# Patient Record
Sex: FEMALE | Race: WHITE | NOT HISPANIC OR LATINO | URBAN - METROPOLITAN AREA
[De-identification: names, ages, dates, MRNs, and addresses within clinical notes are randomized per-mention and may not be internally consistent; named-entity substitution may affect disease eponyms.]

---

## 2016-04-14 RX ORDER — ASPIRIN/CALCIUM CARB/MAGNESIUM 324 MG
1 TABLET ORAL
Qty: 0 | Refills: 0 | COMMUNITY
Start: 2016-04-14

## 2016-04-14 RX ORDER — ACETAMINOPHEN 500 MG
2 TABLET ORAL
Qty: 0 | Refills: 0 | COMMUNITY
Start: 2016-04-14

## 2018-07-07 ENCOUNTER — INPATIENT (INPATIENT)
Facility: HOSPITAL | Age: 83
LOS: 17 days | Discharge: HOME | DRG: 377 | End: 2018-07-25
Attending: FAMILY MEDICINE | Admitting: FAMILY MEDICINE
Payer: COMMERCIAL

## 2018-07-07 VITALS
OXYGEN SATURATION: 100 % | TEMPERATURE: 98 F | SYSTOLIC BLOOD PRESSURE: 80 MMHG | RESPIRATION RATE: 15 BRPM | DIASTOLIC BLOOD PRESSURE: 50 MMHG | HEART RATE: 91 BPM

## 2018-07-07 DIAGNOSIS — E83.51 HYPOCALCEMIA: ICD-10-CM

## 2018-07-07 DIAGNOSIS — J45.909 UNSPECIFIED ASTHMA, UNCOMPLICATED: ICD-10-CM

## 2018-07-07 DIAGNOSIS — E11.22 TYPE 2 DIABETES MELLITUS WITH DIABETIC CHRONIC KIDNEY DISEASE: ICD-10-CM

## 2018-07-07 DIAGNOSIS — I95.9 HYPOTENSION, UNSPECIFIED: ICD-10-CM

## 2018-07-07 DIAGNOSIS — E03.9 HYPOTHYROIDISM, UNSPECIFIED: ICD-10-CM

## 2018-07-07 DIAGNOSIS — F41.9 ANXIETY DISORDER, UNSPECIFIED: ICD-10-CM

## 2018-07-07 DIAGNOSIS — D64.9 ANEMIA, UNSPECIFIED: ICD-10-CM

## 2018-07-07 DIAGNOSIS — M19.90 UNSPECIFIED OSTEOARTHRITIS, UNSPECIFIED SITE: ICD-10-CM

## 2018-07-07 DIAGNOSIS — Z29.9 ENCOUNTER FOR PROPHYLACTIC MEASURES, UNSPECIFIED: ICD-10-CM

## 2018-07-07 DIAGNOSIS — N17.9 ACUTE KIDNEY FAILURE, UNSPECIFIED: ICD-10-CM

## 2018-07-07 DIAGNOSIS — R53.1 WEAKNESS: ICD-10-CM

## 2018-07-07 LAB
ABO RH CONFIRMATION: SIGNIFICANT CHANGE UP
ALBUMIN SERPL ELPH-MCNC: 1.7 G/DL — LOW (ref 3.3–5)
ALP SERPL-CCNC: 66 U/L — SIGNIFICANT CHANGE UP (ref 40–120)
ALT FLD-CCNC: 8 U/L — LOW (ref 12–78)
ANION GAP SERPL CALC-SCNC: 7 MMOL/L — SIGNIFICANT CHANGE UP (ref 5–17)
ANION GAP SERPL CALC-SCNC: 8 MMOL/L — SIGNIFICANT CHANGE UP (ref 5–17)
ANISOCYTOSIS BLD QL: SIGNIFICANT CHANGE UP
APPEARANCE UR: ABNORMAL
APTT BLD: 29.6 SEC — SIGNIFICANT CHANGE UP (ref 27.5–37.4)
AST SERPL-CCNC: 13 U/L — LOW (ref 15–37)
BACTERIA # UR AUTO: ABNORMAL
BASO STIPL BLD QL SMEAR: PRESENT — SIGNIFICANT CHANGE UP
BASOPHILS # BLD AUTO: 0 K/UL — SIGNIFICANT CHANGE UP (ref 0–0.2)
BASOPHILS NFR BLD AUTO: 0 % — SIGNIFICANT CHANGE UP (ref 0–2)
BILIRUB SERPL-MCNC: 0.1 MG/DL — LOW (ref 0.2–1.2)
BILIRUB UR-MCNC: NEGATIVE — SIGNIFICANT CHANGE UP
BLD GP AB SCN SERPL QL: SIGNIFICANT CHANGE UP
BUN SERPL-MCNC: 62 MG/DL — HIGH (ref 7–23)
BUN SERPL-MCNC: 71 MG/DL — HIGH (ref 7–23)
CALCIUM SERPL-MCNC: 6.4 MG/DL — CRITICAL LOW (ref 8.5–10.1)
CALCIUM SERPL-MCNC: 8 MG/DL — LOW (ref 8.5–10.1)
CHLORIDE SERPL-SCNC: 115 MMOL/L — HIGH (ref 96–108)
CHLORIDE SERPL-SCNC: 120 MMOL/L — HIGH (ref 96–108)
CO2 SERPL-SCNC: 20 MMOL/L — LOW (ref 22–31)
CO2 SERPL-SCNC: 23 MMOL/L — SIGNIFICANT CHANGE UP (ref 22–31)
COLOR SPEC: YELLOW — SIGNIFICANT CHANGE UP
CREAT SERPL-MCNC: 1.9 MG/DL — HIGH (ref 0.5–1.3)
CREAT SERPL-MCNC: 2.1 MG/DL — HIGH (ref 0.5–1.3)
DIFF PNL FLD: ABNORMAL
ELLIPTOCYTES BLD QL SMEAR: SLIGHT — SIGNIFICANT CHANGE UP
EOSINOPHIL # BLD AUTO: 0 K/UL — SIGNIFICANT CHANGE UP (ref 0–0.5)
EOSINOPHIL NFR BLD AUTO: 0 % — SIGNIFICANT CHANGE UP (ref 0–6)
EPI CELLS # UR: SIGNIFICANT CHANGE UP
GLUCOSE SERPL-MCNC: 125 MG/DL — HIGH (ref 70–99)
GLUCOSE SERPL-MCNC: 138 MG/DL — HIGH (ref 70–99)
GLUCOSE UR QL: NEGATIVE — SIGNIFICANT CHANGE UP
HCT VFR BLD CALC: 12.6 % — CRITICAL LOW (ref 34.5–45)
HCT VFR BLD CALC: 22.3 % — LOW (ref 34.5–45)
HGB BLD-MCNC: 3.8 G/DL — CRITICAL LOW (ref 11.5–15.5)
HGB BLD-MCNC: 7.2 G/DL — LOW (ref 11.5–15.5)
HYPOCHROMIA BLD QL: SIGNIFICANT CHANGE UP
INR BLD: 1.53 RATIO — HIGH (ref 0.88–1.16)
KETONES UR-MCNC: NEGATIVE — SIGNIFICANT CHANGE UP
LACTATE SERPL-SCNC: 1.4 MMOL/L — SIGNIFICANT CHANGE UP (ref 0.7–2)
LEUKOCYTE ESTERASE UR-ACNC: ABNORMAL
LIDOCAIN IGE QN: 17 U/L — LOW (ref 73–393)
LYMPHOCYTES # BLD AUTO: 0.56 K/UL — LOW (ref 1–3.3)
LYMPHOCYTES # BLD AUTO: 6 % — LOW (ref 13–44)
MACROCYTES BLD QL: SLIGHT — SIGNIFICANT CHANGE UP
MANUAL SMEAR VERIFICATION: SIGNIFICANT CHANGE UP
MCHC RBC-ENTMCNC: 25.2 PG — LOW (ref 27–34)
MCHC RBC-ENTMCNC: 30.2 GM/DL — LOW (ref 32–36)
MCV RBC AUTO: 83.4 FL — SIGNIFICANT CHANGE UP (ref 80–100)
MICROCYTES BLD QL: SLIGHT — SIGNIFICANT CHANGE UP
MONOCYTES # BLD AUTO: 0.28 K/UL — SIGNIFICANT CHANGE UP (ref 0–0.9)
MONOCYTES NFR BLD AUTO: 3 % — SIGNIFICANT CHANGE UP (ref 2–14)
NEUTROPHILS # BLD AUTO: 8.42 K/UL — HIGH (ref 1.8–7.4)
NEUTROPHILS NFR BLD AUTO: 88 % — HIGH (ref 43–77)
NEUTS BAND # BLD: 3 % — SIGNIFICANT CHANGE UP (ref 0–8)
NITRITE UR-MCNC: NEGATIVE — SIGNIFICANT CHANGE UP
NRBC # BLD: 1 — SIGNIFICANT CHANGE UP
NRBC # BLD: SIGNIFICANT CHANGE UP /100 WBCS (ref 0–0)
OB PNL STL: NEGATIVE — SIGNIFICANT CHANGE UP
OVALOCYTES BLD QL SMEAR: SLIGHT — SIGNIFICANT CHANGE UP
PH UR: 5 — SIGNIFICANT CHANGE UP (ref 5–8)
PLAT MORPH BLD: NORMAL — SIGNIFICANT CHANGE UP
PLATELET # BLD AUTO: 151 K/UL — SIGNIFICANT CHANGE UP (ref 150–400)
POIKILOCYTOSIS BLD QL AUTO: SLIGHT — SIGNIFICANT CHANGE UP
POLYCHROMASIA BLD QL SMEAR: SLIGHT — SIGNIFICANT CHANGE UP
POTASSIUM SERPL-MCNC: 4.1 MMOL/L — SIGNIFICANT CHANGE UP (ref 3.5–5.3)
POTASSIUM SERPL-MCNC: 4.8 MMOL/L — SIGNIFICANT CHANGE UP (ref 3.5–5.3)
POTASSIUM SERPL-SCNC: 4.1 MMOL/L — SIGNIFICANT CHANGE UP (ref 3.5–5.3)
POTASSIUM SERPL-SCNC: 4.8 MMOL/L — SIGNIFICANT CHANGE UP (ref 3.5–5.3)
PROT SERPL-MCNC: 4.2 G/DL — LOW (ref 6–8.3)
PROT UR-MCNC: 25 MG/DL
PROTHROM AB SERPL-ACNC: 16.8 SEC — HIGH (ref 9.8–12.7)
RBC # BLD: 1.51 M/UL — LOW (ref 3.8–5.2)
RBC # BLD: 2.65 M/UL — LOW (ref 3.8–5.2)
RBC # FLD: 17.9 % — HIGH (ref 10.3–14.5)
RBC BLD AUTO: ABNORMAL
RBC CASTS # UR COMP ASSIST: ABNORMAL /HPF (ref 0–4)
RETICS #: 79.5 K/UL — SIGNIFICANT CHANGE UP (ref 25–125)
RETICS/RBC NFR: 3 % — HIGH (ref 0.5–2.5)
SODIUM SERPL-SCNC: 145 MMOL/L — SIGNIFICANT CHANGE UP (ref 135–145)
SODIUM SERPL-SCNC: 148 MMOL/L — HIGH (ref 135–145)
SP GR SPEC: 1.02 — SIGNIFICANT CHANGE UP (ref 1.01–1.02)
UROBILINOGEN FLD QL: NEGATIVE — SIGNIFICANT CHANGE UP
WBC # BLD: 9.25 K/UL — SIGNIFICANT CHANGE UP (ref 3.8–10.5)
WBC # FLD AUTO: 9.25 K/UL — SIGNIFICANT CHANGE UP (ref 3.8–10.5)
WBC UR QL: SIGNIFICANT CHANGE UP

## 2018-07-07 PROCEDURE — 71045 X-RAY EXAM CHEST 1 VIEW: CPT | Mod: 26

## 2018-07-07 PROCEDURE — 99223 1ST HOSP IP/OBS HIGH 75: CPT | Mod: AI,GC

## 2018-07-07 PROCEDURE — 72125 CT NECK SPINE W/O DYE: CPT | Mod: 26

## 2018-07-07 PROCEDURE — 93010 ELECTROCARDIOGRAM REPORT: CPT

## 2018-07-07 PROCEDURE — 99291 CRITICAL CARE FIRST HOUR: CPT

## 2018-07-07 PROCEDURE — 99223 1ST HOSP IP/OBS HIGH 75: CPT

## 2018-07-07 PROCEDURE — 74176 CT ABD & PELVIS W/O CONTRAST: CPT | Mod: 26

## 2018-07-07 PROCEDURE — 70450 CT HEAD/BRAIN W/O DYE: CPT | Mod: 26

## 2018-07-07 RX ORDER — DEXTROSE 50 % IN WATER 50 %
25 SYRINGE (ML) INTRAVENOUS ONCE
Qty: 0 | Refills: 0 | Status: DISCONTINUED | OUTPATIENT
Start: 2018-07-07 | End: 2018-07-25

## 2018-07-07 RX ORDER — GLUCAGON INJECTION, SOLUTION 0.5 MG/.1ML
1 INJECTION, SOLUTION SUBCUTANEOUS ONCE
Qty: 0 | Refills: 0 | Status: DISCONTINUED | OUTPATIENT
Start: 2018-07-07 | End: 2018-07-25

## 2018-07-07 RX ORDER — HEPARIN SODIUM 5000 [USP'U]/ML
0 INJECTION INTRAVENOUS; SUBCUTANEOUS
Qty: 0 | Refills: 0 | COMMUNITY

## 2018-07-07 RX ORDER — PANTOPRAZOLE SODIUM 20 MG/1
40 TABLET, DELAYED RELEASE ORAL ONCE
Qty: 0 | Refills: 0 | Status: COMPLETED | OUTPATIENT
Start: 2018-07-07 | End: 2018-07-07

## 2018-07-07 RX ORDER — ACETAMINOPHEN 500 MG
650 TABLET ORAL EVERY 6 HOURS
Qty: 0 | Refills: 0 | Status: DISCONTINUED | OUTPATIENT
Start: 2018-07-07 | End: 2018-07-25

## 2018-07-07 RX ORDER — CALCIUM GLUCONATE 100 MG/ML
2 VIAL (ML) INTRAVENOUS ONCE
Qty: 0 | Refills: 0 | Status: COMPLETED | OUTPATIENT
Start: 2018-07-07 | End: 2018-07-07

## 2018-07-07 RX ORDER — SODIUM CHLORIDE 9 MG/ML
1000 INJECTION, SOLUTION INTRAVENOUS
Qty: 0 | Refills: 0 | Status: DISCONTINUED | OUTPATIENT
Start: 2018-07-07 | End: 2018-07-25

## 2018-07-07 RX ORDER — DEXTROSE 50 % IN WATER 50 %
12.5 SYRINGE (ML) INTRAVENOUS ONCE
Qty: 0 | Refills: 0 | Status: DISCONTINUED | OUTPATIENT
Start: 2018-07-07 | End: 2018-07-25

## 2018-07-07 RX ORDER — DEXTROSE 50 % IN WATER 50 %
15 SYRINGE (ML) INTRAVENOUS ONCE
Qty: 0 | Refills: 0 | Status: DISCONTINUED | OUTPATIENT
Start: 2018-07-07 | End: 2018-07-25

## 2018-07-07 RX ORDER — LEVOTHYROXINE SODIUM 125 MCG
75 TABLET ORAL DAILY
Qty: 0 | Refills: 0 | Status: DISCONTINUED | OUTPATIENT
Start: 2018-07-07 | End: 2018-07-07

## 2018-07-07 RX ORDER — PANTOPRAZOLE SODIUM 20 MG/1
40 TABLET, DELAYED RELEASE ORAL DAILY
Qty: 0 | Refills: 0 | Status: DISCONTINUED | OUTPATIENT
Start: 2018-07-07 | End: 2018-07-07

## 2018-07-07 RX ORDER — PANTOPRAZOLE SODIUM 20 MG/1
8 TABLET, DELAYED RELEASE ORAL
Qty: 80 | Refills: 0 | Status: DISCONTINUED | OUTPATIENT
Start: 2018-07-07 | End: 2018-07-09

## 2018-07-07 RX ORDER — CLONAZEPAM 1 MG
0.5 TABLET ORAL
Qty: 0 | Refills: 0 | Status: DISCONTINUED | OUTPATIENT
Start: 2018-07-07 | End: 2018-07-14

## 2018-07-07 RX ORDER — INSULIN LISPRO 100/ML
VIAL (ML) SUBCUTANEOUS EVERY 6 HOURS
Qty: 0 | Refills: 0 | Status: DISCONTINUED | OUTPATIENT
Start: 2018-07-07 | End: 2018-07-09

## 2018-07-07 RX ORDER — LEVOTHYROXINE SODIUM 125 MCG
37.5 TABLET ORAL AT BEDTIME
Qty: 0 | Refills: 0 | Status: DISCONTINUED | OUTPATIENT
Start: 2018-07-07 | End: 2018-07-09

## 2018-07-07 RX ORDER — SODIUM CHLORIDE 9 MG/ML
1000 INJECTION INTRAMUSCULAR; INTRAVENOUS; SUBCUTANEOUS
Qty: 0 | Refills: 0 | Status: COMPLETED | OUTPATIENT
Start: 2018-07-07 | End: 2018-07-07

## 2018-07-07 RX ADMIN — PANTOPRAZOLE SODIUM 10 MG/HR: 20 TABLET, DELAYED RELEASE ORAL at 18:14

## 2018-07-07 RX ADMIN — Medication 200 GRAM(S): at 17:27

## 2018-07-07 RX ADMIN — SODIUM CHLORIDE 2000 MILLILITER(S): 9 INJECTION INTRAMUSCULAR; INTRAVENOUS; SUBCUTANEOUS at 13:00

## 2018-07-07 RX ADMIN — SODIUM CHLORIDE 1000 MILLILITER(S): 9 INJECTION INTRAMUSCULAR; INTRAVENOUS; SUBCUTANEOUS at 13:45

## 2018-07-07 RX ADMIN — SODIUM CHLORIDE 2000 MILLILITER(S): 9 INJECTION INTRAMUSCULAR; INTRAVENOUS; SUBCUTANEOUS at 13:15

## 2018-07-07 RX ADMIN — PANTOPRAZOLE SODIUM 40 MILLIGRAM(S): 20 TABLET, DELAYED RELEASE ORAL at 14:29

## 2018-07-07 RX ADMIN — SODIUM CHLORIDE 1000 MILLILITER(S): 9 INJECTION INTRAMUSCULAR; INTRAVENOUS; SUBCUTANEOUS at 13:30

## 2018-07-07 RX ADMIN — Medication 37.5 MICROGRAM(S): at 23:11

## 2018-07-07 NOTE — H&P ADULT - HISTORY OF PRESENT ILLNESS
84 y/o F with PMHx DM2, hypothyroidism, asthma, arthritis, meningoma (chronic), and  BIBEMS after syncopal episode at [facility].  Patient is a poor historian and is unsure why she is in the hospital.  Family at bedside reports that patient was discharged from [facility] the previous day after being admitted with AMS 2/2 UTI.  Family reports that yesterday patient was SOB.  They report that she bleeds easily, but denies knowledge of blood in stools, changes in urine color, open wounds.    In ED patient vitals are _______, afebrile, O2 Sat 100% on supp O2.  Labs significant for Hbg 3.8, Hct 12.6, Na 148, Cl 120, Co2 20, BUN 62, Crt 1.9, GFR 24, Ca 6.4.  EKG shows NSR.  Patient received 2 u PRBCs. 84 y/o F with PMHx DM2, hypothyroidism, asthma, arthritis, meningoma (chronic), and  BIBEMS after syncopal episode at [facility].  Patient is a poor historian and is unsure why she is in the hospital.  Family at bedside reports that patient was discharged from [facility] the previous day after being admitted with AMS 2/2 UTI.  Family reports that yesterday patient was SOB.  They report that she bleeds easily, but denies knowledge of blood in stools, changes in urine color, open wounds. Patient confirms abdominal pain.    Per ED physician after stool guaiac performed patient had large maroon colored loose BM.    In ED patient vitals are _______, afebrile, O2 Sat 100% on supp O2.  Labs significant for Hbg 3.8, Hct 12.6, Na 148, Cl 120, Co2 20, BUN 62, Crt 1.9, GFR 24, Ca 6.4.  EKG shows NSR.  Patient received 2 u PRBCs.  Patient CT ab/pelv pending. 82 y/o F with PMHx DM2, hypothyroidism, asthma, arthritis, meningoma (chronic), and  BIBEMS after syncopal episode at [facility].  Patient is a poor historian and is unsure why she is in the hospital.  Family at bedside reports that patient was discharged from [facility] the previous day after being admitted with AMS 2/2 UTI.  Family reports that yesterday patient was SOB.  They report that she bleeds easily, but denies knowledge of blood in stools, changes in urine color, open wounds. Patient confirms abdominal pain.    Per ED nurse after stool guaiac performed patient had red streaked stool.    In ED patient vitals are _______, afebrile, O2 Sat 100% on supp O2.  Labs significant for Hbg 3.8, Hct 12.6, Na 148, Cl 120, Co2 20, BUN 62, Crt 1.9, GFR 24, Ca 6.4.  EKG shows NSR.  Patient received 2 u PRBCs.  Patient CT ab/pelv pending. 84 y/o F with PMHx DM2, hypothyroidism, asthma, arthritis, meningoma (chronic), and  BIBEMS after syncopal episode at [facility].  Patient is a poor historian and is unsure why she is in the hospital.  Family at bedside reports that patient was discharged from [facility] the previous day after being admitted with AMS 2/2 UTI.  Family reports that yesterday patient was SOB.  They report that she bleeds easily, but denies knowledge of blood in stools, changes in urine color, open wounds. Patient confirms abdominal pain.    Per ED nurse after stool guaiac performed patient had red streaked stool.    In ED patient vitals are 108/54, afebrile, O2 Sat 100% on supp O2.  Labs significant for Hbg 3.8, Hct 12.6, Na 148, Cl 120, Co2 20, BUN 62, Crt 1.9, GFR 24, Ca 6.4.  EKG shows NSR.  Patient received 2 u PRBCs.  Patient CT ab/pelv pending. 82 y/o F with PMHx DM2, hypothyroidism, asthma, arthritis, meningoma (chronic), and  BIBEMS after syncopal episode at [facility].  Patient is a poor historian and is unsure why she is in the hospital.  Family at bedside reports that patient was discharged from [facility] the previous day after being admitted with AMS 2/2 UTI.  Family reports that yesterday patient was SOB.  They report that she bleeds easily, but denies knowledge of blood in stools, changes in urine color, open wounds. Patient confirms abdominal pain.    Per ED nurse after stool guaiac performed patient had red streaked stool.    In ED patient vitals are 108/54, afebrile, O2 Sat 100% on supp O2.  Labs significant for Hbg 3.8, Hct 12.6, Na 148, Cl 120, Co2 20, BUN 62, Crt 1.9, GFR 24, Ca 6.4. UA+ for Large blood, leukocyte esterase. EKG shows NSR.  Patient received 2 u PRBCs.  CT head/neck shows no acute pathology. Patient CT ab/pelv pending. 82 y/o F with PMHx DM2, hypothyroidism, asthma, arthritis, meningoma (chronic), and  BIBEMS after syncopal episode at [facility]. Patient is a poor historian and is unsure why she is in the hospital.  Family at bedside reports that patient was discharged from  the previous day after being admitted with AMS 2/2 UTI.  Family reports that yesterday patient was SOB.  They report that she bleeds easily, but denies knowledge of blood in stools, changes in urine color, open wounds. Patient confirms abdominal pain. She denies chest pain, shortness of breath, palpitations, nausea or vomiting. She also denies dizziness blurry vision.    Per ED nurse after stool guaiac performed patient had red streaked stool.    In ED patient vitals are 108/54, afebrile, O2 Sat 100% on supp O2.  Labs significant for Hbg 3.8, Hct 12.6, Na 148, Cl 120, Co2 20, BUN 62, Crt 1.9, GFR 24, Ca 6.4. UA+ for Large blood, leukocyte esterase. EKG shows NSR.  Patient received 2 u PRBCs.  CT head/neck shows no acute pathology. Patient CT A/P ordered.

## 2018-07-07 NOTE — H&P ADULT - PROBLEM SELECTOR PLAN 9
- patient on Clonazepam 0.5 BID; may be contributing to disorientation: dose should be tapered if patient can tolerate

## 2018-07-07 NOTE — ED ADULT NURSE REASSESSMENT NOTE - NS ED NURSE REASSESS COMMENT FT1
Second unit of PRBCs given Unit # [id number]. Consent in chart. Risks and benefits explained to patient. Patient verbalized understanding of risks and benefits. Patient aware of possible side effects. Vital signs stable. Second RN Ligia at bedside for confirmation. Will continue to monitor throughout transfusion.

## 2018-07-07 NOTE — CONSULT NOTE ADULT - ASSESSMENT
Mrs. Collier is an 83 year old female with DM2, HTN, here with an episode of syncope, found to have significant anemia. History is difficult to obtain given her mental status.    - Transfuse to have Hb>8. Trend Hb closely  - Watch volume status. There is no echo on record so her LV function is unknown. Prn diuresis with PRBCs  - Hold all anti-HTN medications  - ICU evaluation  - Very mild troponin leak with normal CK not suggestive of ACS  - EKG is SR with no sign of ischemia or chamber enlargement  - Check ABG  - Baseline creatinine unknown, though normal in 2016.  - Watch creatinine and electrolytes. Keep K>4, Mg>2  - Will follow with you.

## 2018-07-07 NOTE — ED PROVIDER NOTE - ENMT, MLM
Airway patent, Nasal mucosa clear. Mouth with normal mucosa. Throat has no vesicles, no oropharyngeal exudates and uvula is midline. MM Moist. neck supple. no meningeal signs. no signs of head trauma

## 2018-07-07 NOTE — H&P ADULT - NSHPREVIEWOFSYSTEMS_GEN_ALL_CORE
Constitutional: Denies fever, chills, general malaise, weight loss, weight gain, diaphoresis   HEENT: Denies sore throat, runny nose, photophobia, blurry vision, double vision, eye pain, difficulty hearing, dizziness, dysphagia, epistaxis  Respiratory: Denies shortness of breath, dyspnea on exertion, cough, sputum production, wheezing, hemoptysis  Cardiovascular: Denies chest pain, palpitations, edema  Gastrointestinal: Denies nausea, vomiting, diarrhea, constipation, abdominal pain, melena, hematochezia   Genitourinary: Denies dysuria, hematuria, frequency, urgency, incontinence  Skin/Breast: Denies rash, hives, itching  Musculoskeletal: Denies muscle pains, muscle weakness, joint pain or swelling  Neurologic: Denies syncope, loss of consciousness, headache, weakness, dizziness, paresthesias, numbness, tingling, confusion, dementia   Psychiatric: Denies feeling anxious, depressed, suicidal, or homicidal thoughts  Endocrine: Denies cold or heat intolerance, polydipsia, polyphagia   Hematology/Oncology: Denies abnormal bruising, tender or enlarged lymph nodes   ROS negative except as noted above    Of note patient is alert, but not oriented and an inaccurate historian Constitutional: Denies fever, chills, general malaise, weight loss, weight gain, diaphoresis   HEENT: Denies sore throat, runny nose, photophobia, blurry vision, double vision, eye pain, difficulty hearing, dizziness, dysphagia, epistaxis  Respiratory: Denies shortness of breath, dyspnea on exertion, cough, sputum production, wheezing, hemoptysis  Cardiovascular: Denies chest pain, palpitations, edema  Gastrointestinal: + abdominal pain; Denies nausea, vomiting, diarrhea, constipation, melena, hematochezia   Genitourinary: Denies dysuria, hematuria, frequency, urgency, incontinence  Skin/Breast: Denies rash, hives, itching  Musculoskeletal: Denies muscle pains, muscle weakness, joint pain or swelling  Neurologic: Denies syncope, loss of consciousness, headache, weakness, dizziness, paresthesias, numbness, tingling, confusion, dementia   Psychiatric: Denies feeling anxious, depressed, suicidal, or homicidal thoughts  Endocrine: Denies cold or heat intolerance, polydipsia, polyphagia   Hematology/Oncology: Denies abnormal bruising, tender or enlarged lymph nodes   ROS negative except as noted above    Of note patient is alert, but not oriented and an inaccurate historian Constitutional: Denies fever, chills, general malaise, weight loss, weight gain, diaphoresis   HEENT: Denies sore throat, runny nose, photophobia, blurry vision, double vision, eye pain, difficulty hearing, dizziness, dysphagia, epistaxis  Respiratory: Denies shortness of breath, dyspnea on exertion, cough, sputum production, wheezing, hemoptysis  Cardiovascular: Denies chest pain, palpitations, edema  Gastrointestinal: + abdominal pain; Denies nausea, vomiting, diarrhea, constipation, melena, hematochezia   Genitourinary: Denies dysuria, hematuria, frequency, urgency, incontinence  Skin/Breast: Denies rash, hives, itching  Musculoskeletal: Denies muscle pains, muscle weakness, joint pain or swelling  Neurologic: Denies syncope, loss of consciousness, headache, weakness, dizziness, paresthesias, numbness, tingling, confusion, dementia   Psychiatric: Denies feeling anxious, depressed, suicidal, or homicidal thoughts  Endocrine: Denies cold or heat intolerance, polydipsia, polyphagia   Hematology/Oncology: + abnormal bruising: easily bruises (on heparin subcu)  ROS negative except as noted above    Of note patient is alert, but not oriented and an inaccurate historian As per HPI, limited due to confusion.

## 2018-07-07 NOTE — H&P ADULT - PROBLEM SELECTOR PLAN 5
currently on supplemental O2 given symptomatic anemia  duonebs q6 PRN wheezing/SOB Based on labs in HIE, patient appears to have CKD2, now with worsening in creatinine likely due to profound anemia/hypoperfusion vs prerenal due to recent illness  -Monitor CBC and possible renal consult if not improving post fluids and blood transfusion.

## 2018-07-07 NOTE — H&P ADULT - PROBLEM SELECTOR PLAN 3
- patient on SSI inpatient at [place]  - continue SSI (low) w/ hypoglycemia protocol - continue SSI (low) w/ accuchecks and hypoglycemia protocol

## 2018-07-07 NOTE — ED ADULT NURSE REASSESSMENT NOTE - NS ED NURSE REASSESS COMMENT FT1
Patient transported on monitor to CT with RN at bedside and portable monitor, patient remained stable during transport and placed back on hard cardiac monitor upon return to the ED.

## 2018-07-07 NOTE — ED PROVIDER NOTE - CARE PLAN
Principal Discharge DX:	Anemia, unspecified type  Secondary Diagnosis:	Syncope, unspecified syncope type  Secondary Diagnosis:	Hypotension

## 2018-07-07 NOTE — H&P ADULT - NSHPSOCIALHISTORY_GEN_ALL_CORE
Patient lives with son and daughter in-law.  Per family Patient lives with son and daughter in-law.  Per family patient ceased smoking cigarettes 15 years ago.  Patient drinks socially, and ambulates w/ assistance at home.

## 2018-07-07 NOTE — ED PROVIDER NOTE - PROGRESS NOTE DETAILS
Pt doing well, awake and alert. Still with persistent hypotension. Emergency blood xfuision ordered Dw Pt's daughter, Sheila Wyman, was not aware of pt being in hospital. She agrees with rx plan, will come see pt. Mal Gifford, accept admit. Pt doing well, some improvement in BP, pt seen by ICU, will monitor. Pt doing well, awake and alert. Still with persistent hypotension. Emergency blood xfuision ordered.  RN does states some blood in diaper, small amount after initial exam. Dw ICU re CT findings, admit to tele

## 2018-07-07 NOTE — H&P ADULT - ASSESSMENT
82 y/o F with PMHx DM2, hypothyroidism, asthma, arthritis, meningoma (chronic), and  BIBEMS after syncopal episode at [facility]. Admitted to telemetry w/ profound anemia, possibly 2/2 GIB. 84 y/o F with PMHx DM2, hypothyroidism, asthma, arthritis, meningoma (chronic), and  BIBEMS after syncopal episode at [facility]. Admitted to ICU w/ profound anemia, possibly 2/2 GIB.

## 2018-07-07 NOTE — CONSULT NOTE ADULT - ASSESSMENT
82 y/o female pmhx Hypothyroidism, DM, asthma who was discharged to rehab yesterday from [facility] w/ new diagnosis of CKD. Today in rehab patient had episode of syncope and was found to be hypotensive. Pt admitted w/ Acute blood loss anemia, hypotension,  Elevated INR, BARBARA on CKD, UTI and r/o GI bleed    Neuro: Stable  Resp: Stable, maintaining O2 sat >92% on room air.   CV: Hypotension, due to hypovolemia. Received 2L IVF and 2 PRBC w/ good volume response in BP. Will continue to monitor HD. If she becomes hypotensive will start vasopressors to keep MAP >65.   GI: r/o GI bleed, Started Protonix gtt. Watch for active bleeding. Low threshold for CTA of abdomen/pelvis if Hg continues to drop. Has never had EGD/colonoscopy. GI consulted. f/u recommendations  Renal: BARBARA on CKD, may be due to hypoperfusion. Trend BUN/Crt. Oliva Catheter Monitor I&Os. Avoid nephrotoxins   Endo: DM, c/w ISS w/ hypoglycemia protocol  -Hypothyroid, c/w home dose synthroid  Heme: Acute Blood loss anemia, initial Hg 3.8. s/p 2PRBC. Additional 2more ordered. CBC q6hr. Transfuse PRN. ?Hemolysis, f/u LDH, haptoglobin and retic count   - Elevated INR, pt does not take AC. only takes 81mg ASA. Will give 1unit Platelets

## 2018-07-07 NOTE — H&P ADULT - NSHPPHYSICALEXAM_GEN_ALL_CORE
Physical Exam:  General: ill appearing, pale female  HEENT: Normocephallic Atraumatic, PERRLA, EOMI bl, dry mucous membranes   Neck: Supple, nontender, no mass  Neurology: AA&Ox1 (person), CN II-XII grossly intact, no focal deficits  Respiratory: Clear To Auscultation B/L, No Wheezes, rhonchi or rales  CV: Regular Rate and Rhythm, +S1/S2, no murmurs, rubs or gallops  Abdominal: Soft, LUQ Tenderness, Non-Distended +Bowel Soundsx4  Extremities: No Clubbing, cyanosis or edema, + peripheral pulses: scaly chronic venous stasis bilaterally in LE  Musculoskeletal: Normal Range of motion, no joint erythema or warmth, no joint swelling   Skin: warm, dry, normal color, chronic venous stasis in LE bilaterally as noted above. Physical Exam:  General: ill appearing, pale female  HEENT: Normocephallic Atraumatic, PERRLA, EOMI bl, dry mucous membranes   Neck: Supple, nontender, no mass  Neurology: AA&Ox1 (person), CN II-XII grossly intact, no focal deficits  Respiratory: Clear To Auscultation B/L, No Wheezes, rhonchi or rales  CV: Regular Rate and Rhythm, +S1/S2, no murmurs, rubs or gallops  Abdominal: Soft, LUQ Tenderness, Non-Distended +Bowel Soundsx4: mild ecchymosis across LLQ  Extremities: No Clubbing, cyanosis or edema, + peripheral pulses: scaly chronic venous stasis bilaterally in LE  Musculoskeletal: Normal Range of motion, no joint erythema or warmth, no joint swelling   Skin: warm, dry, normal color, chronic venous stasis in LE bilaterally as noted above. Physical Exam:  General: ill appearing, pale female  HEENT: Normocephallic Atraumatic, PERRLA, EOMI bl, +conjunctival pallor, dry mucous membranes   Neck: Supple, nontender, no mass  Neurology: AA&Ox1 (person), CN II-XII grossly intact, no focal deficits  Respiratory: Clear To Auscultation B/L, No Wheezes, rhonchi or rales  CV: Regular Rate and Rhythm, +S1/S2, no murmurs, rubs or gallops  Abdominal: Soft, LUQ Tenderness, Non-Distended +Bowel Soundsx4: mild ecchymosis across LLQ  Extremities: No Clubbing, cyanosis or edema, + peripheral pulses: scaly chronic venous stasis bilaterally in LE  Musculoskeletal: Normal Range of motion, no joint erythema or warmth, no joint swelling   Skin: warm, dry, normal color, chronic venous stasis in LE bilaterally as noted above.

## 2018-07-07 NOTE — ED PROVIDER NOTE - GASTROINTESTINAL, MLM
Abdomen soft, non-tender, no guarding. non-distended. Old bruising lower abd,. No acute signs of trauma. Brown stool per rectum, otherwise nl rectum exam

## 2018-07-07 NOTE — H&P ADULT - ATTENDING COMMENTS
Case discussed with Dr. Klein, PGY2 and I agree with her assessment and plan above, edited where necessary. Patient has been accepted to ICU for further management. Attempted to reach PCP for patient, Patricia La NP at [phone] and was unable to do so.

## 2018-07-07 NOTE — H&P ADULT - PROBLEM SELECTOR PROBLEM 6
Arthritis Asthma, unspecified asthma severity, unspecified whether complicated, unspecified whether persistent

## 2018-07-07 NOTE — H&P ADULT - PROBLEM SELECTOR PLAN 7
IMPROVE VTE Individual Risk Assessment        RISK                                                          Points  [  ] Previous VTE                                                3  [  ] Thrombophilia                                             2  [ x ] Lower limb paralysis                                   2        (unable to hold up >15 seconds)    [  ] Current Cancer                                            2         (within 6 months)  [ x ] Immobilization > 24 hrs                              1  [  ] ICU/CCU stay > 24 hours                            1  [ x ] Age > 60                                                    1  IMPROVE VTE Score __4___    SCD's given GIB: hold all a/c  PPI for GIB pending further workup - patient on Clonazepam 0.5 BID; may be contributing to disorientation: consider tapering dose as tolerated Corrected for hypoalbuminemia is 8.24  ? due to BARBARA

## 2018-07-07 NOTE — H&P ADULT - PROBLEM SELECTOR PLAN 2
- patient receiving blood products now  - ICU consult pending; may need pressors  - monitor vitals closely - patient receiving blood products now: watch volume status closely: no echo  - ICU consult pending;  - Cardio, Dr. Ohara, consulted  - monitor vitals closely - patient receiving blood products now: watch volume status closely: no echo on record: EF unknown  - ICU consult pending;  - Cardio, Dr. Ohara, consulted  - monitor vitals closely - patient receiving blood products now: watch volume status closely: no echo on record: EF unknown  -HD stable for now.   -Accepted to ICU. Further management per ICU team   - Cardio, Dr. Ohara, consulted  - monitor vitals closely

## 2018-07-07 NOTE — ED ADULT NURSE NOTE - OBJECTIVE STATEMENT
Assumed patient care @ 1245. Pt received sitting on stretcher in no apparent distress NSR on cardiac monitor on 2L NC. Pt AOx3 at this time. Patient reports pain to her left arm but denies falling. Patient does state "they said I passed out, but I don't remember". Patient with foul smelling stool like a GI bleed at this time. RN to assess backside/stool.  Denies Nausea, Vomiting, Diarrhea. Skin warm, dry, color appropriate for age and race. Assumed patient care @ 1245. Pt received sitting on stretcher in no apparent distress NSR on cardiac monitor on 2L NC. Pt AOx3 at this time. Patient reports pain to her left arm but denies falling. Patient does state "they said I passed out, but I don't remember". Patient with blood streaked stool in rectum. Patient changed and repositioned. Patient complaining of Left arm pain from an old injury. Patient with DTI to whole bottom. Patient with stage 1 to left inner thigh. Abd soft nontender with + bowel sounds. Patient with Oliva on arrival that has been changed. Denies Nausea, Vomiting, Diarrhea, chest pain, shortness of breath, fever, chills. Patient pale in color.

## 2018-07-07 NOTE — CONSULT NOTE ADULT - ATTENDING COMMENTS
suspected GIB and acute blood loss anemia    hypotension improving with PRBCs and IVF  PPI gtt, transfuse 3rd PRBC and platelets, serial CBCs  GI eval  BARBARA on CKD, suspect will improve with volume repletion and PRBC  plan discussed with family at length

## 2018-07-07 NOTE — CONSULT NOTE ADULT - SUBJECTIVE AND OBJECTIVE BOX
Patient is a 83y old  Female who presents with a chief complaint of profound anemia (2018 14:43)      84 y/o female pmhx Hypothyroidism, DM, asthma     PAST MEDICAL & SURGICAL HISTORY:  Arthritis  Asthma  Diabetes mellitus  Hypothyroidism  No significant past surgical history    Allergies    Levaquin (Hives)  sulfa drugs (Unknown)    Intolerances      FAMILY HISTORY:  No pertinent family history in first degree relatives      Review of Systems:  CONSTITUTIONAL: No fever, chills, or fatigue  EYES: No eye pain, visual disturbances, or discharge  ENMT:  No difficulty hearing, tinnitus, vertigo; No sinus or throat pain  NECK: No pain or stiffness  RESPIRATORY: No cough, wheezing, chills or hemoptysis; No shortness of breath  CARDIOVASCULAR: No chest pain, palpitations, dizziness, or leg swelling  GASTROINTESTINAL: No abdominal or epigastric pain. No nausea, vomiting, or hematemesis; No diarrhea or constipation. No melena or hematochezia.  GENITOURINARY: No dysuria, frequency, hematuria, or incontinence  NEUROLOGICAL: No headaches, memory loss, loss of strength, numbness, or tremors  SKIN: No itching, burning, rashes, or lesions   MUSCULOSKELETAL: No joint pain or swelling; No muscle, back, or extremity pain  PSYCHIATRIC: No depression, anxiety, mood swings, or difficulty sleeping      Medications:        acetaminophen   Tablet 650 milliGRAM(s) Oral every 6 hours PRN  clonazePAM Tablet 0.5 milliGRAM(s) Oral two times a day        pantoprazole  Injectable 40 milliGRAM(s) IV Push daily      levothyroxine 75 MICROGram(s) Oral daily                  ICU Vital Signs Last 24 Hrs  T(C): 36.4 (2018 15:45), Max: 36.8 (2018 12:25)  T(F): 97.5 (2018 15:45), Max: 98.3 (2018 12:25)  HR: 75 (2018 16:09) (70 - 91)  BP: 115/52 (2018 16:09) (79/54 - 121/41)  BP(mean): --  ABP: --  ABP(mean): --  RR: 16 (2018 16:09) (15 - 16)  SpO2: 97% (2018 16:09) (97% - 100%)    Vital Signs Last 24 Hrs  T(C): 36.4 (2018 15:45), Max: 36.8 (2018 12:25)  T(F): 97.5 (2018 15:45), Max: 98.3 (2018 12:25)  HR: 75 (2018 16:09) (70 - 91)  BP: 115/52 (2018 16:09) (79/54 - 121/41)  BP(mean): --  RR: 16 (2018 16:09) (15 - 16)  SpO2: 97% (2018 16:09) (97% - 100%)        I&O's Detail        LABS:                        3.8    9.25  )-----------( 151      ( 2018 13:07 )             12.6         148<H>  |  120<H>  |  62<H>  ----------------------------<  125<H>  4.1   |  20<L>  |  1.90<H>    Ca    6.4<LL>      2018 13:07    TPro  4.2<L>  /  Alb  1.7<L>  /  TBili  0.1<L>  /  DBili  x   /  AST  13<L>  /  ALT  8<L>  /  AlkPhos  66  -07      CARDIAC MARKERS ( 2018 13:07 )  .034 ng/mL / x     / 27 U/L / x     / 3.1 ng/mL      CAPILLARY BLOOD GLUCOSE        PT/INR - ( 2018 13:07 )   PT: 16.8 sec;   INR: 1.53 ratio         PTT - ( 2018 13:07 )  PTT:29.6 sec  Urinalysis Basic - ( 2018 14:08 )    Color: Yellow / Appearance: Slightly Turbid / S.020 / pH: x  Gluc: x / Ketone: Negative  / Bili: Negative / Urobili: Negative   Blood: x / Protein: 25 mg/dL / Nitrite: Negative   Leuk Esterase: Moderate / RBC: 6-10 /HPF / WBC 3-5   Sq Epi: x / Non Sq Epi: Few / Bacteria: Few      CULTURES:      Physical Examination:    General: No acute distress.  Alert, oriented, interactive, nonfocal    HEENT: Pupils equal, reactive to light.  Symmetric.    PULM: Clear to auscultation bilaterally, no significant sputum production    CVS: Regular rate and rhythm, no murmurs, rubs, or gallops    ABD: Soft, nondistended, nontender, normoactive bowel sounds, no masses    EXT: No edema, nontender    SKIN: Warm and well perfused, no rashes noted.    NEURO: A&Ox3, strength 5/5 all extremities, cranial nerves grossly intact, no focal deficits    RADIOLOGY: ***    CRITICAL CARE TIME SPENT: ***  Evaluating/treating patient, reviewing data/labs/imaging, discussing case with multidisciplinary team, discussing plan/goals of care with patient/family. Non-inclusive of procedure time. Patient is a 83y old  Female who presents with a chief complaint of profound anemia (2018 14:43)      82 y/o female pmhx Hypothyroidism, DM, asthma who was discharged to rehab yesterday from [facility] w/ new diagnosis of CKD. Today in rehab patient had episode of syncope and was found to be hypotensive. Pt BIBEMS, Lethargic but arousable, hypotensive w/ SBP 70's. Pt found to have Hg 3.8, was emergently transfused 2 PRBC and given 2L IVF w/ slight BP response w/ SBP in low  100's.  No signs of active GI bleeding, hematuria, CT head/ abdomen/pelvis negative for any acute hemhorrgae /hematoma.  Pt denies chest pain, SOB, N/V, diarrhea, fever/chills, headace /dizziness.    Pt seen and examined in ED, lethargic, will awake to verbal stimuli but falls back asleep . As per ED nurse, she noticed streaks of BRBPR when after BM. Pt is guaiac negative.     PAST MEDICAL & SURGICAL HISTORY:  Arthritis  Asthma  Diabetes mellitus  Hypothyroidism    No significant past surgical history    Allergies    Levaquin (Hives)  sulfa drugs (Unknown)    Intolerances      FAMILY HISTORY:  No pertinent family history in first degree relatives      Review of Systems:  CONSTITUTIONAL: No fever, chills, or fatigue  EYES: No eye pain, visual disturbances, or discharge  ENMT:  No difficulty hearing, tinnitus, vertigo; No sinus or throat pain  NECK: No pain or stiffness  RESPIRATORY: No cough, wheezing, chills or hemoptysis; No shortness of breath  CARDIOVASCULAR: No chest pain, palpitations, dizziness, or leg swelling  GASTROINTESTINAL: ( +)abdominal pain. No nausea, vomiting, or hematemesis; No diarrhea or constipation. No melena or hematochezia.  GENITOURINARY: No dysuria, frequency, hematuria, or incontinence  NEUROLOGICAL: No headaches, memory loss, loss of strength, numbness, or tremors  SKIN: No itching, burning, rashes, or lesions   MUSCULOSKELETAL: No joint pain or swelling; No muscle, back, or extremity pain  PSYCHIATRIC: No depression, anxiety, mood swings, or difficulty sleeping      Medications:        acetaminophen   Tablet 650 milliGRAM(s) Oral every 6 hours PRN  clonazePAM Tablet 0.5 milliGRAM(s) Oral two times a day  pantoprazole  Injectable 40 milliGRAM(s) IV Push daily  levothyroxine 75 MICROGram(s) Oral daily                  ICU Vital Signs Last 24 Hrs  T(C): 36.4 (2018 15:45), Max: 36.8 (2018 12:25)  T(F): 97.5 (2018 15:45), Max: 98.3 (2018 12:25)  HR: 77  BP: 102/69  RR: 18  SpO2: 97%          I&O's Detail        LABS:                        3.8    9.25  )-----------( 151      ( 2018 13:07 )             12.6         148<H>  |  120<H>  |  62<H>  ----------------------------<  125<H>  4.1   |  20<L>  |  1.90<H>    Ca    6.4<LL>      2018 13:07    TPro  4.2<L>  /  Alb  1.7<L>  /  TBili  0.1<L>  /  DBili  x   /  AST  13<L>  /  ALT  8<L>  /  AlkPhos  66  07-07      CARDIAC MARKERS ( 2018 13:07 )  .034 ng/mL / x     / 27 U/L / x     / 3.1 ng/mL      CAPILLARY BLOOD GLUCOSE        PT/INR - ( 2018 13:07 )   PT: 16.8 sec;   INR: 1.53 ratio         PTT - ( 2018 13:07 )  PTT:29.6 sec  Urinalysis Basic - ( 2018 14:08 )    Color: Yellow / Appearance: Slightly Turbid / S.020 / pH: x  Gluc: x / Ketone: Negative  / Bili: Negative / Urobili: Negative   Blood: x / Protein: 25 mg/dL / Nitrite: Negative   Leuk Esterase: Moderate / RBC: 6-10 /HPF / WBC 3-5   Sq Epi: x / Non Sq Epi: Few / Bacteria: Few      CULTURES:      Physical Examination:    General: Extremely pale. Obese elderly female. Lethargic w/ out acute distress     HEENT: Pupils equal, reactive to light.  Symmetric.    PULM: Clear to auscultation bilaterally, no significant sputum production. No wheeze/rales/ rhonchi. Non labored breathing     CVS: + S1/S2. NSR. no murmurs. NO JVD     ABD: Soft, distended, nontender, normoactive bowel sounds, no masses. + Ecchymosis on lower abdomen     EXT: No edema, nontender. Chronic venous stasis     SKIN: Warm and well perfused, no rashes noted.    NEURO: Awake/ alert. Interactive Follows commands. Able to move all extremities. No focal deficits     RADIOLOGY: < from: CT Abdomen and Pelvis No Cont (18 @ 16:10) >    INTERPRETATION:    CT ABDOMEN and PELVIS without IV contrast dated 2018 4:10 PM    INDICATION: 83-year-old female severe anemia, ro RP bleed        TECHNIQUE: CT of the abdomen and pelvis was performed without intravenous   contrast administration. Axial, sagittal and coronal images were produced   and reviewed.  Enteric contrast was not administered, limiting complete   evaluation of the gastrointestinal tract. The patient is imaged with both   upper extremities in the gantry.    COMPARISON: None.    FINDINGS: Images of the lower chest demonstrate trace right pleural   effusion and subjacent right basilar atelectasis. There is no pericardial   effusion or hematoma. Atherosclerotic coronary artery calcifications are   noted. There is visualization of the hyperdense and the ventricular   septum, in keeping with history of anemia. A small hiatal hernia is   noted.     Postcholecystectomy surgical clips are seen in the gallbladder fossa. The   pancreas is completely fatty replaced. There is an apparent 2 cm   circumscribed cortical hypodensity in the left kidney, with an adjacent   cortical dystrophic calcification The liver, spleen, adrenal glands and   right kidney have a grossly unremarkable nonenhanced appearance.    Extensive atheromatous disease throughout the aortobiiliac system,   without aneurysm. There is also marked mural calcification of the splenic   artery. No retroperitoneal hematoma is present. No retroperitoneal mass   or lymphadenopathy is seen.     Enteric contrast was not administered, limiting complete evaluation of   the gastrointestinal tract. Evaluation of the nonopacified small bowel   demonstrates no dilatation or air-fluid levels. There is an apparent   intraluminal lipoma within the second portion of duodenum. There is a   moderate-sized rectal stool ball with circumferential mural thickening   and presacral edema stranding, most suggestive of stercoral colitis.   There is no proximal colonic obstruction. The appendix is not visualized.   No ascites no pneumoperitoneum is seen.    There is no pelvic hematoma, mass or lymphadenopathy. The urinary bladder   is decompressed around a Oliva catheter.    Evaluation of the osseous structures demonstrates a mild S-shaped   thoracolumbar scoliosis with multilevel multifactorial degenerative   spinal changes. There is a benign-appearing bone island in the right   iliac wing. Is heterogeneous appearance of the sacral ala which is felt   to represent to be related to stress-related change.      IMPRESSION:  No retroperitoneal or pelvic hematoma.    Constellation of findings are most suggestive of rectal stercoral colitis.    Trace right pleural effusion.    Oliva catheter in situ.    < end of copied text >    < from: CT Head No Cont (18 @ 13:44) >    CLINICAL INFORMATION: weakness, hypotension, altered mental status    TECHNIQUE: Contiguous axial 4 mm sections were obtained through the head.     COMPARISON: Head CT 2016    FINDINGS:     The ventricles and sulci are prominent, compatible with age-related   generalized cerebral volume loss.   There is no CT evidence for acute   cerebral cortical infarct. There is no evidence of hemorrhage. There is   periventricular and subcortical white matter hypoattenuation,  most   compatible with chronic microvascular ischemic changes.   Nomass effect   is found in the brain.  There is no midline shift or herniation pattern.    Stable calcified meningiomas along the right parietal and left temporal   convexity. Additional small noncalcified right parafalcine meningioma is   noted.    The visualized portions of the paranasal sinuses and mastoid air cells   are clear.      Heterogeneous calvarium again noted.    IMPRESSION:       No evidence of acute intracranial abnormality.  No evidence of   hemorrhage.      Age-related involutional changes as above.    < end of copied text >    CRITICAL CARE TIME SPENT: 45 min  Evaluating/treating patient, reviewing data/labs/imaging, discussing case with multidisciplinary team, discussing plan/goals of care with patient/family. Non-inclusive of procedure time.

## 2018-07-07 NOTE — H&P ADULT - PROBLEM SELECTOR PROBLEM 3
Type 2 diabetes mellitus with diabetic chronic kidney disease, unspecified CKD stage, unspecified whether long term insulin use

## 2018-07-07 NOTE — H&P ADULT - PROBLEM SELECTOR PROBLEM 5
Asthma, unspecified asthma severity, unspecified whether complicated, unspecified whether persistent Acute kidney injury superimposed on CKD

## 2018-07-07 NOTE — ED PROVIDER NOTE - CONSTITUTIONAL, MLM
normal... Well appearing, well nourished, awake, alert, oriented to person, place, time/situation and in no apparent distress. Well appearing, well nourished, awake, alert, oriented to person, place, time/situation and in no apparent distress. pos palllor

## 2018-07-07 NOTE — CONSULT NOTE ADULT - SUBJECTIVE AND OBJECTIVE BOX
[facility] [place] [facility] - Mabel Hernandez, Jessica, Cesar, Holly, Mayda Hartman  Office Number: [phone]    Initial Consult Note    CHIEF COMPLAINT: Patient is a 83y old  Female who presents with a chief complaint of syncope    HPI:  82 yo F p/w weakness x 1 day, reported low Hgb. Pt hypotensive by EMS. No known trauma, pt reportedly passed out this am at SNF. no abd pain. No vomiting. no diarrhea. Pt co feeling lousy, otherwise no other acute co. No cp/sob/palp.    Unable to obtain additional history, except on SNF sheets.  She is pale and weak, and awakes to speech and name, but falls asleep quickly.  Denies chest pain at current time.    PAST MEDICAL & SURGICAL HISTORY:  Arthritis  Asthma  Diabetes mellitus  Hypothyroidism  No significant past surgical history      SOCIAL HISTORY:  No tobacco, ethanol, or drug abuse.    FAMILY HISTORY:  No pertinent family history in first degree relatives    No family history of acute MI or sudden cardiac death.    MEDICATIONS  (STANDING):    MEDICATIONS  (PRN):      Allergies    Levaquin (Hives)  sulfa drugs (Unknown)    Intolerances        REVIEW OF SYSTEMS:  Unable to accurately assess    VITAL SIGNS:   Vital Signs Last 24 Hrs  T(C): 36.4 (07 Jul 2018 14:23), Max: 36.8 (07 Jul 2018 12:25)  T(F): 97.6 (07 Jul 2018 14:23), Max: 98.3 (07 Jul 2018 12:25)  HR: 76 (07 Jul 2018 14:23) (76 - 91)  BP: 102/53 (07 Jul 2018 14:23) (79/54 - 121/41)  BP(mean): --  RR: 16 (07 Jul 2018 14:23) (15 - 16)  SpO2: 100% (07 Jul 2018 14:23) (100% - 100%)    I&O's Summary      On Exam:    Constitutional: NAD, pale, sleeping  Lungs:  Non-labored, breath sounds are clear bilaterally, No wheezing, rales or rhonchi  Cardiovascular: RRR.  S1 and S2 positive.  No murmurs, rubs, gallops or clicks  Gastrointestinal: Bowel Sounds present, soft, nontender.   Lymph: chronic peripheral edema. No cervical lymphadenopathy.  Neurological: Unable to assess  Skin: No rashes or ulcers   Psych:  Unable to assess    LABS: All Labs Reviewed:                        3.8    9.25  )-----------( 151      ( 07 Jul 2018 13:07 )             12.6     07 Jul 2018 13:07    148    |  120    |  62     ----------------------------<  125    4.1     |  20     |  1.90     Ca    6.4        07 Jul 2018 13:07    TPro  4.2    /  Alb  1.7    /  TBili  0.1    /  DBili  x      /  AST  13     /  ALT  8      /  AlkPhos  66     07 Jul 2018 13:07    PT/INR - ( 07 Jul 2018 13:07 )   PT: 16.8 sec;   INR: 1.53 ratio         PTT - ( 07 Jul 2018 13:07 )  PTT:29.6 sec  CARDIAC MARKERS ( 07 Jul 2018 13:07 )  .034 ng/mL / x     / 27 U/L / x     / 3.1 ng/mL      Blood Culture:         RADIOLOGY:    EKG: SR

## 2018-07-07 NOTE — H&P ADULT - PROBLEM SELECTOR PLAN 1
- admit to telemetry  - possibly 2/2 GIB  - 2 unit PRBC in ED, f/u H&H  - ICU consult, given profound anemia, and hypotension, recs appreciated.  - CT ab/pelv pending, fu results - admit to telemetry  - possibly 2/2 GIB  - 2 unit PRBC in ED, f/u H&H  - ICU consult, given profound anemia, and hypotension, recs appreciated.  - CT ab/pelv pending, fu results  - bedrest  - fall precautions  - NPO for possible procedure - admit to telemetry  - possibly 2/2 GIB: GI, Dr. Daly consulted, f/u recs  - 2 unit PRBC in ED, f/u H&H  - ICU consult, given profound anemia, and hypotension, recs appreciated.  - CT ab/pelv pending, fu results  - bedrest  - fall precautions  - NPO for possible procedure - accepted to ICU  - possibly 2/2 GIB: GI, Dr. Daly consulted, f/u recs  - 2 unit PRBC in ED, f/u H&H  -1 unit platelets ordered as well given recent Aspirin use.  -CT A/P shows possible rectal stercoral colitis, no source of bleed evident.  - fall precautions  - NPO for possible procedure

## 2018-07-07 NOTE — H&P ADULT - PROBLEM SELECTOR PLAN 8
IMPROVE VTE Individual Risk Assessment        RISK                                                          Points  [  ] Previous VTE                                                3  [  ] Thrombophilia                                             2  [ x ] Lower limb paralysis                                   2        (unable to hold up >15 seconds)    [  ] Current Cancer                                            2         (within 6 months)  [ x ] Immobilization > 24 hrs                              1  [  ] ICU/CCU stay > 24 hours                            1  [ x ] Age > 60                                                    1  IMPROVE VTE Score __4___    SCD's given GIB: hold all a/c  PPI for GIB pending further workup - HOLD ALL NSAIDS  - tylenol 650mg PO q6 PRN mild pain

## 2018-07-07 NOTE — ED ADULT NURSE REASSESSMENT NOTE - NS ED NURSE REASSESS COMMENT FT1
1 unit emergency transfusion blood given Unit # [id number]. Consent in chart. Risks and benefits explained to patient. Patient verbalized understanding of risks and benefits. Patient aware of possible side effects. Vital signs stable. Second RN Severe, at bedside for confirmation. Will continue to monitor throughout transfusion.

## 2018-07-07 NOTE — H&P ADULT - PROBLEM SELECTOR PLAN 10
IMPROVE VTE Individual Risk Assessment        RISK                                                          Points  [  ] Previous VTE                                                3  [  ] Thrombophilia                                             2  [ x ] Lower limb paralysis                                   2        (unable to hold up >15 seconds)    [  ] Current Cancer                                            2         (within 6 months)  [ x ] Immobilization > 24 hrs                              1  [  ] ICU/CCU stay > 24 hours                            1  [ x ] Age > 60                                                    1  IMPROVE VTE Score __4___    SCD's given GIB: hold all a/c  PPI for GIB pending further workup

## 2018-07-07 NOTE — ED ADULT NURSE REASSESSMENT NOTE - NS ED NURSE REASSESS COMMENT FT1
Hospitalist @ bedside and patient's family states if patient is going to a regular floor they want her transferred to [facility]. ICUN Dr. Burch @ bedside and patient's family states if patient is going to a regular floor they want her transferred to [facility].

## 2018-07-07 NOTE — ED PROVIDER NOTE - OBJECTIVE STATEMENT
82 yo F p/w weakness x 1 day, reported low Hgb. Pt hypotensive by EMS. No known trauma, pt reportedly passed out this am at SNF. no abd pain. No vomiting. no diarrhea. Pt co feeling lousy, otherwise no other acute co. No cp/sob/palp.

## 2018-07-08 LAB
ALBUMIN SERPL ELPH-MCNC: 2 G/DL — LOW (ref 3.3–5)
ALP SERPL-CCNC: 74 U/L — SIGNIFICANT CHANGE UP (ref 40–120)
ALT FLD-CCNC: 7 U/L — LOW (ref 12–78)
ANION GAP SERPL CALC-SCNC: 8 MMOL/L — SIGNIFICANT CHANGE UP (ref 5–17)
APTT BLD: 33.3 SEC — SIGNIFICANT CHANGE UP (ref 27.5–37.4)
AST SERPL-CCNC: 12 U/L — LOW (ref 15–37)
BILIRUB SERPL-MCNC: 0.3 MG/DL — SIGNIFICANT CHANGE UP (ref 0.2–1.2)
BUN SERPL-MCNC: 70 MG/DL — HIGH (ref 7–23)
CALCIUM SERPL-MCNC: 7.9 MG/DL — LOW (ref 8.5–10.1)
CHLORIDE SERPL-SCNC: 117 MMOL/L — HIGH (ref 96–108)
CO2 SERPL-SCNC: 22 MMOL/L — SIGNIFICANT CHANGE UP (ref 22–31)
CREAT SERPL-MCNC: 2.2 MG/DL — HIGH (ref 0.5–1.3)
CULTURE RESULTS: NO GROWTH — SIGNIFICANT CHANGE UP
GLUCOSE SERPL-MCNC: 113 MG/DL — HIGH (ref 70–99)
HAPTOGLOB SERPL-MCNC: 69 MG/DL — SIGNIFICANT CHANGE UP (ref 34–200)
HBA1C BLD-MCNC: 5.8 % — HIGH (ref 4–5.6)
HCT VFR BLD CALC: 20.8 % — CRITICAL LOW (ref 34.5–45)
HCT VFR BLD CALC: 25.3 % — LOW (ref 34.5–45)
HCT VFR BLD CALC: 26.3 % — LOW (ref 34.5–45)
HGB BLD-MCNC: 6.8 G/DL — CRITICAL LOW (ref 11.5–15.5)
HGB BLD-MCNC: 8.2 G/DL — LOW (ref 11.5–15.5)
HGB BLD-MCNC: 8.4 G/DL — LOW (ref 11.5–15.5)
INR BLD: 1.45 RATIO — HIGH (ref 0.88–1.16)
LDH SERPL L TO P-CCNC: 181 U/L — SIGNIFICANT CHANGE UP (ref 50–242)
MAGNESIUM SERPL-MCNC: 2.1 MG/DL — SIGNIFICANT CHANGE UP (ref 1.6–2.6)
MCHC RBC-ENTMCNC: 27.2 PG — SIGNIFICANT CHANGE UP (ref 27–34)
MCHC RBC-ENTMCNC: 27.5 PG — SIGNIFICANT CHANGE UP (ref 27–34)
MCHC RBC-ENTMCNC: 28 PG — SIGNIFICANT CHANGE UP (ref 27–34)
MCHC RBC-ENTMCNC: 31.9 GM/DL — LOW (ref 32–36)
MCHC RBC-ENTMCNC: 32.4 GM/DL — SIGNIFICANT CHANGE UP (ref 32–36)
MCHC RBC-ENTMCNC: 32.7 GM/DL — SIGNIFICANT CHANGE UP (ref 32–36)
MCV RBC AUTO: 83.2 FL — SIGNIFICANT CHANGE UP (ref 80–100)
MCV RBC AUTO: 86.2 FL — SIGNIFICANT CHANGE UP (ref 80–100)
MCV RBC AUTO: 86.3 FL — SIGNIFICANT CHANGE UP (ref 80–100)
NRBC # BLD: 0 /100 WBCS — SIGNIFICANT CHANGE UP (ref 0–0)
NRBC # BLD: 0 /100 WBCS — SIGNIFICANT CHANGE UP (ref 0–0)
NRBC # BLD: 1 /100 WBCS — HIGH (ref 0–0)
PHOSPHATE SERPL-MCNC: 4.3 MG/DL — SIGNIFICANT CHANGE UP (ref 2.5–4.5)
PLATELET # BLD AUTO: 149 K/UL — LOW (ref 150–400)
PLATELET # BLD AUTO: 152 K/UL — SIGNIFICANT CHANGE UP (ref 150–400)
PLATELET # BLD AUTO: 166 K/UL — SIGNIFICANT CHANGE UP (ref 150–400)
POTASSIUM SERPL-MCNC: 4.9 MMOL/L — SIGNIFICANT CHANGE UP (ref 3.5–5.3)
POTASSIUM SERPL-SCNC: 4.9 MMOL/L — SIGNIFICANT CHANGE UP (ref 3.5–5.3)
PROT SERPL-MCNC: 4.6 G/DL — LOW (ref 6–8.3)
PROTHROM AB SERPL-ACNC: 15.9 SEC — HIGH (ref 9.8–12.7)
RBC # BLD: 2.5 M/UL — LOW (ref 3.8–5.2)
RBC # BLD: 2.93 M/UL — LOW (ref 3.8–5.2)
RBC # BLD: 3.05 M/UL — LOW (ref 3.8–5.2)
RBC # FLD: 16.2 % — HIGH (ref 10.3–14.5)
RBC # FLD: 17.1 % — HIGH (ref 10.3–14.5)
RBC # FLD: 17.2 % — HIGH (ref 10.3–14.5)
SODIUM SERPL-SCNC: 147 MMOL/L — HIGH (ref 135–145)
SPECIMEN SOURCE: SIGNIFICANT CHANGE UP
WBC # BLD: 11.92 K/UL — HIGH (ref 3.8–10.5)
WBC # BLD: 12.72 K/UL — HIGH (ref 3.8–10.5)
WBC # BLD: 13.07 K/UL — HIGH (ref 3.8–10.5)
WBC # FLD AUTO: 11.92 K/UL — HIGH (ref 3.8–10.5)
WBC # FLD AUTO: 12.72 K/UL — HIGH (ref 3.8–10.5)
WBC # FLD AUTO: 13.07 K/UL — HIGH (ref 3.8–10.5)

## 2018-07-08 PROCEDURE — 99233 SBSQ HOSP IP/OBS HIGH 50: CPT

## 2018-07-08 PROCEDURE — 99291 CRITICAL CARE FIRST HOUR: CPT

## 2018-07-08 RX ORDER — SODIUM CHLORIDE 9 MG/ML
1000 INJECTION, SOLUTION INTRAVENOUS ONCE
Qty: 0 | Refills: 0 | Status: COMPLETED | OUTPATIENT
Start: 2018-07-08 | End: 2018-07-08

## 2018-07-08 RX ORDER — SODIUM CHLORIDE 9 MG/ML
1000 INJECTION, SOLUTION INTRAVENOUS
Qty: 0 | Refills: 0 | Status: DISCONTINUED | OUTPATIENT
Start: 2018-07-08 | End: 2018-07-09

## 2018-07-08 RX ADMIN — PANTOPRAZOLE SODIUM 10 MG/HR: 20 TABLET, DELAYED RELEASE ORAL at 12:50

## 2018-07-08 RX ADMIN — PANTOPRAZOLE SODIUM 10 MG/HR: 20 TABLET, DELAYED RELEASE ORAL at 23:50

## 2018-07-08 RX ADMIN — Medication 37.5 MICROGRAM(S): at 21:13

## 2018-07-08 RX ADMIN — PANTOPRAZOLE SODIUM 10 MG/HR: 20 TABLET, DELAYED RELEASE ORAL at 01:34

## 2018-07-08 RX ADMIN — SODIUM CHLORIDE 75 MILLILITER(S): 9 INJECTION, SOLUTION INTRAVENOUS at 16:05

## 2018-07-08 RX ADMIN — SODIUM CHLORIDE 75 MILLILITER(S): 9 INJECTION, SOLUTION INTRAVENOUS at 23:50

## 2018-07-08 NOTE — PROGRESS NOTE ADULT - PROBLEM SELECTOR PLAN 1
- possibly 2/2 GIB: GI (Dr. Daly) planning for EGD tomorrow.  - now s/p transfusion of total 5 units PRBCs and 1 unit platelets.  - monitor H/H, transfuse as necessary.  - continue Protonix drip.

## 2018-07-08 NOTE — PROGRESS NOTE ADULT - SUBJECTIVE AND OBJECTIVE BOX
[facility] - Mabel Hernandez, Jessica, Cesar, Holly, Mayda Hartman  Office Number:  [phone]    Patient resting comfortably in bed in NAD.  Laying flat with no respiratory distress.  No chest pain or difficulty breathing.  Reports significant fatigue    ROS: negative unless otherwise mentioned.    Telemetry:  SR    MEDICATIONS  (STANDING):  clonazePAM Tablet 0.5 milliGRAM(s) Oral two times a day  dextrose 5%. 1000 milliLiter(s) (50 mL/Hr) IV Continuous <Continuous>  dextrose 50% Injectable 12.5 Gram(s) IV Push once  dextrose 50% Injectable 25 Gram(s) IV Push once  dextrose 50% Injectable 25 Gram(s) IV Push once  insulin lispro (HumaLOG) corrective regimen sliding scale   SubCutaneous every 6 hours  levothyroxine Injectable 37.5 MICROGram(s) IV Push at bedtime  pantoprazole Infusion 8 mG/Hr (10 mL/Hr) IV Continuous <Continuous>    MEDICATIONS  (PRN):  acetaminophen   Tablet 650 milliGRAM(s) Oral every 6 hours PRN Mild Pain  dextrose 40% Gel 15 Gram(s) Oral once PRN Blood Glucose LESS THAN 70 milliGRAM(s)/deciliter  glucagon  Injectable 1 milliGRAM(s) IntraMuscular once PRN Glucose LESS THAN 70 milligrams/deciliter      Allergies    Levaquin (Hives)  sulfa drugs (Unknown)    Intolerances        Vital Signs Last 24 Hrs  T(C): 36.5 (08 Jul 2018 04:45), Max: 36.8 (07 Jul 2018 12:25)  T(F): 97.7 (08 Jul 2018 04:45), Max: 98.3 (07 Jul 2018 12:25)  HR: 63 (08 Jul 2018 06:00) (57 - 91)  BP: 97/50 (08 Jul 2018 06:00) (79/54 - 126/55)  BP(mean): 72 (08 Jul 2018 06:00) (66 - 79)  RR: 14 (08 Jul 2018 06:00) (14 - 27)  SpO2: 100% (08 Jul 2018 06:00) (89% - 100%)    I&O's Summary    07 Jul 2018 07:01  -  08 Jul 2018 06:49  --------------------------------------------------------  IN: 1050 mL / OUT: 320 mL / NET: 730 mL        ON EXAM:    Constitutional: NAD, pale, sleeping but awake  Lungs:  Non-labored, breath sounds are clear bilaterally, No wheezing, rales or rhonchi  Cardiovascular: RRR.  S1 and S2 positive.  No murmurs, rubs, gallops or clicks  Gastrointestinal: Bowel Sounds present, soft, nontender.   Lymph: chronic peripheral edema. No cervical lymphadenopathy.  Neurological: No focal deficits  Skin: No rashes or ulcers   Psych:  Awake and alert, though lethargic    LABS: All Labs Reviewed:                        6.8    13.07 )-----------( 166      ( 08 Jul 2018 01:37 )             20.8                         7.2    x     )-----------( x        ( 07 Jul 2018 17:24 )             22.3                         3.8    9.25  )-----------( 151      ( 07 Jul 2018 13:07 )             12.6     08 Jul 2018 06:14    147    |  117    |  70     ----------------------------<  113    4.9     |  22     |  2.20   07 Jul 2018 18:22    145    |  115    |  71     ----------------------------<  138    4.8     |  23     |  2.10   07 Jul 2018 13:07    148    |  120    |  62     ----------------------------<  125    4.1     |  20     |  1.90     Ca    7.9        08 Jul 2018 06:14  Ca    8.0        07 Jul 2018 18:22  Ca    6.4        07 Jul 2018 13:07  Phos  4.3       08 Jul 2018 06:14  Mg     2.1       08 Jul 2018 06:14    TPro  4.6    /  Alb  2.0    /  TBili  0.3    /  DBili  x      /  AST  12     /  ALT  7      /  AlkPhos  74     08 Jul 2018 06:14  TPro  4.2    /  Alb  1.7    /  TBili  0.1    /  DBili  x      /  AST  13     /  ALT  8      /  AlkPhos  66     07 Jul 2018 13:07    PT/INR - ( 07 Jul 2018 13:07 )   PT: 16.8 sec;   INR: 1.53 ratio         PTT - ( 07 Jul 2018 13:07 )  PTT:29.6 sec  CARDIAC MARKERS ( 07 Jul 2018 13:07 )  .034 ng/mL / x     / 27 U/L / x     / 3.1 ng/mL      Blood Culture:

## 2018-07-08 NOTE — PROGRESS NOTE ADULT - SUBJECTIVE AND OBJECTIVE BOX
HPI:  84 y/o F with PMHx DM2, hypothyroidism, asthma, arthritis, meningoma (chronic), and  BIBEMS after syncopal episode at [facility], admitted with profound anemia of unclear source.    INTERVAL HPI:    REVIEW OF SYSTEMS:    CONSTITUTIONAL: No weakness, fevers or chills  EYES/ENT: No visual changes, no throat pain   RESPIRATORY: No cough, wheezing, hemoptysis; No shortness of breath  CARDIOVASCULAR: No chest pain or palpitations  GASTROINTESTINAL: No abdominal, nausea, vomiting, or hematemesis; No diarrhea or constipation. No melena or hematochezia.  GENITOURINARY: No dysuria, frequency or hematuria  NEUROLOGICAL: No dizziness, numbness, or weakness  SKIN: No itching, burning, rashes, or lesions   All other review of systems is negative unless indicated above.    VITAL SIGNS:  Vital Signs Last 24 Hrs  T(C): 36.3 (07-08-18 @ 07:23), Max: 36.8 (07-07-18 @ 12:25)  T(F): 97.4 (07-08-18 @ 07:23), Max: 98.3 (07-07-18 @ 12:25)  HR: 58 (07-08-18 @ 09:00) (56 - 91)  BP: 99/51 (07-08-18 @ 09:00) (79/54 - 126/55)  BP(mean): 73 (07-08-18 @ 09:00) (61 - 79)  RR: 14 (07-08-18 @ 09:00) (14 - 27)  SpO2: 100% (07-08-18 @ 09:00) (89% - 100%)      PHYSICAL EXAM:     GENERAL: no acute distress  HEENT: NC/AT, EOMI, neck supple, MMM  RESPIRATORY: LCTAB/L, no rhonchi, rales, or wheezing  CARDIOVASCULAR: RRR, no murmurs, gallops, rubs  ABDOMINAL: soft, non-tender, non-distended, positive bowel sounds   EXTREMITIES: no clubbing, cyanosis, or edema  NEUROLOGICAL: alert and oriented x 3, non-focal  SKIN: no rashes or lesions   MUSCULOSKELETAL: no gross joint deformity                          8.2    11.92 )-----------( 149      ( 08 Jul 2018 09:52 )             25.3     07-08    147<H>  |  117<H>  |  70<H>  ----------------------------<  113<H>  4.9   |  22  |  2.20<H>    Ca    7.9<L>      08 Jul 2018 06:14  Phos  4.3     07-08  Mg     2.1     07-08    TPro  4.6<L>  /  Alb  2.0<L>  /  TBili  0.3  /  DBili  x   /  AST  12<L>  /  ALT  7<L>  /  AlkPhos  74  07-08    PT/INR - ( 07 Jul 2018 13:07 )   PT: 16.8 sec;   INR: 1.53 ratio         PTT - ( 07 Jul 2018 13:07 )  PTT:29.6 sec  CAPILLARY BLOOD GLUCOSE      POCT Blood Glucose.: 116 mg/dL (08 Jul 2018 07:54)  POCT Blood Glucose.: 124 mg/dL (07 Jul 2018 23:55)  POCT Blood Glucose.: 125 mg/dL (07 Jul 2018 18:52)      MEDICATIONS  (STANDING):  clonazePAM Tablet 0.5 milliGRAM(s) Oral two times a day  dextrose 5%. 1000 milliLiter(s) (50 mL/Hr) IV Continuous <Continuous>  dextrose 50% Injectable 12.5 Gram(s) IV Push once  dextrose 50% Injectable 25 Gram(s) IV Push once  dextrose 50% Injectable 25 Gram(s) IV Push once  insulin lispro (HumaLOG) corrective regimen sliding scale   SubCutaneous every 6 hours  levothyroxine Injectable 37.5 MICROGram(s) IV Push at bedtime  pantoprazole Infusion 8 mG/Hr (10 mL/Hr) IV Continuous <Continuous>    MEDICATIONS  (PRN):  acetaminophen   Tablet 650 milliGRAM(s) Oral every 6 hours PRN Mild Pain  dextrose 40% Gel 15 Gram(s) Oral once PRN Blood Glucose LESS THAN 70 milliGRAM(s)/deciliter  glucagon  Injectable 1 milliGRAM(s) IntraMuscular once PRN Glucose LESS THAN 70 milligrams/deciliter HPI:  82 y/o F with PMHx DM2, hypothyroidism, asthma, arthritis, meningoma (chronic), and  BIBEMS after syncopal episode at [facility], admitted with profound anemia of unclear source.    INTERVAL HPI:  Patient see and examined. Sleepy this morning, but does wake up to respond to questions and then falls back asleep. Denies pain.     REVIEW OF SYSTEMS:  Unable to obtain due to somnolence.    VITAL SIGNS:  Vital Signs Last 24 Hrs  T(C): 36.3 (07-08-18 @ 07:23), Max: 36.8 (07-07-18 @ 12:25)  T(F): 97.4 (07-08-18 @ 07:23), Max: 98.3 (07-07-18 @ 12:25)  HR: 58 (07-08-18 @ 09:00) (56 - 91)  BP: 99/51 (07-08-18 @ 09:00) (79/54 - 126/55)  BP(mean): 73 (07-08-18 @ 09:00) (61 - 79)  RR: 14 (07-08-18 @ 09:00) (14 - 27)  SpO2: 100% (07-08-18 @ 09:00) (89% - 100%)      PHYSICAL EXAM:     GENERAL: pale, obese, elderly female, somnolent  HEENT: NC/AT, EOMI, +conjunctival pallor neck supple, MMM  RESPIRATORY: LCTAB/L, no rhonchi, rales, or wheezing  CARDIOVASCULAR: RRR, no murmurs, gallops, rubs  ABDOMINAL: soft, non-tender, non-distended, positive bowel sounds   EXTREMITIES: no clubbing, cyanosis, or edema  NEUROLOGICAL: somnolent but arousable, answers simple questions and falls back asleep  SKIN: no rashes or lesions   MUSCULOSKELETAL: no gross joint deformity                          8.2    11.92 )-----------( 149      ( 08 Jul 2018 09:52 )             25.3     07-08    147<H>  |  117<H>  |  70<H>  ----------------------------<  113<H>  4.9   |  22  |  2.20<H>    Ca    7.9<L>      08 Jul 2018 06:14  Phos  4.3     07-08  Mg     2.1     07-08    TPro  4.6<L>  /  Alb  2.0<L>  /  TBili  0.3  /  DBili  x   /  AST  12<L>  /  ALT  7<L>  /  AlkPhos  74  07-08    PT/INR - ( 07 Jul 2018 13:07 )   PT: 16.8 sec;   INR: 1.53 ratio         PTT - ( 07 Jul 2018 13:07 )  PTT:29.6 sec    CAPILLARY BLOOD GLUCOSE  POCT Blood Glucose.: 116 mg/dL (08 Jul 2018 07:54)  POCT Blood Glucose.: 124 mg/dL (07 Jul 2018 23:55)  POCT Blood Glucose.: 125 mg/dL (07 Jul 2018 18:52)      MEDICATIONS  (STANDING):  clonazePAM Tablet 0.5 milliGRAM(s) Oral two times a day  dextrose 5%. 1000 milliLiter(s) (50 mL/Hr) IV Continuous <Continuous>  dextrose 50% Injectable 12.5 Gram(s) IV Push once  dextrose 50% Injectable 25 Gram(s) IV Push once  dextrose 50% Injectable 25 Gram(s) IV Push once  insulin lispro (HumaLOG) corrective regimen sliding scale   SubCutaneous every 6 hours  levothyroxine Injectable 37.5 MICROGram(s) IV Push at bedtime  pantoprazole Infusion 8 mG/Hr (10 mL/Hr) IV Continuous <Continuous>    MEDICATIONS  (PRN):  acetaminophen   Tablet 650 milliGRAM(s) Oral every 6 hours PRN Mild Pain  dextrose 40% Gel 15 Gram(s) Oral once PRN Blood Glucose LESS THAN 70 milliGRAM(s)/deciliter  glucagon  Injectable 1 milliGRAM(s) IntraMuscular once PRN Glucose LESS THAN 70 milligrams/deciliter

## 2018-07-08 NOTE — DIETITIAN INITIAL EVALUATION ADULT. - PHYSICAL APPEARANCE
BMI 42.8 (class III obesity) using 5'4" as documented from past adm . Pt doesn't appear to be this obese./obese

## 2018-07-08 NOTE — CONSULT NOTE ADULT - SUBJECTIVE AND OBJECTIVE BOX
Chief Complaint:  Patient is a 83y old  Female who presents with a chief complaint of profound anemia (2018 14:43)      HPI:  82 y/o F with PMHx DM2, hypothyroidism, asthma, arthritis, meningoma (chronic), and  BIBEMS after syncopal episode at [facility]. Patient is a poor historian and is unsure why she is in the hospital.  Family at bedside reports that patient was discharged from [facility] the previous day after being admitted with AMS 2/2 UTI.  Family reports that yesterday patient was SOB.  They report that she bleeds easily, but denies knowledge of blood in stools, changes in urine color, open wounds. Patient confirms abdominal pain. She denies chest pain, shortness of breath, palpitations, nausea or vomiting. She also denies dizziness blurry vision. Pt admitted w/ Acute blood loss anemia, hypotension,  Elevated INR, BARBARA on CKD, UTI and r/o GI bleed. Patient poor historian, history obtained from medical chart no family at bedside . No history of ever having egd or colonoscopy.   Allergies:  Levaquin (Hives)  sulfa drugs (Unknown)      Medications:  acetaminophen   Tablet 650 milliGRAM(s) Oral every 6 hours PRN  clonazePAM Tablet 0.5 milliGRAM(s) Oral two times a day  dextrose 40% Gel 15 Gram(s) Oral once PRN  dextrose 5%. 1000 milliLiter(s) IV Continuous <Continuous>  dextrose 50% Injectable 12.5 Gram(s) IV Push once  dextrose 50% Injectable 25 Gram(s) IV Push once  dextrose 50% Injectable 25 Gram(s) IV Push once  glucagon  Injectable 1 milliGRAM(s) IntraMuscular once PRN  insulin lispro (HumaLOG) corrective regimen sliding scale   SubCutaneous every 6 hours  levothyroxine Injectable 37.5 MICROGram(s) IV Push at bedtime  pantoprazole Infusion 8 mG/Hr IV Continuous <Continuous>      PMHX/PSHX:  Arthritis  Asthma  Diabetes mellitus  Hypothyroidism  No significant past surgical history      Family history:        Social History:     ROS:     unable to obtain       PHYSICAL EXAM:   Vital Signs:  Vital Signs Last 24 Hrs  T(C): 36.3 (2018 07:23), Max: 36.8 (2018 12:25)  T(F): 97.4 (2018 07:23), Max: 98.3 (2018 12:25)  HR: 56 (2018 07:00) (56 - 91)  BP: 98/47 (2018 07:00) (79/54 - 126/55)  BP(mean): 68 (2018 07:00) (66 - 79)  RR: 14 (2018 07:00) (14 - 27)  SpO2: 100% (2018 07:00) (89% - 100%)  Daily     Daily Weight in k.2 (2018 17:50)    GENERAL:  Appears stated age, lethargic  HEENT:  NC/AT,  conjunctivae pale  CHEST:  Decreased breath sounds   HEART:  Regular rhythm, S1, S2, no murmur/rub/S3/S4, no abdominal bruit, no edema  ABDOMEN:  Soft, non-tender, non-distended, normoactive bowel sounds,  no masses ,no hepato-splenomegaly, no signs of chronic liver disease  EXTEREMITIES:  no cyanosis,clubbing or edema  SKIN:  No rash/erythema/ecchymoses/petechiae/wounds/abscess/warm/dry  NEURO:  arousable     LABS:                        6.8    13.07 )-----------( 166      ( 2018 01:37 )             20.8     07-08    147<H>  |  117<H>  |  70<H>  ----------------------------<  113<H>  4.9   |  22  |  2.20<H>    Ca    7.9<L>      2018 06:14  Phos  4.3     07-08  Mg     2.1     -08    TPro  4.6<L>  /  Alb  2.0<L>  /  TBili  0.3  /  DBili  x   /  AST  12<L>  /  ALT  7<L>  /  AlkPhos  74  07-08    LIVER FUNCTIONS - ( 2018 06:14 )  Alb: 2.0 g/dL / Pro: 4.6 g/dL / ALK PHOS: 74 U/L / ALT: 7 U/L / AST: 12 U/L / GGT: x           PT/INR - ( 2018 13:07 )   PT: 16.8 sec;   INR: 1.53 ratio         PTT - ( 2018 13:07 )  PTT:29.6 sec  Urinalysis Basic - ( 2018 14:08 )    Color: Yellow / Appearance: Slightly Turbid / S.020 / pH: x  Gluc: x / Ketone: Negative  / Bili: Negative / Urobili: Negative   Blood: x / Protein: 25 mg/dL / Nitrite: Negative   Leuk Esterase: Moderate / RBC: 6-10 /HPF / WBC 3-5   Sq Epi: x / Non Sq Epi: Few / Bacteria: Few      Amylase Serum--      Lipase serum17       Ammonia--      Imaging:    < from: CT Abdomen and Pelvis No Cont (18 @ 16:10) >    EXAM:  CT ABDOMEN AND PELVIS                            PROCEDURE DATE:  2018          INTERPRETATION:    CT ABDOMEN and PELVIS without IV contrast dated 2018 4:10 PM    INDICATION: 83-year-old female severe anemia, ro RP bleed        TECHNIQUE: CT of the abdomen and pelvis was performed without intravenous   contrast administration. Axial, sagittal and coronal images were produced   and reviewed.  Enteric contrast was not administered, limiting complete   evaluation of the gastrointestinal tract. The patient is imaged with both   upper extremities in the gantry.    COMPARISON: None.    FINDINGS: Images of the lower chest demonstrate trace right pleural   effusion and subjacent right basilar atelectasis. There is no pericardial   effusion or hematoma. Atherosclerotic coronary artery calcifications are   noted. There is visualization of the hyperdense and the ventricular   septum, in keeping with history of anemia. A small hiatal hernia is   noted.     Postcholecystectomy surgical clips are seen in the gallbladder fossa. The   pancreas is completely fatty replaced. There is an apparent 2 cm   circumscribed cortical hypodensity in the left kidney, with an adjacent   cortical dystrophic calcification The liver, spleen, adrenal glands and   right kidney have a grossly unremarkable nonenhanced appearance.    Extensive atheromatous disease throughout the aortobiiliac system,   without aneurysm. There is also marked mural calcification of the splenic   artery. No retroperitoneal hematoma is present. No retroperitoneal mass   or lymphadenopathy is seen.     Enteric contrast was not administered, limiting complete evaluation of   the gastrointestinal tract. Evaluation of the nonopacified small bowel   demonstrates no dilatation or air-fluid levels. There is an apparent   intraluminal lipoma within the second portion of duodenum. There is a   moderate-sized rectal stool ball with circumferential mural thickening   and presacral edema stranding, most suggestive of stercoral colitis.   There is no proximal colonic obstruction. The appendix is not visualized.   No ascites no pneumoperitoneum is seen.    There is no pelvic hematoma, mass or lymphadenopathy. The urinary bladder   is decompressed around a Oliva catheter.    Evaluation of the osseous structures demonstrates a mild S-shaped   thoracolumbar scoliosis with multilevel multifactorial degenerative   spinal changes. There is a benign-appearing bone island in the right   iliac wing. Is heterogeneous appearance of the sacral ala which is felt   to represent to be related to stress-related change.      IMPRESSION:  No retroperitoneal or pelvic hematoma.    Constellation of findings are most suggestive of rectal stercoral colitis.    Trace right pleural effusion.    Oliva catheter in situ.      < end of copied text >

## 2018-07-08 NOTE — DIETITIAN INITIAL EVALUATION ADULT. - OTHER INFO
82 y/o female adm from [facility] with a syncopal episode. Adm also with anemia and hypotension. H/H 6.8/20.8 (blood transfusions given). Pt is to have EGD done tomorrow.

## 2018-07-08 NOTE — DIETITIAN INITIAL EVALUATION ADULT. - NS AS NUTRI INTERV MEALS SNACK
Carbohydrate - modified diet/when medically feasible; adv diet after EGD tomorrow./Other (specify)/General/healthful diet

## 2018-07-08 NOTE — PROGRESS NOTE ADULT - ASSESSMENT
84 y/o F with PMHx DM2, hypothyroidism, asthma, arthritis, meningoma (chronic), and  BIBEMS after syncopal episode at [facility]. Admitted to ICU w/ profound anemia, possibly 2/2 GIB.

## 2018-07-08 NOTE — DIETITIAN INITIAL EVALUATION ADULT. - ETIOLOGY
related to alteration in GI tract structure and or function related to increased nutrient needs for wound healing

## 2018-07-08 NOTE — DIETITIAN INITIAL EVALUATION ADULT. - PROBLEM SELECTOR PLAN 5
Based on labs in HIE, patient appears to have CKD2, now with worsening in creatinine likely due to profound anemia/hypoperfusion vs prerenal due to recent illness  -Monitor CBC and possible renal consult if not improving post fluids and blood transfusion.

## 2018-07-08 NOTE — PROGRESS NOTE ADULT - SUBJECTIVE AND OBJECTIVE BOX
H/H 6.8/20.8  Pt HD stable. No signs of active bleed. Will transfuse additonal 2UPRBC. Follow up CBC

## 2018-07-08 NOTE — DIETITIAN INITIAL EVALUATION ADULT. - PROBLEM SELECTOR PLAN 1
- accepted to ICU  - possibly 2/2 GIB: GI, Dr. Daly consulted, f/u recs  - 2 unit PRBC in ED, f/u H&H  -1 unit platelets ordered as well given recent Aspirin use.  -CT A/P shows possible rectal stercoral colitis, no source of bleed evident.  - fall precautions  - NPO for possible procedure

## 2018-07-08 NOTE — PROGRESS NOTE ADULT - PROBLEM SELECTOR PLAN 9
- patient on Clonazepam 0.5 BID; may be contributing to disorientation/somnolence: dose should be tapered if patient can tolerate.

## 2018-07-08 NOTE — PROGRESS NOTE ADULT - SUBJECTIVE AND OBJECTIVE BOX
24 hour events:   admitted yesterday for GIB and severe anemia  has received 5 PRBCs, 1 platelets  Hb trend: 3.8 > 2 RPBC > 7.2 >1 platelet, 1 PRBC > 6.8 > 2 PRBC    T(F): 97.4 (18 @ 07:23), Max: 98.3 (18 @ 12:25)  HR: 56 (18 @ 07:00) (56 - 91)  BP: 98/47 (18 @ 07:00) (79/54 - 126/55)  RR: 14 (18 @ 07:00) (14 - 27)  SpO2: 100% (18 @ 07:00) (89% - 100%)  Wt(kg): --        CAPILLARY BLOOD GLUCOSE      POCT Blood Glucose.: 116 mg/dL (2018 07:54)      I&O's Summary    2018 07:01  -  2018 07:00  --------------------------------------------------------  IN: 1050 mL / OUT: 632 mL / NET: 418 mL        Physical Exam:   Gen:  Neuro:  HEENT:  Resp:  CVS:  Abd:  Ext:  Skin:    Meds:      dextrose 40% Gel Oral PRN  dextrose 50% Injectable IV Push  dextrose 50% Injectable IV Push  dextrose 50% Injectable IV Push  glucagon  Injectable IntraMuscular PRN  insulin lispro (HumaLOG) corrective regimen sliding scale SubCutaneous  levothyroxine Injectable IV Push      acetaminophen   Tablet Oral PRN  clonazePAM Tablet Oral        pantoprazole Infusion IV Continuous      dextrose 5%. IV Continuous                                  6.8    13.07 )-----------( 166      ( 2018 01:37 )             20.8     Bands 3.0    07-08    147<H>  |  117<H>  |  70<H>  ----------------------------<  113<H>  4.9   |  22  |  2.20<H>    Ca    7.9<L>      2018 06:14  Phos  4.3     07-08  Mg     2.1     07-    TPro  4.6<L>  /  Alb  2.0<L>  /  TBili  0.3  /  DBili  x   /  AST  12<L>  /  ALT  7<L>  /  AlkPhos  74      Lactate 1.4            @ 13:07      CARDIAC MARKERS ( 2018 13:07 )  .034 ng/mL / x     / 27 U/L / x     / 3.1 ng/mL      PT/INR - ( 2018 13:07 )   PT: 16.8 sec;   INR: 1.53 ratio         PTT - ( 2018 13:07 )  PTT:29.6 sec  Urinalysis Basic - ( 2018 14:08 )    Color: Yellow / Appearance: Slightly Turbid / S.020 / pH: x  Gluc: x / Ketone: Negative  / Bili: Negative / Urobili: Negative   Blood: x / Protein: 25 mg/dL / Nitrite: Negative   Leuk Esterase: Moderate / RBC: 6-10 /HPF / WBC 3-5   Sq Epi: x / Non Sq Epi: Few / Bacteria: Few                  Radiology: ***    Bedside ultrasound: ***    CENTRAL LINE: N/Y          DATE INSERTED:              REMOVE: Y/N  KELLEY: N/Y                       DATE INSERTED:              REMOVE: Y/N  A-LINE: N/Y                       DATE INSERTED:              REMOVE: Y/N    GLOBAL ISSUE/BEST PRACTICE:  Analgesia:  Sedation:  CAM-ICU:   HOB elevation: yes  Stress ulcer prophylaxis:  VTE prophylaxis:  Glycemic control:  Nutrition:    CODE STATUS: *** 24 hour events:   admitted yesterday for GIB and severe anemia  has received 5 PRBCs, 1 platelets  Hb trend: 3.8 > 2 RPBC > 7.2 >1 platelet, 1 PRBC > 6.8 > 2 PRBC    T(F): 97.4 (18 @ 07:23), Max: 98.3 (18 @ 12:25)  HR: 56 (18 @ 07:00) (56 - 91)  BP: 98/47 (18 @ 07:00) (79/54 - 126/55)  RR: 14 (18 @ 07:00) (14 - 27)  SpO2: 100% (18 @ 07:00) (89% - 100%)    POCT Blood Glucose.: 116 mg/dL (2018 07:54)      I&O's Summary    2018 07:01  -  2018 07:00  --------------------------------------------------------  IN: 1050 mL / OUT: 632 mL / NET: 418 mL        Physical Exam:   Gen: frail and elderly  Neuro: alert  Resp: clear anteriorly  CVS: RRR  Abd: soft, NTND  Ext: no edema  Skin: WWP    Meds:      dextrose 40% Gel Oral PRN  dextrose 50% Injectable IV Push  dextrose 50% Injectable IV Push  dextrose 50% Injectable IV Push  glucagon  Injectable IntraMuscular PRN  insulin lispro (HumaLOG) corrective regimen sliding scale SubCutaneous  levothyroxine Injectable IV Push      acetaminophen   Tablet Oral PRN  clonazePAM Tablet Oral        pantoprazole Infusion IV Continuous      dextrose 5%. IV Continuous                                  6.8    13.07 )-----------( 166      ( 2018 01:37 )             20.8     Bands 3.0    07-08    147<H>  |  117<H>  |  70<H>  ----------------------------<  113<H>  4.9   |  22  |  2.20<H>    Ca    7.9<L>      2018 06:14  Phos  4.3     07-08  Mg     2.1     07-08    TPro  4.6<L>  /  Alb  2.0<L>  /  TBili  0.3  /  DBili  x   /  AST  12<L>  /  ALT  7<L>  /  AlkPhos  74      Lactate 1.4            @ 13:07      CARDIAC MARKERS ( 2018 13:07 )  .034 ng/mL / x     / 27 U/L / x     / 3.1 ng/mL      PT/INR - ( 2018 13:07 )   PT: 16.8 sec;   INR: 1.53 ratio         PTT - ( 2018 13:07 )  PTT:29.6 sec  Urinalysis Basic - ( 2018 14:08 )    Color: Yellow / Appearance: Slightly Turbid / S.020 / pH: x  Gluc: x / Ketone: Negative  / Bili: Negative / Urobili: Negative   Blood: x / Protein: 25 mg/dL / Nitrite: Negative   Leuk Esterase: Moderate / RBC: 6-10 /HPF / WBC 3-5   Sq Epi: x / Non Sq Epi: Few / Bacteria: Few      Radiology:   < from: CT Abdomen and Pelvis No Cont (18 @ 16:10) >  IMPRESSION:  No retroperitoneal or pelvic hematoma.    Constellation of findings are most suggestive of rectal stercoral colitis.    Trace right pleural effusion.    Kelley catheter in situ.    < end of copied text >    < from: CT Cervical Spine No Cont (18 @ 13:48) >    IMPRESSION:   No vertebral fracture is recognized.   Multilevel   degenerative changes of the cervicalspine are present.    10 mm right apical lung nodule.  According to a 2017 consensus statement published by the American College   of Chest Physicians and the Fleischner Society, the following are   suggested as guidelines for single solid pulmonary nodules of this size:    Regardless of the history of smoking or malignancy, consider a follow up   CT, PET/CT, or tissue sampling at 3 months.     (Radiology 2017; 237:395-400).      < end of copied text >    < from: CT Head No Cont (18 @ 13:44) >  IMPRESSION:       No evidence of acute intracranial abnormality.  No evidence of   hemorrhage.      Age-related involutional changes as above.    < end of copied text >        CENTRAL LINE: N  KELLEY: Y  A-LINE: N    GLOBAL ISSUE/BEST PRACTICE:  Analgesia: N  Sedation: N  HOB elevation: yes  Stress ulcer prophylaxis: Y  VTE prophylaxis: Y, SCD  Glycemic control: Y  Nutrition: NPO    CODE STATUS: FULL

## 2018-07-08 NOTE — CONSULT NOTE ADULT - ASSESSMENT
84 y/o F with PMHx DM2, hypothyroidism, asthma, arthritis, meningoma admitted with syncope secondary to anemia. Patient received 5 units of PRBC and 1 platelets, fobt negative, unclear source of bleed

## 2018-07-08 NOTE — PROGRESS NOTE ADULT - PROBLEM SELECTOR PLAN 2
- Likely due to anemia, poor po intake.   - Continue to monitor closely.   - Transfuse/fluids as needed

## 2018-07-08 NOTE — PROGRESS NOTE ADULT - ASSESSMENT
Mrs. Collier is an 83 year old female with DM2, HTN, here with an episode of syncope, found to have significant anemia. Improvement in lethargy with PRBC transfusions    - Transfuse to have Hb>8. Trend Hb closely  - Watch volume status. There is no echo on record so her LV function is unknown. No need for diuresis at current time  - Can check a baseline echocardiogram, though there is no evidence of heart failure despite chronic edema  - Hold all anti-HTN medications  - Care per ICU  - Very mild troponin leak with normal CK not suggestive of ACS  - EKG is SR with no sign of ischemia or chamber enlargement  - Baseline creatinine unknown, though normal in 2016.  - Watch creatinine and electrolytes. Keep K>4, Mg>2  - Will follow with you.  - No active cardiac contraindication to proceeding with EGD. No evidence of ischemia heart failure, critical valvular disease or significant arrhthymias at this time.     High risk of decompensation. >35 min spent taking care of patient.

## 2018-07-08 NOTE — DIETITIAN INITIAL EVALUATION ADULT. - PROBLEM SELECTOR PLAN 2
- patient receiving blood products now: watch volume status closely: no echo on record: EF unknown  -HD stable for now.   -Accepted to ICU. Further management per ICU team   - Cardio, Dr. Ohara, consulted  - monitor vitals closely

## 2018-07-08 NOTE — PROGRESS NOTE ADULT - PROBLEM SELECTOR PLAN 5
Based on labs in HIE, patient appears to have CKD2, now with worsening in creatinine likely ATN due to profound anemia/hypoperfusion vs prerenal due to recent illness.   Expect creatinine will plateau soon and then improve. Continue to monitor daily.

## 2018-07-08 NOTE — DIETITIAN INITIAL EVALUATION ADULT. - SIGNS/SYMPTOMS
as evidenced by possible GI bleed, anemia, hypotension, syncope as evidenced by 2x Stage II and DTI pressure ulcers

## 2018-07-09 LAB
ANION GAP SERPL CALC-SCNC: 7 MMOL/L — SIGNIFICANT CHANGE UP (ref 5–17)
APTT BLD: 30.8 SEC — SIGNIFICANT CHANGE UP (ref 27.5–37.4)
BASOPHILS # BLD AUTO: 0.02 K/UL — SIGNIFICANT CHANGE UP (ref 0–0.2)
BASOPHILS NFR BLD AUTO: 0.2 % — SIGNIFICANT CHANGE UP (ref 0–2)
BUN SERPL-MCNC: 72 MG/DL — HIGH (ref 7–23)
CALCIUM SERPL-MCNC: 7.9 MG/DL — LOW (ref 8.5–10.1)
CHLORIDE SERPL-SCNC: 118 MMOL/L — HIGH (ref 96–108)
CO2 SERPL-SCNC: 23 MMOL/L — SIGNIFICANT CHANGE UP (ref 22–31)
CREAT SERPL-MCNC: 2.3 MG/DL — HIGH (ref 0.5–1.3)
EOSINOPHIL # BLD AUTO: 0.33 K/UL — SIGNIFICANT CHANGE UP (ref 0–0.5)
EOSINOPHIL NFR BLD AUTO: 3.3 % — SIGNIFICANT CHANGE UP (ref 0–6)
GLUCOSE SERPL-MCNC: 111 MG/DL — HIGH (ref 70–99)
HCT VFR BLD CALC: 25.2 % — LOW (ref 34.5–45)
HGB BLD-MCNC: 8.1 G/DL — LOW (ref 11.5–15.5)
IMM GRANULOCYTES NFR BLD AUTO: 1.3 % — SIGNIFICANT CHANGE UP (ref 0–1.5)
INR BLD: 2.04 RATIO — HIGH (ref 0.88–1.16)
LYMPHOCYTES # BLD AUTO: 1.25 K/UL — SIGNIFICANT CHANGE UP (ref 1–3.3)
LYMPHOCYTES # BLD AUTO: 12.5 % — LOW (ref 13–44)
MAGNESIUM SERPL-MCNC: 2.2 MG/DL — SIGNIFICANT CHANGE UP (ref 1.6–2.6)
MCHC RBC-ENTMCNC: 28.1 PG — SIGNIFICANT CHANGE UP (ref 27–34)
MCHC RBC-ENTMCNC: 32.1 GM/DL — SIGNIFICANT CHANGE UP (ref 32–36)
MCV RBC AUTO: 87.5 FL — SIGNIFICANT CHANGE UP (ref 80–100)
MONOCYTES # BLD AUTO: 0.63 K/UL — SIGNIFICANT CHANGE UP (ref 0–0.9)
MONOCYTES NFR BLD AUTO: 6.3 % — SIGNIFICANT CHANGE UP (ref 2–14)
NEUTROPHILS # BLD AUTO: 7.61 K/UL — HIGH (ref 1.8–7.4)
NEUTROPHILS NFR BLD AUTO: 76.4 % — SIGNIFICANT CHANGE UP (ref 43–77)
PHOSPHATE SERPL-MCNC: 4.4 MG/DL — SIGNIFICANT CHANGE UP (ref 2.5–4.5)
PLATELET # BLD AUTO: 149 K/UL — LOW (ref 150–400)
POTASSIUM SERPL-MCNC: 5.1 MMOL/L — SIGNIFICANT CHANGE UP (ref 3.5–5.3)
POTASSIUM SERPL-SCNC: 5.1 MMOL/L — SIGNIFICANT CHANGE UP (ref 3.5–5.3)
PROTHROM AB SERPL-ACNC: 22.6 SEC — HIGH (ref 9.8–12.7)
RBC # BLD: 2.88 M/UL — LOW (ref 3.8–5.2)
RBC # FLD: 18 % — HIGH (ref 10.3–14.5)
SODIUM SERPL-SCNC: 148 MMOL/L — HIGH (ref 135–145)
WBC # BLD: 9.97 K/UL — SIGNIFICANT CHANGE UP (ref 3.8–10.5)
WBC # FLD AUTO: 9.97 K/UL — SIGNIFICANT CHANGE UP (ref 3.8–10.5)

## 2018-07-09 PROCEDURE — 99291 CRITICAL CARE FIRST HOUR: CPT

## 2018-07-09 PROCEDURE — 99233 SBSQ HOSP IP/OBS HIGH 50: CPT

## 2018-07-09 RX ORDER — PHYTONADIONE (VIT K1) 5 MG
5 TABLET ORAL ONCE
Qty: 0 | Refills: 0 | Status: COMPLETED | OUTPATIENT
Start: 2018-07-09 | End: 2018-07-09

## 2018-07-09 RX ORDER — INSULIN LISPRO 100/ML
VIAL (ML) SUBCUTANEOUS
Qty: 0 | Refills: 0 | Status: DISCONTINUED | OUTPATIENT
Start: 2018-07-09 | End: 2018-07-22

## 2018-07-09 RX ORDER — LEVOTHYROXINE SODIUM 125 MCG
75 TABLET ORAL DAILY
Qty: 0 | Refills: 0 | Status: DISCONTINUED | OUTPATIENT
Start: 2018-07-09 | End: 2018-07-25

## 2018-07-09 RX ORDER — INSULIN LISPRO 100/ML
VIAL (ML) SUBCUTANEOUS AT BEDTIME
Qty: 0 | Refills: 0 | Status: DISCONTINUED | OUTPATIENT
Start: 2018-07-09 | End: 2018-07-22

## 2018-07-09 RX ORDER — SUCRALFATE 1 G
1 TABLET ORAL
Qty: 0 | Refills: 0 | Status: DISCONTINUED | OUTPATIENT
Start: 2018-07-09 | End: 2018-07-25

## 2018-07-09 RX ORDER — MORPHINE SULFATE 50 MG/1
1 CAPSULE, EXTENDED RELEASE ORAL ONCE
Qty: 0 | Refills: 0 | Status: DISCONTINUED | OUTPATIENT
Start: 2018-07-09 | End: 2018-07-09

## 2018-07-09 RX ORDER — PANTOPRAZOLE SODIUM 20 MG/1
40 TABLET, DELAYED RELEASE ORAL EVERY 12 HOURS
Qty: 0 | Refills: 0 | Status: DISCONTINUED | OUTPATIENT
Start: 2018-07-09 | End: 2018-07-11

## 2018-07-09 RX ADMIN — PANTOPRAZOLE SODIUM 40 MILLIGRAM(S): 20 TABLET, DELAYED RELEASE ORAL at 17:24

## 2018-07-09 RX ADMIN — MORPHINE SULFATE 1 MILLIGRAM(S): 50 CAPSULE, EXTENDED RELEASE ORAL at 03:35

## 2018-07-09 RX ADMIN — Medication 5 MILLIGRAM(S): at 14:42

## 2018-07-09 RX ADMIN — Medication 650 MILLIGRAM(S): at 02:47

## 2018-07-09 RX ADMIN — MORPHINE SULFATE 1 MILLIGRAM(S): 50 CAPSULE, EXTENDED RELEASE ORAL at 03:50

## 2018-07-09 RX ADMIN — Medication 0.5 MILLIGRAM(S): at 17:24

## 2018-07-09 RX ADMIN — Medication 1: at 16:50

## 2018-07-09 RX ADMIN — Medication 0.5 MILLIGRAM(S): at 06:16

## 2018-07-09 RX ADMIN — Medication 1 GRAM(S): at 17:24

## 2018-07-09 NOTE — PROGRESS NOTE ADULT - SUBJECTIVE AND OBJECTIVE BOX
Patient is a 83y old  Female who presents with a chief complaint of profound anemia (07 Jul 2018 14:43)      INTERVAL HPI: Pt seen and examined. Pt seen in ICU post EGD sleeping unable to obtain history.     OVERNIGHT EVENTS: none noted  T(F): 97.4 (07-09-18 @ 20:21), Max: 98.1 (07-09-18 @ 16:00)  HR: 81 (07-09-18 @ 20:21) (58 - 81)  BP: 152/79 (07-09-18 @ 20:21) (98/52 - 166/75)  RR: 18 (07-09-18 @ 20:21) (13 - 22)  SpO2: 97% (07-09-18 @ 20:21) (93% - 100%)  I&O's Summary    08 Jul 2018 07:01  -  09 Jul 2018 07:00  --------------------------------------------------------  IN: 1400 mL / OUT: 1000 mL / NET: 400 mL    09 Jul 2018 07:01  -  09 Jul 2018 22:00  --------------------------------------------------------  IN: 905 mL / OUT: 795 mL / NET: 110 mL        REVIEW OF SYSTEMS: unable as pt too sleepy from egd anesthesia    PHYSICAL EXAM:  GENERAL: NAD, well-groomed, well-developed  HEAD:  Atraumatic, Normocephalic  NECK: Supple, No JVD  NERVOUS SYSTEM:  Asleep  CHEST/LUNG: Clear to auscultation bilaterally; No rales, rhonchi, wheezing, or rubs  HEART: Regular rate and rhythm; No murmurs, rubs, or gallops  ABDOMEN: Soft, Nontender, Nondistended; Bowel sounds present  EXTREMITIES:  2+ Peripheral Pulses, No clubbing, cyanosis, or edema  SKIN: No rashes or lesions    LABS:                        8.1    9.97  )-----------( 149      ( 09 Jul 2018 07:56 )             25.2     07-09    148<H>  |  118<H>  |  72<H>  ----------------------------<  111<H>  5.1   |  23  |  2.30<H>    Ca    7.9<L>      09 Jul 2018 07:56  Phos  4.4     07-09  Mg     2.2     07-09    TPro  4.6<L>  /  Alb  2.0<L>  /  TBili  0.3  /  DBili  x   /  AST  12<L>  /  ALT  7<L>  /  AlkPhos  74  07-08    PT/INR - ( 09 Jul 2018 07:56 )   PT: 22.6 sec;   INR: 2.04 ratio         PTT - ( 09 Jul 2018 07:56 )  PTT:30.8 sec    CAPILLARY BLOOD GLUCOSE      POCT Blood Glucose.: 147 mg/dL (09 Jul 2018 21:02)  POCT Blood Glucose.: 152 mg/dL (09 Jul 2018 16:45)  POCT Blood Glucose.: 120 mg/dL (09 Jul 2018 11:10)  POCT Blood Glucose.: 129 mg/dL (09 Jul 2018 06:32)  POCT Blood Glucose.: 115 mg/dL (08 Jul 2018 23:14)      07-07 @ 16:49   No growth  --  --  07-07 @ 15:42   No growth to date.  --  --          MEDICATIONS  (STANDING):  clonazePAM Tablet 0.5 milliGRAM(s) Oral two times a day  dextrose 5%. 1000 milliLiter(s) (50 mL/Hr) IV Continuous <Continuous>  dextrose 50% Injectable 12.5 Gram(s) IV Push once  dextrose 50% Injectable 25 Gram(s) IV Push once  dextrose 50% Injectable 25 Gram(s) IV Push once  insulin lispro (HumaLOG) corrective regimen sliding scale   SubCutaneous three times a day before meals  insulin lispro (HumaLOG) corrective regimen sliding scale   SubCutaneous at bedtime  levothyroxine 75 MICROGram(s) Oral daily  pantoprazole    Tablet 40 milliGRAM(s) Oral every 12 hours  sucralfate 1 Gram(s) Oral four times a day    MEDICATIONS  (PRN):  acetaminophen   Tablet 650 milliGRAM(s) Oral every 6 hours PRN Mild Pain  dextrose 40% Gel 15 Gram(s) Oral once PRN Blood Glucose LESS THAN 70 milliGRAM(s)/deciliter  glucagon  Injectable 1 milliGRAM(s) IntraMuscular once PRN Glucose LESS THAN 70 milligrams/deciliter

## 2018-07-09 NOTE — PROGRESS NOTE ADULT - SUBJECTIVE AND OBJECTIVE BOX
SP EGD  -Clean based Duodenal Ulcer  -Gastritis  -Hiatal Hernia    Recs:  PPI bid  carafate suspension qid

## 2018-07-09 NOTE — PROGRESS NOTE ADULT - SUBJECTIVE AND OBJECTIVE BOX
Interval events/HPI: No overnight events. Pt was examined at bedside with no complaints.   Pt tolerated EGD procedure and is recovering well.     REVIEW OF SYSTEMS  Constitutional: No fever, chills, fatigue  Neuro: No headache, numbness, weakness  Resp: No cough, wheezing, shortness of breath  CVS: No chest pain, palpitations, leg swelling  GI: No abdominal pain, nausea, vomiting, diarrhea   : No dysuria, frequency, incontinence     T(F): 97.4 (07-09-18 @ 11:38), Max: 97.7 (07-08-18 @ 20:27)  HR: 66 (07-09-18 @ 13:00) (58 - 68)  BP: 134/58 (07-09-18 @ 13:00) (98/52 - 137/63)  RR: 13 (07-09-18 @ 13:00) (13 - 22)  SpO2: 96% (07-09-18 @ 13:00) (93% - 100%)    I&O's Summary  07-08 @ 07:01  -  07-09 @ 07:00  --------------------------------------------------------  IN: 1400 mL / OUT: 1000 mL / NET: 400 mL    07-09 @ 07:01  -  07-09 @ 14:13  --------------------------------------------------------  IN: 425 mL / OUT: 365 mL / NET: 60 mL    PHYSICAL EXAM  Constitutional: well-nourished, well-developed, NAD   Neurological: Alert, no focal deficits  HEENT:  NC/AT, Neck supple, PERRLA,   Resp: CTA B/L, no W/R/R  CV: RRR, +S1S2.  no M/R/G  GI: soft, NTTP, nondistended, +BSx4  Ext: LE B/L anterior foul odor dry yellow scabs with surrounding thick hard erythematous skin extending from ankle to knee.  Psych: Mood and affect appropriate    MEDICATIONS  insulin lispro (HumaLOG) corrective regimen sliding scale SubCutaneous  levothyroxine Oral  acetaminophen   Tablet Oral PRN  clonazePAM Tablet Oral  pantoprazole    Tablet Oral  sucralfate Oral  phytonadione   Solution Oral                          8.1    9.97  )-----------( 149      ( 09 Jul 2018 07:56 )             25.2   7-09    148<H>  |  118<H>  |  72<H>  ----------------------------<  111<H>  5.1   |  23  |  2.30<H>    Ca    7.9<L>      09 Jul 2018 07:56  Phos  4.4     07-09  Mg     2.2     07-09    TPro  4.6<L>  /  Alb  2.0<L>  /  TBili  0.3  /  DBili  x   /  AST  12<L>  /  ALT  7<L>  /  AlkPhos  74  07-08    PT/INR - ( 09 Jul 2018 07:56 )   PT: 22.6 sec;   INR: 2.04 ratio    PTT - ( 09 Jul 2018 07:56 )  PTT:30.8 sec    .Urine Catheterized   No growth -- 07-07 @ 16:49  .Blood Blood-Peripheral   No growth to date. -- 07-07 @ 15:42        CENTRAL LINE: N           KELLEY: Y                        DATE INSERTED: 7/7/2018             REMOVE: N  A-LINE: N                          GLOBAL ISSUE/BEST PRACTICE  Analgesia: N/A  Sedation:  N/A  HOB elevation: yes  Stress ulcer prophylaxis: Protonix   VTE prophylaxis:  N/A  Glycemic control: ISS  Nutrition: Consistent Carbohydrates    CODE STATUS: Full code

## 2018-07-09 NOTE — PROGRESS NOTE ADULT - PROBLEM SELECTOR PLAN 2
- Likely due to anemia, poor po intake.   - Continue to monitor closely.   - Transfuse/fluids as needed  - icu plan of care

## 2018-07-09 NOTE — PROGRESS NOTE ADULT - ASSESSMENT
84 y/o F with PMHx DM2, hypothyroidism, asthma, arthritis, meningoma (chronic), and  BIBEMS after syncopal episode at [facility]. Admitted to ICU w/ profound anemia, possibly 2/2 GIB.

## 2018-07-09 NOTE — PROGRESS NOTE ADULT - ASSESSMENT
83 year old female with DM2, HTN, here with an episode of syncope, found to have significant anemia. Improvement in lethargy with PRBC transfusions    - Transfuse to have Hb>8. Trend Hb closely  - Watch volume status. There is no echo on record so her LV function is unknown. No need for diuresis at current time, though she is edematous  - Can check a baseline echocardiogram, though there is no evidence of heart failure despite chronic edema  - Hold all HTN meds  - Care per ICU  - Very mild troponin leak with normal CK not suggestive of ACS.  This would not be unexpected given the magnitude of the anemia  - EKG is SR with no sign of ischemia or chamber enlargement  - Baseline creatinine unknown, though normal in 2016.  - Watch creatinine and electrolytes. Keep K>4, Mg>2  - Will follow with you.  - No active cardiac contraindication to proceeding with EGD. No evidence of ischemia heart failure, critical valvular disease or significant arrhthymias at this time.     The patient is at risk of abrupt decompensation.  35 minutes of critical care time was spent with this patient.

## 2018-07-09 NOTE — PROGRESS NOTE ADULT - ASSESSMENT
A:    83yFemale  HD # 3    Here for:    1. GIB, suspect upper, causing  2. Hypovolemic/hemorrhagic shock---> resolved, and  3. Acute blood loss anemia  4. NIDDM  5. Hypothyroid    P:    Avoid deleriogenic meds. Cont home Klonopin HD monitoring, not on pressors. IS, OOB. NPO for EGD today, cont PPI drip. Cont IVF. Mechanical DVT ppx. No abx. Cont walls for I/O's. No deep lines.    Dispo: Cont care.

## 2018-07-09 NOTE — PROGRESS NOTE ADULT - SUBJECTIVE AND OBJECTIVE BOX
ICU Progress Note    HPI:    S:    Pt seen and examined  HD # 3  Pt here for GIB, anemia (Hgb 3)    s/p multiple transfusions  Numbers, symptoms have improved  Large maroon BM with clots passes O/N  HDS  Pending EGD today    Allergies    Levaquin (Hives)  sulfa drugs (Unknown)    Intolerances        MEDICATIONS  (STANDING):  clonazePAM Tablet 0.5 milliGRAM(s) Oral two times a day  dextrose 5%. 1000 milliLiter(s) (50 mL/Hr) IV Continuous <Continuous>  dextrose 50% Injectable 12.5 Gram(s) IV Push once  dextrose 50% Injectable 25 Gram(s) IV Push once  dextrose 50% Injectable 25 Gram(s) IV Push once  insulin lispro (HumaLOG) corrective regimen sliding scale   SubCutaneous every 6 hours  lactated ringers. 1000 milliLiter(s) (75 mL/Hr) IV Continuous <Continuous>  levothyroxine Injectable 37.5 MICROGram(s) IV Push at bedtime  pantoprazole Infusion 8 mG/Hr (10 mL/Hr) IV Continuous <Continuous>    MEDICATIONS  (PRN):  acetaminophen   Tablet 650 milliGRAM(s) Oral every 6 hours PRN Mild Pain  dextrose 40% Gel 15 Gram(s) Oral once PRN Blood Glucose LESS THAN 70 milliGRAM(s)/deciliter  glucagon  Injectable 1 milliGRAM(s) IntraMuscular once PRN Glucose LESS THAN 70 milligrams/deciliter      Drug Dosing Weight        PAST MEDICAL & SURGICAL HISTORY:  Arthritis  Asthma  Diabetes mellitus  Hypothyroidism  No significant past surgical history      FAMILY HISTORY:  No pertinent family history in first degree relatives          ROS: See HPI; otherwise, all systems reviewed and negative.    O:    ICU Vital Signs Last 24 Hrs  T(C): 36.4 (2018 04:06), Max: 36.5 (2018 12:00)  T(F): 97.6 (2018 04:06), Max: 97.7 (2018 12:00)  HR: 66 (2018 04:00) (56 - 70)  BP: 117/56 (2018 04:00) (88/42 - 131/63)  BP(mean): 80 (2018 04:00) (60 - 90)  ABP: --  ABP(mean): --  RR: 22 (2018 04:00) (13 - 22)  SpO2: 100% (2018 04:00) (65% - 100%)          I&O's Detail    2018 07:01  -  2018 07:00  --------------------------------------------------------  IN:    Packed Red Blood Cells: 920 mL    pantoprazole Infusion: 130 mL  Total IN: 1050 mL    OUT:    Indwelling Catheter - Urethral: 632 mL  Total OUT: 632 mL    Total NET: 418 mL      2018 07:01  -  2018 05:02  --------------------------------------------------------  IN:    lactated ringers.: 675 mL    pantoprazole Infusion: 180 mL  Total IN: 855 mL    OUT:    Indwelling Catheter - Urethral: 725 mL  Total OUT: 725 mL    Total NET: 130 mL              PE:    Constitutional: Elderly F lying in bed in NAD.   Neck: No JVD, trachea midline.   Respiratory: CTA B/L good BS B/L no W/R/R.  Cardiovascular: S1S2+ RRR no M/R/G.  Gastrointestinal: Soft, mild distention, non tender throughout.  Extremities: No peripheral edema, No cyanosis, clubbing.  Neurological: Awake, conversive, alert, no gross deficits.  Skin: No rashes, warm, moist.    LABS:    CBC Full  -  ( 2018 16:45 )  WBC Count : 12.72 K/uL  Hemoglobin : 8.4 g/dL  Hematocrit : 26.3 %  Platelet Count - Automated : 152 K/uL  Mean Cell Volume : 86.2 fl  Mean Cell Hemoglobin : 27.5 pg  Mean Cell Hemoglobin Concentration : 31.9 gm/dL  Auto Neutrophil # : x  Auto Lymphocyte # : x  Auto Monocyte # : x  Auto Eosinophil # : x  Auto Basophil # : x  Auto Neutrophil % : x  Auto Lymphocyte % : x  Auto Monocyte % : x  Auto Eosinophil % : x  Auto Basophil % : x        147<H>  |  117<H>  |  70<H>  ----------------------------<  113<H>  4.9   |  22  |  2.20<H>    Ca    7.9<L>      2018 06:14  Phos  4.3     -  Mg     2.1     -    TPro  4.6<L>  /  Alb  2.0<L>  /  TBili  0.3  /  DBili  x   /  AST  12<L>  /  ALT  7<L>  /  AlkPhos  74  07-08    PT/INR - ( 2018 09:52 )   PT: 15.9 sec;   INR: 1.45 ratio         PTT - ( 2018 09:52 )  PTT:33.3 sec  Urinalysis Basic - ( 2018 14:08 )    Color: Yellow / Appearance: Slightly Turbid / S.020 / pH: x  Gluc: x / Ketone: Negative  / Bili: Negative / Urobili: Negative   Blood: x / Protein: 25 mg/dL / Nitrite: Negative   Leuk Esterase: Moderate / RBC: 6-10 /HPF / WBC 3-5   Sq Epi: x / Non Sq Epi: Few / Bacteria: Few      CAPILLARY BLOOD GLUCOSE      POCT Blood Glucose.: 115 mg/dL (2018 23:14)  POCT Blood Glucose.: 112 mg/dL (2018 17:27)  POCT Blood Glucose.: 116 mg/dL (2018 07:54)    CARDIAC MARKERS ( 2018 13:07 )  .034 ng/mL / x     / 27 U/L / x     / 3.1 ng/mL      LIVER FUNCTIONS - ( 2018 06:14 )  Alb: 2.0 g/dL / Pro: 4.6 g/dL / ALK PHOS: 74 U/L / ALT: 7 U/L / AST: 12 U/L / GGT: x

## 2018-07-09 NOTE — PROGRESS NOTE ADULT - ASSESSMENT
82 y/o F with PMHx DM2, hypothyroidism, asthma, arthritis, meningoma (chronic), and  BIBEMS after syncopal episode at [facility]. Pt admitted w/ Acute blood loss anemia, hypotension,  Elevated INR, BARBARA on CKD, UTI and r/o GI bleed. Patient poor historian, history obtained from medical chart. Pt was given 5u PRBC and 1u PLTs to date. Negative FOBT with no clear source for bleed. EGD was done on 7/9/2018 showing a nonbleeding duodenal ulcer.    Neuro: No active issues    Resp: Stable. Apical lung nodule f/u outpatient.     CV: Stable. Hold HTN medication as per cardio.    GI: GIB - Started Protonix 40mg BID. Started Carafate 1g QID. Monitor H/H. EGD showed a nonbleeding duodenal ulcer. Diet advanced to consistent carbohydrates.     Renal: BARBARA on CKD 2/2 hypoperfusion. Monitor BUN/Crt. Oliva. I&Os.    Endo: DM - HumaLOG ISS. Hypothyroidism - Levothyroxine 75 MICROgrams daily.      ID: No active issues    Heme: No active issues    Prophylaxis: Protonix.     Dispo: Full Code. Transfer to medical floor.

## 2018-07-09 NOTE — PROGRESS NOTE ADULT - PROBLEM SELECTOR PLAN 1
- possibly 2/2 GIB: GI (Dr. Daly) planning for EGD today  - now s/p transfusion of total 5 units PRBCs and 1 unit platelets.  - monitor H/H, transfuse as necessary.  - continue Protonix drip.

## 2018-07-09 NOTE — PROGRESS NOTE ADULT - SUBJECTIVE AND OBJECTIVE BOX
[facility]    Mabel Hernandez, Jessica, Cesar, Holly, Devan, Mayda      [phone]    CHIEF COMPLAINT: Patient is a 83y old  Female who presents with a chief complaint of profound anemia (07 Jul 2018 14:43)      Follow Up: gib    Interim history: The patient reports no new symptoms.  Denies chest discomfort and shortness of breath.  No abdominal pain.  No new neurologic symptoms.      MEDICATIONS  (STANDING):  clonazePAM Tablet 0.5 milliGRAM(s) Oral two times a day  dextrose 5%. 1000 milliLiter(s) (50 mL/Hr) IV Continuous <Continuous>  dextrose 50% Injectable 12.5 Gram(s) IV Push once  dextrose 50% Injectable 25 Gram(s) IV Push once  dextrose 50% Injectable 25 Gram(s) IV Push once  insulin lispro (HumaLOG) corrective regimen sliding scale   SubCutaneous every 6 hours  lactated ringers. 1000 milliLiter(s) (75 mL/Hr) IV Continuous <Continuous>  levothyroxine Injectable 37.5 MICROGram(s) IV Push at bedtime  pantoprazole Infusion 8 mG/Hr (10 mL/Hr) IV Continuous <Continuous>    MEDICATIONS  (PRN):  acetaminophen   Tablet 650 milliGRAM(s) Oral every 6 hours PRN Mild Pain  dextrose 40% Gel 15 Gram(s) Oral once PRN Blood Glucose LESS THAN 70 milliGRAM(s)/deciliter  glucagon  Injectable 1 milliGRAM(s) IntraMuscular once PRN Glucose LESS THAN 70 milligrams/deciliter      REVIEW OF SYSTEMS:  eye, ent, GI, , allergic, dermatologic, musculoskeletal and neurologic are negative except as described above    Vital Signs Last 24 Hrs  T(C): 36.4 (09 Jul 2018 04:06), Max: 36.5 (08 Jul 2018 12:00)  T(F): 97.6 (09 Jul 2018 04:06), Max: 97.7 (08 Jul 2018 12:00)  HR: 62 (09 Jul 2018 05:00) (56 - 70)  BP: 98/52 (09 Jul 2018 05:00) (88/42 - 131/63)  BP(mean): 72 (09 Jul 2018 05:00) (60 - 90)  RR: 14 (09 Jul 2018 05:00) (13 - 22)  SpO2: 100% (09 Jul 2018 05:00) (65% - 100%)    I&O's Summary    07 Jul 2018 07:01  -  08 Jul 2018 07:00  --------------------------------------------------------  IN: 1050 mL / OUT: 632 mL / NET: 418 mL    08 Jul 2018 07:01  -  09 Jul 2018 06:00  --------------------------------------------------------  IN: 855 mL / OUT: 725 mL / NET: 130 mL        Telemetry past 24h:    PHYSICAL EXAM:    Constitutional: well-nourished, well-developed, NAD   HEENT:  MMM, sclerae anicteric, conjunctivae clear, no oral cyanosis.  Pulmonary: Non-labored, breath sounds are clear bilaterally, No wheezing, rales or rhonchi  Cardiovascular: Regular, S1 and S2.  No murmur.  No rubs, gallops or clicks  Gastrointestinal: Bowel Sounds present, soft, nontender.   Lymph: 2+ peripheral edema.   Neurological: Alert, no focal deficits  Skin: No rashes.  Psych:  Mood & affect appropriate    LABS: All Labs Reviewed:                        8.4    12.72 )-----------( 152      ( 08 Jul 2018 16:45 )             26.3                         8.2    11.92 )-----------( 149      ( 08 Jul 2018 09:52 )             25.3                         6.8    13.07 )-----------( 166      ( 08 Jul 2018 01:37 )             20.8     08 Jul 2018 06:14    147    |  117    |  70     ----------------------------<  113    4.9     |  22     |  2.20   07 Jul 2018 18:22    145    |  115    |  71     ----------------------------<  138    4.8     |  23     |  2.10   07 Jul 2018 13:07    148    |  120    |  62     ----------------------------<  125    4.1     |  20     |  1.90     Ca    7.9        08 Jul 2018 06:14  Ca    8.0        07 Jul 2018 18:22  Ca    6.4        07 Jul 2018 13:07  Phos  4.3       08 Jul 2018 06:14  Mg     2.1       08 Jul 2018 06:14    TPro  4.6    /  Alb  2.0    /  TBili  0.3    /  DBili  x      /  AST  12     /  ALT  7      /  AlkPhos  74     08 Jul 2018 06:14  TPro  4.2    /  Alb  1.7    /  TBili  0.1    /  DBili  x      /  AST  13     /  ALT  8      /  AlkPhos  66     07 Jul 2018 13:07    PT/INR - ( 08 Jul 2018 09:52 )   PT: 15.9 sec;   INR: 1.45 ratio         PTT - ( 08 Jul 2018 09:52 )  PTT:33.3 sec  CARDIAC MARKERS ( 07 Jul 2018 13:07 )  .034 ng/mL / x     / 27 U/L / x     / 3.1 ng/mL      Blood Culture: Organism --  Gram Stain Blood -- Gram Stain --  Specimen Source .Urine Catheterized  Culture-Blood --    Organism --  Gram Stain Blood -- Gram Stain --  Specimen Source .Blood Blood-Peripheral  Culture-Blood --            RADIOLOGY:    EKG:    Echo:

## 2018-07-10 LAB
ANION GAP SERPL CALC-SCNC: 6 MMOL/L — SIGNIFICANT CHANGE UP (ref 5–17)
APTT BLD: 30.9 SEC — SIGNIFICANT CHANGE UP (ref 27.5–37.4)
BASOPHILS # BLD AUTO: 0.02 K/UL — SIGNIFICANT CHANGE UP (ref 0–0.2)
BASOPHILS NFR BLD AUTO: 0.3 % — SIGNIFICANT CHANGE UP (ref 0–2)
BUN SERPL-MCNC: 67 MG/DL — HIGH (ref 7–23)
CALCIUM SERPL-MCNC: 8 MG/DL — LOW (ref 8.5–10.1)
CHLORIDE SERPL-SCNC: 116 MMOL/L — HIGH (ref 96–108)
CO2 SERPL-SCNC: 24 MMOL/L — SIGNIFICANT CHANGE UP (ref 22–31)
CREAT SERPL-MCNC: 2.1 MG/DL — HIGH (ref 0.5–1.3)
EOSINOPHIL # BLD AUTO: 0.29 K/UL — SIGNIFICANT CHANGE UP (ref 0–0.5)
EOSINOPHIL NFR BLD AUTO: 3.7 % — SIGNIFICANT CHANGE UP (ref 0–6)
GLUCOSE SERPL-MCNC: 124 MG/DL — HIGH (ref 70–99)
HCT VFR BLD CALC: 25.9 % — LOW (ref 34.5–45)
HGB BLD-MCNC: 8.2 G/DL — LOW (ref 11.5–15.5)
IMM GRANULOCYTES NFR BLD AUTO: 1.7 % — HIGH (ref 0–1.5)
LYMPHOCYTES # BLD AUTO: 0.93 K/UL — LOW (ref 1–3.3)
LYMPHOCYTES # BLD AUTO: 12 % — LOW (ref 13–44)
MAGNESIUM SERPL-MCNC: 2.1 MG/DL — SIGNIFICANT CHANGE UP (ref 1.6–2.6)
MCHC RBC-ENTMCNC: 27.9 PG — SIGNIFICANT CHANGE UP (ref 27–34)
MCHC RBC-ENTMCNC: 31.7 GM/DL — LOW (ref 32–36)
MCV RBC AUTO: 88.1 FL — SIGNIFICANT CHANGE UP (ref 80–100)
MONOCYTES # BLD AUTO: 0.48 K/UL — SIGNIFICANT CHANGE UP (ref 0–0.9)
MONOCYTES NFR BLD AUTO: 6.2 % — SIGNIFICANT CHANGE UP (ref 2–14)
NEUTROPHILS # BLD AUTO: 5.9 K/UL — SIGNIFICANT CHANGE UP (ref 1.8–7.4)
NEUTROPHILS NFR BLD AUTO: 76.1 % — SIGNIFICANT CHANGE UP (ref 43–77)
PHOSPHATE SERPL-MCNC: 3.3 MG/DL — SIGNIFICANT CHANGE UP (ref 2.5–4.5)
PLATELET # BLD AUTO: 166 K/UL — SIGNIFICANT CHANGE UP (ref 150–400)
POTASSIUM SERPL-MCNC: 4.9 MMOL/L — SIGNIFICANT CHANGE UP (ref 3.5–5.3)
POTASSIUM SERPL-SCNC: 4.9 MMOL/L — SIGNIFICANT CHANGE UP (ref 3.5–5.3)
RBC # BLD: 2.94 M/UL — LOW (ref 3.8–5.2)
RBC # FLD: 18.6 % — HIGH (ref 10.3–14.5)
SODIUM SERPL-SCNC: 146 MMOL/L — HIGH (ref 135–145)
WBC # BLD: 7.75 K/UL — SIGNIFICANT CHANGE UP (ref 3.8–10.5)
WBC # FLD AUTO: 7.75 K/UL — SIGNIFICANT CHANGE UP (ref 3.8–10.5)

## 2018-07-10 PROCEDURE — 99233 SBSQ HOSP IP/OBS HIGH 50: CPT

## 2018-07-10 RX ORDER — LACTOBACILLUS ACIDOPHILUS 100MM CELL
1 CAPSULE ORAL
Qty: 0 | Refills: 0 | Status: DISCONTINUED | OUTPATIENT
Start: 2018-07-10 | End: 2018-07-25

## 2018-07-10 RX ORDER — IPRATROPIUM/ALBUTEROL SULFATE 18-103MCG
3 AEROSOL WITH ADAPTER (GRAM) INHALATION ONCE
Qty: 0 | Refills: 0 | Status: COMPLETED | OUTPATIENT
Start: 2018-07-10 | End: 2018-07-10

## 2018-07-10 RX ORDER — DIPHENHYDRAMINE HCL 50 MG
25 CAPSULE ORAL ONCE
Qty: 0 | Refills: 0 | Status: COMPLETED | OUTPATIENT
Start: 2018-07-10 | End: 2018-07-15

## 2018-07-10 RX ADMIN — Medication 3 MILLILITER(S): at 23:14

## 2018-07-10 RX ADMIN — PANTOPRAZOLE SODIUM 40 MILLIGRAM(S): 20 TABLET, DELAYED RELEASE ORAL at 17:53

## 2018-07-10 RX ADMIN — Medication 1 TABLET(S): at 17:53

## 2018-07-10 RX ADMIN — Medication 650 MILLIGRAM(S): at 21:57

## 2018-07-10 RX ADMIN — Medication 1 GRAM(S): at 12:24

## 2018-07-10 RX ADMIN — Medication 0.5 MILLIGRAM(S): at 17:59

## 2018-07-10 RX ADMIN — Medication 2: at 12:23

## 2018-07-10 RX ADMIN — Medication 75 MICROGRAM(S): at 06:46

## 2018-07-10 RX ADMIN — Medication 1 TABLET(S): at 12:24

## 2018-07-10 RX ADMIN — Medication 1 GRAM(S): at 17:53

## 2018-07-10 RX ADMIN — PANTOPRAZOLE SODIUM 40 MILLIGRAM(S): 20 TABLET, DELAYED RELEASE ORAL at 06:46

## 2018-07-10 RX ADMIN — Medication 0.5 MILLIGRAM(S): at 06:46

## 2018-07-10 NOTE — PROGRESS NOTE ADULT - PROBLEM SELECTOR PLAN 1
- possibly 2/2 GIB: GI (Dr. Daly) EGD showed duodenal ulcer  - now s/p transfusion of total 5 units PRBCs and 1 unit platelets.  - monitor H/H, transfuse as necessary.  - continue Protonix drip.

## 2018-07-10 NOTE — PROGRESS NOTE ADULT - SUBJECTIVE AND OBJECTIVE BOX
83y Female     T(C): 36.6 (07-10-18 @ 05:11), Max: 36.7 (07-09-18 @ 16:00)  HR: 73 (07-10-18 @ 05:11) (64 - 81)  BP: 156/79 (07-10-18 @ 05:11) (108/50 - 166/75)  RR: 18 (07-10-18 @ 05:11) (13 - 21)  SpO2: 96% (07-10-18 @ 05:11) (93% - 100%)  Wt(kg): --    Pt seen, doing well, no anesthesia complications or complaints noted or reported.   No Nausea  Pain well controlled

## 2018-07-10 NOTE — CHART NOTE - NSCHARTNOTEFT_GEN_A_CORE
called by RN, patient complaining of SOB. Seen and examined at bedside. Patient complaining of some SOB and requesting albuterol. Patient denies chest pain. Patient and son also have noticed that patients top lip has been swollen today. She denies any tongue swelling, difficulty swallowing, or difficulty talking.     Vital Signs Last 24 Hrs  T(C): 36.7 (10 Jul 2018 23:00), Max: 36.7 (10 Jul 2018 23:00)  T(F): 98 (10 Jul 2018 23:00), Max: 98 (10 Jul 2018 23:00)  HR: 80 (10 Jul 2018 23:16) (73 - 101)  BP: 155/83 (10 Jul 2018 23:00) (151/74 - 165/73)  BP(mean): --  RR: 18 (10 Jul 2018 23:00) (18 - 20)  SpO2: 93% (10 Jul 2018 23:16) (93% - 99%)    Physical Exam:  Constitutional: NAD, awake and alert, obese, frail  HEENT: Moist Mucous Membranes, Anicteric, upper lip mildly swollen   Pulmonary: breath sounds are decreased in bases bilaterally, No wheezing, rales or rhonchi  Cardiovascular: Regular, S1 and S2, No murmurs, rubs, gallops or clicks  Gastrointestinal: Bowel Sounds present, soft, nontender.   Lymph: chronic edema bilateral lower extremities. No lymphadenopathy.  Skin: thick yellow scaly areas on lower extremities bilaterally,  Left shoulder/back with ecchymotic area.    Psych:  Mood & affect appropriate    Assessment and Plan  82 y/o F with PMHx DM2, hypothyroidism, asthma, arthritis, meningoma admitted with syncope secondary to anemia. Patient received 5 units of PRBC and 1 platelets, fobt negative, unclear source of bleed. Complaining of SOB. Patient also with swollen upper lip since early afternoon. SOB likely 2/2 asthma as patient reports she uses an albuterol treatment every night at home.    -benedryl 25mg for swollen lip   -duoneb x1 treatment for SOB.    -will follow, RN to call with any changes.

## 2018-07-10 NOTE — PROGRESS NOTE ADULT - PROBLEM SELECTOR PLAN 9
no - patient on Clonazepam 0.5 BID; may be contributing to disorientation/somnolence: dose should be tapered if patient can tolerate.

## 2018-07-10 NOTE — PROGRESS NOTE ADULT - SUBJECTIVE AND OBJECTIVE BOX
INTERVAL HPI/OVERNIGHT EVENTS:  pt seen and examined  denies n/v/abd pain, unsure if she passed stool  per rn, +loose dark stool x2 overnight, no hematemesis  afebrile overnight labs noted    MEDICATIONS  (STANDING):  clonazePAM Tablet 0.5 milliGRAM(s) Oral two times a day  dextrose 5%. 1000 milliLiter(s) (50 mL/Hr) IV Continuous <Continuous>  dextrose 50% Injectable 12.5 Gram(s) IV Push once  dextrose 50% Injectable 25 Gram(s) IV Push once  dextrose 50% Injectable 25 Gram(s) IV Push once  insulin lispro (HumaLOG) corrective regimen sliding scale   SubCutaneous three times a day before meals  insulin lispro (HumaLOG) corrective regimen sliding scale   SubCutaneous at bedtime  levothyroxine 75 MICROGram(s) Oral daily  pantoprazole    Tablet 40 milliGRAM(s) Oral every 12 hours  sucralfate 1 Gram(s) Oral four times a day    MEDICATIONS  (PRN):  acetaminophen   Tablet 650 milliGRAM(s) Oral every 6 hours PRN Mild Pain  dextrose 40% Gel 15 Gram(s) Oral once PRN Blood Glucose LESS THAN 70 milliGRAM(s)/deciliter  glucagon  Injectable 1 milliGRAM(s) IntraMuscular once PRN Glucose LESS THAN 70 milligrams/deciliter      Allergies    Levaquin (Hives)  sulfa drugs (Unknown)    Intolerances        Review of Systems:    General:  No wt loss, fevers, chills, night sweats, fatigue   Eyes:  Good vision, no reported pain  ENT:  No sore throat, pain, runny nose, dysphagia  CV:  No pain, palpitations, hypo/hypertension  Resp:  No dyspnea, cough, tachypnea, wheezing  GI:  No pain, No nausea, No vomiting, No diarrhea, No constipation, No weight loss, No fever, No pruritis, No rectal bleeding, ?melena, No dysphagia  :  No pain, bleeding, incontinence, nocturia  Muscle:  No pain, weakness  Neuro:  No weakness, tingling, memory problems  Psych:  No fatigue, insomnia, mood problems, depression  Endocrine:  No polyuria, polydypsia, cold/heat intolerance  Heme:  No petechiae, ecchymosis, easy bruisability  Skin:  No rash, tattoos, scars, edema      Vital Signs Last 24 Hrs  T(C): 36.6 (10 Jul 2018 05:11), Max: 36.7 (09 Jul 2018 16:00)  T(F): 97.9 (10 Jul 2018 05:11), Max: 98.1 (09 Jul 2018 16:00)  HR: 73 (10 Jul 2018 05:11) (64 - 81)  BP: 156/79 (10 Jul 2018 05:11) (125/58 - 166/75)  BP(mean): 94 (09 Jul 2018 19:00) (73 - 141)  RR: 18 (10 Jul 2018 05:11) (13 - 21)  SpO2: 96% (10 Jul 2018 05:11) (93% - 100%)    PHYSICAL EXAM:    Constitutional: NAD, lying in bed  HEENT: EOMI, perrl  Neck: No LAD  Respiratory: dec bs  Cardiovascular: S1 and S2, RRR  Gastrointestinal: obese abd BS+, soft, NT, nd  Extremities: +edema and blistering to le  Vascular: 2+ peripheral pulses  Neurological: Awake alert  Skin: see above      LABS:                        8.2    7.75  )-----------( 166      ( 10 Jul 2018 07:34 )             25.9     07-10    146<H>  |  116<H>  |  67<H>  ----------------------------<  124<H>  4.9   |  24  |  2.10<H>    Ca    8.0<L>      10 Jul 2018 07:34  Phos  3.3     07-10  Mg     2.1     07-10      PT/INR - ( 09 Jul 2018 07:56 )   PT: 22.6 sec;   INR: 2.04 ratio         PTT - ( 10 Jul 2018 07:34 )  PTT:30.9 sec      RADIOLOGY & ADDITIONAL TESTS:

## 2018-07-10 NOTE — PROGRESS NOTE ADULT - SUBJECTIVE AND OBJECTIVE BOX
[facility] -- Mabel Hernandez, Cesar Lopez Pannella, Patel, Savella  Office # [phone]      Follow Up:  GIB    Subjective/Observations: Seen and examined.  Sitting in bed resting comfortably.  Noted to have Melena this am.  Denies cp, sob or palpitations.  Feeling better after being cleaned up.  No signs of orthopnea or PND.  NAD.      REVIEW OF SYSTEMS: All other review of systems is negative unless indicated above    PAST MEDICAL & SURGICAL HISTORY:  Arthritis  Asthma  Diabetes mellitus  Hypothyroidism  No significant past surgical history      MEDICATIONS  (STANDING):  clonazePAM Tablet 0.5 milliGRAM(s) Oral two times a day  dextrose 5%. 1000 milliLiter(s) (50 mL/Hr) IV Continuous <Continuous>  dextrose 50% Injectable 12.5 Gram(s) IV Push once  dextrose 50% Injectable 25 Gram(s) IV Push once  dextrose 50% Injectable 25 Gram(s) IV Push once  insulin lispro (HumaLOG) corrective regimen sliding scale   SubCutaneous three times a day before meals  insulin lispro (HumaLOG) corrective regimen sliding scale   SubCutaneous at bedtime  lactobacillus acidophilus 1 Tablet(s) Oral three times a day with meals  levothyroxine 75 MICROGram(s) Oral daily  pantoprazole    Tablet 40 milliGRAM(s) Oral every 12 hours  sucralfate 1 Gram(s) Oral four times a day    MEDICATIONS  (PRN):  acetaminophen   Tablet 650 milliGRAM(s) Oral every 6 hours PRN Mild Pain  dextrose 40% Gel 15 Gram(s) Oral once PRN Blood Glucose LESS THAN 70 milliGRAM(s)/deciliter  glucagon  Injectable 1 milliGRAM(s) IntraMuscular once PRN Glucose LESS THAN 70 milligrams/deciliter      Allergies    Levaquin (Hives)  sulfa drugs (Unknown)    Intolerances            Vital Signs Last 24 Hrs  T(C): 36.6 (10 Jul 2018 05:11), Max: 36.7 (09 Jul 2018 16:00)  T(F): 97.9 (10 Jul 2018 05:11), Max: 98.1 (09 Jul 2018 16:00)  HR: 73 (10 Jul 2018 05:11) (64 - 81)  BP: 156/79 (10 Jul 2018 05:11) (125/58 - 166/75)  BP(mean): 94 (09 Jul 2018 19:00) (73 - 141)  RR: 18 (10 Jul 2018 05:11) (13 - 21)  SpO2: 96% (10 Jul 2018 05:11) (93% - 100%)    I&O's Summary    09 Jul 2018 07:01  -  10 Jul 2018 07:00  --------------------------------------------------------  IN: 905 mL / OUT: 1595 mL / NET: -690 mL      Weight (kg): 90.1 (07-09 @ 20:21)    PHYSICAL EXAM:  TELE: Not on tele  Constitutional: NAD, awake and alert, obese, frail  HEENT: Moist Mucous Membranes, Anicteric  Pulmonary: Non-labored, breath sounds are decreased in bases bilaterally, No wheezing, rales or rhonchi  Cardiovascular: Regular, S1 and S2, No murmurs, rubs, gallops or clicks  Gastrointestinal: Bowel Sounds present, soft, nontender.   Lymph: chronic edema bilateral lower extremities. No lymphadenopathy.  Skin: thick yellow scaly areas on lower extremities bilaterally,  Left shoulder/back with ecchymotic area.    Psych:  Mood & affect appropriate    LABS: All Labs Reviewed:                        8.2    7.75  )-----------( 166      ( 10 Jul 2018 07:34 )             25.9                         8.1    9.97  )-----------( 149      ( 09 Jul 2018 07:56 )             25.2                         8.4    12.72 )-----------( 152      ( 08 Jul 2018 16:45 )             26.3     10 Jul 2018 07:34    146    |  116    |  67     ----------------------------<  124    4.9     |  24     |  2.10   09 Jul 2018 07:56    148    |  118    |  72     ----------------------------<  111    5.1     |  23     |  2.30   08 Jul 2018 06:14    147    |  117    |  70     ----------------------------<  113    4.9     |  22     |  2.20     Ca    8.0        10 Jul 2018 07:34  Ca    7.9        09 Jul 2018 07:56  Ca    7.9        08 Jul 2018 06:14  Phos  3.3       10 Jul 2018 07:34  Phos  4.4       09 Jul 2018 07:56  Phos  4.3       08 Jul 2018 06:14  Mg     2.1       10 Jul 2018 07:34  Mg     2.2       09 Jul 2018 07:56  Mg     2.1       08 Jul 2018 06:14    TPro  4.6    /  Alb  2.0    /  TBili  0.3    /  DBili  x      /  AST  12     /  ALT  7      /  AlkPhos  74     08 Jul 2018 06:14  TPro  4.2    /  Alb  1.7    /  TBili  0.1    /  DBili  x      /  AST  13     /  ALT  8      /  AlkPhos  66     07 Jul 2018 13:07    PT/INR - ( 09 Jul 2018 07:56 )   PT: 22.6 sec;   INR: 2.04 ratio         PTT - ( 10 Jul 2018 07:34 )  PTT:30.9 sec      < from: 12 Lead ECG (07.07.18 @ 13:03) >    Ventricular Rate 84 BPM    Atrial Rate 84 BPM    P-R Interval 144 ms    QRS Duration 98 ms    Q-T Interval 374 ms    QTC Calculation(Bezet) 441 ms    P Axis 41 degrees    R Axis 23 degrees    T Axis 45 degrees    Diagnosis Line Normal sinus rhythm  Normal ECG  When compared with ECG of 13-APR-2016 00:29,  No significant change was found  Confirmed by pearl Ohara ([id number]) on 7/7/2018 5:25:04 PM    < end of copied text >       < from: CT Abdomen and Pelvis No Cont (07.07.18 @ 16:10) >  EXAM:  CT ABDOMEN AND PELVIS                            PROCEDURE DATE:  07/07/2018          INTERPRETATION:    CT ABDOMEN and PELVIS without IV contrast dated 7/7/2018 4:10 PM    INDICATION: 83-year-old female severe anemia, ro RP bleed        TECHNIQUE: CT of the abdomen and pelvis was performed without intravenous   contrast administration. Axial, sagittal and coronal images were produced   and reviewed.  Enteric contrast was not administered, limiting complete   evaluation of the gastrointestinal tract. The patient is imaged with both   upper extremities in the gantry.    COMPARISON: None.    FINDINGS: Images of the lower chest demonstrate trace right pleural   effusion and subjacent right basilar atelectasis. There is no pericardial   effusion or hematoma. Atherosclerotic coronary artery calcifications are   noted. There is visualization of the hyperdense and the ventricular   septum, in keeping with history of anemia. A small hiatal hernia is   noted.     Postcholecystectomy surgical clips are seen in the gallbladder fossa. The   pancreas is completely fatty replaced. There is an apparent 2 cm   circumscribed cortical hypodensity in the left kidney, with an adjacent   cortical dystrophic calcification The liver, spleen, adrenal glands and   right kidney have a grossly unremarkable nonenhanced appearance.    Extensive atheromatous disease throughout the aortobiiliac system,   without aneurysm. There is also marked mural calcification of the splenic   artery. No retroperitoneal hematoma is present. No retroperitoneal mass   or lymphadenopathy is seen.     Enteric contrast was not administered, limiting complete evaluation of   the gastrointestinal tract. Evaluation of the nonopacified small bowel   demonstrates no dilatation or air-fluid levels. There is an apparent   intraluminal lipoma within the second portion of duodenum. There is a   moderate-sized rectal stool ball with circumferential mural thickening   and presacral edema stranding, most suggestive of stercoral colitis.   There is no proximal colonic obstruction. The appendix is not visualized.   No ascites no pneumoperitoneum is seen.    There is no pelvic hematoma, mass or lymphadenopathy. The urinary bladder   is decompressed around a Oliva catheter.    Evaluation of the osseous structures demonstrates a mild S-shaped   thoracolumbar scoliosis with multilevel multifactorial degenerative   spinal changes. There is a benign-appearing bone island in the right   iliac wing. Is heterogeneous appearance of the sacral ala which is felt   to represent to be related to stress-related change.      IMPRESSION:  No retroperitoneal or pelvic hematoma.    Constellation of findings are most suggestive of rectal stercoral colitis.    Trace right pleural effusion.    Oliva catheter in situ.                    JUNE BEYER M.D., ATTENDING RADIOLOGIST  This document has been electronically signed. Jul 7 2018  4:20PM          < end of copied text >    < from: CT Cervical Spine No Cont (07.07.18 @ 13:48) >  EXAM:  CT CERVICAL SPINE                          EXAM:  CT BRAIN                            PROCEDURE DATE:  07/07/2018          INTERPRETATION:  Exam Date: 7/7/2018 1:44 PM    CT head without IV contrast    CLINICAL INFORMATION: weakness, hypotension, altered mental status    TECHNIQUE: Contiguous axial 4 mm sections were obtained through the head.     COMPARISON: Head CT 4/13/2016    FINDINGS:     The ventricles and sulci are prominent, compatible with age-related   generalized cerebral volume loss.   There is no CT evidence for acute   cerebral cortical infarct. There is no evidence of hemorrhage. There is   periventricular and subcortical white matter hypoattenuation,  most   compatible with chronic microvascular ischemic changes.   Nomass effect   is found in the brain.  There is no midline shift or herniation pattern.    Stable calcified meningiomas along the right parietal and left temporal   convexity. Additional small noncalcified right parafalcine meningioma is   noted.    The visualized portions of the paranasal sinuses and mastoid air cells   are clear.      Heterogeneous calvarium again noted.    IMPRESSION:       No evidence of acute intracranial abnormality.  No evidence of   hemorrhage.      Age-related involutional changes as above.        Exam Date: 7/7/2018 1:44 PM    CT cervical spine without IV contrast    CLINICAL INFORMATION: weakness, hypotension, slight AMS    TECHNIQUE:  Contiguous axial 2.0mm sections were obtained through the   cervical spine using a single helical acquisition.  Additional sagittal   and coronal reconstructions of the spine were obtained on an independent   3D workstation.  These additional reformatted images were used to   evaluate the spine for alignment, vertebral fractures and the integrity   of the the posterior elements.   This scan was performed using automatic   exposure control (radiation dose reduction software) to obtain a   diagnostic image quality scan with patient dose as low as reasonably   achievable.        FINDINGS:   No prior similar studies are available for review    Cervical vertebral body heights are maintained. No vertebral fracture is   seen. No destructive bone lesion is found.  Alignment is preserved.    Facet joints appear intact and aligned.    Cervical intervertebral disc spaces show multilevel degenerative disc   disease and spondylosis with loss of intervertebral disc height and   associated degenerative endplate changes.  There is multilevel narrowing   of the neural foramina on a degenerative basis.  There is no evidence of   high-grade central canal stenosis.   MR would be required to evaluate the   intervertebral discs at higher sensitivity for disc pathology.    The skull base appears intact.  No neck mass is recognized.  Paraspinal   soft tissues appear intact. Visualized lymph nodes appear to be within   physiologic size limits.      10 mm right apical lung nodule.      IMPRESSION:   No vertebral fracture is recognized.   Multilevel   degenerative changes of the cervicalspine are present.    10 mm right apical lung nodule.  According to a 2017 consensus statement published by the American College   of Chest Physicians and the Fleischner Society, the following are   suggested as guidelines for single solid pulmonary nodules of this size:    Regardless of the history of smoking or malignancy, consider a follow up   CT, PET/CT, or tissue sampling at 3 months.     (Radiology 2017; 237:395-400).                TRAVIS ISABEL M.D., ATTENDING RADIOLOGIST  This document has been electronically signed. Jul 7 2018  2:11PM                < end of copied text >

## 2018-07-10 NOTE — PROGRESS NOTE ADULT - PROBLEM SELECTOR PLAN 1
sp egd 7/9 showed non bleeding du, gastritis and hh  passed dark stool overnight, ?old blood as hgb stable today  cont ppi bid and carafate qid  trend h/h, transfuse prn  hold ac asa nsaids  diet as tolerated  monitor for further s/s active gib  consider heme eval  will need outpatient gi f/u upon dc

## 2018-07-10 NOTE — PROGRESS NOTE ADULT - ASSESSMENT
83 year old female with DM2, HTN, here with an episode of syncope, found to have significant anemia. Improvement in lethargy with PRBC transfusions    - Transfuse to have Hb>8. Trend Hb closely  Had Melena this am but hgb stable - GI following  - s/p EGD 7/9 with clean based duodenal ulcer, gastritis, hiatal hernia  - Watch volume status. There is no echo on record so her LV function is unknown. No need for diuresis at current time, though she is edematous  - Can check a baseline echocardiogram, though there is no evidence of heart failure despite chronic edema  - Hemodynamics stable with BP slightly elevated.  If necessary may start Norvasc for better control.    - Very mild troponin leak with normal CK not suggestive of ACS.  This would not be unexpected given the magnitude of the anemia  - EKG is SR with no sign of ischemia or chamber enlargement  - Baseline creatinine unknown, though normal in 2016. Now elevated although starting to trend down.  Would hold nephrotoxic medications.   - Watch creatinine and electrolytes. Keep K>4, Mg>2  - Will follow with you.    Christine Thomson, NP-C  Cardiology

## 2018-07-10 NOTE — PROGRESS NOTE ADULT - ASSESSMENT
82 y/o F with PMHx DM2, hypothyroidism, asthma, arthritis, meningoma (chronic), and  BIBEMS after syncopal episode at [facility]. Admitted to ICU w/ profound anemia, possibly 2/2 GIB.

## 2018-07-10 NOTE — PROGRESS NOTE ADULT - SUBJECTIVE AND OBJECTIVE BOX
Patient is a 83y old  Female who presents with a chief complaint of profound anemia (07 Jul 2018 14:43)      INTERVAL HPI: Pt seen and examined. States she is feeling better and wants to go home, however cannot state where she is or what year it is. Denies any other acute complaints at this time however limited history due to confusion. Daughter at bedside.     OVERNIGHT EVENTS: none noted  T(F): 98 (07-10-18 @ 23:00), Max: 98 (07-10-18 @ 23:00)  HR: 80 (07-10-18 @ 23:16) (73 - 101)  BP: 155/83 (07-10-18 @ 23:00) (151/74 - 165/73)  RR: 18 (07-10-18 @ 23:00) (18 - 20)  SpO2: 93% (07-10-18 @ 23:16) (93% - 99%)  I&O's Summary    09 Jul 2018 07:01  -  10 Jul 2018 07:00  --------------------------------------------------------  IN: 905 mL / OUT: 1595 mL / NET: -690 mL    10 Jul 2018 07:01  -  11 Jul 2018 00:35  --------------------------------------------------------  IN: 0 mL / OUT: 700 mL / NET: -700 mL        REVIEW OF SYSTEMS: 12 systems negative however limited due to pt confusion    PHYSICAL EXAM:  GENERAL: NAD, well-groomed, well-developed, confused  HEAD:  Atraumatic, Normocephalic  EYES: EOMI, PERRLA, conjunctiva and sclera clear  ENMT: No tonsillar erythema, exudates, or enlargement; Moist mucous membranes, Good dentition, No lesions  NECK: Supple, No JVD, Normal thyroid  NERVOUS SYSTEM:  Alert & Oriented X1, Motor Strength 3/5 B/L upper and lower extremities; DTRs 2+ intact and symmetric  CHEST/LUNG: Clear to percussion bilaterally; No rales, rhonchi, wheezing, or rubs  HEART: Regular rate and rhythm; No murmurs, rubs, or gallops  ABDOMEN: Soft, Nontender, Nondistended; Bowel sounds present  EXTREMITIES:  2+ Peripheral Pulses, No clubbing, cyanosis, or edema  LYMPH: No lymphadenopathy noted  SKIN: No rashes or lesions    LABS:                        8.2    7.75  )-----------( 166      ( 10 Jul 2018 07:34 )             25.9     07-10    146<H>  |  116<H>  |  67<H>  ----------------------------<  124<H>  4.9   |  24  |  2.10<H>    Ca    8.0<L>      10 Jul 2018 07:34  Phos  3.3     07-10  Mg     2.1     07-10      PT/INR - ( 09 Jul 2018 07:56 )   PT: 22.6 sec;   INR: 2.04 ratio         PTT - ( 10 Jul 2018 07:34 )  PTT:30.9 sec    CAPILLARY BLOOD GLUCOSE      POCT Blood Glucose.: 154 mg/dL (10 Jul 2018 21:19)  POCT Blood Glucose.: 141 mg/dL (10 Jul 2018 17:06)  POCT Blood Glucose.: 219 mg/dL (10 Jul 2018 11:59)  POCT Blood Glucose.: 128 mg/dL (10 Jul 2018 07:48)      07-07 @ 16:49   No growth  --  --  07-07 @ 15:42   No growth to date.  --  --          MEDICATIONS  (STANDING):  clonazePAM Tablet 0.5 milliGRAM(s) Oral two times a day  dextrose 5%. 1000 milliLiter(s) (50 mL/Hr) IV Continuous <Continuous>  dextrose 50% Injectable 12.5 Gram(s) IV Push once  dextrose 50% Injectable 25 Gram(s) IV Push once  dextrose 50% Injectable 25 Gram(s) IV Push once  diphenhydrAMINE   Capsule 25 milliGRAM(s) Oral once  insulin lispro (HumaLOG) corrective regimen sliding scale   SubCutaneous three times a day before meals  insulin lispro (HumaLOG) corrective regimen sliding scale   SubCutaneous at bedtime  lactobacillus acidophilus 1 Tablet(s) Oral three times a day with meals  levothyroxine 75 MICROGram(s) Oral daily  pantoprazole    Tablet 40 milliGRAM(s) Oral every 12 hours  sucralfate 1 Gram(s) Oral four times a day    MEDICATIONS  (PRN):  acetaminophen   Tablet 650 milliGRAM(s) Oral every 6 hours PRN Mild Pain  dextrose 40% Gel 15 Gram(s) Oral once PRN Blood Glucose LESS THAN 70 milliGRAM(s)/deciliter  glucagon  Injectable 1 milliGRAM(s) IntraMuscular once PRN Glucose LESS THAN 70 milligrams/deciliter

## 2018-07-11 LAB
ANION GAP SERPL CALC-SCNC: 6 MMOL/L — SIGNIFICANT CHANGE UP (ref 5–17)
BASOPHILS # BLD AUTO: 0.01 K/UL — SIGNIFICANT CHANGE UP (ref 0–0.2)
BASOPHILS NFR BLD AUTO: 0.2 % — SIGNIFICANT CHANGE UP (ref 0–2)
BUN SERPL-MCNC: 65 MG/DL — HIGH (ref 7–23)
CALCIUM SERPL-MCNC: 7.8 MG/DL — LOW (ref 8.5–10.1)
CHLORIDE SERPL-SCNC: 115 MMOL/L — HIGH (ref 96–108)
CO2 SERPL-SCNC: 25 MMOL/L — SIGNIFICANT CHANGE UP (ref 22–31)
CREAT SERPL-MCNC: 2.3 MG/DL — HIGH (ref 0.5–1.3)
EOSINOPHIL # BLD AUTO: 0.2 K/UL — SIGNIFICANT CHANGE UP (ref 0–0.5)
EOSINOPHIL NFR BLD AUTO: 3.1 % — SIGNIFICANT CHANGE UP (ref 0–6)
GLUCOSE SERPL-MCNC: 112 MG/DL — HIGH (ref 70–99)
HCT VFR BLD CALC: 24 % — LOW (ref 34.5–45)
HCT VFR BLD CALC: 25.5 % — LOW (ref 34.5–45)
HCT VFR BLD CALC: 25.8 % — LOW (ref 34.5–45)
HGB BLD-MCNC: 7.4 G/DL — LOW (ref 11.5–15.5)
HGB BLD-MCNC: 8.1 G/DL — LOW (ref 11.5–15.5)
HGB BLD-MCNC: 8.2 G/DL — LOW (ref 11.5–15.5)
IMM GRANULOCYTES NFR BLD AUTO: 1.3 % — SIGNIFICANT CHANGE UP (ref 0–1.5)
INR BLD: 1 RATIO — SIGNIFICANT CHANGE UP (ref 0.88–1.16)
LYMPHOCYTES # BLD AUTO: 1 K/UL — SIGNIFICANT CHANGE UP (ref 1–3.3)
LYMPHOCYTES # BLD AUTO: 15.7 % — SIGNIFICANT CHANGE UP (ref 13–44)
MCHC RBC-ENTMCNC: 27.4 PG — SIGNIFICANT CHANGE UP (ref 27–34)
MCHC RBC-ENTMCNC: 30.8 GM/DL — LOW (ref 32–36)
MCV RBC AUTO: 88.9 FL — SIGNIFICANT CHANGE UP (ref 80–100)
MONOCYTES # BLD AUTO: 0.46 K/UL — SIGNIFICANT CHANGE UP (ref 0–0.9)
MONOCYTES NFR BLD AUTO: 7.2 % — SIGNIFICANT CHANGE UP (ref 2–14)
NEUTROPHILS # BLD AUTO: 4.62 K/UL — SIGNIFICANT CHANGE UP (ref 1.8–7.4)
NEUTROPHILS NFR BLD AUTO: 72.5 % — SIGNIFICANT CHANGE UP (ref 43–77)
PLATELET # BLD AUTO: 154 K/UL — SIGNIFICANT CHANGE UP (ref 150–400)
POTASSIUM SERPL-MCNC: 5.2 MMOL/L — SIGNIFICANT CHANGE UP (ref 3.5–5.3)
POTASSIUM SERPL-SCNC: 5.2 MMOL/L — SIGNIFICANT CHANGE UP (ref 3.5–5.3)
PROTHROM AB SERPL-ACNC: 10.9 SEC — SIGNIFICANT CHANGE UP (ref 9.8–12.7)
RBC # BLD: 2.7 M/UL — LOW (ref 3.8–5.2)
RBC # FLD: 19 % — HIGH (ref 10.3–14.5)
SODIUM SERPL-SCNC: 146 MMOL/L — HIGH (ref 135–145)
WBC # BLD: 6.37 K/UL — SIGNIFICANT CHANGE UP (ref 3.8–10.5)
WBC # FLD AUTO: 6.37 K/UL — SIGNIFICANT CHANGE UP (ref 3.8–10.5)

## 2018-07-11 PROCEDURE — 78278 ACUTE GI BLOOD LOSS IMAGING: CPT | Mod: 26

## 2018-07-11 PROCEDURE — 99233 SBSQ HOSP IP/OBS HIGH 50: CPT | Mod: GC

## 2018-07-11 PROCEDURE — 99232 SBSQ HOSP IP/OBS MODERATE 35: CPT

## 2018-07-11 RX ORDER — DIPHENHYDRAMINE HCL 50 MG
25 CAPSULE ORAL ONCE
Qty: 0 | Refills: 0 | Status: COMPLETED | OUTPATIENT
Start: 2018-07-11 | End: 2018-07-11

## 2018-07-11 RX ORDER — IPRATROPIUM/ALBUTEROL SULFATE 18-103MCG
3 AEROSOL WITH ADAPTER (GRAM) INHALATION EVERY 6 HOURS
Qty: 0 | Refills: 0 | Status: DISCONTINUED | OUTPATIENT
Start: 2018-07-11 | End: 2018-07-11

## 2018-07-11 RX ORDER — SODIUM CHLORIDE 9 MG/ML
1000 INJECTION, SOLUTION INTRAVENOUS
Qty: 0 | Refills: 0 | Status: DISCONTINUED | OUTPATIENT
Start: 2018-07-11 | End: 2018-07-12

## 2018-07-11 RX ORDER — PANTOPRAZOLE SODIUM 20 MG/1
40 TABLET, DELAYED RELEASE ORAL
Qty: 0 | Refills: 0 | Status: DISCONTINUED | OUTPATIENT
Start: 2018-07-11 | End: 2018-07-12

## 2018-07-11 RX ORDER — ALBUTEROL 90 UG/1
2.5 AEROSOL, METERED ORAL EVERY 4 HOURS
Qty: 0 | Refills: 0 | Status: DISCONTINUED | OUTPATIENT
Start: 2018-07-11 | End: 2018-07-15

## 2018-07-11 RX ADMIN — Medication 25 MILLIGRAM(S): at 23:48

## 2018-07-11 RX ADMIN — Medication 1 GRAM(S): at 00:07

## 2018-07-11 RX ADMIN — Medication 1 GRAM(S): at 12:47

## 2018-07-11 RX ADMIN — Medication 1 GRAM(S): at 18:02

## 2018-07-11 RX ADMIN — SODIUM CHLORIDE 75 MILLILITER(S): 9 INJECTION, SOLUTION INTRAVENOUS at 18:09

## 2018-07-11 RX ADMIN — PANTOPRAZOLE SODIUM 40 MILLIGRAM(S): 20 TABLET, DELAYED RELEASE ORAL at 05:31

## 2018-07-11 RX ADMIN — Medication 1 GRAM(S): at 21:27

## 2018-07-11 RX ADMIN — Medication 1 TABLET(S): at 12:47

## 2018-07-11 RX ADMIN — Medication 0.5 MILLIGRAM(S): at 05:31

## 2018-07-11 RX ADMIN — Medication 1 TABLET(S): at 08:23

## 2018-07-11 RX ADMIN — Medication 1 GRAM(S): at 05:32

## 2018-07-11 RX ADMIN — Medication 75 MICROGRAM(S): at 05:32

## 2018-07-11 RX ADMIN — Medication 0.5 MILLIGRAM(S): at 18:01

## 2018-07-11 RX ADMIN — PANTOPRAZOLE SODIUM 40 MILLIGRAM(S): 20 TABLET, DELAYED RELEASE ORAL at 18:03

## 2018-07-11 RX ADMIN — Medication 1: at 12:45

## 2018-07-11 NOTE — CONSULT NOTE ADULT - ASSESSMENT
82 y/o woman with GI bleed.s/p EGD showed duodenal ulcer, pt continues having melena .  consult called for nmgt of anemia        Anemia   presented with Hb 0f 3.4 7/7     Hb 3.8 s/p 3 units+ plt on 7/7/18 with appropriate response   Hb was 6.8 on 7/8/18 , s/p 2 units , HB is 8.2 today   + melena , GI is planning bleeding scan / colonoscopy   coags are normal     PLAN:  iron studies , B 12  results should be  with awareness of post transfusion   will start IV iron if indicated 84 y/o woman with GI bleed.s/p EGD showed duodenal ulcer, pt continues having melena .  consult called for nmgt of anemia        Anemia   presented with Hb 0f 3.4 7/7     Hb 3.8 s/p 3 units+ plt on 7/7/18 with appropriate response   Hb was 6.8 on 7/8/18 , s/p 2 units , HB is 8.2 today   + melena , GI is planning bleeding scan / colonoscopy   coags are normal     PLAN:  iron studies , B 12  results should be  with awareness of post transfusion   will start IV iron if indicated( pt agreed)

## 2018-07-11 NOTE — PROGRESS NOTE ADULT - SUBJECTIVE AND OBJECTIVE BOX
Patient is a 83y old  Female who presents with a chief complaint of profound anemia (07 Jul 2018 14:43)      INTERVAL HPI: Pt seen and examined. Pt somewhat confused but improved from yesterday, poor historian. States she is feeling slightly better, denies any acute complaints at this time.     OVERNIGHT EVENTS: some sob  T(F): 98.1 (07-11-18 @ 13:34), Max: 98.2 (07-11-18 @ 04:00)  HR: 74 (07-11-18 @ 13:34) (73 - 80)  BP: 169/77 (07-11-18 @ 13:34) (151/74 - 169/77)  RR: 18 (07-11-18 @ 13:34) (18 - 18)  SpO2: 94% (07-11-18 @ 13:34) (93% - 99%)  I&O's Summary    10 Jul 2018 07:01  -  11 Jul 2018 07:00  --------------------------------------------------------  IN: 0 mL / OUT: 2000 mL / NET: -2000 mL    11 Jul 2018 07:01  -  11 Jul 2018 17:59  --------------------------------------------------------  IN: 0 mL / OUT: 1000 mL / NET: -1000 mL        REVIEW OF SYSTEMS: 12 systems noncontributory other than hpi    PHYSICAL EXAM:  GENERAL: NAD, well-groomed, well-developed  HEAD:  Atraumatic, Normocephalic  EYES: EOMI, PERRLA, conjunctiva and sclera clear  ENMT: No tonsillar erythema, exudates, or enlargement; Moist mucous membranes, Good dentition, No lesions  NECK: Supple, No JVD, Normal thyroid  NERVOUS SYSTEM:  Alert & Oriented X3, Good concentration; Motor Strength 5/5 B/L upper and lower extremities; DTRs 2+ intact and symmetric  CHEST/LUNG: Clear to percussion bilaterally; No rales, rhonchi, wheezing, or rubs  HEART: Regular rate and rhythm; No murmurs, rubs, or gallops  ABDOMEN: Soft, Nontender, Nondistended; Bowel sounds present  EXTREMITIES:  2+ Peripheral Pulses, No clubbing, cyanosis, or edema  LYMPH: No lymphadenopathy noted  SKIN: No rashes or lesions    LABS:                        8.1    x     )-----------( x        ( 11 Jul 2018 16:12 )             25.5     07-11    146<H>  |  115<H>  |  65<H>  ----------------------------<  112<H>  5.2   |  25  |  2.30<H>    Ca    7.8<L>      11 Jul 2018 08:03  Phos  3.3     07-10  Mg     2.1     07-10      PT/INR - ( 11 Jul 2018 09:46 )   PT: 10.9 sec;   INR: 1.00 ratio         PTT - ( 10 Jul 2018 07:34 )  PTT:30.9 sec    CAPILLARY BLOOD GLUCOSE      POCT Blood Glucose.: 186 mg/dL (11 Jul 2018 12:01)  POCT Blood Glucose.: 128 mg/dL (11 Jul 2018 08:21)  POCT Blood Glucose.: 154 mg/dL (10 Jul 2018 21:19)              MEDICATIONS  (STANDING):  clonazePAM Tablet 0.5 milliGRAM(s) Oral two times a day  dextrose 5%. 1000 milliLiter(s) (50 mL/Hr) IV Continuous <Continuous>  dextrose 50% Injectable 12.5 Gram(s) IV Push once  dextrose 50% Injectable 25 Gram(s) IV Push once  dextrose 50% Injectable 25 Gram(s) IV Push once  diphenhydrAMINE   Capsule 25 milliGRAM(s) Oral once  insulin lispro (HumaLOG) corrective regimen sliding scale   SubCutaneous three times a day before meals  insulin lispro (HumaLOG) corrective regimen sliding scale   SubCutaneous at bedtime  lactated ringers. 1000 milliLiter(s) (75 mL/Hr) IV Continuous <Continuous>  lactobacillus acidophilus 1 Tablet(s) Oral three times a day with meals  levothyroxine 75 MICROGram(s) Oral daily  pantoprazole  Injectable 40 milliGRAM(s) IV Push two times a day  sucralfate 1 Gram(s) Oral four times a day    MEDICATIONS  (PRN):  acetaminophen   Tablet 650 milliGRAM(s) Oral every 6 hours PRN Mild Pain  ALBUTerol/ipratropium for Nebulization 3 milliLiter(s) Nebulizer every 6 hours PRN Shortness of Breath and/or Wheezing  dextrose 40% Gel 15 Gram(s) Oral once PRN Blood Glucose LESS THAN 70 milliGRAM(s)/deciliter  glucagon  Injectable 1 milliGRAM(s) IntraMuscular once PRN Glucose LESS THAN 70 milligrams/deciliter Patient is a 83y old  Female who presents with a chief complaint of profound anemia (07 Jul 2018 14:43)      INTERVAL HPI: Pt seen and examined. Pt somewhat confused but improved from yesterday, poor historian. States she is feeling slightly better, denies any acute complaints at this time.     OVERNIGHT EVENTS: some sob  T(F): 98.1 (07-11-18 @ 13:34), Max: 98.2 (07-11-18 @ 04:00)  HR: 74 (07-11-18 @ 13:34) (73 - 80)  BP: 169/77 (07-11-18 @ 13:34) (151/74 - 169/77)  RR: 18 (07-11-18 @ 13:34) (18 - 18)  SpO2: 94% (07-11-18 @ 13:34) (93% - 99%)  I&O's Summary    10 Jul 2018 07:01  -  11 Jul 2018 07:00  --------------------------------------------------------  IN: 0 mL / OUT: 2000 mL / NET: -2000 mL    11 Jul 2018 07:01  -  11 Jul 2018 17:59  --------------------------------------------------------  IN: 0 mL / OUT: 1000 mL / NET: -1000 mL        REVIEW OF SYSTEMS: 12 systems noncontributory other than hpi    PHYSICAL EXAM:  GENERAL: NAD, well-groomed, well-developed, elder, obese  HEAD:  Atraumatic, Normocephalic  EYES: EOMI, PERRLA, conjunctiva and sclera clear  ENMT: No tonsillar erythema, exudates, or enlargement; Moist mucous membranes, Good dentition, No lesions  NECK: Supple, No JVD, Normal thyroid  NERVOUS SYSTEM:  Alert & Oriented X3, Good concentration; Motor Strength 3/5 B/L upper and lower extremities  CHEST/LUNG: Clear to percussion bilaterally; No rales, rhonchi, wheezing, or rubs  HEART: Regular rate and rhythm; No murmurs, rubs, or gallops  ABDOMEN: Soft, Nontender, Nondistended; Bowel sounds present  EXTREMITIES:  2+ Peripheral Pulses, No clubbing, cyanosis, or edema  SKIN: No rashes or lesions    LABS:                        8.1    x     )-----------( x        ( 11 Jul 2018 16:12 )             25.5     07-11    146<H>  |  115<H>  |  65<H>  ----------------------------<  112<H>  5.2   |  25  |  2.30<H>    Ca    7.8<L>      11 Jul 2018 08:03  Phos  3.3     07-10  Mg     2.1     07-10      PT/INR - ( 11 Jul 2018 09:46 )   PT: 10.9 sec;   INR: 1.00 ratio         PTT - ( 10 Jul 2018 07:34 )  PTT:30.9 sec    CAPILLARY BLOOD GLUCOSE      POCT Blood Glucose.: 186 mg/dL (11 Jul 2018 12:01)  POCT Blood Glucose.: 128 mg/dL (11 Jul 2018 08:21)  POCT Blood Glucose.: 154 mg/dL (10 Jul 2018 21:19)              MEDICATIONS  (STANDING):  clonazePAM Tablet 0.5 milliGRAM(s) Oral two times a day  dextrose 5%. 1000 milliLiter(s) (50 mL/Hr) IV Continuous <Continuous>  dextrose 50% Injectable 12.5 Gram(s) IV Push once  dextrose 50% Injectable 25 Gram(s) IV Push once  dextrose 50% Injectable 25 Gram(s) IV Push once  diphenhydrAMINE   Capsule 25 milliGRAM(s) Oral once  insulin lispro (HumaLOG) corrective regimen sliding scale   SubCutaneous three times a day before meals  insulin lispro (HumaLOG) corrective regimen sliding scale   SubCutaneous at bedtime  lactated ringers. 1000 milliLiter(s) (75 mL/Hr) IV Continuous <Continuous>  lactobacillus acidophilus 1 Tablet(s) Oral three times a day with meals  levothyroxine 75 MICROGram(s) Oral daily  pantoprazole  Injectable 40 milliGRAM(s) IV Push two times a day  sucralfate 1 Gram(s) Oral four times a day    MEDICATIONS  (PRN):  acetaminophen   Tablet 650 milliGRAM(s) Oral every 6 hours PRN Mild Pain  ALBUTerol/ipratropium for Nebulization 3 milliLiter(s) Nebulizer every 6 hours PRN Shortness of Breath and/or Wheezing  dextrose 40% Gel 15 Gram(s) Oral once PRN Blood Glucose LESS THAN 70 milliGRAM(s)/deciliter  glucagon  Injectable 1 milliGRAM(s) IntraMuscular once PRN Glucose LESS THAN 70 milligrams/deciliter

## 2018-07-11 NOTE — PROGRESS NOTE ADULT - ASSESSMENT
83 year old female with DM2, HTN, here with an episode of syncope, found to have significant anemia. Improvement in lethargy with PRBC transfusions    - Hgb 8.1 today  - Transfuse to have Hb>8. Trend Hb closely, GI following  - s/p EGD 7/9 with clean based duodenal ulcer, gastritis, hiatal hernia  - Watch volume status. There is no echo on record so her LV function is unknown. No need for diuresis at current time, though she is edematous, possibly chronic, no evidence of heart failure despite chronic edema  - Hemodynamics stable with BP elevated, may start Norvasc for better control.    - Very mild troponin leak 2/2 demand in the setting of severe anemia  - EKG is SR with no sign of ischemia or chamber enlargement  - Baseline creatinine unknown, though normal in 2016, Cr persistently elevated 2.30 today, monitor  - Monitor electrolytes. Keep K>4, Mg>2

## 2018-07-11 NOTE — PROGRESS NOTE ADULT - PROBLEM SELECTOR PLAN 5
Based on labs in HIE, patient appears to have CKD2, now with worsening in creatinine likely ATN due to profound anemia/hypoperfusion vs prerenal due to recent illness.   gently ivf   apprec renal recs Dr. Sarkar/Joel  Expect creatinine will plateau soon and then improve. Continue to monitor daily.

## 2018-07-11 NOTE — CONSULT NOTE ADULT - SUBJECTIVE AND OBJECTIVE BOX
Patient is a 83y old  Female who presents with a chief complaint of profound anemia (07 Jul 2018 14:43)      HPI:  84 y/o F with PMHx DM2, hypothyroidism, asthma, arthritis, meningoma (chronic), and  BIBEMS after syncopal episode at [facility]. Patient is a poor historian and is unsure why she is in the hospital.  Family at bedside reports that patient was discharged from [facility] the previous day after being admitted with AMS 2/2 UTI.  Family reports that yesterday patient was SOB.  They report that she bleeds easily, but denies knowledge of blood in stools, changes in urine color, open wounds. Patient confirms abdominal pain. She denies chest pain, shortness of breath, palpitations, nausea or vomiting. She also denies dizziness blurry vision.    EGD: duodenal ulcer   melena        ROS:    CONSTITUTIONAL: No fever, weight loss, + fatigue  EYES: No eye pain, visual disturbances, or discharge  ENMT:  No difficulty hearing, tinnitus, vertigo; No sinus or throat pain  NECK: No pain or stiffness  BREASTS: No pain, masses, or nipple discharge  RESPIRATORY: No cough, wheezing, chills or hemoptysis;some shortness of breath  CARDIOVASCULAR: No chest pain, palpitations, dizziness, or leg swelling, + decreased exercise tolerance  GASTROINTESTINAL: No abdominal or epigastric pain. No nausea, vomiting, or hematemesis; No diarrhea or constipation. +melena or hematochezia.  GENITOURINARY: No dysuria, frequency, hematuria, or incontinence  NEUROLOGICAL:dementia   SKIN: No itching, burning, rashes, or lesions   LYMPH NODES: No enlargement or tenderness noted  ENDOCRINE: No heat or cold intolerance; No hair loss  MUSCULOSKELETAL: generalized weakness  PSYCHIATRIC: No depression, anxiety, mood swings, or difficulty sleeping  HEME/LYMPH: No easy bruising, or bleeding gums  ALLERGY AND IMMUNOLOGIC: No hives or eczema      PAST MEDICAL & SURGICAL HISTORY:  Arthritis  Asthma  Diabetes mellitus  Hypothyroidism  No significant past surgical history      SOCIAL HISTORY:from home     FAMILY HISTORY:  No pertinent family history in first degree relatives      MEDICATIONS  (STANDING):  clonazePAM Tablet 0.5 milliGRAM(s) Oral two times a day  dextrose 5%. 1000 milliLiter(s) (50 mL/Hr) IV Continuous <Continuous>  dextrose 50% Injectable 12.5 Gram(s) IV Push once  dextrose 50% Injectable 25 Gram(s) IV Push once  dextrose 50% Injectable 25 Gram(s) IV Push once  diphenhydrAMINE   Capsule 25 milliGRAM(s) Oral once  insulin lispro (HumaLOG) corrective regimen sliding scale   SubCutaneous three times a day before meals  insulin lispro (HumaLOG) corrective regimen sliding scale   SubCutaneous at bedtime  lactobacillus acidophilus 1 Tablet(s) Oral three times a day with meals  levothyroxine 75 MICROGram(s) Oral daily  pantoprazole  Injectable 40 milliGRAM(s) IV Push two times a day  sucralfate 1 Gram(s) Oral four times a day    MEDICATIONS  (PRN):  acetaminophen   Tablet 650 milliGRAM(s) Oral every 6 hours PRN Mild Pain  dextrose 40% Gel 15 Gram(s) Oral once PRN Blood Glucose LESS THAN 70 milliGRAM(s)/deciliter  glucagon  Injectable 1 milliGRAM(s) IntraMuscular once PRN Glucose LESS THAN 70 milligrams/deciliter      Allergies    Levaquin (Hives)  sulfa drugs (Unknown)    Intolerances        Vital Signs Last 24 Hrs  T(C): 36.8 (11 Jul 2018 04:00), Max: 36.8 (11 Jul 2018 04:00)  T(F): 98.2 (11 Jul 2018 04:00), Max: 98.2 (11 Jul 2018 04:00)  HR: 78 (11 Jul 2018 04:00) (73 - 101)  BP: 155/73 (11 Jul 2018 04:00) (151/74 - 165/73)  BP(mean): --  RR: 18 (11 Jul 2018 04:00) (18 - 20)  SpO2: 98% (11 Jul 2018 04:00) (93% - 99%)    PHYSICAL EXAM  General: frail woman , chronically ill looking   HEENT: clear oropharynx, anicteric sclera, pink conjunctivae  Neck: supple  CV: normal S1S2 with no murmur rubs or gallops  Lungs: clear to auscultation, no wheezes, no rales  Abdomen: soft non-tender non-distended, no hepatosplenomegaly  Ext: no clubbing cyanosis or edema  Skin: no rashes and no petichiae  Neuro: alert and oriented X3 no focal deficits      LABS:    CBC Full  -  ( 11 Jul 2018 09:46 )  WBC Count : x  Hemoglobin : 8.2 g/dL  Hematocrit : 25.8 %  Platelet Count - Automated : x  Mean Cell Volume : x  Mean Cell Hemoglobin : x  Mean Cell Hemoglobin Concentration : x  Auto Neutrophil # : x  Auto Lymphocyte # : x  Auto Monocyte # : x  Auto Eosinophil # : x  Auto Basophil # : x  Auto Neutrophil % : x  Auto Lymphocyte % : x  Auto Monocyte % : x  Auto Eosinophil % : x  Auto Basophil % : x    07-11    146<H>  |  115<H>  |  65<H>  ----------------------------<  112<H>  5.2   |  25  |  2.30<H>    Ca    7.8<L>      11 Jul 2018 08:03  Phos  3.3     07-10  Mg     2.1     07-10      PT/INR - ( 11 Jul 2018 09:46 )   PT: 10.9 sec;   INR: 1.00 ratio         PTT - ( 10 Jul 2018 07:34 )  PTT:30.9 sec      BLOOD SMEAR INTERPRETATION:  RBC: normocytic , hypochromic with significant  poikiloanizocytosis including ovalocytes  no significant raulauex  WBC:adequate in number , normal morphology  plt: adequate in number no clumps , rare giant/ large plt Patient is a 83y old  Female who presents with a chief complaint of profound anemia (07 Jul 2018 14:43)      HPI:  84 y/o F with PMHx DM2, hypothyroidism, asthma, arthritis, meningoma (chronic), and  BIBEMS after syncopal episode at [facility]. Patient is a poor historian and is unsure why she is in the hospital.  Family at bedside reports that patient was discharged from [facility] the previous day after being admitted with AMS 2/2 UTI.  Family reports that yesterday patient was SOB.  They report that she bleeds easily, but denies knowledge of blood in stools, changes in urine color, open wounds. Patient confirms abdominal pain. She denies chest pain, shortness of breath, palpitations, nausea or vomiting. She also denies dizziness blurry vision.        pt was on yasemin iron in the past ( constipation)   EGD: duodenal ulcer   melena        ROS:    CONSTITUTIONAL: No fever, weight loss, + fatigue  EYES: No eye pain, visual disturbances, or discharge  ENMT:  No difficulty hearing, tinnitus, vertigo; No sinus or throat pain  NECK: No pain or stiffness  BREASTS: No pain, masses, or nipple discharge  RESPIRATORY: No cough, wheezing, chills or hemoptysis;some shortness of breath  CARDIOVASCULAR: No chest pain, palpitations, dizziness, or leg swelling, + decreased exercise tolerance  GASTROINTESTINAL: No abdominal or epigastric pain. No nausea, vomiting, or hematemesis; No diarrhea or constipation. +melena or hematochezia.  GENITOURINARY: No dysuria, frequency, hematuria, or incontinence  NEUROLOGICAL:dementia   SKIN: No itching, burning, rashes, or lesions   LYMPH NODES: No enlargement or tenderness noted  ENDOCRINE: No heat or cold intolerance; No hair loss  MUSCULOSKELETAL: generalized weakness  PSYCHIATRIC: No depression, anxiety, mood swings, or difficulty sleeping  HEME/LYMPH: No easy bruising, or bleeding gums  ALLERGY AND IMMUNOLOGIC: No hives or eczema      PAST MEDICAL & SURGICAL HISTORY:  Arthritis  Asthma  Diabetes mellitus  Hypothyroidism  No significant past surgical history      SOCIAL HISTORY:from home     FAMILY HISTORY:  No pertinent family history in first degree relatives      MEDICATIONS  (STANDING):  clonazePAM Tablet 0.5 milliGRAM(s) Oral two times a day  dextrose 5%. 1000 milliLiter(s) (50 mL/Hr) IV Continuous <Continuous>  dextrose 50% Injectable 12.5 Gram(s) IV Push once  dextrose 50% Injectable 25 Gram(s) IV Push once  dextrose 50% Injectable 25 Gram(s) IV Push once  diphenhydrAMINE   Capsule 25 milliGRAM(s) Oral once  insulin lispro (HumaLOG) corrective regimen sliding scale   SubCutaneous three times a day before meals  insulin lispro (HumaLOG) corrective regimen sliding scale   SubCutaneous at bedtime  lactobacillus acidophilus 1 Tablet(s) Oral three times a day with meals  levothyroxine 75 MICROGram(s) Oral daily  pantoprazole  Injectable 40 milliGRAM(s) IV Push two times a day  sucralfate 1 Gram(s) Oral four times a day    MEDICATIONS  (PRN):  acetaminophen   Tablet 650 milliGRAM(s) Oral every 6 hours PRN Mild Pain  dextrose 40% Gel 15 Gram(s) Oral once PRN Blood Glucose LESS THAN 70 milliGRAM(s)/deciliter  glucagon  Injectable 1 milliGRAM(s) IntraMuscular once PRN Glucose LESS THAN 70 milligrams/deciliter      Allergies    Levaquin (Hives)  sulfa drugs (Unknown)    Intolerances        Vital Signs Last 24 Hrs  T(C): 36.8 (11 Jul 2018 04:00), Max: 36.8 (11 Jul 2018 04:00)  T(F): 98.2 (11 Jul 2018 04:00), Max: 98.2 (11 Jul 2018 04:00)  HR: 78 (11 Jul 2018 04:00) (73 - 101)  BP: 155/73 (11 Jul 2018 04:00) (151/74 - 165/73)  BP(mean): --  RR: 18 (11 Jul 2018 04:00) (18 - 20)  SpO2: 98% (11 Jul 2018 04:00) (93% - 99%)    PHYSICAL EXAM  General: frail woman , chronically ill looking   HEENT: clear oropharynx, anicteric sclera, pink conjunctivae  Neck: supple  CV: normal S1S2 with no murmur rubs or gallops  Lungs: clear to auscultation, no wheezes, no rales  Abdomen: soft non-tender non-distended, no hepatosplenomegaly  Ext: no clubbing cyanosis or edema  Skin: no rashes and no petichiae  Neuro: alert and oriented X3 no focal deficits      LABS:    CBC Full  -  ( 11 Jul 2018 09:46 )  WBC Count : x  Hemoglobin : 8.2 g/dL  Hematocrit : 25.8 %  Platelet Count - Automated : x  Mean Cell Volume : x  Mean Cell Hemoglobin : x  Mean Cell Hemoglobin Concentration : x  Auto Neutrophil # : x  Auto Lymphocyte # : x  Auto Monocyte # : x  Auto Eosinophil # : x  Auto Basophil # : x  Auto Neutrophil % : x  Auto Lymphocyte % : x  Auto Monocyte % : x  Auto Eosinophil % : x  Auto Basophil % : x    07-11    146<H>  |  115<H>  |  65<H>  ----------------------------<  112<H>  5.2   |  25  |  2.30<H>    Ca    7.8<L>      11 Jul 2018 08:03  Phos  3.3     07-10  Mg     2.1     07-10      PT/INR - ( 11 Jul 2018 09:46 )   PT: 10.9 sec;   INR: 1.00 ratio         PTT - ( 10 Jul 2018 07:34 )  PTT:30.9 sec      BLOOD SMEAR INTERPRETATION:  RBC: normocytic , hypochromic with significant  poikiloanizocytosis including ovalocytes  no significant raulauex  WBC:adequate in number , normal morphology  plt: adequate in number no clumps , rare giant/ large plt

## 2018-07-11 NOTE — PROGRESS NOTE ADULT - ASSESSMENT
84 y/o F with PMHx DM2, hypothyroidism, asthma, arthritis, meningoma (chronic), and  BIBEMS after syncopal episode at [facility]. Admitted to ICU w/ profound anemia hgb 3.8, possibly 2/2 acute GIB sec to duodenal ulcer and poss other source in gi tract under investigation

## 2018-07-11 NOTE — PROGRESS NOTE ADULT - SUBJECTIVE AND OBJECTIVE BOX
INTERVAL HPI/OVERNIGHT EVENTS:  pt seen and examined  pt denies n/v/abd pain  per rn with 2 episodes of melena overnight, no hematemesis or brbpr  labs noted afebrile overnight    MEDICATIONS  (STANDING):  clonazePAM Tablet 0.5 milliGRAM(s) Oral two times a day  dextrose 5%. 1000 milliLiter(s) (50 mL/Hr) IV Continuous <Continuous>  dextrose 50% Injectable 12.5 Gram(s) IV Push once  dextrose 50% Injectable 25 Gram(s) IV Push once  dextrose 50% Injectable 25 Gram(s) IV Push once  diphenhydrAMINE   Capsule 25 milliGRAM(s) Oral once  insulin lispro (HumaLOG) corrective regimen sliding scale   SubCutaneous three times a day before meals  insulin lispro (HumaLOG) corrective regimen sliding scale   SubCutaneous at bedtime  lactobacillus acidophilus 1 Tablet(s) Oral three times a day with meals  levothyroxine 75 MICROGram(s) Oral daily  pantoprazole  Injectable 40 milliGRAM(s) IV Push two times a day  sucralfate 1 Gram(s) Oral four times a day    MEDICATIONS  (PRN):  acetaminophen   Tablet 650 milliGRAM(s) Oral every 6 hours PRN Mild Pain  dextrose 40% Gel 15 Gram(s) Oral once PRN Blood Glucose LESS THAN 70 milliGRAM(s)/deciliter  glucagon  Injectable 1 milliGRAM(s) IntraMuscular once PRN Glucose LESS THAN 70 milligrams/deciliter      Allergies    Levaquin (Hives)  sulfa drugs (Unknown)    Intolerances        Review of Systems:    General:  No wt loss, fevers, chills, night sweats, fatigue   Eyes:  Good vision, no reported pain  ENT:  No sore throat, pain, runny nose, dysphagia  CV:  No pain, palpitations, hypo/hypertension  Resp:  No dyspnea, cough, tachypnea, wheezing  GI:  No pain, No nausea, No vomiting, No diarrhea, No constipation, No weight loss, No fever, No pruritis, No rectal bleeding, +melena, No dysphagia  :  No pain, bleeding, incontinence, nocturia  Muscle:  No pain, weakness  Neuro:  No weakness, tingling, memory problems  Psych:  No fatigue, insomnia, mood problems, depression  Endocrine:  No polyuria, polydypsia, cold/heat intolerance  Heme:  No petechiae, ecchymosis, easy bruisability  Skin:  No rash, tattoos, scars, edema      Vital Signs Last 24 Hrs  T(C): 36.8 (11 Jul 2018 04:00), Max: 36.8 (11 Jul 2018 04:00)  T(F): 98.2 (11 Jul 2018 04:00), Max: 98.2 (11 Jul 2018 04:00)  HR: 78 (11 Jul 2018 04:00) (73 - 101)  BP: 155/73 (11 Jul 2018 04:00) (151/74 - 165/73)  BP(mean): --  RR: 18 (11 Jul 2018 04:00) (18 - 20)  SpO2: 98% (11 Jul 2018 04:00) (93% - 99%)    PHYSICAL EXAM:    Constitutional: NAD, lying in bed  HEENT: EOMI, perrl  Neck: No LAD  Respiratory: dec bs  Cardiovascular: S1 and S2, RRR  Gastrointestinal: obese abd BS+, soft, NT, nd  Extremities: +edema and blistering to le  Vascular: 2+ peripheral pulses  Neurological: Awake alert  Skin: see above      LABS:                        7.4    6.37  )-----------( 154      ( 11 Jul 2018 08:03 )             24.0     07-11    146<H>  |  115<H>  |  65<H>  ----------------------------<  112<H>  5.2   |  25  |  2.30<H>    Ca    7.8<L>      11 Jul 2018 08:03  Phos  3.3     07-10  Mg     2.1     07-10      PTT - ( 10 Jul 2018 07:34 )  PTT:30.9 sec      RADIOLOGY & ADDITIONAL TESTS:

## 2018-07-11 NOTE — PROGRESS NOTE ADULT - PROBLEM SELECTOR PLAN 1
- possibly 2/2 GIB: GI (Dr. Daly) EGD showed duodenal ulcer  - now s/p transfusion of total 5 units PRBCs and 1 unit platelets.  - monitor H/H, transfuse as necessary.  - continue Protonix   -apprec gi and hemat recs  -pt underwent bleeding scan but could only tolerate 45 minutes dues to being uncomfortable on back and in pain

## 2018-07-11 NOTE — CONSULT NOTE ADULT - SUBJECTIVE AND OBJECTIVE BOX
Patient is a 83y old  Female who presents with a chief complaint of profound anemia (07 Jul 2018 14:43)   Acute  renal failure.    HPI:  82 y/o F with PMHx DM2, hypothyroidism, asthma, arthritis, meningoma (chronic), and  BIBEMS after syncopal episode at [facility]. Patient is a poor historian and is unsure why she is in the hospital.  Family at bedside reports that patient was discharged from [facility] the previous day after being admitted with AMS 2/2 UTI.  Family reports that yesterday patient was SOB.  They report that she bleeds easily, but denies knowledge of blood in stools, changes in urine color, open wounds. Patient confirms abdominal pain. She denies chest pain, shortness of breath, palpitations, nausea or vomiting. She also denies dizziness blurry vision.    Per ED nurse after stool guaiac performed patient had red streaked stool.    In ED patient vitals are 108/54, afebrile, O2 Sat 100% on supp O2.  Labs significant for Hbg 3.8, Hct 12.6, Na 148, Cl 120, Co2 20, BUN 62, Crt 1.9, GFR 24, Ca 6.4. UA+ for Large blood, leukocyte esterase. EKG shows NSR.  Patient received 2 u PRBCs.  CT head/neck shows no acute pathology. Patient CT A/P ordered. (07 Jul 2018 14:43)   Hx renal stones x1 not aware of type, no other renal  problems; elevated creatinine on admission.    PAST MEDICAL & SURGICAL HISTORY:  Arthritis  Asthma  Diabetes mellitus  Hypothyroidism  No significant past surgical history    FAMILY HISTORY:  No pertinent family history in first degree relatives    Social History:Non smoker    MEDICATIONS  (STANDING):  clonazePAM Tablet 0.5 milliGRAM(s) Oral two times a day  dextrose 5%. 1000 milliLiter(s) (50 mL/Hr) IV Continuous <Continuous>  dextrose 50% Injectable 12.5 Gram(s) IV Push once  dextrose 50% Injectable 25 Gram(s) IV Push once  dextrose 50% Injectable 25 Gram(s) IV Push once  diphenhydrAMINE   Capsule 25 milliGRAM(s) Oral once  insulin lispro (HumaLOG) corrective regimen sliding scale   SubCutaneous three times a day before meals  insulin lispro (HumaLOG) corrective regimen sliding scale   SubCutaneous at bedtime  lactated ringers. 1000 milliLiter(s) (75 mL/Hr) IV Continuous <Continuous>  lactobacillus acidophilus 1 Tablet(s) Oral three times a day with meals  levothyroxine 75 MICROGram(s) Oral daily  pantoprazole  Injectable 40 milliGRAM(s) IV Push two times a day  sucralfate 1 Gram(s) Oral four times a day    MEDICATIONS  (PRN):  acetaminophen   Tablet 650 milliGRAM(s) Oral every 6 hours PRN Mild Pain  dextrose 40% Gel 15 Gram(s) Oral once PRN Blood Glucose LESS THAN 70 milliGRAM(s)/deciliter  glucagon  Injectable 1 milliGRAM(s) IntraMuscular once PRN Glucose LESS THAN 70 milligrams/deciliter   Meds reviewed    Allergies    Levaquin (Hives)  sulfa drugs (Unknown)    Intolerances         REVIEW OF SYSTEMS:    CONSTITUTIONAL:  sob, weight gain, feels weak  EYES: No eye pain, visual disturbances, or discharge  ENMT:  No difficulty hearing, tinnitus, vertigo; No sinus or throat pain  NECK: No pain or stiffness  BREASTS: No pain, masses, or nipple discharge  RESPIRATORY: sob  CARDIOVASCULAR: No chest pain, palpitations, dizziness,   GASTROINTESTINAL: No abdominal or epigastric pain. No nausea, vomiting, or hematemesis; No diarrhea or constipation. No melena or hematochezia.Trunckal obesity  GENITOURINARY: No dysuria, frequency, hematuria, or incontinence  NEUROLOGICAL: No headaches, memory loss, loss of strength, numbness, or tremors  SKIN: Diffuse erythema, no blisters  LYMPH NODES: No enlarged glands  ENDOCRINE: No heat or cold intolerance; No hair loss  MUSCULOSKELETAL: Chronic lymphedema legs with scars  PSYCHIATRIC: No depression, anxiety, mood swings, or difficulty sleeping  HEME/LYMPH: No easy bruising, or bleeding gums  ALLERY AND IMMUNOLOGIC: No hives or eczema    Vital Signs Last 24 Hrs  T(C): 36.7 (11 Jul 2018 13:34), Max: 36.8 (11 Jul 2018 04:00)  T(F): 98.1 (11 Jul 2018 13:34), Max: 98.2 (11 Jul 2018 04:00)  HR: 74 (11 Jul 2018 13:34) (73 - 101)  BP: 169/77 (11 Jul 2018 13:34) (151/74 - 169/77)  BP(mean): --  RR: 18 (11 Jul 2018 13:34) (18 - 18)  SpO2: 94% (11 Jul 2018 13:34) (93% - 99%)  Daily     Daily     PHYSICAL EXAM:    GENERAL: appears chronically ill, oriented.  HEAD:  Atraumatic, Normocephalic  EYES: EOMI, PERRLA, conjunctiva and sclera clear  ENMT: No tonsillar erythema, exudates, or enlargement; Moist mucous membranes, Good dentition, No lesions  NECK: Supple, neck  veins full  NERVOUS SYSTEM:  Alert & Oriented X3, Good concentration; Motor Strength wnl upper and lower extremities;CHEST/LUNG: Clear to percussion bilaterally; No rales, rhonchi, wheezing, or rubs  HEART: Regular rate and rhythm; No murmurs, rubs, or gallops  ABDOMEN: Soft, Nontender, Nondistended; Bowel sounds present, body wall and flank edema  EXTREMITIES:  +2 - +3 leg edema with sligt erythema edema  LYMPH: No lymphadenopathy noted  SKIN: No rashes or lesions, pale    LABS:                        8.1    x     )-----------( x        ( 11 Jul 2018 16:12 )             25.5     07-11    146<H>  |  115<H>  |  65<H>  ----------------------------<  112<H>  5.2   |  25  |  2.30<H>    Ca    7.8<L>      11 Jul 2018 08:03  Phos  3.3     07-10  Mg     2.1     07-10      PT/INR - ( 11 Jul 2018 09:46 )   PT: 10.9 sec;   INR: 1.00 ratio         PTT - ( 10 Jul 2018 07:34 )  PTT:30.9 sec        TREND Hb Hemoglobin: 8.1 (07-11 @ 16:12)  Hemoglobin: 8.2 (07-11 @ 09:46)  Hemoglobin: 7.4 (07-11 @ 08:03)  Hemoglobin: 8.2 (07-10 @ 07:34)  Hemoglobin: 8.1 (07-09 @ 07:56)  Hemoglobin: 8.4 (07-08 @ 16:45)    Trend Potassium Potassium, Serum: 5.2 (07-11 @ 08:03)  Potassium, Serum: 4.9 (07-10 @ 07:34)  Potassium, Serum: 5.1 (07-09 @ 07:56)  Potassium, Serum: 4.9 (07-08 @ 06:14)  Potassium, Serum: 4.8 (07-07 @ 18:22)  Potassium, Serum: 4.1 (07-07 @ 13:07)    RADIOLOGY & ADDITIONAL TESTS: Patient is a 83y old  Female who presents with a chief complaint of profound anemia (07 Jul 2018 14:43)   Acute  renal failure.    HPI:  82 y/o F with PMHx DM2, hypothyroidism, asthma, arthritis, meningoma (chronic), and  BIBEMS after syncopal episode at [facility]. Patient is a poor historian and is unsure why she is in the hospital.  Family at bedside reports that patient was discharged from [facility] the previous day after being admitted with AMS 2/2 UTI.  Family reports that yesterday patient was SOB.  They report that she bleeds easily, but denies knowledge of blood in stools, changes in urine color, open wounds. Patient confirms abdominal pain. She denies chest pain, shortness of breath, palpitations, nausea or vomiting. She also denies dizziness blurry vision.    Per ED nurse after stool guaiac performed patient had red streaked stool.    In ED patient vitals are 108/54, afebrile, O2 Sat 100% on supp O2.  Labs significant for Hbg 3.8, Hct 12.6, Na 148, Cl 120, Co2 20, BUN 62, Crt 1.9, GFR 24, Ca 6.4. UA+ for Large blood, leukocyte esterase. EKG shows NSR.  Patient received 2 u PRBCs.  CT head/neck shows no acute pathology. Patient CT A/P ordered. (07 Jul 2018 14:43)    PAST MEDICAL & SURGICAL HISTORY:  Arthritis  Asthma  Diabetes mellitus  Hypothyroidism  No significant past surgical history    FAMILY HISTORY:  No pertinent family history in first degree relatives    Social History:Non smoker    MEDICATIONS  (STANDING):  clonazePAM Tablet 0.5 milliGRAM(s) Oral two times a day  dextrose 5%. 1000 milliLiter(s) (50 mL/Hr) IV Continuous <Continuous>  dextrose 50% Injectable 12.5 Gram(s) IV Push once  dextrose 50% Injectable 25 Gram(s) IV Push once  dextrose 50% Injectable 25 Gram(s) IV Push once  diphenhydrAMINE   Capsule 25 milliGRAM(s) Oral once  insulin lispro (HumaLOG) corrective regimen sliding scale   SubCutaneous three times a day before meals  insulin lispro (HumaLOG) corrective regimen sliding scale   SubCutaneous at bedtime  lactated ringers. 1000 milliLiter(s) (75 mL/Hr) IV Continuous <Continuous>  lactobacillus acidophilus 1 Tablet(s) Oral three times a day with meals  levothyroxine 75 MICROGram(s) Oral daily  pantoprazole  Injectable 40 milliGRAM(s) IV Push two times a day  sucralfate 1 Gram(s) Oral four times a day    MEDICATIONS  (PRN):  acetaminophen   Tablet 650 milliGRAM(s) Oral every 6 hours PRN Mild Pain  dextrose 40% Gel 15 Gram(s) Oral once PRN Blood Glucose LESS THAN 70 milliGRAM(s)/deciliter  glucagon  Injectable 1 milliGRAM(s) IntraMuscular once PRN Glucose LESS THAN 70 milligrams/deciliter   Meds reviewed    Allergies    Levaquin (Hives)  sulfa drugs (Unknown)    Intolerances  REVIEW OF SYSTEMS:    CONSTITUTIONAL:  sob, weight gain, feels weak  EYES: No eye pain, visual disturbances, or discharge  ENMT:  No difficulty hearing, tinnitus, vertigo; No sinus or throat pain  NECK: No pain or stiffness  BREASTS: No pain, masses, or nipple discharge  RESPIRATORY: sob  CARDIOVASCULAR: No chest pain, palpitations, dizziness,   GASTROINTESTINAL: No abdominal or epigastric pain. No nausea, vomiting, or hematemesis; No diarrhea or constipation. No melena or hematochezia.Trunckal obesity  GENITOURINARY: No dysuria, frequency, hematuria, or incontinence  NEUROLOGICAL: No headaches, memory loss, loss of strength, numbness, or tremors  SKIN: Diffuse erythema, no blisters  LYMPH NODES: No enlarged glands  ENDOCRINE: No heat or cold intolerance; No hair loss  MUSCULOSKELETAL: Chronic lymphedema legs with scars  PSYCHIATRIC: No depression, anxiety, mood swings, or difficulty sleeping  HEME/LYMPH: No easy bruising, or bleeding gums  ALLERY AND IMMUNOLOGIC: No hives or eczema    Vital Signs Last 24 Hrs  T(C): 36.7 (11 Jul 2018 13:34), Max: 36.8 (11 Jul 2018 04:00)  T(F): 98.1 (11 Jul 2018 13:34), Max: 98.2 (11 Jul 2018 04:00)  HR: 74 (11 Jul 2018 13:34) (73 - 101)  BP: 169/77 (11 Jul 2018 13:34) (151/74 - 169/77)  BP(mean): --  RR: 18 (11 Jul 2018 13:34) (18 - 18)  SpO2: 94% (11 Jul 2018 13:34) (93% - 99%)  Daily     Daily     PHYSICAL EXAM:    GENERAL: appears chronically ill, oriented.  HEAD:  Atraumatic, Normocephalic  EYES: EOMI, PERRLA, conjunctiva and sclera clear  ENMT: No tonsillar erythema, exudates, or enlargement; Moist mucous membranes, Good dentition, No lesions  NECK: Supple, neck  veins full  NERVOUS SYSTEM:  Alert & Oriented X3, Good concentration; Motor Strength wnl upper and lower extremities;CHEST/LUNG: Clear to percussion bilaterally; No rales, rhonchi, wheezing, or rubs  HEART: Regular rate and rhythm; No murmurs, rubs, or gallops  ABDOMEN: Soft, Nontender, Nondistended; Bowel sounds present, body wall and flank edema  EXTREMITIES:  +2 - +3 leg edema chronic and indurated, lymphedema, with slight erythema   LYMPH: No lymphadenopathy noted  SKIN: No rashes or lesions, pale    LABS:                        8.1    x     )-----------( x        ( 11 Jul 2018 16:12 )             25.5     07-11    146<H>  |  115<H>  |  65<H>  ----------------------------<  112<H>  5.2   |  25  |  2.30<H>    Ca    7.8<L>      11 Jul 2018 08:03  Phos  3.3     07-10  Mg     2.1     07-10      PT/INR - ( 11 Jul 2018 09:46 )   PT: 10.9 sec;   INR: 1.00 ratio         PTT - ( 10 Jul 2018 07:34 )  PTT:30.9 sec        TREND Hb Hemoglobin: 8.1 (07-11 @ 16:12)  Hemoglobin: 8.2 (07-11 @ 09:46)  Hemoglobin: 7.4 (07-11 @ 08:03)  Hemoglobin: 8.2 (07-10 @ 07:34)  Hemoglobin: 8.1 (07-09 @ 07:56)  Hemoglobin: 8.4 (07-08 @ 16:45)    Trend Potassium Potassium, Serum: 5.2 (07-11 @ 08:03)  Potassium, Serum: 4.9 (07-10 @ 07:34)  Potassium, Serum: 5.1 (07-09 @ 07:56)  Potassium, Serum: 4.9 (07-08 @ 06:14)  Potassium, Serum: 4.8 (07-07 @ 18:22)  Potassium, Serum: 4.1 (07-07 @ 13:07)    RADIOLOGY & ADDITIONAL TESTS:

## 2018-07-11 NOTE — CONSULT NOTE ADULT - ASSESSMENT
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

## 2018-07-11 NOTE — CONSULT NOTE ADULT - SUBJECTIVE AND OBJECTIVE BOX
83yFemale admitted to med/surg with following history:  HPI:  82 y/o F with PMHx DM2, hypothyroidism, asthma, arthritis, meningoma (chronic), and  BIBEMS after syncopal episode at [facility]. Patient is a poor historian and is unsure why she is in the hospital.  Family at bedside reports that patient was discharged from [facility] the previous day after being admitted with AMS 2/2 UTI.  Family reports that yesterday patient was SOB.  They report that she bleeds easily, but denies knowledge of blood in stools, changes in urine color, open wounds. Patient confirms abdominal pain. She denies chest pain, shortness of breath, palpitations, nausea or vomiting. She also denies dizziness blurry vision.    Per ED nurse after stool guaiac performed patient had red streaked stool.    In ED patient vitals are 108/54, afebrile, O2 Sat 100% on supp O2.  Labs significant for Hbg 3.8, Hct 12.6, Na 148, Cl 120, Co2 20, BUN 62, Crt 1.9, GFR 24, Ca 6.4. UA+ for Large blood, leukocyte esterase. EKG shows NSR.  Patient received 2 u PRBCs.  CT head/neck shows no acute pathology. Patient CT A/P ordered. (07 Jul 2018 14:43)      Psych HPI: Patient seen, evaluated and chart reviewed. 82 y/o WF, with no significant prior psychiatric history admitted following syncopal episode. Issues of decision making capacity was raised. At this time patient appears to be confused and is unable to give consistent history.    PAST MEDICAL & SURGICAL HISTORY:  Arthritis  Asthma  Diabetes mellitus  Hypothyroidism  No significant past surgical history      Allergies    Levaquin (Hives)  sulfa drugs (Unknown)    Intolerances      MEDICATIONS  (STANDING):  clonazePAM Tablet 0.5 milliGRAM(s) Oral two times a day  dextrose 5%. 1000 milliLiter(s) (50 mL/Hr) IV Continuous <Continuous>  dextrose 50% Injectable 12.5 Gram(s) IV Push once  dextrose 50% Injectable 25 Gram(s) IV Push once  dextrose 50% Injectable 25 Gram(s) IV Push once  diphenhydrAMINE   Capsule 25 milliGRAM(s) Oral once  insulin lispro (HumaLOG) corrective regimen sliding scale   SubCutaneous three times a day before meals  insulin lispro (HumaLOG) corrective regimen sliding scale   SubCutaneous at bedtime  lactobacillus acidophilus 1 Tablet(s) Oral three times a day with meals  levothyroxine 75 MICROGram(s) Oral daily  pantoprazole  Injectable 40 milliGRAM(s) IV Push two times a day  sucralfate 1 Gram(s) Oral four times a day    MEDICATIONS  (PRN):  acetaminophen   Tablet 650 milliGRAM(s) Oral every 6 hours PRN Mild Pain  dextrose 40% Gel 15 Gram(s) Oral once PRN Blood Glucose LESS THAN 70 milliGRAM(s)/deciliter  glucagon  Injectable 1 milliGRAM(s) IntraMuscular once PRN Glucose LESS THAN 70 milligrams/deciliter        ROS: Psych: See HPI.  All other systems negative.                          8.2    x     )-----------( x        ( 11 Jul 2018 09:46 )             25.8   11 Jul 2018 08:03    146    |  115    |  65     ----------------------------<  112    5.2     |  25     |  2.30     Ca    7.8        11 Jul 2018 08:03  Phos  3.3       10 Jul 2018 07:34  Mg     2.1       10 Jul 2018 07:34      TSH:     Utox:  Imaging:  Other Tests:    Old Records reviewed:    EXAM:  Vital Signs Last 24 Hrs  T(C): 36.8 (07-11-18 @ 04:00), Max: 36.8 (07-11-18 @ 04:00)  T(F): 98.2 (07-11-18 @ 04:00), Max: 98.2 (07-11-18 @ 04:00)  HR: 78 (07-11-18 @ 04:00) (73 - 101)  BP: 155/73 (07-11-18 @ 04:00) (151/74 - 165/73)  BP(mean): --  RR: 18 (07-11-18 @ 04:00) (18 - 20)  SpO2: 98% (07-11-18 @ 04:00) (93% - 99%)  Gen Appearance: Fair grooming and hygiene  Gait/Station/Muscle Tone: WNL  Abnl Movements: Absent  Speech: Normoproductive,   TP; illogical at times  Associations: WNL  TC: Patient is confused  Mood: Somewhat anxious  Affect: Labile  Consciousness/orientation: Impaired  Memory:   Recent: Impaired   Remote: Intact  Attention/Concentration: Impaired  Language: WNL  Fund of Knowledge: Average  Insight: Poor  Judgment: poor    DX: Delirium vs dementia

## 2018-07-11 NOTE — PROGRESS NOTE ADULT - SUBJECTIVE AND OBJECTIVE BOX
[facility] - Mabel Hernandez, Jessica, Cesar, Holly, Mayda Hartman  Office Number:  [phone]    Patient resting comfortably in bed in NAD.  Laying flat with no respiratory distress.  No complaints of chest pain, dyspnea, palpitations, PND, or orthopnea.        MEDICATIONS  (STANDING):  clonazePAM Tablet 0.5 milliGRAM(s) Oral two times a day  dextrose 5%. 1000 milliLiter(s) (50 mL/Hr) IV Continuous <Continuous>  dextrose 50% Injectable 12.5 Gram(s) IV Push once  dextrose 50% Injectable 25 Gram(s) IV Push once  dextrose 50% Injectable 25 Gram(s) IV Push once  diphenhydrAMINE   Capsule 25 milliGRAM(s) Oral once  insulin lispro (HumaLOG) corrective regimen sliding scale   SubCutaneous three times a day before meals  insulin lispro (HumaLOG) corrective regimen sliding scale   SubCutaneous at bedtime  lactated ringers. 1000 milliLiter(s) (75 mL/Hr) IV Continuous <Continuous>  lactobacillus acidophilus 1 Tablet(s) Oral three times a day with meals  levothyroxine 75 MICROGram(s) Oral daily  pantoprazole  Injectable 40 milliGRAM(s) IV Push two times a day  sucralfate 1 Gram(s) Oral four times a day    MEDICATIONS  (PRN):  acetaminophen   Tablet 650 milliGRAM(s) Oral every 6 hours PRN Mild Pain  dextrose 40% Gel 15 Gram(s) Oral once PRN Blood Glucose LESS THAN 70 milliGRAM(s)/deciliter  glucagon  Injectable 1 milliGRAM(s) IntraMuscular once PRN Glucose LESS THAN 70 milligrams/deciliter      Allergies    Levaquin (Hives)  sulfa drugs (Unknown)    Intolerances        Vital Signs Last 24 Hrs  T(C): 36.7 (11 Jul 2018 13:34), Max: 36.8 (11 Jul 2018 04:00)  T(F): 98.1 (11 Jul 2018 13:34), Max: 98.2 (11 Jul 2018 04:00)  HR: 74 (11 Jul 2018 13:34) (73 - 101)  BP: 169/77 (11 Jul 2018 13:34) (151/74 - 169/77)  BP(mean): --  RR: 18 (11 Jul 2018 13:34) (18 - 18)  SpO2: 94% (11 Jul 2018 13:34) (93% - 99%)    I&O's Summary    10 Jul 2018 07:01  -  11 Jul 2018 07:00  --------------------------------------------------------  IN: 0 mL / OUT: 2000 mL / NET: -2000 mL    11 Jul 2018 07:01  -  11 Jul 2018 16:30  --------------------------------------------------------  IN: 0 mL / OUT: 1000 mL / NET: -1000 mL        ON EXAM:    Constitutional: NAD, awake and alert, obese, frail  HEENT: Moist Mucous Membranes, Anicteric  Pulmonary: Non-labored, breath sounds are decreased in bases bilaterally, No wheezing, rales or rhonchi  Cardiovascular: Regular, S1 and S2, No murmurs, rubs, gallops or clicks  Gastrointestinal: Bowel Sounds present, soft, nontender.   Lymph: chronic edema bilateral lower extremities. No lymphadenopathy.  Skin: thick yellow scaly areas on lower extremities bilaterally,  Left shoulder/back with ecchymotic area.    Psych:  Mood & affect appropriate    LABS: All Labs Reviewed:                        8.1    x     )-----------( x        ( 11 Jul 2018 16:12 )             25.5                         8.2    x     )-----------( x        ( 11 Jul 2018 09:46 )             25.8                         7.4    6.37  )-----------( 154      ( 11 Jul 2018 08:03 )             24.0     11 Jul 2018 08:03    146    |  115    |  65     ----------------------------<  112    5.2     |  25     |  2.30   10 Jul 2018 07:34    146    |  116    |  67     ----------------------------<  124    4.9     |  24     |  2.10   09 Jul 2018 07:56    148    |  118    |  72     ----------------------------<  111    5.1     |  23     |  2.30     Ca    7.8        11 Jul 2018 08:03  Ca    8.0        10 Jul 2018 07:34  Ca    7.9        09 Jul 2018 07:56  Phos  3.3       10 Jul 2018 07:34  Phos  4.4       09 Jul 2018 07:56  Mg     2.1       10 Jul 2018 07:34  Mg     2.2       09 Jul 2018 07:56      PT/INR - ( 11 Jul 2018 09:46 )   PT: 10.9 sec;   INR: 1.00 ratio         PTT - ( 10 Jul 2018 07:34 )  PTT:30.9 sec      Blood Culture: Organism --  Gram Stain Blood -- Gram Stain --  Specimen Source .Urine Catheterized  Culture-Blood --    Organism --  Gram Stain Blood -- Gram Stain --  Specimen Source .Blood Blood-Peripheral  Culture-Blood --

## 2018-07-12 LAB
ANION GAP SERPL CALC-SCNC: 4 MMOL/L — LOW (ref 5–17)
BUN SERPL-MCNC: 60 MG/DL — HIGH (ref 7–23)
CALCIUM SERPL-MCNC: 7.9 MG/DL — LOW (ref 8.5–10.1)
CHLORIDE SERPL-SCNC: 116 MMOL/L — HIGH (ref 96–108)
CO2 SERPL-SCNC: 26 MMOL/L — SIGNIFICANT CHANGE UP (ref 22–31)
CREAT SERPL-MCNC: 2 MG/DL — HIGH (ref 0.5–1.3)
CULTURE RESULTS: SIGNIFICANT CHANGE UP
CULTURE RESULTS: SIGNIFICANT CHANGE UP
FERRITIN SERPL-MCNC: 207 NG/ML — HIGH (ref 15–150)
GLUCOSE SERPL-MCNC: 97 MG/DL — SIGNIFICANT CHANGE UP (ref 70–99)
HCT VFR BLD CALC: 26 % — LOW (ref 34.5–45)
HCT VFR BLD CALC: 26.2 % — LOW (ref 34.5–45)
HGB BLD-MCNC: 7.8 G/DL — LOW (ref 11.5–15.5)
HGB BLD-MCNC: 8 G/DL — LOW (ref 11.5–15.5)
IRON SATN MFR SERPL: 22 % — SIGNIFICANT CHANGE UP (ref 14–50)
IRON SATN MFR SERPL: 43 UG/DL — SIGNIFICANT CHANGE UP (ref 30–160)
MCHC RBC-ENTMCNC: 27.7 PG — SIGNIFICANT CHANGE UP (ref 27–34)
MCHC RBC-ENTMCNC: 29.8 GM/DL — LOW (ref 32–36)
MCV RBC AUTO: 92.9 FL — SIGNIFICANT CHANGE UP (ref 80–100)
NRBC # BLD: 0 /100 WBCS — SIGNIFICANT CHANGE UP (ref 0–0)
PLATELET # BLD AUTO: 156 K/UL — SIGNIFICANT CHANGE UP (ref 150–400)
POTASSIUM SERPL-MCNC: 5.1 MMOL/L — SIGNIFICANT CHANGE UP (ref 3.5–5.3)
POTASSIUM SERPL-SCNC: 5.1 MMOL/L — SIGNIFICANT CHANGE UP (ref 3.5–5.3)
RBC # BLD: 2.82 M/UL — LOW (ref 3.8–5.2)
RBC # FLD: 19.2 % — HIGH (ref 10.3–14.5)
SODIUM SERPL-SCNC: 146 MMOL/L — HIGH (ref 135–145)
SPECIMEN SOURCE: SIGNIFICANT CHANGE UP
SPECIMEN SOURCE: SIGNIFICANT CHANGE UP
TIBC SERPL-MCNC: 199 UG/DL — LOW (ref 220–430)
UIBC SERPL-MCNC: 156 UG/DL — SIGNIFICANT CHANGE UP (ref 110–370)
VIT B12 SERPL-MCNC: 654 PG/ML — SIGNIFICANT CHANGE UP (ref 232–1245)
WBC # BLD: 4.63 K/UL — SIGNIFICANT CHANGE UP (ref 3.8–10.5)
WBC # FLD AUTO: 4.63 K/UL — SIGNIFICANT CHANGE UP (ref 3.8–10.5)

## 2018-07-12 PROCEDURE — 99232 SBSQ HOSP IP/OBS MODERATE 35: CPT

## 2018-07-12 PROCEDURE — 99233 SBSQ HOSP IP/OBS HIGH 50: CPT

## 2018-07-12 RX ORDER — SODIUM CHLORIDE 9 MG/ML
1000 INJECTION, SOLUTION INTRAVENOUS
Qty: 0 | Refills: 0 | Status: DISCONTINUED | OUTPATIENT
Start: 2018-07-12 | End: 2018-07-13

## 2018-07-12 RX ORDER — PANTOPRAZOLE SODIUM 20 MG/1
40 TABLET, DELAYED RELEASE ORAL
Qty: 0 | Refills: 0 | Status: DISCONTINUED | OUTPATIENT
Start: 2018-07-12 | End: 2018-07-25

## 2018-07-12 RX ORDER — NYSTATIN CREAM 100000 [USP'U]/G
1 CREAM TOPICAL ONCE
Qty: 0 | Refills: 0 | Status: COMPLETED | OUTPATIENT
Start: 2018-07-12 | End: 2018-07-12

## 2018-07-12 RX ADMIN — Medication 1 GRAM(S): at 12:05

## 2018-07-12 RX ADMIN — Medication 1 GRAM(S): at 17:24

## 2018-07-12 RX ADMIN — NYSTATIN CREAM 1 APPLICATION(S): 100000 CREAM TOPICAL at 22:24

## 2018-07-12 RX ADMIN — Medication 1 GRAM(S): at 05:22

## 2018-07-12 RX ADMIN — Medication 1 TABLET(S): at 08:37

## 2018-07-12 RX ADMIN — ALBUTEROL 2.5 MILLIGRAM(S): 90 AEROSOL, METERED ORAL at 16:20

## 2018-07-12 RX ADMIN — Medication 0.5 MILLIGRAM(S): at 05:28

## 2018-07-12 RX ADMIN — Medication 1 TABLET(S): at 17:24

## 2018-07-12 RX ADMIN — Medication 0.5 MILLIGRAM(S): at 17:29

## 2018-07-12 RX ADMIN — Medication 75 MICROGRAM(S): at 05:22

## 2018-07-12 RX ADMIN — SODIUM CHLORIDE 75 MILLILITER(S): 9 INJECTION, SOLUTION INTRAVENOUS at 12:05

## 2018-07-12 RX ADMIN — Medication 1 TABLET(S): at 12:05

## 2018-07-12 RX ADMIN — PANTOPRAZOLE SODIUM 40 MILLIGRAM(S): 20 TABLET, DELAYED RELEASE ORAL at 17:24

## 2018-07-12 RX ADMIN — PANTOPRAZOLE SODIUM 40 MILLIGRAM(S): 20 TABLET, DELAYED RELEASE ORAL at 05:21

## 2018-07-12 RX ADMIN — Medication 1: at 17:25

## 2018-07-12 NOTE — PROGRESS NOTE ADULT - PROBLEM SELECTOR PLAN 1
- possibly 2/2 GIB: GI (Dr. Daly) EGD showed duodenal ulcer  - now s/p transfusion of total 5 units PRBCs and 1 unit platelets.  - monitor H/H, transfuse as necessary.  - continue Protonix   -apprec gi and hemat recs  -pt underwent bleeding scan but could only tolerate 45 minutes dues to being uncomfortable on back and in pain  -tolerating diet well  -dc planning if h.h stable - possibly 2/2 GIB: GI (Dr. Daly) EGD showed duodenal ulcer  -stable h/h  - now s/p transfusion of total 5 units PRBCs and 1 unit platelets.  - monitor H/H, transfuse as necessary.  - continue Protonix   -apprec gi and hemat recs  -pt underwent bleeding scan but could only tolerate 45 minutes dues to being uncomfortable on back and in pain  -tolerating diet well  -dc planning as h.h stable, daughter does not want pt to go back to [facility] rehab prefers another facility

## 2018-07-12 NOTE — PROGRESS NOTE ADULT - SUBJECTIVE AND OBJECTIVE BOX
INTERVAL HPI/OVERNIGHT EVENTS:  pt seen and examined  pt denies n/v/abd pain, mildly confused  per rn no melena overnight no hematemesis no brbpr  labs noted afebrile overnight  incomplete bleeding scan done yesterday- no active gib seen  seen by psych no decision making capacity    MEDICATIONS  (STANDING):  clonazePAM Tablet 0.5 milliGRAM(s) Oral two times a day  dextrose 5%. 1000 milliLiter(s) (50 mL/Hr) IV Continuous <Continuous>  dextrose 50% Injectable 12.5 Gram(s) IV Push once  dextrose 50% Injectable 25 Gram(s) IV Push once  dextrose 50% Injectable 25 Gram(s) IV Push once  diphenhydrAMINE   Capsule 25 milliGRAM(s) Oral once  insulin lispro (HumaLOG) corrective regimen sliding scale   SubCutaneous three times a day before meals  insulin lispro (HumaLOG) corrective regimen sliding scale   SubCutaneous at bedtime  lactated ringers. 1000 milliLiter(s) (75 mL/Hr) IV Continuous <Continuous>  lactobacillus acidophilus 1 Tablet(s) Oral three times a day with meals  levothyroxine 75 MICROGram(s) Oral daily  pantoprazole  Injectable 40 milliGRAM(s) IV Push two times a day  sucralfate 1 Gram(s) Oral four times a day    MEDICATIONS  (PRN):  acetaminophen   Tablet 650 milliGRAM(s) Oral every 6 hours PRN Mild Pain  ALBUTerol    0.083% 2.5 milliGRAM(s) Nebulizer every 4 hours PRN Shortness of Breath and/or Wheezing  dextrose 40% Gel 15 Gram(s) Oral once PRN Blood Glucose LESS THAN 70 milliGRAM(s)/deciliter  glucagon  Injectable 1 milliGRAM(s) IntraMuscular once PRN Glucose LESS THAN 70 milligrams/deciliter      Allergies    Levaquin (Hives)  sulfa drugs (Unknown)    Intolerances        Review of Systems:    General:  No wt loss, fevers, chills, night sweats, fatigue   Eyes:  Good vision, no reported pain  ENT:  No sore throat, pain, runny nose, dysphagia  CV:  No pain, palpitations, hypo/hypertension  Resp:  No dyspnea, cough, tachypnea, wheezing  GI:  No pain, No nausea, No vomiting, No diarrhea, No constipation, No weight loss, No fever, No pruritis, No rectal bleeding, No melena, No dysphagia  :  No pain, bleeding, incontinence, nocturia  Muscle:  No pain, weakness  Neuro:  No weakness, tingling, memory problems  Psych:  No fatigue, insomnia, mood problems, depression  Endocrine:  No polyuria, polydypsia, cold/heat intolerance  Heme:  No petechiae, ecchymosis, easy bruisability  Skin:  No rash, tattoos, scars, edema      Vital Signs Last 24 Hrs  T(C): 36.4 (12 Jul 2018 05:20), Max: 36.7 (11 Jul 2018 13:34)  T(F): 97.5 (12 Jul 2018 05:20), Max: 98.1 (11 Jul 2018 13:34)  HR: 72 (12 Jul 2018 05:20) (72 - 74)  BP: 123/67 (12 Jul 2018 05:20) (123/67 - 169/77)  BP(mean): --  RR: 16 (12 Jul 2018 05:20) (16 - 18)  SpO2: 97% (12 Jul 2018 05:20) (94% - 99%)    PHYSICAL EXAM:  Constitutional: NAD, lying in bed  HEENT: EOMI, perrl  Neck: No LAD  Respiratory: dec bs  Cardiovascular: S1 and S2, RRR  Gastrointestinal: obese abd BS+, soft, NT, nd  Extremities: +edema and blistering to le  Vascular: 2+ peripheral pulses  Neurological: Awake alert some confusion  Skin: see above    LABS:                        7.8    4.63  )-----------( 156      ( 12 Jul 2018 07:41 )             26.2     07-12    146<H>  |  116<H>  |  60<H>  ----------------------------<  97  5.1   |  26  |  2.00<H>    Ca    7.9<L>      12 Jul 2018 07:41      PT/INR - ( 11 Jul 2018 09:46 )   PT: 10.9 sec;   INR: 1.00 ratio               RADIOLOGY & ADDITIONAL TESTS:

## 2018-07-12 NOTE — PROGRESS NOTE ADULT - PROBLEM SELECTOR PLAN 1
hgb low but stable, no further s/s overt gib  repeat h/h now  ppi iv bid/carafate qid  npo/ivf per primary team for now  f/u repeat labs, if repeat labs confirm drop in hgb, recommend check cta/bleeding scan  trend h/h, transfuse prn  hold ac asa nsaids  sp egd 7/9 showed non bleeding du, gastritis and hh  consider heme eval  will need outpatient gi f/u upon dc  above plan d/w dr bah, agreeable hgb low but stable, no further s/s overt gib overnight  unable to tolerate bleeding scan as could not lay flat, tolerated 45 minutes, incomplete study but no active gib noted  trend h/h, trasnfuse prn, monitor for overt s/s gib  ppi bid/carafate qid  diet as tolerated  hold ac asa nsaids  sp egd 7/9 showed non bleeding du, gastritis and hh  monitor for need of possible egd/colonoscopy if hgb drops/with s/s active gib (cannot get cta 2/2 renal function)  heme following  will need close outpatient gi f/u upon dc hgb low but stable, no further s/s overt gib overnight  unable to tolerate bleeding scan as could not lay flat, tolerated 45 minutes, incomplete study but no active gib noted  trend h/h, trasnfuse prn, monitor for overt s/s gib  ppi bid/carafate qid  diet as tolerated  hold ac asa nsaids  sp egd 7/9 showed non bleeding du, gastritis and hh  monitor for need of possible repeat bleeding scan w anxiolytic vs egd/colonoscopy if hgb drops/with s/s active gib (cannot get cta 2/2 renal function)  heme following  will need close outpatient gi f/u upon dc

## 2018-07-12 NOTE — PROGRESS NOTE ADULT - SUBJECTIVE AND OBJECTIVE BOX
Patient is a 83y old  Female who presents with a chief complaint of profound anemia (07 Jul 2018 14:43)      INTERVAL HPI: Pt seen and examined. Tolerating diet well, denies any acute complaints at this time.    OVERNIGHT EVENTS: none noted  T(F): 98 (07-12-18 @ 20:21), Max: 98 (07-12-18 @ 20:21)  HR: 75 (07-12-18 @ 20:21) (72 - 76)  BP: 138/66 (07-12-18 @ 20:21) (123/67 - 150/75)  RR: 18 (07-12-18 @ 20:21) (16 - 18)  SpO2: 95% (07-12-18 @ 20:21) (93% - 99%)  I&O's Summary    11 Jul 2018 07:01  -  12 Jul 2018 07:00  --------------------------------------------------------  IN: 0 mL / OUT: 2400 mL / NET: -2400 mL    12 Jul 2018 07:01  -  12 Jul 2018 21:26  --------------------------------------------------------  IN: 600 mL / OUT: 700 mL / NET: -100 mL        REVIEW OF SYSTEMS: 12 systems noncontributory other than that stated in hpi    PHYSICAL EXAM:  GENERAL: NAD, elder  HEAD:  Atraumatic, Normocephalic  EYES: EOMI, PERRLA, conjunctiva and sclera clear  ENMT: No tonsillar erythema, exudates, or enlargement; Moist mucous membranes, Good dentition, No lesions  NECK: Supple, No JVD,   NERVOUS SYSTEM:  Alert & Oriented X3, Good concentration; Motor Strength 5/5 B/L upper and lower extremities  CHEST/LUNG: Clear to percussion bilaterally; No rales, rhonchi, wheezing, or rubs  HEART: Regular rate and rhythm; No murmurs, rubs, or gallops  ABDOMEN: Soft, Nontender, Nondistended; Bowel sounds present  EXTREMITIES:  2+ Peripheral Pulses, No clubbing, cyanosis, or edema  SKIN: No rashes or lesions    LABS:                        8.0    x     )-----------( x        ( 12 Jul 2018 17:12 )             26.0     07-12    146<H>  |  116<H>  |  60<H>  ----------------------------<  97  5.1   |  26  |  2.00<H>    Ca    7.9<L>      12 Jul 2018 07:41      PT/INR - ( 11 Jul 2018 09:46 )   PT: 10.9 sec;   INR: 1.00 ratio             CAPILLARY BLOOD GLUCOSE      POCT Blood Glucose.: 198 mg/dL (12 Jul 2018 21:23)  POCT Blood Glucose.: 197 mg/dL (12 Jul 2018 17:00)  POCT Blood Glucose.: 146 mg/dL (12 Jul 2018 12:12)  POCT Blood Glucose.: 105 mg/dL (12 Jul 2018 08:08)              MEDICATIONS  (STANDING):  clonazePAM Tablet 0.5 milliGRAM(s) Oral two times a day  dextrose 5% + sodium chloride 0.45%. 1000 milliLiter(s) (75 mL/Hr) IV Continuous <Continuous>  dextrose 5%. 1000 milliLiter(s) (50 mL/Hr) IV Continuous <Continuous>  dextrose 50% Injectable 12.5 Gram(s) IV Push once  dextrose 50% Injectable 25 Gram(s) IV Push once  dextrose 50% Injectable 25 Gram(s) IV Push once  diphenhydrAMINE   Capsule 25 milliGRAM(s) Oral once  insulin lispro (HumaLOG) corrective regimen sliding scale   SubCutaneous three times a day before meals  insulin lispro (HumaLOG) corrective regimen sliding scale   SubCutaneous at bedtime  lactobacillus acidophilus 1 Tablet(s) Oral three times a day with meals  levothyroxine 75 MICROGram(s) Oral daily  nystatin Cream 1 Application(s) Topical once  pantoprazole    Tablet 40 milliGRAM(s) Oral two times a day  sucralfate 1 Gram(s) Oral four times a day    MEDICATIONS  (PRN):  acetaminophen   Tablet 650 milliGRAM(s) Oral every 6 hours PRN Mild Pain  ALBUTerol    0.083% 2.5 milliGRAM(s) Nebulizer every 4 hours PRN Shortness of Breath and/or Wheezing  dextrose 40% Gel 15 Gram(s) Oral once PRN Blood Glucose LESS THAN 70 milliGRAM(s)/deciliter  glucagon  Injectable 1 milliGRAM(s) IntraMuscular once PRN Glucose LESS THAN 70 milligrams/deciliter Patient is a 83y old  Female who presents with a chief complaint of profound anemia (07 Jul 2018 14:43)      INTERVAL HPI: Pt seen and examined. Tolerating diet well, denies any acute complaints at this time.    OVERNIGHT EVENTS: none noted  T(F): 98 (07-12-18 @ 20:21), Max: 98 (07-12-18 @ 20:21)  HR: 75 (07-12-18 @ 20:21) (72 - 76)  BP: 138/66 (07-12-18 @ 20:21) (123/67 - 150/75)  RR: 18 (07-12-18 @ 20:21) (16 - 18)  SpO2: 95% (07-12-18 @ 20:21) (93% - 99%)  I&O's Summary    11 Jul 2018 07:01  -  12 Jul 2018 07:00  --------------------------------------------------------  IN: 0 mL / OUT: 2400 mL / NET: -2400 mL    12 Jul 2018 07:01  -  12 Jul 2018 21:26  --------------------------------------------------------  IN: 600 mL / OUT: 700 mL / NET: -100 mL        REVIEW OF SYSTEMS: 12 systems noncontributory other than that stated in hpi    PHYSICAL EXAM:  GENERAL: NAD, elder, obese  HEAD:  Atraumatic, Normocephalic  EYES: EOMI, PERRLA, conjunctiva and sclera clear  ENMT: No tonsillar erythema, exudates, or enlargement; Moist mucous membranes, Good dentition, No lesions  NECK: Supple, No JVD,   NERVOUS SYSTEM:  Alert & Oriented X3, Good concentration; Motor Strength 5/5 B/L upper and lower extremities  CHEST/LUNG: Clear to percussion bilaterally; No rales, rhonchi, wheezing, or rubs  HEART: Regular rate and rhythm; No murmurs, rubs, or gallops  ABDOMEN: Soft, Nontender, Nondistended; Bowel sounds present  EXTREMITIES:  2+ Peripheral Pulses, No clubbing, cyanosis, or edema  SKIN: No rashes or lesions    LABS:                        8.0    x     )-----------( x        ( 12 Jul 2018 17:12 )             26.0     07-12    146<H>  |  116<H>  |  60<H>  ----------------------------<  97  5.1   |  26  |  2.00<H>    Ca    7.9<L>      12 Jul 2018 07:41      PT/INR - ( 11 Jul 2018 09:46 )   PT: 10.9 sec;   INR: 1.00 ratio             CAPILLARY BLOOD GLUCOSE      POCT Blood Glucose.: 198 mg/dL (12 Jul 2018 21:23)  POCT Blood Glucose.: 197 mg/dL (12 Jul 2018 17:00)  POCT Blood Glucose.: 146 mg/dL (12 Jul 2018 12:12)  POCT Blood Glucose.: 105 mg/dL (12 Jul 2018 08:08)              MEDICATIONS  (STANDING):  clonazePAM Tablet 0.5 milliGRAM(s) Oral two times a day  dextrose 5% + sodium chloride 0.45%. 1000 milliLiter(s) (75 mL/Hr) IV Continuous <Continuous>  dextrose 5%. 1000 milliLiter(s) (50 mL/Hr) IV Continuous <Continuous>  dextrose 50% Injectable 12.5 Gram(s) IV Push once  dextrose 50% Injectable 25 Gram(s) IV Push once  dextrose 50% Injectable 25 Gram(s) IV Push once  diphenhydrAMINE   Capsule 25 milliGRAM(s) Oral once  insulin lispro (HumaLOG) corrective regimen sliding scale   SubCutaneous three times a day before meals  insulin lispro (HumaLOG) corrective regimen sliding scale   SubCutaneous at bedtime  lactobacillus acidophilus 1 Tablet(s) Oral three times a day with meals  levothyroxine 75 MICROGram(s) Oral daily  nystatin Cream 1 Application(s) Topical once  pantoprazole    Tablet 40 milliGRAM(s) Oral two times a day  sucralfate 1 Gram(s) Oral four times a day    MEDICATIONS  (PRN):  acetaminophen   Tablet 650 milliGRAM(s) Oral every 6 hours PRN Mild Pain  ALBUTerol    0.083% 2.5 milliGRAM(s) Nebulizer every 4 hours PRN Shortness of Breath and/or Wheezing  dextrose 40% Gel 15 Gram(s) Oral once PRN Blood Glucose LESS THAN 70 milliGRAM(s)/deciliter  glucagon  Injectable 1 milliGRAM(s) IntraMuscular once PRN Glucose LESS THAN 70 milligrams/deciliter

## 2018-07-12 NOTE — PROGRESS NOTE ADULT - ASSESSMENT
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 Renal function fluctuates   Hypernatremia  Anemia: Blood loss anemia, severe: GIB  Arthritis  Asthma  Diabetes mellitus  Lymphedema      - Work up for anemia such as bleeding scan etc in progress  - Serial BMP, cbc, phos  - No indication for RRT, shall follow closely  - No uremic bleeding diathesis. If bleeding becomes a recurrent issue, will consider on dose of DDAVP 0.3 /kg bw  - Urine studies   - D/C LR as K is 5.1 and bicarb is 26. LR contains K and lactate. Change IVF to D5-1/2NS for now     Thanks

## 2018-07-12 NOTE — PROGRESS NOTE ADULT - ASSESSMENT
82 y/o woman with GI bleed.s/p EGD showed duodenal ulcer, pt continues having melena .  consult called for nmgt of anemia        Anemia   presented with Hb 0f 3.4 7/7     Hb 3.8 s/p 3 units+ plt on 7/7/18 with appropriate response   Hb was 6.8 on 7/8/18 , s/p 2 units , HB is 7.8 today mildly decreased  + melena , bleeding scan incomplete study without bleeding seen  fe studies with ferritin >200    PLAN:  iron studies , B 12  will hold venofer and observe  to hold transfusion and observe. if hgb continues to decrease will need additional GI evaluation ?EGD and will transfuse PRBC as indicated

## 2018-07-12 NOTE — PROGRESS NOTE ADULT - SUBJECTIVE AND OBJECTIVE BOX
[facility] -- Mabel Hernandez, Cesar Lopez, Devan Barrera Savella  Office # [phone]    Follow Up:      Subjective/Observations:   Seen and evaluated.  Denies dizziness/lightheadedness.  Denies CP, SOB, or palpitation.  c/o SOB which is at baseline, owing it to her asthma.  Unable to tell if +melena    REVIEW OF SYSTEMS: All other review of systems is negative unless indicated above    PAST MEDICAL & SURGICAL HISTORY:  Arthritis  Asthma  Diabetes mellitus  Hypothyroidism  No significant past surgical history    MEDICATIONS  (STANDING):  clonazePAM Tablet 0.5 milliGRAM(s) Oral two times a day  dextrose 5% + sodium chloride 0.45%. 1000 milliLiter(s) (75 mL/Hr) IV Continuous <Continuous>  dextrose 5%. 1000 milliLiter(s) (50 mL/Hr) IV Continuous <Continuous>  dextrose 50% Injectable 12.5 Gram(s) IV Push once  dextrose 50% Injectable 25 Gram(s) IV Push once  dextrose 50% Injectable 25 Gram(s) IV Push once  diphenhydrAMINE   Capsule 25 milliGRAM(s) Oral once  insulin lispro (HumaLOG) corrective regimen sliding scale   SubCutaneous three times a day before meals  insulin lispro (HumaLOG) corrective regimen sliding scale   SubCutaneous at bedtime  lactobacillus acidophilus 1 Tablet(s) Oral three times a day with meals  levothyroxine 75 MICROGram(s) Oral daily  pantoprazole    Tablet 40 milliGRAM(s) Oral two times a day  sucralfate 1 Gram(s) Oral four times a day    MEDICATIONS  (PRN):  acetaminophen   Tablet 650 milliGRAM(s) Oral every 6 hours PRN Mild Pain  ALBUTerol    0.083% 2.5 milliGRAM(s) Nebulizer every 4 hours PRN Shortness of Breath and/or Wheezing  dextrose 40% Gel 15 Gram(s) Oral once PRN Blood Glucose LESS THAN 70 milliGRAM(s)/deciliter  glucagon  Injectable 1 milliGRAM(s) IntraMuscular once PRN Glucose LESS THAN 70 milligrams/deciliter    Allergies    Levaquin (Hives)  sulfa drugs (Unknown)    Intolerances    Vital Signs Last 24 Hrs  T(C): 36.4 (12 Jul 2018 05:20), Max: 36.4 (11 Jul 2018 21:36)  T(F): 97.5 (12 Jul 2018 05:20), Max: 97.6 (11 Jul 2018 21:36)  HR: 72 (12 Jul 2018 05:20) (72 - 72)  BP: 123/67 (12 Jul 2018 05:20) (123/67 - 150/75)  BP(mean): --  RR: 16 (12 Jul 2018 05:20) (16 - 17)  SpO2: 97% (12 Jul 2018 05:20) (97% - 99%)    I&O's Summary    11 Jul 2018 07:01  -  12 Jul 2018 07:00  --------------------------------------------------------  IN: 0 mL / OUT: 2400 mL / NET: -2400 mL     PHYSICAL EXAM:  TELE: Not on tele  Constitutional: NAD, awake and alert, obese  HEENT: Moist Mucous Membranes, Anicteric  Pulmonary: Non-labored, breath sounds are clear bilaterally, No wheezing, rales or rhonchi  Cardiovascular: Regular, S1 and S2, No murmurs, rubs, gallops or clicks  Gastrointestinal: Bowel Sounds present, soft, nontender.   Lymph: No peripheral edema. No lymphadenopathy.  Skin: No visible rashes or ulcers.  Psych:  Mood & affect appropriate    LABS: All Labs Reviewed:                        7.8    4.63  )-----------( 156      ( 12 Jul 2018 07:41 )             26.2                         8.1    x     )-----------( x        ( 11 Jul 2018 16:12 )             25.5                         8.2    x     )-----------( x        ( 11 Jul 2018 09:46 )             25.8     12 Jul 2018 07:41    146    |  116    |  60     ----------------------------<  97     5.1     |  26     |  2.00   11 Jul 2018 08:03    146    |  115    |  65     ----------------------------<  112    5.2     |  25     |  2.30   10 Jul 2018 07:34    146    |  116    |  67     ----------------------------<  124    4.9     |  24     |  2.10     Ca    7.9        12 Jul 2018 07:41  Ca    7.8        11 Jul 2018 08:03  Ca    8.0        10 Jul 2018 07:34  Phos  3.3       10 Jul 2018 07:34  Mg     2.1       10 Jul 2018 07:34    PT/INR - ( 11 Jul 2018 09:46 )   PT: 10.9 sec;   INR: 1.00 ratio      < from: CT Abdomen and Pelvis No Cont (07.07.18 @ 16:10) >    EXAM:  CT ABDOMEN AND PELVIS                            PROCEDURE DATE:  07/07/2018        INTERPRETATION:    CT ABDOMEN and PELVIS without IV contrast dated 7/7/2018 4:10 PM    INDICATION: 83-year-old female severe anemia, ro RP bleed        TECHNIQUE: CT of the abdomen and pelvis was performed without intravenous   contrast administration. Axial, sagittal and coronal images were produced   and reviewed.  Enteric contrast was not administered, limiting complete   evaluation of the gastrointestinal tract. The patient is imaged with both   upper extremities in the gantry.    COMPARISON: None.    FINDINGS: Images of the lower chest demonstrate trace right pleural   effusion and subjacent right basilar atelectasis. There is no pericardial   effusion or hematoma. Atherosclerotic coronary artery calcifications are   noted. There is visualization of the hyperdense and the ventricular   septum, in keeping with history of anemia. A small hiatal hernia is   noted.     Postcholecystectomy surgical clips are seen in the gallbladder fossa. The   pancreas is completely fatty replaced. There is an apparent 2 cm   circumscribed cortical hypodensity in the left kidney, with an adjacent   cortical dystrophic calcification The liver, spleen, adrenal glands and   right kidney have a grossly unremarkable nonenhanced appearance.    Extensive atheromatous disease throughout the aortobiiliac system,   without aneurysm. There is also marked mural calcification of the splenic   artery. No retroperitoneal hematoma is present. No retroperitoneal mass   or lymphadenopathy is seen.     Enteric contrast was not administered, limiting complete evaluation of   the gastrointestinal tract. Evaluation of the nonopacified small bowel   demonstrates no dilatation or air-fluid levels. There is an apparent   intraluminal lipoma within the second portion of duodenum. There is a   moderate-sized rectal stool ball with circumferential mural thickening   and presacral edema stranding, most suggestive of stercoral colitis.   There is no proximal colonic obstruction. The appendix is not visualized.   No ascites no pneumoperitoneum is seen.    There is no pelvic hematoma, mass or lymphadenopathy. The urinary bladder   is decompressed around a Oliva catheter.    Evaluation of the osseous structures demonstrates a mild S-shaped   thoracolumbar scoliosis with multilevel multifactorial degenerative   spinal changes. There is a benign-appearing bone island in the right   iliac wing. Is heterogeneous appearance of the sacral ala which is felt   to represent to be related to stress-related change.      IMPRESSION:  No retroperitoneal or pelvic hematoma.    Constellation of findings are most suggestive of rectal stercoral colitis.    Trace right pleural effusion.    Oliva catheter in situ.    JUNE BEYER M.D., ATTENDING RADIOLOGIST  This document has been electronically signed. Jul 7 2018  4:20PM      < end of copied text >    < from: NM GI Bleed Localization (07.11.18 @ 17:47) >    EXAM:  NM GI BLEEDING IMG                            PROCEDURE DATE:  07/11/2018        INTERPRETATION:  RADIOPHARMACEUTICAL: 20 mCi Tc-99m labeled autologous   red blood cells, I.V.    CLINICAL STATEMENT: 83-year-old female with GI bleeding referred for   localization of bleeding site.    TECHNIQUE:  Dynamic images of the anterior abdomen/pelvis were obtained   for 48 minutes following administration of radiopharmaceutical. Patient   refused additional imaging.    FINDINGS: There is physiologic distribution of radiolabeled red cells in   the abdomen and pelvis. There is no abnormal focus of increased activity   to suggest the presence of active bleeding.    IMPRESSION: Normal limited GI bleeding scan .    No evidence of active bleeding site.    Patient was imaged for 45 minutes. Patient refused additional imaging.    SHADI FUNK M.D., NUCLEAR MEDICINE ATTENDING  This document has been electronically signed. Jul 11 2018  8:12PM      < end of copied text >

## 2018-07-12 NOTE — PROGRESS NOTE ADULT - SUBJECTIVE AND OBJECTIVE BOX
Interval History:  remains weak. denies visible bleeding    Chart reviewed and events noted;   Overnight events:    MEDICATIONS  (STANDING):  clonazePAM Tablet 0.5 milliGRAM(s) Oral two times a day  dextrose 5% + sodium chloride 0.45%. 1000 milliLiter(s) (75 mL/Hr) IV Continuous <Continuous>  dextrose 5%. 1000 milliLiter(s) (50 mL/Hr) IV Continuous <Continuous>  dextrose 50% Injectable 12.5 Gram(s) IV Push once  dextrose 50% Injectable 25 Gram(s) IV Push once  dextrose 50% Injectable 25 Gram(s) IV Push once  diphenhydrAMINE   Capsule 25 milliGRAM(s) Oral once  insulin lispro (HumaLOG) corrective regimen sliding scale   SubCutaneous three times a day before meals  insulin lispro (HumaLOG) corrective regimen sliding scale   SubCutaneous at bedtime  lactobacillus acidophilus 1 Tablet(s) Oral three times a day with meals  levothyroxine 75 MICROGram(s) Oral daily  pantoprazole    Tablet 40 milliGRAM(s) Oral two times a day  sucralfate 1 Gram(s) Oral four times a day    MEDICATIONS  (PRN):  acetaminophen   Tablet 650 milliGRAM(s) Oral every 6 hours PRN Mild Pain  ALBUTerol    0.083% 2.5 milliGRAM(s) Nebulizer every 4 hours PRN Shortness of Breath and/or Wheezing  dextrose 40% Gel 15 Gram(s) Oral once PRN Blood Glucose LESS THAN 70 milliGRAM(s)/deciliter  glucagon  Injectable 1 milliGRAM(s) IntraMuscular once PRN Glucose LESS THAN 70 milligrams/deciliter      Vital Signs Last 24 Hrs  T(C): 36.4 (12 Jul 2018 05:20), Max: 36.4 (11 Jul 2018 21:36)  T(F): 97.5 (12 Jul 2018 05:20), Max: 97.6 (11 Jul 2018 21:36)  HR: 72 (12 Jul 2018 05:20) (72 - 72)  BP: 123/67 (12 Jul 2018 05:20) (123/67 - 150/75)  BP(mean): --  RR: 16 (12 Jul 2018 05:20) (16 - 17)  SpO2: 97% (12 Jul 2018 05:20) (97% - 99%)    PHYSICAL EXAM  General: adult in NAD  HEENT: clear oropharynx, anicteric sclera, pink conjunctivae  Neck: supple  CV: normal S1S2 with no murmur rubs or gallops  Lungs: clear to auscultation, no wheezes, no rhales  Abdomen: soft non-tender non-distended, no hepato/splenomegaly  Ext: no clubbing cyanosis or edema  Skin: no rashes and no petichiae  Neuro: alert and oriented X3 no focal deficits      LABS:  CBC Full  -  ( 12 Jul 2018 07:41 )  WBC Count : 4.63 K/uL  Hemoglobin : 7.8 g/dL  Hematocrit : 26.2 %  Platelet Count - Automated : 156 K/uL  Mean Cell Volume : 92.9 fl  Mean Cell Hemoglobin : 27.7 pg  Mean Cell Hemoglobin Concentration : 29.8 gm/dL  Auto Neutrophil # : x  Auto Lymphocyte # : x  Auto Monocyte # : x  Auto Eosinophil # : x  Auto Basophil # : x  Auto Neutrophil % : x  Auto Lymphocyte % : x  Auto Monocyte % : x  Auto Eosinophil % : x  Auto Basophil % : x    07-12    146<H>  |  116<H>  |  60<H>  ----------------------------<  97  5.1   |  26  |  2.00<H>    Ca    7.9<L>      12 Jul 2018 07:41      PT/INR - ( 11 Jul 2018 09:46 )   PT: 10.9 sec;   INR: 1.00 ratio             fe studies  Ferritin, Serum: 207 ng/mL (07-11 @ 20:23)  Iron - Total Binding Capacity.: 199 ug/dL (07-11 @ 20:23)      WBC trend  4.63 K/uL (07-12-18 @ 07:41)  6.37 K/uL (07-11-18 @ 08:03)  7.75 K/uL (07-10-18 @ 07:34)      Hgb trend  7.8 g/dL (07-12-18 @ 07:41)  8.1 g/dL (07-11-18 @ 16:12)  8.2 g/dL (07-11-18 @ 09:46)  7.4 g/dL (07-11-18 @ 08:03)  8.2 g/dL (07-10-18 @ 07:34)      plt trend  156 K/uL (07-12-18 @ 07:41)  154 K/uL (07-11-18 @ 08:03)  166 K/uL (07-10-18 @ 07:34)        RADIOLOGY & ADDITIONAL STUDIES:    < from: NM GI Bleed Localization (07.11.18 @ 17:47) >  EXAM:  NM GI BLEEDING IMG                            PROCEDURE DATE:  07/11/2018          INTERPRETATION:  RADIOPHARMACEUTICAL: 20 mCi Tc-99m labeled autologous   red blood cells, I.V.    CLINICAL STATEMENT: 83-year-old female with GI bleeding referred for   localization of bleeding site.    TECHNIQUE:  Dynamic images of the anterior abdomen/pelvis were obtained   for 48 minutes following administration of radiopharmaceutical. Patient   refused additional imaging.    FINDINGS: There is physiologic distribution of radiolabeled red cells in   the abdomen and pelvis. There is no abnormal focus of increased activity   to suggest the presence of active bleeding.    IMPRESSION: Normal limited GI bleeding scan .    No evidence of active bleeding site.    Patient was imaged for 45 minutes. Patient refused additional imaging.                        SHADI FUNK M.D., NUCLEAR MEDICINE ATTENDING  This document has been electronically signed. Jul 11 2018  8:12PM          < end of copied text >

## 2018-07-12 NOTE — PROGRESS NOTE ADULT - SUBJECTIVE AND OBJECTIVE BOX
NEPHROLOGY INTERVAL HPI/OVERNIGHT EVENTS:  HPI:  82 y/o F with PMHx DM2, hypothyroidism, asthma, arthritis, meningoma (chronic), and  BIBEMS after syncopal episode at [facility]. Patient is a poor historian and is unsure why she is in the hospital.  Family at bedside reports that patient was discharged from [facility] the previous day after being admitted with AMS 2/2 UTI.  Family reports that yesterday patient was SOB.  They report that she bleeds easily, but denies knowledge of blood in stools, changes in urine color, open wounds. Patient confirms abdominal pain. She denies chest pain, shortness of breath, palpitations, nausea or vomiting. She also denies dizziness blurry vision.    Follow up BARBARA/CKD  No c/o       PAST MEDICAL & SURGICAL HISTORY:  Arthritis  Asthma  Diabetes mellitus  Hypothyroidism  No significant past surgical history      MEDICATIONS  (STANDING):  clonazePAM Tablet 0.5 milliGRAM(s) Oral two times a day  dextrose 5%. 1000 milliLiter(s) (50 mL/Hr) IV Continuous <Continuous>  dextrose 50% Injectable 12.5 Gram(s) IV Push once  dextrose 50% Injectable 25 Gram(s) IV Push once  dextrose 50% Injectable 25 Gram(s) IV Push once  diphenhydrAMINE   Capsule 25 milliGRAM(s) Oral once  insulin lispro (HumaLOG) corrective regimen sliding scale   SubCutaneous three times a day before meals  insulin lispro (HumaLOG) corrective regimen sliding scale   SubCutaneous at bedtime  lactated ringers. 1000 milliLiter(s) (75 mL/Hr) IV Continuous <Continuous>  lactobacillus acidophilus 1 Tablet(s) Oral three times a day with meals  levothyroxine 75 MICROGram(s) Oral daily  pantoprazole    Tablet 40 milliGRAM(s) Oral two times a day  sucralfate 1 Gram(s) Oral four times a day    MEDICATIONS  (PRN):  acetaminophen   Tablet 650 milliGRAM(s) Oral every 6 hours PRN Mild Pain  ALBUTerol    0.083% 2.5 milliGRAM(s) Nebulizer every 4 hours PRN Shortness of Breath and/or Wheezing  dextrose 40% Gel 15 Gram(s) Oral once PRN Blood Glucose LESS THAN 70 milliGRAM(s)/deciliter  glucagon  Injectable 1 milliGRAM(s) IntraMuscular once PRN Glucose LESS THAN 70 milligrams/deciliter      Allergies    Levaquin (Hives)  sulfa drugs (Unknown)    Intolerances        Vital Signs Last 24 Hrs  T(C): 36.4 (12 Jul 2018 05:20), Max: 36.7 (11 Jul 2018 13:34)  T(F): 97.5 (12 Jul 2018 05:20), Max: 98.1 (11 Jul 2018 13:34)  HR: 72 (12 Jul 2018 05:20) (72 - 74)  BP: 123/67 (12 Jul 2018 05:20) (123/67 - 169/77)  BP(mean): --  RR: 16 (12 Jul 2018 05:20) (16 - 18)  SpO2: 97% (12 Jul 2018 05:20) (94% - 99%)  Daily     Daily     PHYSICAL EXAM:  GENERAL: NAD, well-groomed, well-developed, elder, obese  HEAD:  Atraumatic, Normocephalic  EYES: EOMI, PERRLA, conjunctiva and sclera clear  ENMT: No tonsillar erythema, exudates, or enlargement; Moist mucous membranes, Good dentition, No lesions  NECK: Supple, No JVD, Normal thyroid  NERVOUS SYSTEM:  Alert & Oriented X3, Good concentration; Motor Strength 3/5 B/L upper and lower extremities  CHEST/LUNG: Clear to percussion bilaterally; No rales, rhonchi, wheezing, or rubs  HEART: Regular rate and rhythm; No murmurs, rubs, or gallops  ABDOMEN: Soft, Nontender, Nondistended; Bowel sounds present  EXTREMITIES:  2+ Peripheral Pulses, No clubbing, cyanosis, or edema  SKIN: No rashes or lesions    LABS:                        7.8    4.63  )-----------( 156      ( 12 Jul 2018 07:41 )             26.2     07-12    146<H>  |  116<H>  |  60<H>  ----------------------------<  97  5.1   |  26  |  2.00<H>    Ca    7.9<L>      12 Jul 2018 07:41      PT/INR - ( 11 Jul 2018 09:46 )   PT: 10.9 sec;   INR: 1.00 ratio                     RADIOLOGY & ADDITIONAL TESTS:

## 2018-07-12 NOTE — PROGRESS NOTE ADULT - PROBLEM SELECTOR PLAN 2
- Likely due to anemia, poor po intake.   - Continue to monitor closely.   - Transfuse/fluids as needed - stable, improved  - Likely due to anemia, poor po intake.   - Continue to monitor closely.   - Transfuse/fluids as needed

## 2018-07-12 NOTE — CHART NOTE - NSCHARTNOTEFT_GEN_A_CORE
called by RN, patient complaining of pain in vaginal area. Patient has had a Oliva since prior to admission and it was changed in the ER when the patient was admitted 7/7/18. Oliva in place and draining properly. Seen and examined at bedside. Patient complaining of sharp and burning pain in vagina. Vaginal area erythematous, no blood or discharge present. Oliva wedged in patients groin, likely being pulled. Oliva repositioned and sharp pain dissipated.  Patient still complaining of mild burning, nystatin cream ordered for rash. Will continue to follow, RN to call with any changes.

## 2018-07-12 NOTE — PROGRESS NOTE ADULT - ASSESSMENT
83 year old female with DM2, HTN, here with an episode of syncope, found to have significant anemia. Improvement in lethargy with PRBC transfusions    - Patient presented wit profound anemia, s/p PRBC's x 5 units total and 1 unit of platelets    - H/H trending down again, today's = 7.8/26.2 from 8.1/25.5 yesterday.  Transfuse per Primary or GI.   - s/p EGD 7/9 with clean based duodenal ulcer, gastritis, hiatal hernia  - s/p bleeding scan which was negative, although, unable to complete due to patient's refusal to have anymore tests  - Watch volume status. There is no echo on record so her LV function is unknown.  No need for diuresis at current time, though she is edematous which is chronic and has been ongoing for years.  No evidence of volume overloaddespite chronic edema  - Hemodynamics stable with BP elevated, may start Norvasc for better control.  Would not aggressively treat hypertension as this can be her compensatory mechanism  - Very mild troponin leak 2/2 demand in the setting of severe anemia  - EKG is SR with no sign of ischemia or chamber enlargement  - Baseline creatinine unknown, though normal in 2016, BARBARA likely due to her profound anemia.  Avoid nephrotoxics.  Renal following  - Monitor electrolytes. Keep K>4, Mg>2  - Further cardiac workup as case unfolds    Hannah Winter NP  Cardiology

## 2018-07-12 NOTE — PROGRESS NOTE ADULT - ASSESSMENT
82 y/o F with PMHx DM2, hypothyroidism, asthma, arthritis, meningoma (chronic), and  BIBEMS after syncopal episode at [facility]. Admitted to ICU w/ profound anemia hgb 3.8, possibly 2/2 acute GIB sec to duodenal ulcer and poss other source in gi tract under investigation 84 y/o F with PMHx DM2, hypothyroidism, asthma, arthritis, meningoma (chronic), and  BIBEMS after syncopal episode at [facility]. Admitted to ICU w/ profound anemia hgb 3.8, possibly 2/2 acute GIB sec to duodenal ulcer

## 2018-07-12 NOTE — PROGRESS NOTE ADULT - PROBLEM SELECTOR PLAN 5
Based on labs in HIE, patient appears to have CKD2, now with worsening in creatinine likely ATN due to profound anemia/hypoperfusion vs prerenal due to recent illness.   gently ivf   apprec renal recs Dr. Sarkar/Joel  Expect creatinine will plateau soon and then improve. Continue to monitor daily. improving  Based on labs in HIE, patient appears to have CKD2   apprec renal recs Dr. Sarkar/Joel  Expect creatinine will plateau soon and then improve. Continue to monitor daily.

## 2018-07-13 LAB
ANION GAP SERPL CALC-SCNC: 7 MMOL/L — SIGNIFICANT CHANGE UP (ref 5–17)
APPEARANCE UR: ABNORMAL
BILIRUB UR-MCNC: NEGATIVE — SIGNIFICANT CHANGE UP
BUN SERPL-MCNC: 57 MG/DL — HIGH (ref 7–23)
CALCIUM SERPL-MCNC: 7.7 MG/DL — LOW (ref 8.5–10.1)
CHLORIDE SERPL-SCNC: 112 MMOL/L — HIGH (ref 96–108)
CK MB BLD-MCNC: 10.9 % — HIGH (ref 0–3.5)
CK MB CFR SERPL CALC: 3.8 NG/ML — HIGH (ref 0–3.6)
CK SERPL-CCNC: 35 U/L — SIGNIFICANT CHANGE UP (ref 26–192)
CO2 SERPL-SCNC: 26 MMOL/L — SIGNIFICANT CHANGE UP (ref 22–31)
COLOR SPEC: SIGNIFICANT CHANGE UP
CREAT ?TM UR-MCNC: 29 MG/DL — SIGNIFICANT CHANGE UP
CREAT SERPL-MCNC: 2.2 MG/DL — HIGH (ref 0.5–1.3)
DIFF PNL FLD: ABNORMAL
EOSINOPHIL NFR URNS MANUAL: NEGATIVE — SIGNIFICANT CHANGE UP
GLUCOSE SERPL-MCNC: 127 MG/DL — HIGH (ref 70–99)
GLUCOSE UR QL: NEGATIVE — SIGNIFICANT CHANGE UP
HCT VFR BLD CALC: 25.5 % — LOW (ref 34.5–45)
HGB BLD-MCNC: 7.8 G/DL — LOW (ref 11.5–15.5)
KETONES UR-MCNC: NEGATIVE — SIGNIFICANT CHANGE UP
LEUKOCYTE ESTERASE UR-ACNC: ABNORMAL
MCHC RBC-ENTMCNC: 28.5 PG — SIGNIFICANT CHANGE UP (ref 27–34)
MCHC RBC-ENTMCNC: 30.6 GM/DL — LOW (ref 32–36)
MCV RBC AUTO: 93.1 FL — SIGNIFICANT CHANGE UP (ref 80–100)
NITRITE UR-MCNC: NEGATIVE — SIGNIFICANT CHANGE UP
NRBC # BLD: 0 /100 WBCS — SIGNIFICANT CHANGE UP (ref 0–0)
OSMOLALITY UR: 358 MOS/KG — SIGNIFICANT CHANGE UP (ref 50–1200)
PH UR: 6.5 — SIGNIFICANT CHANGE UP (ref 5–8)
PLATELET # BLD AUTO: 161 K/UL — SIGNIFICANT CHANGE UP (ref 150–400)
POTASSIUM SERPL-MCNC: 5.3 MMOL/L — SIGNIFICANT CHANGE UP (ref 3.5–5.3)
POTASSIUM SERPL-SCNC: 5.3 MMOL/L — SIGNIFICANT CHANGE UP (ref 3.5–5.3)
POTASSIUM UR-SCNC: 13 MMOL/L — SIGNIFICANT CHANGE UP
PROT ?TM UR-MCNC: 38 MG/DL — HIGH (ref 0–12)
PROT UR-MCNC: 75 MG/DL
RBC # BLD: 2.74 M/UL — LOW (ref 3.8–5.2)
RBC # FLD: 19.2 % — HIGH (ref 10.3–14.5)
SODIUM SERPL-SCNC: 145 MMOL/L — SIGNIFICANT CHANGE UP (ref 135–145)
SODIUM UR-SCNC: 70 MMOL/L — SIGNIFICANT CHANGE UP
SP GR SPEC: 1 — LOW (ref 1.01–1.02)
TROPONIN I SERPL-MCNC: 0.02 NG/ML — SIGNIFICANT CHANGE UP (ref 0.01–0.04)
UROBILINOGEN FLD QL: NEGATIVE — SIGNIFICANT CHANGE UP
WBC # BLD: 6.09 K/UL — SIGNIFICANT CHANGE UP (ref 3.8–10.5)
WBC # FLD AUTO: 6.09 K/UL — SIGNIFICANT CHANGE UP (ref 3.8–10.5)

## 2018-07-13 PROCEDURE — 99232 SBSQ HOSP IP/OBS MODERATE 35: CPT

## 2018-07-13 PROCEDURE — 99233 SBSQ HOSP IP/OBS HIGH 50: CPT

## 2018-07-13 PROCEDURE — 93010 ELECTROCARDIOGRAM REPORT: CPT

## 2018-07-13 RX ORDER — ASPIRIN/CALCIUM CARB/MAGNESIUM 324 MG
325 TABLET ORAL ONCE
Qty: 0 | Refills: 0 | Status: DISCONTINUED | OUTPATIENT
Start: 2018-07-13 | End: 2018-07-13

## 2018-07-13 RX ORDER — IRON SUCROSE 20 MG/ML
100 INJECTION, SOLUTION INTRAVENOUS EVERY 24 HOURS
Qty: 0 | Refills: 0 | Status: DISCONTINUED | OUTPATIENT
Start: 2018-07-13 | End: 2018-07-13

## 2018-07-13 RX ORDER — ALPRAZOLAM 0.25 MG
0.25 TABLET ORAL ONCE
Qty: 0 | Refills: 0 | Status: DISCONTINUED | OUTPATIENT
Start: 2018-07-13 | End: 2018-07-13

## 2018-07-13 RX ORDER — FOLIC ACID 0.8 MG
1 TABLET ORAL DAILY
Qty: 0 | Refills: 0 | Status: DISCONTINUED | OUTPATIENT
Start: 2018-07-13 | End: 2018-07-25

## 2018-07-13 RX ADMIN — Medication 75 MICROGRAM(S): at 05:50

## 2018-07-13 RX ADMIN — Medication 1 GRAM(S): at 13:07

## 2018-07-13 RX ADMIN — Medication 0.5 MILLIGRAM(S): at 05:50

## 2018-07-13 RX ADMIN — ALBUTEROL 2.5 MILLIGRAM(S): 90 AEROSOL, METERED ORAL at 14:01

## 2018-07-13 RX ADMIN — Medication 1 TABLET(S): at 08:32

## 2018-07-13 RX ADMIN — PANTOPRAZOLE SODIUM 40 MILLIGRAM(S): 20 TABLET, DELAYED RELEASE ORAL at 05:50

## 2018-07-13 RX ADMIN — PANTOPRAZOLE SODIUM 40 MILLIGRAM(S): 20 TABLET, DELAYED RELEASE ORAL at 17:37

## 2018-07-13 RX ADMIN — Medication 1: at 13:16

## 2018-07-13 RX ADMIN — Medication 1 GRAM(S): at 05:50

## 2018-07-13 RX ADMIN — Medication 1 TABLET(S): at 13:07

## 2018-07-13 RX ADMIN — Medication 0.5 MILLIGRAM(S): at 17:43

## 2018-07-13 RX ADMIN — Medication 1 GRAM(S): at 00:43

## 2018-07-13 RX ADMIN — Medication 1 TABLET(S): at 17:37

## 2018-07-13 RX ADMIN — Medication 1 GRAM(S): at 17:37

## 2018-07-13 RX ADMIN — ALBUTEROL 2.5 MILLIGRAM(S): 90 AEROSOL, METERED ORAL at 00:53

## 2018-07-13 RX ADMIN — ALBUTEROL 2.5 MILLIGRAM(S): 90 AEROSOL, METERED ORAL at 22:42

## 2018-07-13 RX ADMIN — SODIUM CHLORIDE 75 MILLILITER(S): 9 INJECTION, SOLUTION INTRAVENOUS at 00:44

## 2018-07-13 RX ADMIN — Medication 1 MILLIGRAM(S): at 18:30

## 2018-07-13 RX ADMIN — Medication 1: at 17:43

## 2018-07-13 RX ADMIN — Medication 0.25 MILLIGRAM(S): at 22:50

## 2018-07-13 NOTE — CHART NOTE - NSCHARTNOTEFT_GEN_A_CORE
Called by RN, patient complaining of SOB. Seen and examined at bedside. Patient resting in bed after being cleaned. Patient complains she is mildy SOB and requests her albuterol treatment. Patient has had this SOB before and states that it is usually relieved by the albuterol at home. Patient denies chest pain, nausea, vomiting, or dizziness. She is able to speak in full sentences.     Vital Signs Last 24 Hrs  T(C): 36.6 (13 Jul 2018 18:10), Max: 36.8 (13 Jul 2018 05:25)  T(F): 97.8 (13 Jul 2018 18:10), Max: 98.2 (13 Jul 2018 05:25)  HR: 77 (13 Jul 2018 18:10) (66 - 90)  BP: 146/68 (13 Jul 2018 18:10) (138/66 - 153/86)  BP(mean): --  RR: 20 (13 Jul 2018 18:10) (18 - 20)  SpO2: 95% (13 Jul 2018 18:10) (92% - 97%)    Physical Exam:  General: Well developed, well nourished, NAD  HEENT: NCAT, PERRLA, EOMI bl, moist mucous membranes   Neck: Supple, nontender, no mass  Neurology: A&Ox3, nonfocal, CN II-XII grossly intact, sensation intact, no gait abnormalities   Respiratory: Slightly dyspneic, breath sounds with occasional wheezes.   CV: Regular, S1 and S2, 2/6 SM  Abdominal: Soft, NT, ND +BSx4  Extremities: No C/C/E, + peripheral pulses  lymph: Chronic edema with scaly skin and +2 pedal edema. No lymphadenopathy.  Skin: No visible rashes or ulcers.      Assessment and Plan   83 year old female with DM2, HTN, here with an episode of syncope, found to have significant anemia. Improvement in lethargy with PRBC transfusions. Patient with mild SOB, does not appear fliud overloaded at this time.   - patient to be given duoneb treatment, nurse to call if SOB does not improve.   - will follow, RN to call with any changes

## 2018-07-13 NOTE — PROGRESS NOTE ADULT - PROBLEM SELECTOR PLAN 1
hgb low but stable, no further s/s overt gib   unable to tolerate bleeding scan as could not lay flat, tolerated 45 minutes, incomplete study but no active gib noted  trend h/h, trasnfuse prn, monitor for overt s/s gib  ppi bid/carafate qid  diet as tolerated  hold ac asa nsaids  sp egd 7/9 showed non bleeding du, gastritis and hh  monitor for need of possible repeat bleeding scan w anxiolytic vs egd/colonoscopy if hgb drops/with s/s active gib (cannot get cta 2/2 renal function)  heme following  will need close outpatient gi f/u upon dc hgb low but stable, no further s/s overt gib   unable to tolerate bleeding scan as could not lay flat, tolerated 45 minutes, incomplete study but no active gib noted  trend h/h, transfuse prn, monitor for overt s/s gib  ppi bid/carafate qid  diet as tolerated  hold ac asa nsaids  sp egd 7/9 showed non bleeding du, gastritis and hh  heme following  hgb low but stable, no further overt s/s gib, stable for dc with close outpatient gi follow up, cont ppi bid and carafate qid, dw dr bah and agreeable, dotty med attg anemia multifactorial, renal and heme following  hgb low but stable, no further s/s overt gib   unable to tolerate bleeding scan as could not lay flat, tolerated 45 minutes, incomplete study but no active gib noted  trend h/h, transfuse prn, monitor for overt s/s gib  ppi bid/carafate qid  diet as tolerated  hold ac asa nsaids  sp egd 7/9 showed non bleeding du, gastritis and hh  hgb low but stable, no further overt s/s gib, stable for dc with close outpatient gi follow up, cont ppi bid and carafate qid, dw dr bah and agreeable, [facility]ty med attg

## 2018-07-13 NOTE — PROGRESS NOTE ADULT - ASSESSMENT
84 y/o F with PMHx DM2, hypothyroidism, asthma, arthritis, meningoma (chronic), and  BIBEMS after syncopal episode at [facility]. Admitted to ICU w/ profound anemia hgb 3.8, possibly 2/2 acute GIB sec to duodenal ulcer

## 2018-07-13 NOTE — CHART NOTE - NSCHARTNOTEFT_GEN_A_CORE
Called by RN, patient complaining of chest pain/pressure. Seen and examined at bedside. Patient anxious and teary in bed claiming nobody will give her her nightly nebulizer treatment. Patient admits to mild chest pain but unable to describe location. There is reproducible chest pain on the left lateral sternal boarder. Patient not visibly SOB but claims she needs her nebulizer because she cannot breathe. Denies nausea, vomiting, dizziness.     Vital Signs Last 24 Hrs  T(C): 36.6 (13 Jul 2018 22:03), Max: 36.8 (13 Jul 2018 05:25)  T(F): 97.8 (13 Jul 2018 22:03), Max: 98.2 (13 Jul 2018 05:25)  HR: 67 (13 Jul 2018 22:42) (66 - 90)  BP: 156/82 (13 Jul 2018 22:03) (146/68 - 156/82)  BP(mean): --  RR: 20 (13 Jul 2018 22:03) (18 - 20)  SpO2: 93% (13 Jul 2018 22:42) (92% - 97%)    Physical Exam:  GENERAL: NAD, elder  HEAD:  Atraumatic, Normocephalic  EYES: EOMI, PERRLA, conjunctiva and sclera clear  ENMT: No tonsillar erythema, exudates, or enlargement; Moist mucous membranes, Good dentition, No lesions  NECK: Supple, No JVD,   NERVOUS SYSTEM:  Alert & Oriented X3, Good concentration  CHEST/LUNG; No rales, rhonchi, wheezing, or rubs, coarse breath sounds in bases. Reproducible chest pain near right lateral sternal border.   HEART: Regular rate and rhythm; No murmurs, rubs, or gallops  ABDOMEN: Soft, Nontender, Nondistended; Bowel sounds present  EXTREMITIES:  2+ Peripheral Pulses, No clubbing, cyanosis, or edema  SKIN: No rashes or lesions    Assessment and Plan    84 y/o F with PMHx DM2, hypothyroidism, asthma, arthritis, meningoma (chronic), and  BIBEMS after syncopal episode at [facility]. Admitted to ICU w/ profound anemia hgb 3.8, possibly 2/2 acute GIB sec to duodenal ulcer. Patient transferred to floor after stabilization.  Bleed scan negative but was not fully completed due to patients refusal. Complaining of chest pain and SOB. Chest pain not likely cardiac in origin, likely 2/2 anxiety vs costochondritis.    -repeat EKG similar to prior   - Cardiac enzymes, BMP ordered STAT   - Full dose aspirin not given due to patients anemia and possible GI bleed.    - 0.25mg Xanax PO x1 for anxiety    - Duoneb treatment requested by patient. There is a PRN order, patient will receive treatment    - will follow, RN to call with any changes

## 2018-07-13 NOTE — PROGRESS NOTE ADULT - ASSESSMENT
84 y/o woman with hx GI bleed. s/p EGD showed duodenal ulcer, pt continues having melena .  consult called for nmgt of anemia  FOBT neg, 07/11/18 Bleeding scan negative    Anemia   presented with Hgb 0f 3.4 on 07/07/18     Hgb 3.8 s/p 3 units+ plt on 07/07/18 with appropriate response.   Hgb was 6.8 on 07/08/18 , s/p 2 units , Hgb is 7.8, stable.   + melena , bleeding scan incomplete study without bleeding seen.   Fe studies with ferritin >200    CKD, with some anemia likely due to CKD.     PLAN:  Give IV venofer x3. Start Folic acid. B12 is adequate.   consider outpt eval for Procrit.    to hold transfusion and observe.   if hgb continues to decrease will need additional GI evaluation ?EGD and will transfuse PRBC as indicated

## 2018-07-13 NOTE — PROGRESS NOTE ADULT - SUBJECTIVE AND OBJECTIVE BOX
Patient is a 83y old  Female who presents with a chief complaint of profound anemia (2018 14:43)      INTERVAL HPI: Pt seen and examined. Wants to know when she will be leaving, denies any acute complaonts at this time.     OVERNIGHT EVENTS: no major events noted    Vitals stable        REVIEW OF SYSTEMS: 12 systems reviewed noncontributory at this time other than that stated in hpi    PHYSICAL EXAM:  GENERAL: NAD, well-groomed, well-developed, obese, elder  HEAD:  Atraumatic, Normocephalic  EYES: EOMI, PERRLA, conjunctiva and sclera clear  ENMT: No tonsillar erythema, exudates, or enlargement; Moist mucous membranes, Good dentition, No lesions  NECK: Supple, No JVD  NERVOUS SYSTEM:  Alert & Oriented X3, Good concentration; Motor Strength 4/5 B/L upper and lower extremities  CHEST/LUNG: Clear to percussion bilaterally; No rales, rhonchi, wheezing, or rubs  HEART: Regular rate and rhythm; No murmurs, rubs, or gallops  ABDOMEN: Soft, Nontender, Nondistended; Bowel sounds present  EXTREMITIES:  2+ Peripheral Pulses, No clubbing, cyanosis, or edema  SKIN: No rashes or lesions    LABS:                        7.8    6.09  )-----------( 161      ( 2018 07:26 )             25.5         143  |  112<H>  |  54<H>  ----------------------------<  124<H>  5.1   |  27  |  1.90<H>    Ca    7.7<L>      2018 22:45    TPro  5.6<L>  /  Alb  2.4<L>  /  TBili  0.3  /  DBili  x   /  AST  12<L>  /  ALT  <6<L>  /  AlkPhos  112  -      Urinalysis Basic - ( 2018 20:24 )    Color: Pale Yellow / Appearance: Turbid / S.005 / pH: x  Gluc: x / Ketone: Negative  / Bili: Negative / Urobili: Negative   Blood: x / Protein: 75 mg/dL / Nitrite: Negative   Leuk Esterase: Moderate / RBC: 25-50 /HPF / WBC >50   Sq Epi: x / Non Sq Epi: Moderate / Bacteria: Moderate      CAPILLARY BLOOD GLUCOSE      POCT Blood Glucose.: 132 mg/dL (2018 22:30)  POCT Blood Glucose.: 166 mg/dL (2018 17:24)  POCT Blood Glucose.: 196 mg/dL (2018 13:14)  POCT Blood Glucose.: 209 mg/dL (2018 11:59)  POCT Blood Glucose.: 145 mg/dL (2018 08:14)              MEDICATIONS  (STANDING):  clonazePAM Tablet 0.5 milliGRAM(s) Oral two times a day  dextrose 5%. 1000 milliLiter(s) (50 mL/Hr) IV Continuous <Continuous>  dextrose 50% Injectable 12.5 Gram(s) IV Push once  dextrose 50% Injectable 25 Gram(s) IV Push once  dextrose 50% Injectable 25 Gram(s) IV Push once  diphenhydrAMINE   Capsule 25 milliGRAM(s) Oral once  folic acid 1 milliGRAM(s) Oral daily  insulin lispro (HumaLOG) corrective regimen sliding scale   SubCutaneous three times a day before meals  insulin lispro (HumaLOG) corrective regimen sliding scale   SubCutaneous at bedtime  lactobacillus acidophilus 1 Tablet(s) Oral three times a day with meals  levothyroxine 75 MICROGram(s) Oral daily  pantoprazole    Tablet 40 milliGRAM(s) Oral two times a day  sucralfate 1 Gram(s) Oral four times a day    MEDICATIONS  (PRN):  acetaminophen   Tablet 650 milliGRAM(s) Oral every 6 hours PRN Mild Pain  ALBUTerol    0.083% 2.5 milliGRAM(s) Nebulizer every 4 hours PRN Shortness of Breath and/or Wheezing  dextrose 40% Gel 15 Gram(s) Oral once PRN Blood Glucose LESS THAN 70 milliGRAM(s)/deciliter  glucagon  Injectable 1 milliGRAM(s) IntraMuscular once PRN Glucose LESS THAN 70 milligrams/deciliter

## 2018-07-13 NOTE — PROGRESS NOTE ADULT - PROBLEM SELECTOR PLAN 5
improving  Based on labs in HIE, patient appears to have CKD2   apprec renal recs Dr. Sarkar/Joel  Expect creatinine will plateau soon and then improve. Continue to monitor daily.

## 2018-07-13 NOTE — PROGRESS NOTE ADULT - SUBJECTIVE AND OBJECTIVE BOX
[facility] -- Mabel Hernandez, Jessica, Cesar, Devan Barrera Savella  Office # [phone]      Follow Up:  Syncope/GIB    Subjective/Observations: Seen and examined.  Resting in bed slightly dyspneic this am while still receiving IVF.  Denies cp, sob, N/V or palpitations.  NAD.        REVIEW OF SYSTEMS: All other review of systems is negative unless indicated above    PAST MEDICAL & SURGICAL HISTORY:  Arthritis  Asthma  Diabetes mellitus  Hypothyroidism  No significant past surgical history      MEDICATIONS  (STANDING):  clonazePAM Tablet 0.5 milliGRAM(s) Oral two times a day  dextrose 5%. 1000 milliLiter(s) (50 mL/Hr) IV Continuous <Continuous>  dextrose 50% Injectable 12.5 Gram(s) IV Push once  dextrose 50% Injectable 25 Gram(s) IV Push once  dextrose 50% Injectable 25 Gram(s) IV Push once  diphenhydrAMINE   Capsule 25 milliGRAM(s) Oral once  folic acid 1 milliGRAM(s) Oral daily  insulin lispro (HumaLOG) corrective regimen sliding scale   SubCutaneous three times a day before meals  insulin lispro (HumaLOG) corrective regimen sliding scale   SubCutaneous at bedtime  lactobacillus acidophilus 1 Tablet(s) Oral three times a day with meals  levothyroxine 75 MICROGram(s) Oral daily  pantoprazole    Tablet 40 milliGRAM(s) Oral two times a day  sucralfate 1 Gram(s) Oral four times a day    MEDICATIONS  (PRN):  acetaminophen   Tablet 650 milliGRAM(s) Oral every 6 hours PRN Mild Pain  ALBUTerol    0.083% 2.5 milliGRAM(s) Nebulizer every 4 hours PRN Shortness of Breath and/or Wheezing  dextrose 40% Gel 15 Gram(s) Oral once PRN Blood Glucose LESS THAN 70 milliGRAM(s)/deciliter  glucagon  Injectable 1 milliGRAM(s) IntraMuscular once PRN Glucose LESS THAN 70 milligrams/deciliter      Allergies    Levaquin (Hives)  sulfa drugs (Unknown)    Intolerances            Vital Signs Last 24 Hrs  T(C): 36.4 (13 Jul 2018 12:58), Max: 36.8 (13 Jul 2018 05:25)  T(F): 97.6 (13 Jul 2018 12:58), Max: 98.2 (13 Jul 2018 05:25)  HR: 70 (13 Jul 2018 14:03) (66 - 76)  BP: 146/79 (13 Jul 2018 12:58) (138/66 - 153/86)  BP(mean): --  RR: 18 (13 Jul 2018 12:58) (18 - 18)  SpO2: 93% (13 Jul 2018 14:03) (92% - 97%)    I&O's Summary    12 Jul 2018 07:01  -  13 Jul 2018 07:00  --------------------------------------------------------  IN: 600 mL / OUT: 1400 mL / NET: -800 mL    13 Jul 2018 07:01  -  13 Jul 2018 14:55  --------------------------------------------------------  IN: 0 mL / OUT: 1100 mL / NET: -1100 mL          PHYSICAL EXAM:  TELE: Not on tele  Constitutional: NAD, awake and alert, well-developed  HEENT: Moist Mucous Membranes, Anicteric  Pulmonary: Slightly dyspneic, breath sounds with basal crackles bilaterally  Cardiovascular: Regular, S1 and S2, 2/6 SM  Gastrointestinal: Bowel Sounds present, soft, nontender.   Lymph: Chronic edema with scaly skin and +2 pedal edema. No lymphadenopathy.  Skin: No visible rashes or ulcers.  Psych:  Mood & affect appropriate    LABS: All Labs Reviewed:                        7.8    6.09  )-----------( 161      ( 13 Jul 2018 07:26 )             25.5                         8.0    x     )-----------( x        ( 12 Jul 2018 17:12 )             26.0                         7.8    4.63  )-----------( 156      ( 12 Jul 2018 07:41 )             26.2     13 Jul 2018 07:26    145    |  112    |  57     ----------------------------<  127    5.3     |  26     |  2.20   12 Jul 2018 07:41    146    |  116    |  60     ----------------------------<  97     5.1     |  26     |  2.00   11 Jul 2018 08:03    146    |  115    |  65     ----------------------------<  112    5.2     |  25     |  2.30     Ca    7.7        13 Jul 2018 07:26  Ca    7.9        12 Jul 2018 07:41  Ca    7.8        11 Jul 2018 08:03    < from: 12 Lead ECG (07.07.18 @ 13:03) >  Ventricular Rate 84 BPM    Atrial Rate 84 BPM    P-R Interval 144 ms    QRS Duration 98 ms    Q-T Interval 374 ms    QTC Calculation(Bezet) 441 ms    P Axis 41 degrees    R Axis 23 degrees    T Axis 45 degrees    Diagnosis Line Normal sinus rhythm  Normal ECG  When compared with ECG of 13-APR-2016 00:29,  No significant change was found  Confirmed by pearl Ohara ([id number]) on 7/7/2018 5:25:04 PM    < end of copied text >    < from: NM GI Bleed Localization (07.11.18 @ 17:47) >  EXAM:  NM GI BLEEDING IMG                            PROCEDURE DATE:  07/11/2018          INTERPRETATION:  RADIOPHARMACEUTICAL: 20 mCi Tc-99m labeled autologous   red blood cells, I.V.    CLINICAL STATEMENT: 83-year-old female with GI bleeding referred for   localization of bleeding site.    TECHNIQUE:  Dynamic images of the anterior abdomen/pelvis were obtained   for 48 minutes following administration of radiopharmaceutical. Patient   refused additional imaging.    FINDINGS: There is physiologic distribution of radiolabeled red cells in   the abdomen and pelvis. There is no abnormal focus of increased activity   to suggest the presence of active bleeding.    IMPRESSION: Normal limited GI bleeding scan .    No evidence of active bleeding site.    Patient was imaged for 45 minutes. Patient refused additional imaging.                        SHADI FUNK M.D., NUCLEAR MEDICINE ATTENDING  This document has been electronically signed. Jul 11 2018  8:12PM    < end of copied text >    < from: CT Abdomen and Pelvis No Cont (07.07.18 @ 16:10) >  EXAM:  CT ABDOMEN AND PELVIS                            PROCEDURE DATE:  07/07/2018          INTERPRETATION:    CT ABDOMEN and PELVIS without IV contrast dated 7/7/2018 4:10 PM    INDICATION: 83-year-old female severe anemia, ro RP bleed        TECHNIQUE: CT of the abdomen and pelvis was performed without intravenous   contrast administration. Axial, sagittal and coronal images were produced   and reviewed.  Enteric contrast was not administered, limiting complete   evaluation of the gastrointestinal tract. The patient is imaged with both   upper extremities in the gantry.    COMPARISON: None.    FINDINGS: Images of the lower chest demonstrate trace right pleural   effusion and subjacent right basilar atelectasis. There is no pericardial   effusion or hematoma. Atherosclerotic coronary artery calcifications are   noted. There is visualization of the hyperdense and the ventricular   septum, in keeping with history of anemia. A small hiatal hernia is   noted.     Postcholecystectomy surgical clips are seen in the gallbladder fossa. The   pancreas is completely fatty replaced. There is an apparent 2 cm   circumscribed cortical hypodensity in the left kidney, with an adjacent   cortical dystrophic calcification The liver, spleen, adrenal glands and   right kidney have a grossly unremarkable nonenhanced appearance.    Extensive atheromatous disease throughout the aortobiiliac system,   without aneurysm. There is also marked mural calcification of the splenic   artery. No retroperitoneal hematoma is present. No retroperitoneal mass   or lymphadenopathy is seen.     Enteric contrast was not administered, limiting complete evaluation of   the gastrointestinal tract. Evaluation of the nonopacified small bowel   demonstrates no dilatation or air-fluid levels. There is an apparent   intraluminal lipoma within the second portion of duodenum. There is a   moderate-sized rectal stool ball with circumferential mural thickening   and presacral edema stranding, most suggestive of stercoral colitis.   There is no proximal colonic obstruction. The appendix is not visualized.   No ascites no pneumoperitoneum is seen.    There is no pelvic hematoma, mass or lymphadenopathy. The urinary bladder   is decompressed around a Oliva catheter.    Evaluation of the osseous structures demonstrates a mild S-shaped   thoracolumbar scoliosis with multilevel multifactorial degenerative   spinal changes. There is a benign-appearing bone island in the right   iliac wing. Is heterogeneous appearance of the sacral ala which is felt   to represent to be related to stress-related change.      IMPRESSION:  No retroperitoneal or pelvic hematoma.    Constellation of findings are most suggestive of rectal stercoral colitis.    Trace right pleural effusion.    Oliva catheter in situ.    < end of copied text > [facility] -- Mabel Hernandez, Jessica, Cesar, Devan Barrera Savella  Office # [phone]      Follow Up:  Syncope/GIB    Subjective/Observations: Seen and examined.  Resting in bed slightly dyspneic this am while still receiving IVF.  Denies cp, sob, N/V or palpitations.  NAD.        REVIEW OF SYSTEMS: All other review of systems is negative unless indicated above     PAST MEDICAL & SURGICAL HISTORY:  Arthritis  Asthma  Diabetes mellitus  Hypothyroidism  No significant past surgical history      MEDICATIONS  (STANDING):  clonazePAM Tablet 0.5 milliGRAM(s) Oral two times a day  dextrose 5%. 1000 milliLiter(s) (50 mL/Hr) IV Continuous <Continuous>  dextrose 50% Injectable 12.5 Gram(s) IV Push once  dextrose 50% Injectable 25 Gram(s) IV Push once  dextrose 50% Injectable 25 Gram(s) IV Push once  diphenhydrAMINE   Capsule 25 milliGRAM(s) Oral once  folic acid 1 milliGRAM(s) Oral daily  insulin lispro (HumaLOG) corrective regimen sliding scale   SubCutaneous three times a day before meals  insulin lispro (HumaLOG) corrective regimen sliding scale   SubCutaneous at bedtime  lactobacillus acidophilus 1 Tablet(s) Oral three times a day with meals  levothyroxine 75 MICROGram(s) Oral daily  pantoprazole    Tablet 40 milliGRAM(s) Oral two times a day  sucralfate 1 Gram(s) Oral four times a day    MEDICATIONS  (PRN):  acetaminophen   Tablet 650 milliGRAM(s) Oral every 6 hours PRN Mild Pain  ALBUTerol    0.083% 2.5 milliGRAM(s) Nebulizer every 4 hours PRN Shortness of Breath and/or Wheezing  dextrose 40% Gel 15 Gram(s) Oral once PRN Blood Glucose LESS THAN 70 milliGRAM(s)/deciliter  glucagon  Injectable 1 milliGRAM(s) IntraMuscular once PRN Glucose LESS THAN 70 milligrams/deciliter      Allergies    Levaquin (Hives)  sulfa drugs (Unknown)    Intolerances            Vital Signs Last 24 Hrs  T(C): 36.4 (13 Jul 2018 12:58), Max: 36.8 (13 Jul 2018 05:25)  T(F): 97.6 (13 Jul 2018 12:58), Max: 98.2 (13 Jul 2018 05:25)  HR: 70 (13 Jul 2018 14:03) (66 - 76)  BP: 146/79 (13 Jul 2018 12:58) (138/66 - 153/86)  BP(mean): --  RR: 18 (13 Jul 2018 12:58) (18 - 18)  SpO2: 93% (13 Jul 2018 14:03) (92% - 97%)    I&O's Summary    12 Jul 2018 07:01  -  13 Jul 2018 07:00  --------------------------------------------------------  IN: 600 mL / OUT: 1400 mL / NET: -800 mL    13 Jul 2018 07:01  -  13 Jul 2018 14:55  --------------------------------------------------------  IN: 0 mL / OUT: 1100 mL / NET: -1100 mL          PHYSICAL EXAM:  TELE: Not on tele  Constitutional: NAD, awake and alert, well-developed  HEENT: Moist Mucous Membranes, Anicteric  Pulmonary: Slightly dyspneic, breath sounds with basal crackles bilaterally  Cardiovascular: Regular, S1 and S2, 2/6 SM  Gastrointestinal: Bowel Sounds present, soft, nontender.   Lymph: Chronic edema with scaly skin and +2 pedal edema. No lymphadenopathy.  Skin: No visible rashes or ulcers.                                                                                                                                                                                                                                                                                                     Psych:  Mood & affect appropriate    LABS: All Labs Reviewed:                        7.8    6.09  )-----------( 161      ( 13 Jul 2018 07:26 )             25.5                         8.0    x     )-----------( x        ( 12 Jul 2018 17:12 )             26.0                         7.8    4.63  )-----------( 156      ( 12 Jul 2018 07:41 )             26.2     13 Jul 2018 07:26    145    |  112    |  57     ----------------------------<  127    5.3     |  26     |  2.20   12 Jul 2018 07:41    146    |  116    |  60     ----------------------------<  97     5.1     |  26     |  2.00   11 Jul 2018 08:03    146    |  115    |  65     ----------------------------<  112    5.2     |  25     |  2.30     Ca    7.7        13 Jul 2018 07:26  Ca    7.9        12 Jul 2018 07:41  Ca    7.8        11 Jul 2018 08:03    < from: 12 Lead ECG (07.07.18 @ 13:03) >  Ventricular Rate 84 BPM    Atrial Rate 84 BPM    P-R Interval 144 ms    QRS Duration 98 ms    Q-T Interval 374 ms    QTC Calculation(Bezet) 441 ms    P Axis 41 degrees    R Axis 23 degrees    T Axis 45 degrees    Diagnosis Line Normal sinus rhythm  Normal ECG  When compared with ECG of 13-APR-2016 00:29,  No significant change was found  Confirmed by pearl Ohara ([id number]) on 7/7/2018 5:25:04 PM    < end of copied text >    < from: NM GI Bleed Localization (07.11.18 @ 17:47) >  EXAM:  NM GI BLEEDING IMG                            PROCEDURE DATE:  07/11/2018          INTERPRETATION:  RADIOPHARMACEUTICAL: 20 mCi Tc-99m labeled autologous   red blood cells, I.V.    CLINICAL STATEMENT: 83-year-old female with GI bleeding referred for   localization of bleeding site.    TECHNIQUE:  Dynamic images of the anterior abdomen/pelvis were obtained   for 48 minutes following administration of radiopharmaceutical. Patient   refused additional imaging.    FINDINGS: There is physiologic distribution of radiolabeled red cells in   the abdomen and pelvis. There is no abnormal focus of increased activity   to suggest the presence of active bleeding.    IMPRESSION: Normal limited GI bleeding scan .    No evidence of active bleeding site.    Patient was imaged for 45 minutes. Patient refused additional imaging.                        SHADI FUNK M.D., NUCLEAR MEDICINE ATTENDING  This document has been electronically signed. Jul 11 2018  8:12PM    < end of copied text >    < from: CT Abdomen and Pelvis No Cont (07.07.18 @ 16:10) >  EXAM:  CT ABDOMEN AND PELVIS                            PROCEDURE DATE:  07/07/2018          INTERPRETATION:    CT ABDOMEN and PELVIS without IV contrast dated 7/7/2018 4:10 PM    INDICATION: 83-year-old female severe anemia, ro RP bleed        TECHNIQUE: CT of the abdomen and pelvis was performed without intravenous   contrast administration. Axial, sagittal and coronal images were produced   and reviewed.  Enteric contrast was not administered, limiting complete   evaluation of the gastrointestinal tract. The patient is imaged with both   upper extremities in the gantry.    COMPARISON: None.    FINDINGS: Images of the lower chest demonstrate trace right pleural   effusion and subjacent right basilar atelectasis. There is no pericardial   effusion or hematoma. Atherosclerotic coronary artery calcifications are   noted. There is visualization of the hyperdense and the ventricular   septum, in keeping with history of anemia. A small hiatal hernia is   noted.     Postcholecystectomy surgical clips are seen in the gallbladder fossa. The   pancreas is completely fatty replaced. There is an apparent 2 cm   circumscribed cortical hypodensity in the left kidney, with an adjacent   cortical dystrophic calcification The liver, spleen, adrenal glands and   right kidney have a grossly unremarkable nonenhanced appearance.    Extensive atheromatous disease throughout the aortobiiliac system,   without aneurysm. There is also marked mural calcification of the splenic   artery. No retroperitoneal hematoma is present. No retroperitoneal mass   or lymphadenopathy is seen.     Enteric contrast was not administered, limiting complete evaluation of   the gastrointestinal tract. Evaluation of the nonopacified small bowel   demonstrates no dilatation or air-fluid levels. There is an apparent   intraluminal lipoma within the second portion of duodenum. There is a   moderate-sized rectal stool ball with circumferential mural thickening   and presacral edema stranding, most suggestive of stercoral colitis.   There is no proximal colonic obstruction. The appendix is not visualized.   No ascites no pneumoperitoneum is seen.    There is no pelvic hematoma, mass or lymphadenopathy. The urinary bladder   is decompressed around a Oliva catheter.    Evaluation of the osseous structures demonstrates a mild S-shaped   thoracolumbar scoliosis with multilevel multifactorial degenerative   spinal changes. There is a benign-appearing bone island in the right   iliac wing. Is heterogeneous appearance of the sacral ala which is felt   to represent to be related to stress-related change.      IMPRESSION:  No retroperitoneal or pelvic hematoma.    Constellation of findings are most suggestive of rectal stercoral colitis.    Trace right pleural effusion.    Oliva catheter in situ.    < end of copied text >

## 2018-07-13 NOTE — PROGRESS NOTE ADULT - ASSESSMENT
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 Renal function fluctuates but relatively stable   Hypernatremia - Resolved  Hyperkalemia    Anemia: Blood loss anemia, severe: GIB  Arthritis  Asthma  Diabetes mellitus  Lymphedema      - Work up for anemia such as bleeding scan etc in progress  - Serial BMP, cbc, phos  - No indication for RRT, shall follow closely  - No indication for kidney biopsy. No signs of underlying GN or nephrotic syndrome   - No uremic bleeding diathesis. If bleeding becomes a recurrent issue, will consider on dose of DDAVP 0.3 /kg bw  - Urine studies reviewed. No real proteinuria. FeNa does not suggest pre-renal. D/C IVF.   - D/C IVF  - Low K diet   - Edema is most lymphedema although some contribution from fluid. We will consider adding low dose diuretics   - She will benefit from ACEi if renal function stable before discharge     Thanks

## 2018-07-13 NOTE — PROGRESS NOTE ADULT - PROBLEM SELECTOR PLAN 1
- possibly 2/2 GIB: GI (Dr. Daly) EGD showed duodenal ulcer  -stable h/h  - now s/p transfusion of total 5 units PRBCs and 1 unit platelets.  - monitor H/H, transfuse as necessary.  - continue Protonix   -apprec gi and hemat recs  -pt underwent bleeding scan but could only tolerate 45 minutes dues to being uncomfortable on back and in pain  -tolerating diet well  -dc planning as h.h stable, daughter does not want pt to go back to [facility] rehab prefers another facility

## 2018-07-13 NOTE — PROGRESS NOTE ADULT - PROBLEM SELECTOR PLAN 2
- stable, improved  - Likely due to anemia, poor po intake.   - Continue to monitor closely.   - Transfuse/fluids as needed

## 2018-07-13 NOTE — PROGRESS NOTE ADULT - SUBJECTIVE AND OBJECTIVE BOX
[INTERVAL HX: ]  Patient seen and examined;  Chart reviewed and events noted;   Denies hematuria, denies obvious bleeding.  no CP, no SOB    MEDICATIONS  (STANDING):  clonazePAM Tablet 0.5 milliGRAM(s) Oral two times a day  dextrose 5%. 1000 milliLiter(s) (50 mL/Hr) IV Continuous <Continuous>  dextrose 50% Injectable 12.5 Gram(s) IV Push once  dextrose 50% Injectable 25 Gram(s) IV Push once  dextrose 50% Injectable 25 Gram(s) IV Push once  diphenhydrAMINE   Capsule 25 milliGRAM(s) Oral once  insulin lispro (HumaLOG) corrective regimen sliding scale   SubCutaneous three times a day before meals  insulin lispro (HumaLOG) corrective regimen sliding scale   SubCutaneous at bedtime  lactobacillus acidophilus 1 Tablet(s) Oral three times a day with meals  levothyroxine 75 MICROGram(s) Oral daily  pantoprazole    Tablet 40 milliGRAM(s) Oral two times a day  sucralfate 1 Gram(s) Oral four times a day    MEDICATIONS  (PRN):  acetaminophen   Tablet 650 milliGRAM(s) Oral every 6 hours PRN Mild Pain  ALBUTerol    0.083% 2.5 milliGRAM(s) Nebulizer every 4 hours PRN Shortness of Breath and/or Wheezing  dextrose 40% Gel 15 Gram(s) Oral once PRN Blood Glucose LESS THAN 70 milliGRAM(s)/deciliter  glucagon  Injectable 1 milliGRAM(s) IntraMuscular once PRN Glucose LESS THAN 70 milligrams/deciliter      Vital Signs Last 24 Hrs  T(C): 36.4 (13 Jul 2018 12:58), Max: 36.8 (13 Jul 2018 05:25)  T(F): 97.6 (13 Jul 2018 12:58), Max: 98.2 (13 Jul 2018 05:25)  HR: 70 (13 Jul 2018 14:03) (66 - 76)  BP: 146/79 (13 Jul 2018 12:58) (138/66 - 153/86)  BP(mean): --  RR: 18 (13 Jul 2018 12:58) (18 - 18)  SpO2: 93% (13 Jul 2018 14:03) (92% - 97%)    [PHYSICAL EXAM]  General: adult in NAD,  WN,  WD. Obese  HEENT: clear oropharynx, anicteric sclera, pink conjunctivae.  Neck: supple, no masses.  CV: normal S1S2, no murmur, no rubs, no gallops.  Lungs: clear to auscultation, no wheezes, no rales, no rhonchi.  Abdomen: soft, non-tender, non-distended, no hepatosplenomegaly, normal BS, no guarding.  Ext: no clubbing, no cyanosis, no edema.  Skin: no rashes,  no petechiae, no venous stasis changes.  Neuro: alert and oriented X2, no focal motor deficits.  LN: no MAYA.      [LABS:]                        7.8    6.09  )-----------( 161      ( 13 Jul 2018 07:26 )             25.5     07-13    145  |  112<H>  |  57<H>  ----------------------------<  127<H>  5.3   |  26  |  2.20<H>    Ca    7.7<L>      13 Jul 2018 07:26      Occult Blood, Feces (07.07.18 @ 13:04)    Occult Blood, Feces: Negative          [RADIOLOGY STUDIES:]  < from: NM GI Bleed Localization (07.11.18 @ 17:47) >    EXAM:  NM GI BLEEDING IMG                        PROCEDURE DATE:  07/11/2018    INTERPRETATION:  RADIOPHARMACEUTICAL: 20 mCi Tc-99m labeled autologous red blood cells, I.V.  CLINICAL STATEMENT: 83-year-old female with GI bleeding referred for localization of bleeding site.  TECHNIQUE:  Dynamic images of the anterior abdomen/pelvis were obtained for 48 minutes following administration of radiopharmaceutical. Patient refused additional imaging.    FINDINGS: There is physiologic distribution of radiolabeled red cells in the abdomen and pelvis. There is no abnormal focus of increased activity to suggest the presence of active bleeding.    IMPRESSION: Normal limited GI bleeding scan .  No evidence of active bleeding site.  Patient was imaged for 45 minutes. Patient refused additional imaging.    SHADI FUNK M.D., NUCLEAR MEDICINE ATTENDING  This document has been electronically signed. Jul 11 2018  8:12PM  < end of copied text >

## 2018-07-13 NOTE — PROGRESS NOTE ADULT - SUBJECTIVE AND OBJECTIVE BOX
NEPHROLOGY INTERVAL HPI/OVERNIGHT EVENTS:  HPI:  84 y/o F with PMHx DM2, hypothyroidism, asthma, arthritis, meningoma (chronic), and  BIBEMS after syncopal episode at [facility]. Patient is a poor historian and is unsure why she is in the hospital.  Family at bedside reports that patient was discharged from [facility] the previous day after being admitted with AMS 2/2 UTI.  Family reports that yesterday patient was SOB.  They report that she bleeds easily, but denies knowledge of blood in stools, changes in urine color, open wounds. Patient confirms abdominal pain. She denies chest pain, shortness of breath, palpitations, nausea or vomiting. She also denies dizziness blurry vision.    Follow up BARBARA/CKD  C/o pain around walls area last night      PAST MEDICAL & SURGICAL HISTORY:  Arthritis  Asthma  Diabetes mellitus  Hypothyroidism  No significant past surgical history      MEDICATIONS  (STANDING):  clonazePAM Tablet 0.5 milliGRAM(s) Oral two times a day  dextrose 5% + sodium chloride 0.45%. 1000 milliLiter(s) (75 mL/Hr) IV Continuous <Continuous>  dextrose 5%. 1000 milliLiter(s) (50 mL/Hr) IV Continuous <Continuous>  dextrose 50% Injectable 12.5 Gram(s) IV Push once  dextrose 50% Injectable 25 Gram(s) IV Push once  dextrose 50% Injectable 25 Gram(s) IV Push once  diphenhydrAMINE   Capsule 25 milliGRAM(s) Oral once  insulin lispro (HumaLOG) corrective regimen sliding scale   SubCutaneous three times a day before meals  insulin lispro (HumaLOG) corrective regimen sliding scale   SubCutaneous at bedtime  lactobacillus acidophilus 1 Tablet(s) Oral three times a day with meals  levothyroxine 75 MICROGram(s) Oral daily  pantoprazole    Tablet 40 milliGRAM(s) Oral two times a day  sucralfate 1 Gram(s) Oral four times a day    MEDICATIONS  (PRN):  acetaminophen   Tablet 650 milliGRAM(s) Oral every 6 hours PRN Mild Pain  ALBUTerol    0.083% 2.5 milliGRAM(s) Nebulizer every 4 hours PRN Shortness of Breath and/or Wheezing  dextrose 40% Gel 15 Gram(s) Oral once PRN Blood Glucose LESS THAN 70 milliGRAM(s)/deciliter  glucagon  Injectable 1 milliGRAM(s) IntraMuscular once PRN Glucose LESS THAN 70 milligrams/deciliter      Allergies    Levaquin (Hives)  sulfa drugs (Unknown)    Intolerances        Vital Signs Last 24 Hrs  T(C): 36.4 (13 Jul 2018 12:58), Max: 36.8 (13 Jul 2018 05:25)  T(F): 97.6 (13 Jul 2018 12:58), Max: 98.2 (13 Jul 2018 05:25)  HR: 76 (13 Jul 2018 12:58) (66 - 76)  BP: 146/79 (13 Jul 2018 12:58) (138/66 - 153/86)  BP(mean): --  RR: 18 (13 Jul 2018 12:58) (18 - 18)  SpO2: 97% (13 Jul 2018 12:58) (92% - 97%)  Daily     Daily     PHYSICAL EXAM:  GENERAL: NAD, well-groomed, well-developed, elder, obese  HEAD:  Atraumatic, Normocephalic  EYES: EOMI, PERRLA, conjunctiva and sclera clear  ENMT: No tonsillar erythema, exudates, or enlargement; Moist mucous membranes, Good dentition, No lesions  NECK: Supple, No JVD, Normal thyroid  NERVOUS SYSTEM:  Alert & Oriented X3, Good concentration; Motor Strength 3/5 B/L upper and lower extremities  CHEST/LUNG: Clear to percussion bilaterally; No rales, rhonchi, wheezing, or rubs  HEART: Regular rate and rhythm; No murmurs, rubs, or gallops  ABDOMEN: Soft, Nontender, Nondistended; Bowel sounds present  EXTREMITIES:  2+ Peripheral Pulses, No clubbing, cyanosis, or edema  SKIN: No rashes or lesions    LABS:                        7.8    6.09  )-----------( 161      ( 13 Jul 2018 07:26 )             25.5     07-13    145  |  112<H>  |  57<H>  ----------------------------<  127<H>  5.3   |  26  |  2.20<H>    Ca    7.7<L>      13 Jul 2018 07:26                  RADIOLOGY & ADDITIONAL TESTS:

## 2018-07-13 NOTE — PROGRESS NOTE ADULT - ASSESSMENT
83 year old female with DM2, HTN, here with an episode of syncope, found to have significant anemia. Improvement in lethargy with PRBC transfusions    - Patient presented with profound anemia, s/p PRBC's x 5 units total and 1 unit of platelets    - H/H trending down again, today's = 7.8/25.5 from 8.0/26.0 yesterday.  Transfuse per Primary or GI.   - s/p EGD 7/9 with clean based duodenal ulcer, gastritis, hiatal hernia  - s/p bleeding scan which was negative, although, unable to complete due to patient's refusal to have anymore tests  - Receiving IVF on exam this am appeared slightly dyspneic with increased pedal edema noted 2+ with her chronic lower extremity edema unchanged.  D/c IVF cont to monitor closely.  May need some diuresis.  Although if requiring more blood products would give Lasix after.    - There is no echo on record so her LV function is unknown.     - Hemodynamics stable with BP elevated, may start Norvasc for better control.  Would not aggressively treat hypertension as this can be her compensatory mechanism  - Very mild troponin leak 2/2 demand in the setting of severe anemia  - EKG is SR with no sign of ischemia or chamber enlargement  - Baseline creatinine unknown, though normal in 2016, BARBARA likely due to her profound anemia.  Avoid nephrotoxics.  Renal following  - Monitor electrolytes. Keep K>4, Mg>2  - Further cardiac workup as case unfolds  - D/C planning for Monday back to [facility] as per primary team/MATI Thomson NP-C  Cardiology

## 2018-07-14 LAB
ANION GAP SERPL CALC-SCNC: 6 MMOL/L — SIGNIFICANT CHANGE UP (ref 5–17)
BUN SERPL-MCNC: 53 MG/DL — HIGH (ref 7–23)
CALCIUM SERPL-MCNC: 8.1 MG/DL — LOW (ref 8.5–10.1)
CHLORIDE SERPL-SCNC: 114 MMOL/L — HIGH (ref 96–108)
CK MB BLD-MCNC: 10.7 % — HIGH (ref 0–3.5)
CK MB CFR SERPL CALC: 3.1 NG/ML — SIGNIFICANT CHANGE UP (ref 0–3.6)
CK SERPL-CCNC: 29 U/L — SIGNIFICANT CHANGE UP (ref 26–192)
CO2 SERPL-SCNC: 25 MMOL/L — SIGNIFICANT CHANGE UP (ref 22–31)
CREAT SERPL-MCNC: 1.9 MG/DL — HIGH (ref 0.5–1.3)
GLUCOSE SERPL-MCNC: 116 MG/DL — HIGH (ref 70–99)
HCT VFR BLD CALC: 24.7 % — LOW (ref 34.5–45)
HGB BLD-MCNC: 7.6 G/DL — LOW (ref 11.5–15.5)
IRON SATN MFR SERPL: 16 % — SIGNIFICANT CHANGE UP (ref 14–50)
IRON SATN MFR SERPL: 32 UG/DL — SIGNIFICANT CHANGE UP (ref 30–160)
MCHC RBC-ENTMCNC: 28.1 PG — SIGNIFICANT CHANGE UP (ref 27–34)
MCHC RBC-ENTMCNC: 30.8 GM/DL — LOW (ref 32–36)
MCV RBC AUTO: 91.5 FL — SIGNIFICANT CHANGE UP (ref 80–100)
NRBC # BLD: 0 /100 WBCS — SIGNIFICANT CHANGE UP (ref 0–0)
PLATELET # BLD AUTO: 161 K/UL — SIGNIFICANT CHANGE UP (ref 150–400)
POTASSIUM SERPL-MCNC: 5 MMOL/L — SIGNIFICANT CHANGE UP (ref 3.5–5.3)
POTASSIUM SERPL-SCNC: 5 MMOL/L — SIGNIFICANT CHANGE UP (ref 3.5–5.3)
RBC # BLD: 2.7 M/UL — LOW (ref 3.8–5.2)
RBC # FLD: 19 % — HIGH (ref 10.3–14.5)
SODIUM SERPL-SCNC: 145 MMOL/L — SIGNIFICANT CHANGE UP (ref 135–145)
TIBC SERPL-MCNC: 195 UG/DL — LOW (ref 220–430)
TROPONIN I SERPL-MCNC: 0.02 NG/ML — SIGNIFICANT CHANGE UP (ref 0.01–0.04)
UIBC SERPL-MCNC: 163 UG/DL — SIGNIFICANT CHANGE UP (ref 110–370)
WBC # BLD: 5.21 K/UL — SIGNIFICANT CHANGE UP (ref 3.8–10.5)
WBC # FLD AUTO: 5.21 K/UL — SIGNIFICANT CHANGE UP (ref 3.8–10.5)

## 2018-07-14 PROCEDURE — 99232 SBSQ HOSP IP/OBS MODERATE 35: CPT

## 2018-07-14 PROCEDURE — 99233 SBSQ HOSP IP/OBS HIGH 50: CPT

## 2018-07-14 RX ORDER — FUROSEMIDE 40 MG
20 TABLET ORAL ONCE
Qty: 0 | Refills: 0 | Status: COMPLETED | OUTPATIENT
Start: 2018-07-14 | End: 2018-07-14

## 2018-07-14 RX ADMIN — Medication 1 GRAM(S): at 17:25

## 2018-07-14 RX ADMIN — Medication 0.5 MILLIGRAM(S): at 05:57

## 2018-07-14 RX ADMIN — Medication 2: at 12:11

## 2018-07-14 RX ADMIN — ALBUTEROL 2.5 MILLIGRAM(S): 90 AEROSOL, METERED ORAL at 07:58

## 2018-07-14 RX ADMIN — Medication 1 GRAM(S): at 05:57

## 2018-07-14 RX ADMIN — Medication 0.5 MILLIGRAM(S): at 17:28

## 2018-07-14 RX ADMIN — Medication 1 TABLET(S): at 12:11

## 2018-07-14 RX ADMIN — Medication 1 TABLET(S): at 17:24

## 2018-07-14 RX ADMIN — PANTOPRAZOLE SODIUM 40 MILLIGRAM(S): 20 TABLET, DELAYED RELEASE ORAL at 17:25

## 2018-07-14 RX ADMIN — Medication 20 MILLIGRAM(S): at 11:37

## 2018-07-14 RX ADMIN — ALBUTEROL 2.5 MILLIGRAM(S): 90 AEROSOL, METERED ORAL at 15:11

## 2018-07-14 RX ADMIN — Medication 1: at 17:23

## 2018-07-14 RX ADMIN — Medication 1 GRAM(S): at 12:12

## 2018-07-14 RX ADMIN — ALBUTEROL 2.5 MILLIGRAM(S): 90 AEROSOL, METERED ORAL at 20:05

## 2018-07-14 RX ADMIN — Medication 1 MILLIGRAM(S): at 12:11

## 2018-07-14 RX ADMIN — Medication 75 MICROGRAM(S): at 05:57

## 2018-07-14 RX ADMIN — PANTOPRAZOLE SODIUM 40 MILLIGRAM(S): 20 TABLET, DELAYED RELEASE ORAL at 05:57

## 2018-07-14 NOTE — PROGRESS NOTE ADULT - ASSESSMENT
83 year old female with DM2, HTN, here with an episode of syncope, found to have significant anemia. Improvement in lethargy with PRBC transfusions    - Patient presented with profound anemia, s/p PRBC's x 5 units total and 1 unit of platelets    - H/H trending down again, today's = 7.6/24.7 from 7.8/25.5 yesterday.  Transfuse per Primary or GI.   - s/p EGD 7/9 with clean based duodenal ulcer, gastritis, hiatal hernia  - s/p bleeding scan which was negative, although, unable to complete due to patient's refusal to have anymore tests  -  No dyspnea on exam this morning or pedal edema,  chronic lower extremity edema unchanged.    Administer Lasix after blood products transfusion to avoid fluid volume overload   - There is no echo on record so her LV function is unknown.     - Hemodynamics stable as per flow sheet   - Very mild troponin leak 2/2 demand in the setting of severe anemia  - EKG is SR with no sign of ischemia or chamber enlargement  - Baseline creatinine unknown, though normal in 2016, BARBARA likely due to her profound anemia.  Avoid nephrotoxics.  Renal following  - Monitor electrolytes. Keep K>4, Mg>2  - Further cardiac workup as case unfolds  - D/C planning for Monday back to [facility] as per primary team/SW ( when H/H stable)       Gricel Talbot NP   Cardiology

## 2018-07-14 NOTE — PROVIDER CONTACT NOTE (OTHER) - ACTION/TREATMENT ORDERED:
to do an EKG and sent cardiac enzyme and anxiety med to be given to do an EKG and sent cardiac enzyme and  anxiety med to be given and a nebulizer treatment

## 2018-07-14 NOTE — PROGRESS NOTE ADULT - PROBLEM SELECTOR PLAN 1
- possibly 2/2 GIB: GI (Dr. Daly) EGD showed duodenal ulcer  -H/H dropped again today. GI f/u requested.  - Patient is  s/p transfusion of total 5 units PRBCs and 1 unit platelets.  - monitor H/H, transfuse as necessary.  - continue Protonix   -apprec gi and hemat recs  -pt underwent bleeding scan but could only tolerate 45 minutes dues to being uncomfortable on back and in pain  -tolerating diet well  -dc planning as h.h stable, daughter does not want pt to go back to [facility] rehab prefers another facility

## 2018-07-14 NOTE — PROGRESS NOTE ADULT - ASSESSMENT
82 y/o woman tx from Rehab w Hgb3.8, found  GI bleed. s/p EGD showing duodenal ulcer.  post transfusion w stabilized CBC  post IV iron  clinically stable  stable from heme standpoint for Rehab transfer  treat symptomatically  will f/u in office as outpatient    sign off for now, please call if any questions

## 2018-07-14 NOTE — CHART NOTE - NSCHARTNOTEFT_GEN_A_CORE
Assessment:	  84 y/o F with PMHx DM2, hypothyroidism, asthma, arthritis, meningoma (chronic), CKD.  BIBEMS after syncopal episode at [facility]. Admitted w/ profound anemia hgb 3.8, possibly 2/2 acute GIB sec to duodenal ulcer.  EGD done 7/9, revealing nonbleeding duodenal ulcer, gastritis and hiatal hernia. Since admission pt has received 5U PRBC and 1U FFP.  H/H now stable though slightly low.  Pt tolerating meals, ate all bfst per plate waste inspection. Reports bfst is her best meal. Pt doesn't recall having BM's, though noted on 7/11 and 7/13.     Factors impacting intake: [x] none [ ] nausea  [ ] vomiting [ ] diarrhea [ ] constipation  [ ]chewing problems [ ] swallowing issues  [ ] other:     Diet Presciption: Diet, Consistent Carbohydrate w/Evening Snack:   For patients receiving Renal Replacement - No Protein Restr, No Conc K, No Conc Phos, Low Sodium (RENAL) (07-13-18 @ 14:14)    Intake: good for bfst, somewhat less at lunch/dinner    Current Weight: no new wts available    Pertinent Medications: MEDICATIONS  (STANDING):  clonazePAM Tablet 0.5 milliGRAM(s) Oral two times a day  dextrose 5%. 1000 milliLiter(s) (50 mL/Hr) IV Continuous <Continuous>  dextrose 50% Injectable 12.5 Gram(s) IV Push once  dextrose 50% Injectable 25 Gram(s) IV Push once  dextrose 50% Injectable 25 Gram(s) IV Push once  diphenhydrAMINE   Capsule 25 milliGRAM(s) Oral once  folic acid 1 milliGRAM(s) Oral daily  furosemide   Injectable 20 milliGRAM(s) IV Push once  insulin lispro (HumaLOG) corrective regimen sliding scale   SubCutaneous three times a day before meals  insulin lispro (HumaLOG) corrective regimen sliding scale   SubCutaneous at bedtime  lactobacillus acidophilus 1 Tablet(s) Oral three times a day with meals  levothyroxine 75 MICROGram(s) Oral daily  pantoprazole    Tablet 40 milliGRAM(s) Oral two times a day  sucralfate 1 Gram(s) Oral four times a day    MEDICATIONS  (PRN):  acetaminophen   Tablet 650 milliGRAM(s) Oral every 6 hours PRN Mild Pain  ALBUTerol    0.083% 2.5 milliGRAM(s) Nebulizer every 4 hours PRN Shortness of Breath and/or Wheezing  dextrose 40% Gel 15 Gram(s) Oral once PRN Blood Glucose LESS THAN 70 milliGRAM(s)/deciliter  glucagon  Injectable 1 milliGRAM(s) IntraMuscular once PRN Glucose LESS THAN 70 milligrams/deciliter    Pertinent Labs: 07-14 Na145 mmol/L Glu 116 mg/dL<H> K+ 5.0 mmol/L Cr  1.90 mg/dL<H> BUN 53 mg/dL<H> 07-10 Phos 3.3 mg/dL 07-13 Alb 2.4 g/dL<L> 07-08 [id number] 5.8 %<H>     CAPILLARY BLOOD GLUCOSE    POCT Blood Glucose.: 123 mg/dL (14 Jul 2018 08:08)  POCT Blood Glucose.: 132 mg/dL (13 Jul 2018 22:30)  POCT Blood Glucose.: 166 mg/dL (13 Jul 2018 17:24)  POCT Blood Glucose.: 196 mg/dL (13 Jul 2018 13:14)  POCT Blood Glucose.: 209 mg/dL (13 Jul 2018 11:59)    Skin: R/L thighs DTI, coccyc/sacrum I/II.  Recommend daily MVI, Vit C 500mg bid.  Edema: 3+ R/L ankles     Estimated Needs:   [x] no change since previous assessment  [ ] recalculated:     Previous Nutrition Diagnosis:    [x] Increased Nutrient Needs  [x] Altered GI Function     Nutrition Diagnosis is [x] ongoing  [ ] resolved [ ] not applicable     New Nutrition Diagnosis: [x] not applicable       Interventions:   Recommend continue current diet (CKD 3/4 noted per renal). Pt not exceeding rec protein intake on current diet. May consider adding soft to diet Rx 2/2 gastritis and ulcer.  [ ] Change Diet To:  [ ] Nutrition Supplement  [ ] Nutrition Support  [ ] Other:     Monitoring and Evaluation:   [x] PO intake [ x ] Tolerance to diet prescription [ x ] weights [ x ] labs[ x ] follow up per protocol  [x] other: bowel function

## 2018-07-14 NOTE — PROGRESS NOTE ADULT - ASSESSMENT
84 y/o F with PMHx DM2, hypothyroidism, asthma, arthritis, meningoma (chronic), and  BIBEMS after syncopal episode at [facility]. Admitted to ICU w/ profound anemia hgb 3.8, possibly 2/2 acute GIB sec to duodenal ulcer

## 2018-07-14 NOTE — PROGRESS NOTE ADULT - ASSESSMENT
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 Renal function fluctuates but relatively stable   Hypernatremia - Resolved  Hyperkalemia    Anemia: Blood loss anemia, severe: GIB  Arthritis  Asthma  Diabetes mellitus  Lymphedema      - Work up for anemia such as bleeding scan etc in progress  - No indication for RRT, shall follow closely  - No indication for kidney biopsy. No signs of underlying GN or nephrotic syndrome   - No uremic bleeding diathesis. If bleeding becomes a recurrent issue, will consider on dose of DDAVP 0.3 /kg bw  - Urine studies reviewed. No real proteinuria. FeNa does not suggest pre-renal. S/p brief IVF. Off IVF  - Low K diet   - Edema is most lymphedema although some contribution from fluid. We will consider adding low dose diuretics.   - She will benefit from ACEi if renal function stable before discharge     Thanks

## 2018-07-14 NOTE — PROGRESS NOTE ADULT - SUBJECTIVE AND OBJECTIVE BOX
NEPHROLOGY INTERVAL HPI/OVERNIGHT EVENTS:  HPI:  82 y/o F with PMHx DM2, hypothyroidism, asthma, arthritis, meningoma (chronic), and  BIBEMS after syncopal episode at [facility]. Patient is a poor historian and is unsure why she is in the hospital.  Family at bedside reports that patient was discharged from [facility] the previous day after being admitted with AMS 2/2 UTI.  Family reports that yesterday patient was SOB.  They report that she bleeds easily, but denies knowledge of blood in stools, changes in urine color, open wounds. Patient confirms abdominal pain. She denies chest pain, shortness of breath, palpitations, nausea or vomiting. She also denies dizziness blurry vision.    Follow up BARBARA/CKD  C/o pain around walls area last night      PAST MEDICAL & SURGICAL HISTORY:  Arthritis  Asthma  Diabetes mellitus  Hypothyroidism  No significant past surgical history      MEDICATIONS  (STANDING):  clonazePAM Tablet 0.5 milliGRAM(s) Oral two times a day  dextrose 5% + sodium chloride 0.45%. 1000 milliLiter(s) (75 mL/Hr) IV Continuous <Continuous>  dextrose 5%. 1000 milliLiter(s) (50 mL/Hr) IV Continuous <Continuous>  dextrose 50% Injectable 12.5 Gram(s) IV Push once  dextrose 50% Injectable 25 Gram(s) IV Push once  dextrose 50% Injectable 25 Gram(s) IV Push once  diphenhydrAMINE   Capsule 25 milliGRAM(s) Oral once  insulin lispro (HumaLOG) corrective regimen sliding scale   SubCutaneous three times a day before meals  insulin lispro (HumaLOG) corrective regimen sliding scale   SubCutaneous at bedtime  lactobacillus acidophilus 1 Tablet(s) Oral three times a day with meals  levothyroxine 75 MICROGram(s) Oral daily  pantoprazole    Tablet 40 milliGRAM(s) Oral two times a day  sucralfate 1 Gram(s) Oral four times a day    MEDICATIONS  (PRN):  acetaminophen   Tablet 650 milliGRAM(s) Oral every 6 hours PRN Mild Pain  ALBUTerol    0.083% 2.5 milliGRAM(s) Nebulizer every 4 hours PRN Shortness of Breath and/or Wheezing  dextrose 40% Gel 15 Gram(s) Oral once PRN Blood Glucose LESS THAN 70 milliGRAM(s)/deciliter  glucagon  Injectable 1 milliGRAM(s) IntraMuscular once PRN Glucose LESS THAN 70 milligrams/deciliter      Allergies    Levaquin (Hives)  sulfa drugs (Unknown)    Intolerances        Vital Signs Last 24 Hrs  T(C): 36.4 (13 Jul 2018 12:58), Max: 36.8 (13 Jul 2018 05:25)  T(F): 97.6 (13 Jul 2018 12:58), Max: 98.2 (13 Jul 2018 05:25)  HR: 76 (13 Jul 2018 12:58) (66 - 76)  BP: 146/79 (13 Jul 2018 12:58) (138/66 - 153/86)  BP(mean): --  RR: 18 (13 Jul 2018 12:58) (18 - 18)  SpO2: 97% (13 Jul 2018 12:58) (92% - 97%)  Daily     Daily     PHYSICAL EXAM:  GENERAL: NAD, well-groomed, well-developed, elder, obese  HEAD:  Atraumatic, Normocephalic  EYES: EOMI, PERRLA, conjunctiva and sclera clear  ENMT: No tonsillar erythema, exudates, or enlargement; Moist mucous membranes, Good dentition, No lesions  NECK: Supple, No JVD, Normal thyroid  NERVOUS SYSTEM:  Alert & Oriented X3, Good concentration; Motor Strength 3/5 B/L upper and lower extremities  CHEST/LUNG: Clear to percussion bilaterally; No rales, rhonchi, wheezing, or rubs  HEART: Regular rate and rhythm; No murmurs, rubs, or gallops  ABDOMEN: Soft, Nontender, Nondistended; Bowel sounds present  EXTREMITIES:  2+ Peripheral Pulses, No clubbing, cyanosis, or edema  SKIN: No rashes or lesions    LABS:  BUN/Cr 54/1.9

## 2018-07-14 NOTE — PROGRESS NOTE ADULT - SUBJECTIVE AND OBJECTIVE BOX
Patient is a 83y old  Female who presents with a chief complaint of profound anemia (07 Jul 2018 14:43)       INTERVAL HPI/OVERNIGHT EVENTS: C/o headache, refusing to take Tylenol, states that it does not help. Denies any other symptoms, complaints     MEDICATIONS  (STANDING):  clonazePAM Tablet 0.5 milliGRAM(s) Oral two times a day  dextrose 5%. 1000 milliLiter(s) (50 mL/Hr) IV Continuous <Continuous>  dextrose 50% Injectable 12.5 Gram(s) IV Push once  dextrose 50% Injectable 25 Gram(s) IV Push once  dextrose 50% Injectable 25 Gram(s) IV Push once  diphenhydrAMINE   Capsule 25 milliGRAM(s) Oral once  folic acid 1 milliGRAM(s) Oral daily  insulin lispro (HumaLOG) corrective regimen sliding scale   SubCutaneous three times a day before meals  insulin lispro (HumaLOG) corrective regimen sliding scale   SubCutaneous at bedtime  lactobacillus acidophilus 1 Tablet(s) Oral three times a day with meals  levothyroxine 75 MICROGram(s) Oral daily  pantoprazole    Tablet 40 milliGRAM(s) Oral two times a day  sucralfate 1 Gram(s) Oral four times a day    MEDICATIONS  (PRN):  acetaminophen   Tablet 650 milliGRAM(s) Oral every 6 hours PRN Mild Pain  ALBUTerol    0.083% 2.5 milliGRAM(s) Nebulizer every 4 hours PRN Shortness of Breath and/or Wheezing  dextrose 40% Gel 15 Gram(s) Oral once PRN Blood Glucose LESS THAN 70 milliGRAM(s)/deciliter  glucagon  Injectable 1 milliGRAM(s) IntraMuscular once PRN Glucose LESS THAN 70 milligrams/deciliter      Allergies    Levaquin (Hives)  sulfa drugs (Unknown)    Intolerances        REVIEW OF SYSTEMS:  All 10 systems reviewed and are negative except as above.   Vital Signs Last 24 Hrs  T(C): 36.5 (14 Jul 2018 05:26), Max: 36.6 (13 Jul 2018 18:10)  T(F): 97.7 (14 Jul 2018 05:26), Max: 97.9 (13 Jul 2018 20:56)  HR: 71 (14 Jul 2018 05:26) (67 - 90)  BP: 139/74 (14 Jul 2018 05:26) (139/74 - 156/82)  BP(mean): --  RR: 17 (14 Jul 2018 05:26) (17 - 20)  SpO2: 94% (14 Jul 2018 05:26) (93% - 97%)    PHYSICAL EXAM:  GENERAL: NAD, Awake, Alert   HEAD:  Atraumatic, Normocephalic  EYES: EOMI, PERRLA, conjunctiva and sclera clear  ENMT: No tonsillar erythema, exudates, or enlargement; Moist mucous membranes  NECK: Supple, No JVD, Normal thyroid  NERVOUS SYSTEM:  Alert & Oriented X3, Non focal   CHEST/LUNG: Clear to auscultation bilaterally; No rales, rhonchi, wheezing, or rubs  HEART: S1S2+, Regular rate and rhythm  ABDOMEN: Soft, Nontender, Nondistended; Bowel sounds present  EXTREMITIES:  2+ Peripheral Pulses, No clubbing, cyanosis, or edema  LYMPH: No lymphadenopathy noted  SKIN: No rashes or lesions    LABS:                        7.6    5.21  )-----------( 161      ( 14 Jul 2018 07:38 )             24.7     14 Jul 2018 07:38    145    |  114    |  53     ----------------------------<  116    5.0     |  25     |  1.90     Ca    8.1        14 Jul 2018 07:38    TPro  5.6    /  Alb  2.4    /  TBili  0.3    /  DBili  x      /  AST  12     /  ALT  <6     /  AlkPhos  112    13 Jul 2018 22:45      CAPILLARY BLOOD GLUCOSE      POCT Blood Glucose.: 123 mg/dL (14 Jul 2018 08:08)  POCT Blood Glucose.: 132 mg/dL (13 Jul 2018 22:30)  POCT Blood Glucose.: 166 mg/dL (13 Jul 2018 17:24)  POCT Blood Glucose.: 196 mg/dL (13 Jul 2018 13:14)  POCT Blood Glucose.: 209 mg/dL (13 Jul 2018 11:59)    BLOOD CULTURE    RADIOLOGY & ADDITIONAL TESTS:    Imaging Personally Reviewed:  [ ] YES     Consultant(s) Notes Reviewed:      Care Discussed with Consultants/Other Providers:

## 2018-07-14 NOTE — PROGRESS NOTE ADULT - SUBJECTIVE AND OBJECTIVE BOX
[facility] -- Mabel Hernandez, Jessica, Cesar, Devan Barrera Savella  Office # [phone]      Follow Up:  Syncope/GIB    Subjective/Observations: Seen and examined.  Resting in bed in no acute distress.  Denies cp, sob, N/V or palpitations.  NAD      REVIEW OF SYSTEMS: All other review of systems is negative unless indicated above    PAST MEDICAL & SURGICAL HISTORY:  Arthritis  Asthma  Diabetes mellitus  Hypothyroidism  No significant past surgical history      MEDICATIONS  (STANDING):  clonazePAM Tablet 0.5 milliGRAM(s) Oral two times a day  dextrose 5%. 1000 milliLiter(s) (50 mL/Hr) IV Continuous <Continuous>  dextrose 50% Injectable 12.5 Gram(s) IV Push once  dextrose 50% Injectable 25 Gram(s) IV Push once  dextrose 50% Injectable 25 Gram(s) IV Push once  diphenhydrAMINE   Capsule 25 milliGRAM(s) Oral once  folic acid 1 milliGRAM(s) Oral daily  insulin lispro (HumaLOG) corrective regimen sliding scale   SubCutaneous three times a day before meals  insulin lispro (HumaLOG) corrective regimen sliding scale   SubCutaneous at bedtime  lactobacillus acidophilus 1 Tablet(s) Oral three times a day with meals  levothyroxine 75 MICROGram(s) Oral daily  pantoprazole    Tablet 40 milliGRAM(s) Oral two times a day  sucralfate 1 Gram(s) Oral four times a day    MEDICATIONS  (PRN):  acetaminophen   Tablet 650 milliGRAM(s) Oral every 6 hours PRN Mild Pain  ALBUTerol    0.083% 2.5 milliGRAM(s) Nebulizer every 4 hours PRN Shortness of Breath and/or Wheezing  dextrose 40% Gel 15 Gram(s) Oral once PRN Blood Glucose LESS THAN 70 milliGRAM(s)/deciliter  glucagon  Injectable 1 milliGRAM(s) IntraMuscular once PRN Glucose LESS THAN 70 milligrams/deciliter      Allergies    Levaquin (Hives)  sulfa drugs (Unknown)    Intolerances            Vital Signs Last 24 Hrs  T(C): 36.5 (14 Jul 2018 05:26), Max: 36.6 (13 Jul 2018 18:10)  T(F): 97.7 (14 Jul 2018 05:26), Max: 97.9 (13 Jul 2018 20:56)  HR: 71 (14 Jul 2018 05:26) (67 - 90)  BP: 139/74 (14 Jul 2018 05:26) (139/74 - 156/82)  BP(mean): --  RR: 17 (14 Jul 2018 05:26) (17 - 20)  SpO2: 94% (14 Jul 2018 05:26) (93% - 97%)    I&O's Summary    13 Jul 2018 07:01  -  14 Jul 2018 07:00  --------------------------------------------------------  IN: 0 mL / OUT: 1100 mL / NET: -1100 mL          PHYSICAL EXAM:  TELE: not on tele   Constitutional: NAD, awake and alert, well-developed  HEENT: Moist Mucous Membranes, Anicteric  Pulmonary: Non-labored, breath sounds are clear bilaterally, No wheezing, rales or rhonchi  Cardiovascular: Regular, S1 and S2,  2/6 systolic  murmurs  Gastrointestinal: Bowel Sounds present, soft, nontender.   Lymph: Chronic bl lower  peripheral edema w/ scaly skin.  +  lymphadenopathy.  Skin: No visible rashes or ulcers.  Psych:  Mood & affect appropriate    LABS: All Labs Reviewed:                        7.6    5.21  )-----------( 161      ( 14 Jul 2018 07:38 )             24.7                         7.8    6.09  )-----------( 161      ( 13 Jul 2018 07:26 )             25.5                         8.0    x     )-----------( x        ( 12 Jul 2018 17:12 )             26.0     14 Jul 2018 07:38    145    |  114    |  53     ----------------------------<  116    5.0     |  25     |  1.90   13 Jul 2018 22:45    143    |  112    |  54     ----------------------------<  124    5.1     |  27     |  1.90   13 Jul 2018 07:26    145    |  112    |  57     ----------------------------<  127    5.3     |  26     |  2.20     Ca    8.1        14 Jul 2018 07:38  Ca    7.7        13 Jul 2018 22:45  Ca    7.7        13 Jul 2018 07:26    TPro  5.6    /  Alb  2.4    /  TBili  0.3    /  DBili  x      /  AST  12     /  ALT  <6     /  AlkPhos  112    13 Jul 2018 22:45      CARDIAC MARKERS ( 14 Jul 2018 07:38 )  .017 ng/mL / x     / 29 U/L / x     / 3.1 ng/mL  CARDIAC MARKERS ( 13 Jul 2018 22:45 )  .018 ng/mL / x     / 35 U/L / x     / 3.8 ng/mL         < from: 12 Lead ECG (07.07.18 @ 13:03) >  Ventricular Rate 84 BPM    Atrial Rate 84 BPM    P-R Interval 144 ms    QRS Duration 98 ms    Q-T Interval 374 ms    QTC Calculation(Bezet) 441 ms    P Axis 41 degrees    R Axis 23 degrees    T Axis 45 degrees    Diagnosis Line Normal sinus rhythm  Normal ECG  When compared with ECG of 13-APR-2016 00:29,  No significant change was found  Confirmed by pearl Ohara ([id number]) on 7/7/2018 5:25:04 PM    < end of copied text >    < from: NM GI Bleed Localization (07.11.18 @ 17:47) >  EXAM:  NM GI BLEEDING IMG                            PROCEDURE DATE:  07/11/2018          INTERPRETATION:  RADIOPHARMACEUTICAL: 20 mCi Tc-99m labeled autologous   red blood cells, I.V.    CLINICAL STATEMENT: 83-year-old female with GI bleeding referred for   localization of bleeding site.    TECHNIQUE:  Dynamic images of the anterior abdomen/pelvis were obtained   for 48 minutes following administration of radiopharmaceutical. Patient   refused additional imaging.    FINDINGS: There is physiologic distribution of radiolabeled red cells in   the abdomen and pelvis. There is no abnormal focus of increased activity   to suggest the presence of active bleeding.    IMPRESSION: Normal limited GI bleeding scan .    No evidence of active bleeding site.    Patient was imaged for 45 minutes. Patient refused additional imaging.    < end of copied text >    < from: CT Abdomen and Pelvis No Cont (07.07.18 @ 16:10) >  EXAM:  CT ABDOMEN AND PELVIS                            PROCEDURE DATE:  07/07/2018          INTERPRETATION:    CT ABDOMEN and PELVIS without IV contrast dated 7/7/2018 4:10 PM    INDICATION: 83-year-old female severe anemia, ro RP bleed        TECHNIQUE: CT of the abdomen and pelvis was performed without intravenous   contrast administration. Axial, sagittal and coronal images were produced   and reviewed.  Enteric contrast was not administered, limiting complete   evaluation of the gastrointestinal tract. The patient is imaged with both   upper extremities in the gantry.    COMPARISON: None.    FINDINGS: Images of the lower chest demonstrate trace right pleural   effusion and subjacent right basilar atelectasis. There is no pericardial   effusion or hematoma. Atherosclerotic coronary artery calcifications are   noted. There is visualization of the hyperdense and the ventricular   septum, in keeping with history of anemia. A small hiatal hernia is   noted.     Postcholecystectomy surgical clips are seen in the gallbladder fossa. The   pancreas is completely fatty replaced. There is an apparent 2 cm   circumscribed cortical hypodensity in the left kidney, with an adjacent   cortical dystrophic calcification The liver, spleen, adrenal glands and   right kidney have a grossly unremarkable nonenhanced appearance.    Extensive atheromatous disease throughout the aortobiiliac system,   without aneurysm. There is also marked mural calcification of the splenic   artery. No retroperitoneal hematoma is present. No retroperitoneal mass   or lymphadenopathy is seen.     Enteric contrast was not administered, limiting complete evaluation of   the gastrointestinal tract. Evaluation of the nonopacified small bowel   demonstrates no dilatation or air-fluid levels. There is an apparent   intraluminal lipoma within the second portion of duodenum. There is a   moderate-sized rectal stool ball with circumferential mural thickening   and presacral edema stranding, most suggestive of stercoral colitis.   There is no proximal colonic obstruction. The appendix is not visualized.   No ascites no pneumoperitoneum is seen.    There is no pelvic hematoma, mass or lymphadenopathy. The urinary bladder   is decompressed around a Oliva catheter.    Evaluation of the osseous structures demonstrates a mild S-shaped   thoracolumbar scoliosis with multilevel multifactorial degenerative   spinal changes. There is a benign-appearing bone island in the right   iliac wing. Is heterogeneous appearance of the sacral ala which is felt   to represent to be related to stress-related change.      IMPRESSION:  No retroperitoneal or pelvic hematoma.    Constellation of findings are most suggestive of rectal stercoral colitis.    Trace right pleural effusion.    Oliva catheter in situ.      < end of copied text >

## 2018-07-14 NOTE — PROGRESS NOTE ADULT - SUBJECTIVE AND OBJECTIVE BOX
All interim records and events noted.    comfortable, getting washed      MEDICATIONS  (STANDING):  clonazePAM Tablet 0.5 milliGRAM(s) Oral two times a day  dextrose 5%. 1000 milliLiter(s) (50 mL/Hr) IV Continuous <Continuous>  dextrose 50% Injectable 12.5 Gram(s) IV Push once  dextrose 50% Injectable 25 Gram(s) IV Push once  dextrose 50% Injectable 25 Gram(s) IV Push once  diphenhydrAMINE   Capsule 25 milliGRAM(s) Oral once  folic acid 1 milliGRAM(s) Oral daily  insulin lispro (HumaLOG) corrective regimen sliding scale   SubCutaneous three times a day before meals  insulin lispro (HumaLOG) corrective regimen sliding scale   SubCutaneous at bedtime  lactobacillus acidophilus 1 Tablet(s) Oral three times a day with meals  levothyroxine 75 MICROGram(s) Oral daily  pantoprazole    Tablet 40 milliGRAM(s) Oral two times a day  sucralfate 1 Gram(s) Oral four times a day    MEDICATIONS  (PRN):  acetaminophen   Tablet 650 milliGRAM(s) Oral every 6 hours PRN Mild Pain  ALBUTerol    0.083% 2.5 milliGRAM(s) Nebulizer every 4 hours PRN Shortness of Breath and/or Wheezing  dextrose 40% Gel 15 Gram(s) Oral once PRN Blood Glucose LESS THAN 70 milliGRAM(s)/deciliter  glucagon  Injectable 1 milliGRAM(s) IntraMuscular once PRN Glucose LESS THAN 70 milligrams/deciliter      Vital Signs Last 24 Hrs  T(C): 36.5 (14 Jul 2018 05:26), Max: 36.6 (13 Jul 2018 18:10)  T(F): 97.7 (14 Jul 2018 05:26), Max: 97.9 (13 Jul 2018 20:56)  HR: 71 (14 Jul 2018 05:26) (67 - 90)  BP: 139/74 (14 Jul 2018 05:26) (139/74 - 156/82)  BP(mean): --  RR: 17 (14 Jul 2018 05:26) (17 - 20)  SpO2: 94% (14 Jul 2018 05:26) (93% - 97%)    PHYSICAL EXAM  General: well developed  well nourished, in no acute distress  Head: atraumatic, normocephalic  ENT: sclera anicteric, buccal mucosa moist  Neck: supple, trachea midline  CV: S1 S2, regular rate and rhythm  Lungs: clear to auscultation, no wheezes/rhonchi  Abdomen: soft, nontender, bowel sounds present, pouchy  Extrem: no clubbing/cyanosis/edema  Skin: no significant increased ecchymosis/petechiae  Neuro: alert and no focal deficits      LABS:             7.6    5.21  )-----------( 161      ( 07-14 @ 07:38 )             24.7                7.8    6.09  )-----------( 161      ( 07-13 @ 07:26 )             25.5                8.0    x     )-----------( x        ( 07-12 @ 17:12 )             26.0                7.8    4.63  )-----------( 156      ( 07-12 @ 07:41 )             26.2                8.1    x     )-----------( x        ( 07-11 @ 16:12 )             25.5       07-14    145  |  114<H>  |  53<H>  ----------------------------<  116<H>  5.0   |  25  |  1.90<H>    Ca    8.1<L>      14 Jul 2018 07:38    TPro  5.6<L>  /  Alb  2.4<L>  /  TBili  0.3  /  DBili  x   /  AST  12<L>  /  ALT  <6<L>  /  AlkPhos  112  07-13 07-11 @ 09:46  PT10.9 INR1.00  PTT--  07-10 @ 07:34  PT-- INR--  PTT30.9      RADIOLOGY & ADDITIONAL STUDIES:    IMPRESSION/RECOMMENDATIONS:

## 2018-07-15 DIAGNOSIS — N18.9 CHRONIC KIDNEY DISEASE, UNSPECIFIED: ICD-10-CM

## 2018-07-15 LAB
-  AMIKACIN: SIGNIFICANT CHANGE UP
-  AMOXICILLIN/CLAVULANIC ACID: SIGNIFICANT CHANGE UP
-  AMPICILLIN/SULBACTAM: SIGNIFICANT CHANGE UP
-  AMPICILLIN: SIGNIFICANT CHANGE UP
-  AZTREONAM: SIGNIFICANT CHANGE UP
-  CEFAZOLIN: SIGNIFICANT CHANGE UP
-  CEFEPIME: SIGNIFICANT CHANGE UP
-  CEFOXITIN: SIGNIFICANT CHANGE UP
-  CEFTRIAXONE: SIGNIFICANT CHANGE UP
-  CIPROFLOXACIN: SIGNIFICANT CHANGE UP
-  ERTAPENEM: SIGNIFICANT CHANGE UP
-  GENTAMICIN: SIGNIFICANT CHANGE UP
-  LEVOFLOXACIN: SIGNIFICANT CHANGE UP
-  MEROPENEM: SIGNIFICANT CHANGE UP
-  NITROFURANTOIN: SIGNIFICANT CHANGE UP
-  PIPERACILLIN/TAZOBACTAM: SIGNIFICANT CHANGE UP
-  TOBRAMYCIN: SIGNIFICANT CHANGE UP
-  TRIMETHOPRIM/SULFAMETHOXAZOLE: SIGNIFICANT CHANGE UP
ANION GAP SERPL CALC-SCNC: 5 MMOL/L — SIGNIFICANT CHANGE UP (ref 5–17)
BUN SERPL-MCNC: 51 MG/DL — HIGH (ref 7–23)
CALCIUM SERPL-MCNC: 8.3 MG/DL — LOW (ref 8.5–10.1)
CHLORIDE SERPL-SCNC: 113 MMOL/L — HIGH (ref 96–108)
CO2 SERPL-SCNC: 27 MMOL/L — SIGNIFICANT CHANGE UP (ref 22–31)
CREAT SERPL-MCNC: 2.1 MG/DL — HIGH (ref 0.5–1.3)
CULTURE RESULTS: SIGNIFICANT CHANGE UP
GLUCOSE SERPL-MCNC: 110 MG/DL — HIGH (ref 70–99)
HCT VFR BLD CALC: 25.4 % — LOW (ref 34.5–45)
HGB BLD-MCNC: 7.6 G/DL — LOW (ref 11.5–15.5)
MCHC RBC-ENTMCNC: 27.5 PG — SIGNIFICANT CHANGE UP (ref 27–34)
MCHC RBC-ENTMCNC: 29.9 GM/DL — LOW (ref 32–36)
MCV RBC AUTO: 92 FL — SIGNIFICANT CHANGE UP (ref 80–100)
METHOD TYPE: SIGNIFICANT CHANGE UP
NRBC # BLD: 0 /100 WBCS — SIGNIFICANT CHANGE UP (ref 0–0)
ORGANISM # SPEC MICROSCOPIC CNT: SIGNIFICANT CHANGE UP
ORGANISM # SPEC MICROSCOPIC CNT: SIGNIFICANT CHANGE UP
PLATELET # BLD AUTO: 177 K/UL — SIGNIFICANT CHANGE UP (ref 150–400)
POTASSIUM SERPL-MCNC: 5 MMOL/L — SIGNIFICANT CHANGE UP (ref 3.5–5.3)
POTASSIUM SERPL-SCNC: 5 MMOL/L — SIGNIFICANT CHANGE UP (ref 3.5–5.3)
RBC # BLD: 2.76 M/UL — LOW (ref 3.8–5.2)
RBC # FLD: 19.3 % — HIGH (ref 10.3–14.5)
SODIUM SERPL-SCNC: 145 MMOL/L — SIGNIFICANT CHANGE UP (ref 135–145)
SPECIMEN SOURCE: SIGNIFICANT CHANGE UP
WBC # BLD: 5.02 K/UL — SIGNIFICANT CHANGE UP (ref 3.8–10.5)
WBC # FLD AUTO: 5.02 K/UL — SIGNIFICANT CHANGE UP (ref 3.8–10.5)

## 2018-07-15 PROCEDURE — 99233 SBSQ HOSP IP/OBS HIGH 50: CPT

## 2018-07-15 RX ORDER — IPRATROPIUM/ALBUTEROL SULFATE 18-103MCG
3 AEROSOL WITH ADAPTER (GRAM) INHALATION EVERY 6 HOURS
Qty: 0 | Refills: 0 | Status: DISCONTINUED | OUTPATIENT
Start: 2018-07-15 | End: 2018-07-21

## 2018-07-15 RX ADMIN — Medication 1 GRAM(S): at 12:36

## 2018-07-15 RX ADMIN — Medication 3 MILLILITER(S): at 19:48

## 2018-07-15 RX ADMIN — Medication 1 GRAM(S): at 06:32

## 2018-07-15 RX ADMIN — Medication 1: at 12:26

## 2018-07-15 RX ADMIN — Medication 25 MILLIGRAM(S): at 08:13

## 2018-07-15 RX ADMIN — Medication 1 GRAM(S): at 17:46

## 2018-07-15 RX ADMIN — Medication 3 MILLILITER(S): at 13:32

## 2018-07-15 RX ADMIN — Medication 1 MILLIGRAM(S): at 12:36

## 2018-07-15 RX ADMIN — ALBUTEROL 2.5 MILLIGRAM(S): 90 AEROSOL, METERED ORAL at 07:47

## 2018-07-15 RX ADMIN — Medication 2: at 17:44

## 2018-07-15 RX ADMIN — PANTOPRAZOLE SODIUM 40 MILLIGRAM(S): 20 TABLET, DELAYED RELEASE ORAL at 06:32

## 2018-07-15 RX ADMIN — Medication 1 TABLET(S): at 08:31

## 2018-07-15 RX ADMIN — PANTOPRAZOLE SODIUM 40 MILLIGRAM(S): 20 TABLET, DELAYED RELEASE ORAL at 17:44

## 2018-07-15 RX ADMIN — Medication 0: at 08:31

## 2018-07-15 RX ADMIN — Medication 1 TABLET(S): at 17:44

## 2018-07-15 RX ADMIN — Medication 1 TABLET(S): at 12:36

## 2018-07-15 RX ADMIN — Medication 75 MICROGRAM(S): at 06:32

## 2018-07-15 NOTE — CONSULT NOTE ADULT - SUBJECTIVE AND OBJECTIVE BOX
Date/Time Patient Seen:  		  Referring MD:   Data Reviewed	       Patient is a 83y old  Female who presents with a chief complaint of profound anemia (07 Jul 2018 14:43)      Subjective/HPI  seen and examined  labs and imaging reviewed  ordered for NEBS by PMD  pt has Asthma    84 y/o F with PMHx DM2, hypothyroidism, asthma, arthritis, meningoma admitted with syncope secondary to anemia. Patient received 5 units of PRBC and 1 platelets, fobt negative, unclear source of bleed        PAST MEDICAL & SURGICAL HISTORY:  Arthritis  Asthma  Diabetes mellitus  Hypothyroidism  No significant past surgical history     Family History:  No pertinent family history in first degree relatives.     Social History:  Social History (marital status, living situation, occupation, tobacco use, alcohol and drug use, and sexual history): Patient lives with son and daughter in-law.  Per family patient ceased smoking cigarettes 15 years ago.  Patient drinks socially, and ambulates w/ assistance at home.     Medication list         MEDICATIONS  (STANDING):  ALBUTerol/ipratropium for Nebulization 3 milliLiter(s) Nebulizer every 6 hours  dextrose 5%. 1000 milliLiter(s) (50 mL/Hr) IV Continuous <Continuous>  dextrose 50% Injectable 12.5 Gram(s) IV Push once  dextrose 50% Injectable 25 Gram(s) IV Push once  dextrose 50% Injectable 25 Gram(s) IV Push once  folic acid 1 milliGRAM(s) Oral daily  insulin lispro (HumaLOG) corrective regimen sliding scale   SubCutaneous three times a day before meals  insulin lispro (HumaLOG) corrective regimen sliding scale   SubCutaneous at bedtime  lactobacillus acidophilus 1 Tablet(s) Oral three times a day with meals  levothyroxine 75 MICROGram(s) Oral daily  pantoprazole    Tablet 40 milliGRAM(s) Oral two times a day  sucralfate 1 Gram(s) Oral four times a day    MEDICATIONS  (PRN):  acetaminophen   Tablet 650 milliGRAM(s) Oral every 6 hours PRN Mild Pain  dextrose 40% Gel 15 Gram(s) Oral once PRN Blood Glucose LESS THAN 70 milliGRAM(s)/deciliter  glucagon  Injectable 1 milliGRAM(s) IntraMuscular once PRN Glucose LESS THAN 70 milligrams/deciliter         Vitals log        ICU Vital Signs Last 24 Hrs  T(C): 36.8 (15 Jul 2018 14:15), Max: 36.8 (15 Jul 2018 14:15)  T(F): 98.3 (15 Jul 2018 14:15), Max: 98.3 (15 Jul 2018 14:15)  HR: 79 (15 Jul 2018 14:15) (62 - 92)  BP: 117/74 (15 Jul 2018 14:15) (117/74 - 147/76)  BP(mean): --  ABP: --  ABP(mean): --  RR: 18 (15 Jul 2018 14:15) (18 - 20)  SpO2: 95% (15 Jul 2018 14:15) (94% - 98%)           Input and Output:  I&O's Detail    14 Jul 2018 07:01  -  15 Jul 2018 07:00  --------------------------------------------------------  IN:    Oral Fluid: 360 mL  Total IN: 360 mL    OUT:    Indwelling Catheter - Urethral: 2900 mL  Total OUT: 2900 mL    Total NET: -2540 mL          Lab Data                        7.6    5.02  )-----------( 177      ( 15 Jul 2018 07:49 )             25.4     07-15    145  |  113<H>  |  51<H>  ----------------------------<  110<H>  5.0   |  27  |  2.10<H>    Ca    8.3<L>      15 Jul 2018 07:49    TPro  5.6<L>  /  Alb  2.4<L>  /  TBili  0.3  /  DBili  x   /  AST  12<L>  /  ALT  <6<L>  /  AlkPhos  112  07-13      CARDIAC MARKERS ( 14 Jul 2018 07:38 )  .017 ng/mL / x     / 29 U/L / x     / 3.1 ng/mL  CARDIAC MARKERS ( 13 Jul 2018 22:45 )  .018 ng/mL / x     / 35 U/L / x     / 3.8 ng/mL        Review of Systems	      Objective     Physical Examination    heart s1s2  lung dec BS  abd soft  cn grossly int      Pertinent Lab findings & Imaging      Oliva:  NO   Adequate UO     I&O's Detail    14 Jul 2018 07:01  -  15 Jul 2018 07:00  --------------------------------------------------------  IN:    Oral Fluid: 360 mL  Total IN: 360 mL    OUT:    Indwelling Catheter - Urethral: 2900 mL  Total OUT: 2900 mL    Total NET: -2540 mL               Discussed with:     Cultures:	        Radiology

## 2018-07-15 NOTE — PROGRESS NOTE ADULT - ASSESSMENT
82 y/o F with PMHx DM2, hypothyroidism, asthma, arthritis, meningoma admitted with syncope secondary to anemia. Patient received 5 units of PRBC and 1 platelets, fobt negative, unclear source of bleed

## 2018-07-15 NOTE — PROGRESS NOTE ADULT - SUBJECTIVE AND OBJECTIVE BOX
[facility] -- Mabel Hernandez, Cesar Lopez, Devan Barrera Savella  Office # [phone]      Follow Up:  Syncope/GIB    Subjective/Observations: Seen and examined.  Resting comfortably in the chair but demanding to go back to bed.  Denies cp, sob or palpitations.  No signs of orthopnea or PND.        REVIEW OF SYSTEMS: All other review of systems is negative unless indicated above    PAST MEDICAL & SURGICAL HISTORY:  Arthritis  Asthma  Diabetes mellitus  Hypothyroidism  No significant past surgical history      MEDICATIONS  (STANDING):  ALBUTerol/ipratropium for Nebulization 3 milliLiter(s) Nebulizer every 6 hours  dextrose 5%. 1000 milliLiter(s) (50 mL/Hr) IV Continuous <Continuous>  dextrose 50% Injectable 12.5 Gram(s) IV Push once  dextrose 50% Injectable 25 Gram(s) IV Push once  dextrose 50% Injectable 25 Gram(s) IV Push once  folic acid 1 milliGRAM(s) Oral daily  insulin lispro (HumaLOG) corrective regimen sliding scale   SubCutaneous three times a day before meals  insulin lispro (HumaLOG) corrective regimen sliding scale   SubCutaneous at bedtime  lactobacillus acidophilus 1 Tablet(s) Oral three times a day with meals  levothyroxine 75 MICROGram(s) Oral daily  pantoprazole    Tablet 40 milliGRAM(s) Oral two times a day  sucralfate 1 Gram(s) Oral four times a day    MEDICATIONS  (PRN):  acetaminophen   Tablet 650 milliGRAM(s) Oral every 6 hours PRN Mild Pain  dextrose 40% Gel 15 Gram(s) Oral once PRN Blood Glucose LESS THAN 70 milliGRAM(s)/deciliter  glucagon  Injectable 1 milliGRAM(s) IntraMuscular once PRN Glucose LESS THAN 70 milligrams/deciliter      Allergies    Levaquin (Hives)  sulfa drugs (Unknown)    Intolerances            Vital Signs Last 24 Hrs  T(C): 36.8 (15 Jul 2018 14:15), Max: 36.8 (15 Jul 2018 14:15)  T(F): 98.3 (15 Jul 2018 14:15), Max: 98.3 (15 Jul 2018 14:15)  HR: 79 (15 Jul 2018 14:15) (68 - 92)  BP: 117/74 (15 Jul 2018 14:15) (117/74 - 147/76)  BP(mean): --  RR: 18 (15 Jul 2018 14:15) (18 - 20)  SpO2: 95% (15 Jul 2018 14:15) (94% - 98%)    I&O's Summary    14 Jul 2018 07:01  -  15 Jul 2018 07:00  --------------------------------------------------------  IN: 360 mL / OUT: 2900 mL / NET: -2540 mL    15 Jul 2018 07:01  -  15 Jul 2018 15:20  --------------------------------------------------------  IN: 720 mL / OUT: 0 mL / NET: 720 mL          PHYSICAL EXAM:  TELE: Not on tele  Constitutional: NAD, awake and alert, well-developed, obese  HEENT: Moist Mucous Membranes, Anicteric  Pulmonary: Non-labored, breath sounds are diminished bilaterally, No wheezing, rales or rhonchi  Cardiovascular: Regular, S1 and S2, No murmurs, rubs, gallops or clicks  Gastrointestinal: Bowel Sounds present, soft, nontender.   Lymph: Chronic edema. No lymphadenopathy.  Skin: rough skin turgor bilateral lower extremities, intact (chronic)  Psych:  Mood & affect appropriate    LABS: All Labs Reviewed:                        7.6    5.02  )-----------( 177      ( 15 Jul 2018 07:49 )             25.4                         7.6    5.21  )-----------( 161      ( 14 Jul 2018 07:38 )             24.7                         7.8    6.09  )-----------( 161      ( 13 Jul 2018 07:26 )             25.5     15 Jul 2018 07:49    145    |  113    |  51     ----------------------------<  110    5.0     |  27     |  2.10   14 Jul 2018 07:38    145    |  114    |  53     ----------------------------<  116    5.0     |  25     |  1.90   13 Jul 2018 22:45    143    |  112    |  54     ----------------------------<  124    5.1     |  27     |  1.90     Ca    8.3        15 Jul 2018 07:49  Ca    8.1        14 Jul 2018 07:38  Ca    7.7        13 Jul 2018 22:45    TPro  5.6    /  Alb  2.4    /  TBili  0.3    /  DBili  x      /  AST  12     /  ALT  <6     /  AlkPhos  112    13 Jul 2018 22:45      CARDIAC MARKERS ( 14 Jul 2018 07:38 )  .017 ng/mL / x     / 29 U/L / x     / 3.1 ng/mL  CARDIAC MARKERS ( 13 Jul 2018 22:45 )  .018 ng/mL / x     / 35 U/L / x     / 3.8 ng/mL    < from: 12 Lead ECG (07.13.18 @ 22:04) >  Ventricular Rate 68 BPM    Atrial Rate 68 BPM    P-R Interval 142 ms    QRS Duration 80 ms    Q-T Interval 376 ms    QTC Calculation(Bezet) 399 ms    P Axis 44 degrees    R Axis 6 degrees    T Axis 23 degrees    Diagnosis Line Normal sinus rhythm  Low voltage QRS  Septal infarct , age undetermined  Abnormal ECG  When compared with ECG of 07-JUL-2018 13:03,  Septal infarct is now present  Confirmed by MAYRA COOPER ([id number]) on 7/14/2018 5:26:45 PM    < end of copied text >    < from: NM GI Bleed Localization (07.11.18 @ 17:47) >    EXAM:  NM GI BLEEDING IMG                            PROCEDURE DATE:  07/11/2018          INTERPRETATION:  RADIOPHARMACEUTICAL: 20 mCi Tc-99m labeled autologous   red blood cells, I.V.    CLINICAL STATEMENT: 83-year-old female with GI bleeding referred for   localization of bleeding site.    TECHNIQUE:  Dynamic images of the anterior abdomen/pelvis were obtained   for 48 minutes following administration of radiopharmaceutical. Patient   refused additional imaging.    FINDINGS: There is physiologic distribution of radiolabeled red cells in   the abdomen and pelvis. There is no abnormal focus of increased activity   to suggest the presence of active bleeding.    IMPRESSION: Normal limited GI bleeding scan .    No evidence of active bleeding site.    Patient was imaged for 45 minutes. Patient refused additional imaging.                        SHADI FUNK M.D., NUCLEAR MEDICINE ATTENDING  This document has been electronically signed. Jul 11 2018  8:12PM    < end of copied text >

## 2018-07-15 NOTE — PROGRESS NOTE ADULT - SUBJECTIVE AND OBJECTIVE BOX
INTERVAL HPI/OVERNIGHT EVENTS:  pt seen and examined earlier this am  dark stool o/n    MEDICATIONS  (STANDING):  clonazePAM Tablet 0.5 milliGRAM(s) Oral two times a day  dextrose 5% + sodium chloride 0.45%. 1000 milliLiter(s) (75 mL/Hr) IV Continuous <Continuous>  dextrose 5%. 1000 milliLiter(s) (50 mL/Hr) IV Continuous <Continuous>  dextrose 50% Injectable 12.5 Gram(s) IV Push once  dextrose 50% Injectable 25 Gram(s) IV Push once  dextrose 50% Injectable 25 Gram(s) IV Push once  diphenhydrAMINE   Capsule 25 milliGRAM(s) Oral once  insulin lispro (HumaLOG) corrective regimen sliding scale   SubCutaneous three times a day before meals  insulin lispro (HumaLOG) corrective regimen sliding scale   SubCutaneous at bedtime  lactobacillus acidophilus 1 Tablet(s) Oral three times a day with meals  levothyroxine 75 MICROGram(s) Oral daily  pantoprazole    Tablet 40 milliGRAM(s) Oral two times a day  sucralfate 1 Gram(s) Oral four times a day    MEDICATIONS  (PRN):  acetaminophen   Tablet 650 milliGRAM(s) Oral every 6 hours PRN Mild Pain  ALBUTerol    0.083% 2.5 milliGRAM(s) Nebulizer every 4 hours PRN Shortness of Breath and/or Wheezing  dextrose 40% Gel 15 Gram(s) Oral once PRN Blood Glucose LESS THAN 70 milliGRAM(s)/deciliter  glucagon  Injectable 1 milliGRAM(s) IntraMuscular once PRN Glucose LESS THAN 70 milligrams/deciliter      Allergies    Levaquin (Hives)  sulfa drugs (Unknown)    Intolerances        Review of Systems:    General:  No wt loss, fevers, chills, night sweats, fatigue   Eyes:  Good vision, no reported pain  ENT:  No sore throat, pain, runny nose, dysphagia  CV:  No pain, palpitations, hypo/hypertension  Resp:  No dyspnea, cough, tachypnea, wheezing  GI:  No pain, No nausea, No vomiting, No diarrhea, No constipation, No weight loss, No fever, No pruritis, No rectal bleeding, No melena, No dysphagia  :  No pain, bleeding, incontinence, nocturia  Muscle:  No pain, weakness  Neuro:  No weakness, tingling, memory problems  Psych:  No fatigue, insomnia, mood problems, depression  Endocrine:  No polyuria, polydypsia, cold/heat intolerance  Heme:  No petechiae, ecchymosis, easy bruisability  Skin:  No rash, tattoos, scars, edema      Vital Signs Last 24 Hrs  T(C): 36.8 (13 Jul 2018 05:25), Max: 36.8 (13 Jul 2018 05:25)  T(F): 98.2 (13 Jul 2018 05:25), Max: 98.2 (13 Jul 2018 05:25)  HR: 66 (13 Jul 2018 05:25) (66 - 76)  BP: 153/86 (13 Jul 2018 05:25) (138/66 - 153/86)  BP(mean): --  RR: 18 (13 Jul 2018 05:25) (18 - 18)  SpO2: 95% (13 Jul 2018 05:25) (92% - 95%)    PHYSICAL EXAM:    Constitutional: NAD, lying in bed  HEENT: EOMI, perrl  Neck: No LAD  Respiratory: dec bs  Cardiovascular: S1 and S2, RRR  Gastrointestinal: obese abd BS+, soft, NT, nd  Extremities: +edema and blistering to le  Vascular: 2+ peripheral pulses  Neurological: Awake alert confusion  Skin: see above        LABS:                        7.8    6.09  )-----------( 161      ( 13 Jul 2018 07:26 )             25.5     07-13    145  |  112<H>  |  57<H>  ----------------------------<  127<H>  5.3   |  26  |  2.20<H>    Ca    7.7<L>      13 Jul 2018 07:26            RADIOLOGY & ADDITIONAL TESTS:

## 2018-07-15 NOTE — PROGRESS NOTE ADULT - SUBJECTIVE AND OBJECTIVE BOX
Patient is a 83y old  Female who presents with a chief complaint of profound anemia (07 Jul 2018 14:43)       INTERVAL HPI/OVERNIGHT EVENTS: Patient seen and examined at bedside. Denies any new symptoms, complaints     MEDICATIONS  (STANDING):  ALBUTerol/ipratropium for Nebulization 3 milliLiter(s) Nebulizer every 6 hours  dextrose 5%. 1000 milliLiter(s) (50 mL/Hr) IV Continuous <Continuous>  dextrose 50% Injectable 12.5 Gram(s) IV Push once  dextrose 50% Injectable 25 Gram(s) IV Push once  dextrose 50% Injectable 25 Gram(s) IV Push once  folic acid 1 milliGRAM(s) Oral daily  insulin lispro (HumaLOG) corrective regimen sliding scale   SubCutaneous three times a day before meals  insulin lispro (HumaLOG) corrective regimen sliding scale   SubCutaneous at bedtime  lactobacillus acidophilus 1 Tablet(s) Oral three times a day with meals  levothyroxine 75 MICROGram(s) Oral daily  pantoprazole    Tablet 40 milliGRAM(s) Oral two times a day  sucralfate 1 Gram(s) Oral four times a day    MEDICATIONS  (PRN):  acetaminophen   Tablet 650 milliGRAM(s) Oral every 6 hours PRN Mild Pain  dextrose 40% Gel 15 Gram(s) Oral once PRN Blood Glucose LESS THAN 70 milliGRAM(s)/deciliter  glucagon  Injectable 1 milliGRAM(s) IntraMuscular once PRN Glucose LESS THAN 70 milligrams/deciliter      Allergies    Levaquin (Hives)  sulfa drugs (Unknown)    Intolerances        REVIEW OF SYSTEMS:  All 10 systems reviewed and are negative except as above.   Vital Signs Last 24 Hrs  T(C): 36.4 (15 Jul 2018 04:24), Max: 36.7 (14 Jul 2018 20:41)  T(F): 97.6 (15 Jul 2018 04:24), Max: 98.1 (14 Jul 2018 20:41)  HR: 70 (15 Jul 2018 07:50) (61 - 77)  BP: 147/76 (15 Jul 2018 04:24) (122/73 - 147/76)  BP(mean): --  RR: 18 (15 Jul 2018 04:24) (17 - 18)  SpO2: 95% (15 Jul 2018 07:50) (94% - 99%)    PHYSICAL EXAM:  GENERAL: NAD, well-groomed, Awake, Alert   HEAD:  Atraumatic, Normocephalic  EYES: EOMI, PERRLA, conjunctiva and sclera clear  ENMT: No tonsillar erythema, exudates, or enlargement; Moist mucous membranes  NECK: Supple, No JVD, Normal thyroid  NERVOUS SYSTEM:  Alert & Oriented X3, Non focal   CHEST/LUNG: Clear to auscultation bilaterally; No rales, rhonchi, wheezing, or rubs  HEART: S1S2+, Regular rate and rhythm  ABDOMEN: Soft, Nontender, Nondistended; Bowel sounds present  EXTREMITIES:  2+ Peripheral Pulses, No clubbing, cyanosis, or edema  LYMPH: No lymphadenopathy noted  SKIN: No rashes or lesions    LABS:                        7.6    5.02  )-----------( 177      ( 15 Jul 2018 07:49 )             25.4     15 Jul 2018 07:49    145    |  113    |  51     ----------------------------<  110    5.0     |  27     |  2.10     Ca    8.3        15 Jul 2018 07:49        CAPILLARY BLOOD GLUCOSE      POCT Blood Glucose.: 124 mg/dL (15 Jul 2018 08:00)  POCT Blood Glucose.: 206 mg/dL (14 Jul 2018 21:09)  POCT Blood Glucose.: 196 mg/dL (14 Jul 2018 17:01)  POCT Blood Glucose.: 206 mg/dL (14 Jul 2018 12:04)    BLOOD CULTURE  07-13 @ 21:56   >100,000 CFU/ml Proteus mirabilis  --  --    RADIOLOGY & ADDITIONAL TESTS:    Imaging Personally Reviewed:  [ ] YES     Consultant(s) Notes Reviewed:      Care Discussed with Consultants/Other Providers:

## 2018-07-15 NOTE — PROVIDER CONTACT NOTE (OTHER) - ASSESSMENT
patient call and complain of having chest pain  a 6 out 10 on scale pain. pt stated she feels like a pressure on her chest, anxious  and SOB  not radiating anyway but the chest area. v/s taken 36.6 /82 HR 70 RR 20 on RA 97%, MD notified
Red small lesions around bilat arms, chest and mid back

## 2018-07-15 NOTE — PROGRESS NOTE ADULT - PROBLEM SELECTOR PLAN 1
- possibly 2/2 GIB: GI (Dr. Daly) EGD showed duodenal ulcer  -GI f/u requested. Plan for colonoscopy in am   - Patient is  s/p transfusion of total 5 units PRBCs and 1 unit platelets.  - monitor H/H, transfuse as necessary.  - continue Protonix   -apprec gi and hemat recs  -pt underwent bleeding scan but could only tolerate 45 minutes dues to being uncomfortable on back and in pain  -tolerating diet well  -dc planning as h.h stable, daughter does not want pt to go back to [facility] rehab prefers another facility

## 2018-07-15 NOTE — PROGRESS NOTE ADULT - SUBJECTIVE AND OBJECTIVE BOX
NEPHROLOGY INTERVAL HPI/OVERNIGHT EVENTS:  HPI:  82 y/o F with PMHx DM2, hypothyroidism, asthma, arthritis, meningoma (chronic), and  BIBEMS after syncopal episode at [facility]. Patient is a poor historian and is unsure why she is in the hospital.  Family at bedside reports that patient was discharged from [facility] the previous day after being admitted with AMS 2/2 UTI.  Family reports that yesterday patient was SOB.  They report that she bleeds easily, but denies knowledge of blood in stools, changes in urine color, open wounds. Patient confirms abdominal pain. She denies chest pain, shortness of breath, palpitations, nausea or vomiting. She also denies dizziness blurry vision.    Follow up BARBARA/CKD  No c/o     PAST MEDICAL & SURGICAL HISTORY:  Arthritis  Asthma  Diabetes mellitus  Hypothyroidism  No significant past surgical history      MEDICATIONS  (STANDING):  ALBUTerol/ipratropium for Nebulization 3 milliLiter(s) Nebulizer every 6 hours  dextrose 5%. 1000 milliLiter(s) (50 mL/Hr) IV Continuous <Continuous>  dextrose 50% Injectable 12.5 Gram(s) IV Push once  dextrose 50% Injectable 25 Gram(s) IV Push once  dextrose 50% Injectable 25 Gram(s) IV Push once  folic acid 1 milliGRAM(s) Oral daily  insulin lispro (HumaLOG) corrective regimen sliding scale   SubCutaneous three times a day before meals  insulin lispro (HumaLOG) corrective regimen sliding scale   SubCutaneous at bedtime  lactobacillus acidophilus 1 Tablet(s) Oral three times a day with meals  levothyroxine 75 MICROGram(s) Oral daily  pantoprazole    Tablet 40 milliGRAM(s) Oral two times a day  sucralfate 1 Gram(s) Oral four times a day    MEDICATIONS  (PRN):  acetaminophen   Tablet 650 milliGRAM(s) Oral every 6 hours PRN Mild Pain  dextrose 40% Gel 15 Gram(s) Oral once PRN Blood Glucose LESS THAN 70 milliGRAM(s)/deciliter  glucagon  Injectable 1 milliGRAM(s) IntraMuscular once PRN Glucose LESS THAN 70 milligrams/deciliter      Allergies    Levaquin (Hives)  sulfa drugs (Unknown)    Intolerances        Vital Signs Last 24 Hrs  T(C): 36.4 (15 Jul 2018 04:24), Max: 36.7 (2018 20:41)  T(F): 97.6 (15 Jul 2018 04:24), Max: 98.1 (2018 20:41)  HR: 70 (15 Jul 2018 07:50) (61 - 77)  BP: 147/76 (15 Jul 2018 04:24) (122/73 - 147/76)  BP(mean): --  RR: 18 (15 Jul 2018 04:24) (17 - 18)  SpO2: 95% (15 Jul 2018 07:50) (94% - 99%)  Daily     Daily     PHYSICAL EXAM:  GENERAL: NAD, Awake, Alert   HEAD:  Atraumatic, Normocephalic  EYES: EOMI, PERRLA, conjunctiva and sclera clear  ENMT: No tonsillar erythema, exudates, or enlargement; Moist mucous membranes  NECK: Supple, No JVD, Normal thyroid  NERVOUS SYSTEM:  Alert & Oriented X3, Non focal   CHEST/LUNG: Clear to auscultation bilaterally; No rales, rhonchi, wheezing, or rubs  HEART: S1S2+, Regular rate and rhythm  ABDOMEN: Soft, Nontender, Nondistended; Bowel sounds present  EXTREMITIES:  2+ Peripheral Pulses, No clubbing, cyanosis, or edema  LYMPH: No lymphadenopathy noted  SKIN: No rashes or lesions    LABS:                        7.6    5.02  )-----------( 177      ( 15 Jul 2018 07:49 )             25.4     07-15    145  |  113<H>  |  51<H>  ----------------------------<  110<H>  5.0   |  27  |  2.10<H>    Ca    8.3<L>      15 Jul 2018 07:49    TPro  5.6<L>  /  Alb  2.4<L>  /  TBili  0.3  /  DBili  x   /  AST  12<L>  /  ALT  <6<L>  /  AlkPhos  112  07-13      Urinalysis Basic - ( 2018 20:24 )    Color: Pale Yellow / Appearance: Turbid / S.005 / pH: x  Gluc: x / Ketone: Negative  / Bili: Negative / Urobili: Negative   Blood: x / Protein: 75 mg/dL / Nitrite: Negative   Leuk Esterase: Moderate / RBC: 25-50 /HPF / WBC >50   Sq Epi: x / Non Sq Epi: Moderate / Bacteria: Moderate              RADIOLOGY & ADDITIONAL TESTS:

## 2018-07-15 NOTE — PROGRESS NOTE ADULT - ASSESSMENT
82 y/o F with PMHx DM2, hypothyroidism, asthma, arthritis, meningoma (chronic), and  BIBEMS after syncopal episode at [facility]. Admitted to ICU w/ profound anemia hgb 3.8, possibly 2/2 acute GIB sec to duodenal ulcer

## 2018-07-15 NOTE — PROVIDER CONTACT NOTE (OTHER) - ACTION/TREATMENT ORDERED:
Bed bath given with lotion applied. Relief noted. Benadryl given by night RN with relief. no further complaints this shift

## 2018-07-15 NOTE — PROGRESS NOTE ADULT - PROBLEM SELECTOR PLAN 1
colonoscopy offered, presently patient is refusing  I spoke with daughter prince; she will talk to her mother and let us know if she would agree or not  if agreeable tentative colonoscopy on tuesday

## 2018-07-15 NOTE — PROGRESS NOTE ADULT - ASSESSMENT
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 Renal function fluctuates but relatively stable   Hypernatremia - Resolved  Hyperkalemia  - Resolved   Anemia: Blood loss anemia, severe: GIB  Arthritis  Asthma  Diabetes mellitus  Lymphedema        - No indication for RRT, shall follow closely  - No indication for kidney biopsy. No signs of underlying GN or nephrotic syndrome   - No uremic bleeding diathesis. If bleeding becomes a recurrent issue, will consider on dose of DDAVP 0.3 /kg bw  - Urine studies reviewed. No real proteinuria. FeNa does not suggest pre-renal. S/p brief IVF. Off IVF  - Low K diet   - Edema is most lymphedema although some contribution from fluid. We will consider adding low dose diuretics. S/p Lasix 20 mg IV x 1  - She will benefit from ACEi if renal function. This can be started outpatient     No renal objection for d/c planning. She will need outpatient follow up

## 2018-07-15 NOTE — PROVIDER CONTACT NOTE (OTHER) - SITUATION
At Report received from RN at 7am. Patient c/o itching and hives.
patient call and complain of having chest pain

## 2018-07-15 NOTE — PROGRESS NOTE ADULT - ASSESSMENT
83 year old female with DM2, HTN, here with an episode of syncope, found to have significant anemia. Improvement in lethargy with PRBC transfusions    - Patient presented with profound anemia, s/p PRBC's x 5 units total and 1 unit of platelets    - H/H holding  today's = 7.6/25.4 from 7.6/24.7 yesterday.  Transfuse per Primary or GI.   - s/p EGD 7/9 with clean based duodenal ulcer, gastritis, hiatal hernia  - s/p bleeding scan which was negative, although, unable to complete due to patient's refusal to have anymore tests  -  Chronic lower extremity edema unchanged.    Administer Lasix after blood products transfusion to avoid fluid volume overload   - There is no echo on record so her LV function is unknown.     - Hemodynamics stable as per flow sheet   - Very mild troponin leak 2/2 demand in the setting of severe anemia  - EKG is SR with no sign of ischemia or chamber enlargement  - Baseline creatinine unknown, though normal in 2016, BARBARA likely due to her profound anemia.  Avoid nephrotoxics.  Renal following  - Monitor electrolytes. Keep K>4, Mg>2  - Further cardiac workup as case unfolds  - D/C planning for Monday back to [facility] as per primary team/SW ( when H/H stable)     Christine Thomson NP-C  Cardiology 83 year old female with DM2, HTN, here with an episode of syncope, found to have significant anemia. Improvement in lethargy with PRBC transfusions    - Patient presented with profound anemia, s/p PRBC's x 5 units total and 1 unit of platelets    - H/H holding  today's = 7.6/25.4 from 7.6/24.7 yesterday.  Transfuse per Primary or GI.   - s/p EGD 7/9 with clean based duodenal ulcer, gastritis, hiatal hernia  - s/p bleeding scan which was negative, although, unable to complete due to patient's refusal to have anymore tests  - Now will need a colonoscopy but pt refusing  -  Currently optimized as best as possible from a CV POV for a possible low risk endoscopic GI procedure if pt is willing to undergo  -  Chronic lower extremity edema unchanged.    Administer Lasix after blood products transfusion to avoid fluid volume overload   - There is no echo on record so her LV function is unknown.     - Hemodynamics stable as per flow sheet   - Very mild troponin leak 2/2 demand in the setting of severe anemia  - EKG is SR with no sign of ischemia or chamber enlargement  - Baseline creatinine unknown, though normal in 2016, BARBARA likely due to her profound anemia.  Avoid nephrotoxics.  Renal following  - Monitor electrolytes. Keep K>4, Mg>2  - Further cardiac workup as case unfolds      Christine Thomson NP-C  Cardiology

## 2018-07-15 NOTE — CONSULT NOTE ADULT - PROBLEM SELECTOR RECOMMENDATION 2
Asthma hx  on NEBS now as per PMD  monitor sx  pt does not see a pulmonary specialist in the community  would benefit from PFT as outpatient  will monitor vs and Sat and sx while inpatient  discussed with pt

## 2018-07-16 DIAGNOSIS — R60.0 LOCALIZED EDEMA: ICD-10-CM

## 2018-07-16 LAB
-  AMIKACIN: SIGNIFICANT CHANGE UP
-  AMOXICILLIN/CLAVULANIC ACID: SIGNIFICANT CHANGE UP
-  AMPICILLIN/SULBACTAM: SIGNIFICANT CHANGE UP
-  AMPICILLIN: SIGNIFICANT CHANGE UP
-  AZTREONAM: SIGNIFICANT CHANGE UP
-  CEFAZOLIN: SIGNIFICANT CHANGE UP
-  CEFEPIME: SIGNIFICANT CHANGE UP
-  CEFOXITIN: SIGNIFICANT CHANGE UP
-  CEFTRIAXONE: SIGNIFICANT CHANGE UP
-  CIPROFLOXACIN: SIGNIFICANT CHANGE UP
-  ERTAPENEM: SIGNIFICANT CHANGE UP
-  GENTAMICIN: SIGNIFICANT CHANGE UP
-  LEVOFLOXACIN: SIGNIFICANT CHANGE UP
-  MEROPENEM: SIGNIFICANT CHANGE UP
-  NITROFURANTOIN: SIGNIFICANT CHANGE UP
-  PIPERACILLIN/TAZOBACTAM: SIGNIFICANT CHANGE UP
-  TOBRAMYCIN: SIGNIFICANT CHANGE UP
-  TRIMETHOPRIM/SULFAMETHOXAZOLE: SIGNIFICANT CHANGE UP
ANION GAP SERPL CALC-SCNC: 5 MMOL/L — SIGNIFICANT CHANGE UP (ref 5–17)
BUN SERPL-MCNC: 46 MG/DL — HIGH (ref 7–23)
CALCIUM SERPL-MCNC: 8.2 MG/DL — LOW (ref 8.5–10.1)
CHLORIDE SERPL-SCNC: 112 MMOL/L — HIGH (ref 96–108)
CO2 SERPL-SCNC: 27 MMOL/L — SIGNIFICANT CHANGE UP (ref 22–31)
CREAT SERPL-MCNC: 1.9 MG/DL — HIGH (ref 0.5–1.3)
CULTURE RESULTS: SIGNIFICANT CHANGE UP
GLUCOSE SERPL-MCNC: 109 MG/DL — HIGH (ref 70–99)
HCT VFR BLD CALC: 25.8 % — LOW (ref 34.5–45)
HGB BLD-MCNC: 7.7 G/DL — LOW (ref 11.5–15.5)
MCHC RBC-ENTMCNC: 27.2 PG — SIGNIFICANT CHANGE UP (ref 27–34)
MCHC RBC-ENTMCNC: 29.8 GM/DL — LOW (ref 32–36)
MCV RBC AUTO: 91.2 FL — SIGNIFICANT CHANGE UP (ref 80–100)
METHOD TYPE: SIGNIFICANT CHANGE UP
NRBC # BLD: 0 /100 WBCS — SIGNIFICANT CHANGE UP (ref 0–0)
ORGANISM # SPEC MICROSCOPIC CNT: SIGNIFICANT CHANGE UP
ORGANISM # SPEC MICROSCOPIC CNT: SIGNIFICANT CHANGE UP
PLATELET # BLD AUTO: 197 K/UL — SIGNIFICANT CHANGE UP (ref 150–400)
POTASSIUM SERPL-MCNC: 4.8 MMOL/L — SIGNIFICANT CHANGE UP (ref 3.5–5.3)
POTASSIUM SERPL-SCNC: 4.8 MMOL/L — SIGNIFICANT CHANGE UP (ref 3.5–5.3)
RBC # BLD: 2.83 M/UL — LOW (ref 3.8–5.2)
RBC # FLD: 18.9 % — HIGH (ref 10.3–14.5)
SODIUM SERPL-SCNC: 144 MMOL/L — SIGNIFICANT CHANGE UP (ref 135–145)
SPECIMEN SOURCE: SIGNIFICANT CHANGE UP
WBC # BLD: 4.83 K/UL — SIGNIFICANT CHANGE UP (ref 3.8–10.5)
WBC # FLD AUTO: 4.83 K/UL — SIGNIFICANT CHANGE UP (ref 3.8–10.5)

## 2018-07-16 PROCEDURE — 93970 EXTREMITY STUDY: CPT | Mod: 26

## 2018-07-16 PROCEDURE — 99232 SBSQ HOSP IP/OBS MODERATE 35: CPT

## 2018-07-16 PROCEDURE — 99233 SBSQ HOSP IP/OBS HIGH 50: CPT

## 2018-07-16 RX ORDER — ERYTHROPOIETIN 10000 [IU]/ML
10000 INJECTION, SOLUTION INTRAVENOUS; SUBCUTANEOUS
Qty: 0 | Refills: 0 | Status: DISCONTINUED | OUTPATIENT
Start: 2018-07-16 | End: 2018-07-25

## 2018-07-16 RX ORDER — NYSTATIN CREAM 100000 [USP'U]/G
1 CREAM TOPICAL ONCE
Qty: 0 | Refills: 0 | Status: COMPLETED | OUTPATIENT
Start: 2018-07-16 | End: 2018-07-16

## 2018-07-16 RX ORDER — SOD SULF/SODIUM/NAHCO3/KCL/PEG
1000 SOLUTION, RECONSTITUTED, ORAL ORAL EVERY 4 HOURS
Qty: 0 | Refills: 0 | Status: COMPLETED | OUTPATIENT
Start: 2018-07-16 | End: 2018-07-16

## 2018-07-16 RX ADMIN — NYSTATIN CREAM 1 APPLICATION(S): 100000 CREAM TOPICAL at 12:27

## 2018-07-16 RX ADMIN — Medication 1 GRAM(S): at 05:50

## 2018-07-16 RX ADMIN — Medication 3 MILLILITER(S): at 08:00

## 2018-07-16 RX ADMIN — Medication 1 TABLET(S): at 17:59

## 2018-07-16 RX ADMIN — Medication 20 MILLIGRAM(S): at 21:19

## 2018-07-16 RX ADMIN — Medication 1000 MILLILITER(S): at 19:03

## 2018-07-16 RX ADMIN — Medication 1 TABLET(S): at 12:29

## 2018-07-16 RX ADMIN — Medication 1 MILLIGRAM(S): at 12:27

## 2018-07-16 RX ADMIN — Medication 1 TABLET(S): at 09:25

## 2018-07-16 RX ADMIN — Medication 3 MILLILITER(S): at 00:44

## 2018-07-16 RX ADMIN — Medication 3 MILLILITER(S): at 19:35

## 2018-07-16 RX ADMIN — Medication 1: at 12:27

## 2018-07-16 RX ADMIN — Medication 75 MICROGRAM(S): at 05:50

## 2018-07-16 RX ADMIN — PANTOPRAZOLE SODIUM 40 MILLIGRAM(S): 20 TABLET, DELAYED RELEASE ORAL at 18:00

## 2018-07-16 RX ADMIN — PANTOPRAZOLE SODIUM 40 MILLIGRAM(S): 20 TABLET, DELAYED RELEASE ORAL at 05:50

## 2018-07-16 RX ADMIN — Medication 1 GRAM(S): at 12:28

## 2018-07-16 RX ADMIN — Medication 1: at 17:57

## 2018-07-16 RX ADMIN — Medication 1 GRAM(S): at 17:59

## 2018-07-16 RX ADMIN — Medication 3 MILLILITER(S): at 13:08

## 2018-07-16 NOTE — PROGRESS NOTE ADULT - PROBLEM SELECTOR PLAN 1
anemia, oa, obesity, gerd  asthma  on nebs now  tolerating room air  monitor vs and HD and Sat  GI and Heme follow up  out of bed to chair  will follow

## 2018-07-16 NOTE — PROGRESS NOTE ADULT - ASSESSMENT
82 y/o F with PMHx DM2, hypothyroidism, asthma, arthritis, meningoma (chronic), and  brought in by EMS after syncopal episode at [facility]. Admitted to ICU w/ profound anemia hgb 3.8, possibly 2/2 acute GIB sec to duodenal ulcer 82 y/o F with PMHx DM2, hypothyroidism, asthma, arthritis, meningoma (chronic), and  brought in by EMS after syncopal episode at [facility]. Admitted to ICU w/ profound anemia hgb 3.8, possibly 2/2 acute GIB sec to duodenal ulcer. Colonoscopy tomorrow. Dopplers to r/o DVT in LE

## 2018-07-16 NOTE — PROGRESS NOTE ADULT - PROBLEM SELECTOR PLAN 8
- HOLD ALL NSAIDS  - tylenol 650mg PO q6 PRN mild pain Holding Clonazepam as of 7/15  patient tolerating   may have be contributing to disorientation/somnolence

## 2018-07-16 NOTE — PROGRESS NOTE ADULT - PROBLEM SELECTOR PLAN 2
- stable, improved  - Likely due to anemia, poor po intake.   - Continue to monitor closely.   - Transfuse/fluids as needed Bilateral  LE edema  and pain on palpitation  Dopplers ordered to r/o DVT  Continue  with SCDs, no anticoag due to workup of GI bleed

## 2018-07-16 NOTE — PROGRESS NOTE ADULT - SUBJECTIVE AND OBJECTIVE BOX
[facility] -- Mabel Hernandez, Cesar Lopez, Devan Barrera Savella  Office # [phone]      Follow Up:  Syncope/GIB, preop for Colonoscopy    Subjective/Observations: Seen and examined. Sitting in washing herself up this am.  Feeling good.  Denies cp, sob or palpitations.  No signs of orthopnea or PND.  Pt is agreeable for Colonoscopy now scheduled for 7/17.      REVIEW OF SYSTEMS: All other review of systems is negative unless indicated above    PAST MEDICAL & SURGICAL HISTORY:  Arthritis  Asthma  Diabetes mellitus  Hypothyroidism  No significant past surgical history      MEDICATIONS  (STANDING):  ALBUTerol/ipratropium for Nebulization 3 milliLiter(s) Nebulizer every 6 hours  bisacodyl 20 milliGRAM(s) Oral at bedtime  dextrose 5%. 1000 milliLiter(s) (50 mL/Hr) IV Continuous <Continuous>  dextrose 50% Injectable 12.5 Gram(s) IV Push once  dextrose 50% Injectable 25 Gram(s) IV Push once  dextrose 50% Injectable 25 Gram(s) IV Push once  epoetin sherita Injectable 70650 Unit(s) SubCutaneous <User Schedule>  folic acid 1 milliGRAM(s) Oral daily  insulin lispro (HumaLOG) corrective regimen sliding scale   SubCutaneous three times a day before meals  insulin lispro (HumaLOG) corrective regimen sliding scale   SubCutaneous at bedtime  lactobacillus acidophilus 1 Tablet(s) Oral three times a day with meals  levothyroxine 75 MICROGram(s) Oral daily  nystatin Powder 1 Application(s) Topical once  pantoprazole    Tablet 40 milliGRAM(s) Oral two times a day  polyethylene glycol/electrolyte Solution 1000 milliLiter(s) Oral every 4 hours  sucralfate 1 Gram(s) Oral four times a day    MEDICATIONS  (PRN):  acetaminophen   Tablet 650 milliGRAM(s) Oral every 6 hours PRN Mild Pain  dextrose 40% Gel 15 Gram(s) Oral once PRN Blood Glucose LESS THAN 70 milliGRAM(s)/deciliter  glucagon  Injectable 1 milliGRAM(s) IntraMuscular once PRN Glucose LESS THAN 70 milligrams/deciliter      Allergies    Levaquin (Hives)  sulfa drugs (Unknown)    Intolerances            Vital Signs Last 24 Hrs  T(C): 36.8 (16 Jul 2018 05:24), Max: 36.9 (15 Jul 2018 21:20)  T(F): 98.2 (16 Jul 2018 05:24), Max: 98.5 (15 Jul 2018 21:20)  HR: 78 (16 Jul 2018 08:00) (65 - 79)  BP: 150/74 (16 Jul 2018 05:24) (117/74 - 150/74)  BP(mean): --  RR: 18 (16 Jul 2018 05:24) (18 - 18)  SpO2: 93% (16 Jul 2018 08:00) (92% - 95%)    I&O's Summary    15 Jul 2018 07:01  -  16 Jul 2018 07:00  --------------------------------------------------------  IN: 1060 mL / OUT: 2100 mL / NET: -1040 mL          PHYSICAL EXAM:  TELE: Not on tele  Constitutional: NAD, awake and alert, well-developed, obese  HEENT: Moist Mucous Membranes, Anicteric  Pulmonary: Non-labored, breath sounds are clear bilaterally, No wheezing, rales or rhonchi  Cardiovascular: Regular, S1 and S2, No murmurs, rubs, gallops or clicks  Gastrointestinal: Bowel Sounds present, soft, nontender. Obese abdomen  Lymph: chronic peripheral edema. No lymphadenopathy.  Skin: rough skin turgor bilateral lower extremities, intact  Psych:  Mood & affect appropriate    LABS: All Labs Reviewed:                        7.7    4.83  )-----------( 197      ( 16 Jul 2018 07:01 )             25.8                         7.6    5.02  )-----------( 177      ( 15 Jul 2018 07:49 )             25.4                         7.6    5.21  )-----------( 161      ( 14 Jul 2018 07:38 )             24.7     16 Jul 2018 07:01    144    |  112    |  46     ----------------------------<  109    4.8     |  27     |  1.90   15 Jul 2018 07:49    145    |  113    |  51     ----------------------------<  110    5.0     |  27     |  2.10   14 Jul 2018 07:38    145    |  114    |  53     ----------------------------<  116    5.0     |  25     |  1.90     Ca    8.2        16 Jul 2018 07:01  Ca    8.3        15 Jul 2018 07:49  Ca    8.1        14 Jul 2018 07:38    TPro  5.6    /  Alb  2.4    /  TBili  0.3    /  DBili  x      /  AST  12     /  ALT  <6     /  AlkPhos  112    13 Jul 2018 22:45    < from: 12 Lead ECG (07.13.18 @ 22:04) >  Ventricular Rate 68 BPM    Atrial Rate 68 BPM    P-R Interval 142 ms    QRS Duration 80 ms    Q-T Interval 376 ms    QTC Calculation(Bezet) 399 ms    P Axis 44 degrees    R Axis 6 degrees    T Axis 23 degrees    Diagnosis Line Normal sinus rhythm  Low voltage QRS  Septal infarct , age undetermined  Abnormal ECG  When compared with ECG of 07-JUL-2018 13:03,  Septal infarct is now present  Confirmed by MAYRA COOPER ([id number]) on 7/14/2018 5:26:45 PM    < end of copied text >    < from: NM GI Bleed Localization (07.11.18 @ 17:47) >  EXAM:  NM GI BLEEDING IMG                            PROCEDURE DATE:  07/11/2018          INTERPRETATION:  RADIOPHARMACEUTICAL: 20 mCi Tc-99m labeled autologous   red blood cells, I.V.    CLINICAL STATEMENT: 83-year-old female with GI bleeding referred for   localization of bleeding site.    TECHNIQUE:  Dynamic images of the anterior abdomen/pelvis were obtained   for 48 minutes following administration of radiopharmaceutical. Patient   refused additional imaging.    FINDINGS: There is physiologic distribution of radiolabeled red cells in   the abdomen and pelvis. There is no abnormal focus of increased activity   to suggest the presence of active bleeding.    IMPRESSION: Normal limited GI bleeding scan .    No evidence of active bleeding site.    Patient was imaged for 45 minutes. Patient refused additional imaging.                        SHADI FUNK M.D., NUCLEAR MEDICINE ATTENDING  This document has been electronically signed. Jul 11 2018  8:12PM        < end of copied text >

## 2018-07-16 NOTE — DISCHARGE NOTE ADULT - PROVIDER TOKENS
FREE:[LAST:[Dani],FIRST:[Patricia],PHONE:[(   )    -],FAX:[(   )    -],ADDRESS:[Patricia La (NP, PMD) [phone]]] FREE:[LAST:[Abhinav],FIRST:[Patricia],PHONE:[(   )    -],FAX:[(   )    -],ADDRESS:[Patricia Abhinav (NP, PMD) [phone]]],TOKEN:'[id number]',TOKEN:'[id number]',TOKEN:'[id number]'

## 2018-07-16 NOTE — DISCHARGE NOTE ADULT - CARE PLAN
Principal Discharge DX:	Anemia, unspecified type  Secondary Diagnosis:	CKD (chronic kidney disease)  Secondary Diagnosis:	Diabetes mellitus  Secondary Diagnosis:	Hypothyroidism  Secondary Diagnosis:	Asthma Principal Discharge DX:	Anemia, unspecified type  Goal:	resolution  Assessment and plan of treatment:	-have your cbc check by your primary medical provider or hematologist within 1 week of discharge  Secondary Diagnosis:	CKD (chronic kidney disease)  Goal:	stable  Assessment and plan of treatment:	-follow up with your primary medical provider or nephrologist and have your renal function checked by with your routine labwork  Secondary Diagnosis:	Diabetes mellitus  Goal:	stable  Assessment and plan of treatment:	-cont dm diet and follow up with your primary medical provider  Secondary Diagnosis:	Hypothyroidism  Goal:	stable  Assessment and plan of treatment:	-cont home meds  -follow up with your primary medical provider  Secondary Diagnosis:	Asthma  Goal:	stable  Assessment and plan of treatment:	-cont home meds  -follow up with your primary medical provider

## 2018-07-16 NOTE — PROGRESS NOTE ADULT - SUBJECTIVE AND OBJECTIVE BOX
Interval Events: Pt without complaints. Plan for colonscopy in am.     MEDICATIONS  (STANDING):  ALBUTerol/ipratropium for Nebulization 3 milliLiter(s) Nebulizer every 6 hours  bisacodyl 20 milliGRAM(s) Oral at bedtime  dextrose 5%. 1000 milliLiter(s) (50 mL/Hr) IV Continuous <Continuous>  dextrose 50% Injectable 12.5 Gram(s) IV Push once  dextrose 50% Injectable 25 Gram(s) IV Push once  dextrose 50% Injectable 25 Gram(s) IV Push once  folic acid 1 milliGRAM(s) Oral daily  insulin lispro (HumaLOG) corrective regimen sliding scale   SubCutaneous three times a day before meals  insulin lispro (HumaLOG) corrective regimen sliding scale   SubCutaneous at bedtime  lactobacillus acidophilus 1 Tablet(s) Oral three times a day with meals  levothyroxine 75 MICROGram(s) Oral daily  nystatin Powder 1 Application(s) Topical once  pantoprazole    Tablet 40 milliGRAM(s) Oral two times a day  polyethylene glycol/electrolyte Solution 1000 milliLiter(s) Oral every 4 hours  sucralfate 1 Gram(s) Oral four times a day    MEDICATIONS  (PRN):  acetaminophen   Tablet 650 milliGRAM(s) Oral every 6 hours PRN Mild Pain  dextrose 40% Gel 15 Gram(s) Oral once PRN Blood Glucose LESS THAN 70 milliGRAM(s)/deciliter  glucagon  Injectable 1 milliGRAM(s) IntraMuscular once PRN Glucose LESS THAN 70 milligrams/deciliter      Allergies    Levaquin (Hives)  sulfa drugs (Unknown)    Intolerances        Review of Systems:    General:  No wt loss, fevers, chills, night sweats,fatigue,   Eyes:  Good vision, no reported pain  ENT:  No sore throat, pain, runny nose, dysphagia  CV:  No pain, palpitations, hypo/hypertension  Resp:  No dyspnea, cough, tachypnea, wheezing  GI:  No pain, No nausea, No vomiting, No diarrhea, No constipation, No weight loss, No fever, No pruritis, No rectal bleeding, No melena, No dysphagia  :  No pain, bleeding, incontinence, nocturia  Muscle:  No pain, weakness  Neuro:  No weakness, tingling, memory problems  Psych:  No fatigue, insomnia, mood problems, depression  Endocrine:  No polyuria, polydypsia, cold/heat intolerance  Heme:  No petechiae, ecchymosis, easy bruisability  Skin:  No rash, tattoos, scars, edema      Vital Signs Last 24 Hrs  T(C): 36.8 (16 Jul 2018 05:24), Max: 36.9 (15 Jul 2018 21:20)  T(F): 98.2 (16 Jul 2018 05:24), Max: 98.5 (15 Jul 2018 21:20)  HR: 78 (16 Jul 2018 08:00) (65 - 92)  BP: 150/74 (16 Jul 2018 05:24) (117/74 - 150/74)  BP(mean): --  RR: 18 (16 Jul 2018 05:24) (18 - 20)  SpO2: 93% (16 Jul 2018 08:00) (92% - 96%)    PHYSICAL EXAM:    Constitutional: NAD, well-developed  HEENT: EOMI, throat clear  Neck: No LAD, supple  Respiratory: CTA and P  Cardiovascular: S1 and S2, RRR, no M  Gastrointestinal: BS+, soft, NT/ND, neg HSM,  Extremities: No peripheral edema, neg clubing, cyanosis  Vascular: 2+ peripheral pulses  Neurological: A/O x 3, no focal deficits  Psychiatric: Normal mood, normal affect  Skin: No rashes      LABS:                        7.7    4.83  )-----------( 197      ( 16 Jul 2018 07:01 )             25.8     07-16    144  |  112<H>  |  46<H>  ----------------------------<  109<H>  4.8   |  27  |  1.90<H>    Ca    8.2<L>      16 Jul 2018 07:01            RADIOLOGY & ADDITIONAL TESTS:

## 2018-07-16 NOTE — PROGRESS NOTE ADULT - PROBLEM SELECTOR PLAN 9
- patient on Clonazepam 0.5 BID; may be contributing to disorientation/somnolence: dose should be tapered if patient can tolerate. SCD's given GIB: hold all a/c  PPI for GIB pending further workup

## 2018-07-16 NOTE — PROGRESS NOTE ADULT - SUBJECTIVE AND OBJECTIVE BOX
NEPHROLOGY INTERVAL HPI/OVERNIGHT EVENTS:  HPI:  82 y/o F with PMHx DM2, hypothyroidism, asthma, arthritis, meningoma (chronic), and  BIBEMS after syncopal episode at [facility]. Patient is a poor historian and is unsure why she is in the hospital.  Family at bedside reports that patient was discharged from [facility] the previous day after being admitted with AMS 2/2 UTI.  Family reports that yesterday patient was SOB.  They report that she bleeds easily, but denies knowledge of blood in stools, changes in urine color, open wounds. Patient confirms abdominal pain. She denies chest pain, shortness of breath, palpitations, nausea or vomiting. She also denies dizziness blurry vision.    Per ED nurse after stool guaiac performed patient had red streaked stool.    In ED patient vitals are 108/54, afebrile, O2 Sat 100% on supp O2.  Labs significant for Hbg 3.8, Hct 12.6, Na 148, Cl 120, Co2 20, BUN 62, Crt 1.9, GFR 24, Ca 6.4. UA+ for Large blood, leukocyte esterase. EKG shows NSR.  Patient received 2 u PRBCs.  CT head/neck shows no acute pathology. Patient CT A/P ordered. (2018 14:43)      PAST MEDICAL & SURGICAL HISTORY:  Arthritis  Asthma  Diabetes mellitus  Hypothyroidism  No significant past surgical history      MEDICATIONS  (STANDING):  ALBUTerol/ipratropium for Nebulization 3 milliLiter(s) Nebulizer every 6 hours  dextrose 5%. 1000 milliLiter(s) (50 mL/Hr) IV Continuous <Continuous>  dextrose 50% Injectable 12.5 Gram(s) IV Push once  dextrose 50% Injectable 25 Gram(s) IV Push once  dextrose 50% Injectable 25 Gram(s) IV Push once  folic acid 1 milliGRAM(s) Oral daily  insulin lispro (HumaLOG) corrective regimen sliding scale   SubCutaneous three times a day before meals  insulin lispro (HumaLOG) corrective regimen sliding scale   SubCutaneous at bedtime  lactobacillus acidophilus 1 Tablet(s) Oral three times a day with meals  levothyroxine 75 MICROGram(s) Oral daily  nystatin Powder 1 Application(s) Topical once  pantoprazole    Tablet 40 milliGRAM(s) Oral two times a day  sucralfate 1 Gram(s) Oral four times a day    MEDICATIONS  (PRN):  acetaminophen   Tablet 650 milliGRAM(s) Oral every 6 hours PRN Mild Pain  dextrose 40% Gel 15 Gram(s) Oral once PRN Blood Glucose LESS THAN 70 milliGRAM(s)/deciliter  glucagon  Injectable 1 milliGRAM(s) IntraMuscular once PRN Glucose LESS THAN 70 milligrams/deciliter      Allergies    Levaquin (Hives)  sulfa drugs (Unknown)    Intolerances        Vital Signs Last 24 Hrs  T(C): 36.8 (2018 05:24), Max: 36.9 (15 Jul 2018 21:20)  T(F): 98.2 (2018 05:24), Max: 98.5 (15 Jul 2018 21:20)  HR: 78 (2018 08:00) (65 - 92)  BP: 150/74 (2018 05:24) (117/74 - 150/74)  BP(mean): --  RR: 18 (2018 05:24) (18 - 20)  SpO2: 93% (2018 08:00) (92% - 96%)  Daily Height in cm: 165.1 (15 Jul 2018 10:35)    Daily Weight in k.2 (2018 09:11)    PHYSICAL EXAM:    GENERAL: NAD, well-groomed, well-developed  HEAD:  Atraumatic, Normocephalic  EYES: EOMI, PERRLA, conjunctiva and sclera clear  ENMT: No tonsillar erythema, exudates, or enlargement; Moist mucous membranes, Good dentition, No lesions  NECK: Supple, No JVD, Normal thyroid  NERVOUS SYSTEM:  Alert & Oriented X3, Good concentration; Motor Strength 5/5 B/L upper and lower extremities; DTRs 2+ intact and symmetric  CHEST/LUNG: Clear to percussion bilaterally; No rales, rhonchi, wheezing, or rubs  HEART: Regular rate and rhythm; No murmurs, rubs, or gallops  ABDOMEN: Soft, Nontender, Nondistended; Bowel sounds present  EXTREMITIES:  2+ Peripheral Pulses, No clubbing, cyanosis, or edema  SKIN: No rashes or lesions    LABS:                        7.7    4.83  )-----------( 197      ( 2018 07:01 )             25.8     07-16    144  |  112<H>  |  46<H>  ----------------------------<  109<H>  4.8   |  27  |  1.90<H>    Ca    8.2<L>      2018 07:01                RADIOLOGY & ADDITIONAL TESTS:

## 2018-07-16 NOTE — PROGRESS NOTE ADULT - ASSESSMENT
83 year old female with DM2, HTN, here with an episode of syncope, found to have significant anemia. Improvement in lethargy with PRBC transfusions    - Patient presented with profound anemia, s/p PRBC's x 5 units total and 1 unit of platelets    - H/H holding  today's = 7.7/25.8 from 7.6/25.4 yesterday  Transfuse per Primary or GI.   - s/p EGD 7/9 with clean based duodenal ulcer, gastritis, hiatal hernia  - s/p bleeding scan which was negative, although, unable to complete due to patient's refusal to have anymore tests  - Pt is agreeable for colonoscopy and is scheduled for tomorrow.   -  Currently optimized as best as possible from a CV POV for a possible low risk endoscopic GI procedure if pt is willing to undergo  -  Chronic lower extremity edema unchanged.    Administer Lasix after blood products transfusion to avoid fluid volume overload   - There is no echo on record so her LV function is unknown.     - Hemodynamics stable as per flow sheet   - Very mild troponin leak 2/2 demand in the setting of severe anemia  - EKG is SR with no sign of ischemia or chamber enlargement  - Baseline creatinine unknown, though normal in 2016, BARBARA likely due to her profound anemia.  Avoid nephrotoxics.  Renal following  - Monitor electrolytes. Keep K>4, Mg>2  - Further cardiac workup as case unfolds      Christine Thomson NP-C  Cardiology

## 2018-07-16 NOTE — PROGRESS NOTE ADULT - SUBJECTIVE AND OBJECTIVE BOX
Patient is a 83y old  Female who presents with a chief complaint of profound anemia (07 Jul 2018 14:43)      INTERVAL HPI/OVERNIGHT EVENTS:    MEDICATIONS  (STANDING):  ALBUTerol/ipratropium for Nebulization 3 milliLiter(s) Nebulizer every 6 hours  dextrose 5%. 1000 milliLiter(s) (50 mL/Hr) IV Continuous <Continuous>  dextrose 50% Injectable 12.5 Gram(s) IV Push once  dextrose 50% Injectable 25 Gram(s) IV Push once  dextrose 50% Injectable 25 Gram(s) IV Push once  folic acid 1 milliGRAM(s) Oral daily  insulin lispro (HumaLOG) corrective regimen sliding scale   SubCutaneous three times a day before meals  insulin lispro (HumaLOG) corrective regimen sliding scale   SubCutaneous at bedtime  lactobacillus acidophilus 1 Tablet(s) Oral three times a day with meals  levothyroxine 75 MICROGram(s) Oral daily  pantoprazole    Tablet 40 milliGRAM(s) Oral two times a day  sucralfate 1 Gram(s) Oral four times a day    MEDICATIONS  (PRN):  acetaminophen   Tablet 650 milliGRAM(s) Oral every 6 hours PRN Mild Pain  dextrose 40% Gel 15 Gram(s) Oral once PRN Blood Glucose LESS THAN 70 milliGRAM(s)/deciliter  glucagon  Injectable 1 milliGRAM(s) IntraMuscular once PRN Glucose LESS THAN 70 milligrams/deciliter      Allergies    Levaquin (Hives)  sulfa drugs (Unknown)    Intolerances        REVIEW OF SYSTEMS:            Vital Signs Last 24 Hrs  T(C): 36.8 (16 Jul 2018 05:24), Max: 36.9 (15 Jul 2018 21:20)  T(F): 98.2 (16 Jul 2018 05:24), Max: 98.5 (15 Jul 2018 21:20)  HR: 65 (16 Jul 2018 05:24) (65 - 92)  BP: 150/74 (16 Jul 2018 05:24) (117/74 - 150/74)  BP(mean): --  RR: 18 (16 Jul 2018 05:24) (18 - 20)  SpO2: 94% (16 Jul 2018 05:24) (92% - 96%)    PHYSICAL EXAM:                LABS:                        7.7    4.83  )-----------( 197      ( 16 Jul 2018 07:01 )             25.8     CBC Full  -  ( 16 Jul 2018 07:01 )  WBC Count : 4.83 K/uL  Hemoglobin : 7.7 g/dL  Hematocrit : 25.8 %  Platelet Count - Automated : 197 K/uL  Mean Cell Volume : 91.2 fl  Mean Cell Hemoglobin : 27.2 pg  Mean Cell Hemoglobin Concentration : 29.8 gm/dL  Auto Neutrophil # : x  Auto Lymphocyte # : x  Auto Monocyte # : x  Auto Eosinophil # : x  Auto Basophil # : x  Auto Neutrophil % : x  Auto Lymphocyte % : x  Auto Monocyte % : x  Auto Eosinophil % : x  Auto Basophil % : x    16 Jul 2018 07:01    144    |  112    |  46     ----------------------------<  109    4.8     |  27     |  1.90     Ca    8.2        16 Jul 2018 07:01          CAPILLARY BLOOD GLUCOSE      POCT Blood Glucose.: 119 mg/dL (16 Jul 2018 08:08)  POCT Blood Glucose.: 184 mg/dL (15 Jul 2018 21:15)  POCT Blood Glucose.: 205 mg/dL (15 Jul 2018 17:00)  POCT Blood Glucose.: 199 mg/dL (15 Jul 2018 11:56)        Culture - Urine (collected 07-14-18 @ 16:32)  Source: .Urine Catheterized  Preliminary Report (07-15-18 @ 18:30):    >100,000 CFU/ml Proteus mirabilis    <10,000 CFU/ml Normal Urogenital rome present    Culture - Urine (collected 07-13-18 @ 21:56)  Source: .Urine Catheterized  Final Report (07-15-18 @ 16:22):    >100,000 CFU/ml Proteus mirabilis  Organism: Proteus mirabilis (07-15-18 @ 16:22)  Organism: Proteus mirabilis (07-15-18 @ 16:22)      -  Amikacin: S <=8      -  Amoxicillin/Clavulanic Acid: R >16/8      -  Ampicillin: R >16 These ampicillin results predict results for amoxicillin      -  Ampicillin/Sulbactam: R >16/8      -  Aztreonam: S <=4      -  Cefazolin: R >16 This predicts results for oral agents cefaclor, cefdinir, cefpodoxime, cefprozil, cefuroxime axetil, cephalexin and locarbef for uncomplicated UTI. Note that some isolates may be susceptible to these agents while testing resistant to cefazolin.      -  Cefepime: S <=2      -  Cefoxitin: R >16      -  Ceftriaxone: R 8 Enterobacter, Citrobacter, and Serratia may develop resistance during prolonged therapy      -  Ciprofloxacin: S <=0.5      -  Ertapenem: S <=0.5      -  Gentamicin: S <=1      -  Levofloxacin: S <=1      -  Meropenem: S <=1      -  Nitrofurantoin: R >64 Should not be used to treat pyelonephritis      -  Piperacillin/Tazobactam: S <=8      -  Tobramycin: S <=2      -  Trimethoprim/Sulfamethoxazole: S <=0.5/9.5      Method Type: BROOKE        RADIOLOGY & ADDITIONAL TESTS:    Personally reviewed.     Consultant(s) Notes Reviewed:  [x] YES  [ ] NO Patient is a 83y old  Female who presents with a chief complaint of profound anemia (07 Jul 2018 14:43)      INTERVAL HPI/OVERNIGHT EVENTS: Patient offers no complaints.  No events overnight. Patient is in agreement to have the colonoscopy.    MEDICATIONS  (STANDING):  ALBUTerol/ipratropium for Nebulization 3 milliLiter(s) Nebulizer every 6 hours  dextrose 5%. 1000 milliLiter(s) (50 mL/Hr) IV Continuous <Continuous>  dextrose 50% Injectable 12.5 Gram(s) IV Push once  dextrose 50% Injectable 25 Gram(s) IV Push once  dextrose 50% Injectable 25 Gram(s) IV Push once  folic acid 1 milliGRAM(s) Oral daily  insulin lispro (HumaLOG) corrective regimen sliding scale   SubCutaneous three times a day before meals  insulin lispro (HumaLOG) corrective regimen sliding scale   SubCutaneous at bedtime  lactobacillus acidophilus 1 Tablet(s) Oral three times a day with meals  levothyroxine 75 MICROGram(s) Oral daily  pantoprazole    Tablet 40 milliGRAM(s) Oral two times a day  sucralfate 1 Gram(s) Oral four times a day    MEDICATIONS  (PRN):  acetaminophen   Tablet 650 milliGRAM(s) Oral every 6 hours PRN Mild Pain  dextrose 40% Gel 15 Gram(s) Oral once PRN Blood Glucose LESS THAN 70 milliGRAM(s)/deciliter  glucagon  Injectable 1 milliGRAM(s) IntraMuscular once PRN Glucose LESS THAN 70 milligrams/deciliter      Allergies    Levaquin (Hives)  sulfa drugs (Unknown)    Intolerances        REVIEW OF SYSTEMS:  CONSTITUTIONAL: denies fever, chills, fatigue, weakness  HEENT: denies changes in vision, No difficulties swallowing  SKIN: denies new rashes, lesions  CARDIOVASCULAR: denies chest pain, chest pressure, palpitations  RESPIRATORY: denies shortness of breath, denies cough  GASTROINTESTINAL: denies nausea, vomiting, diarrhea, abdominal pain  GENITOURINARY: denies dysuria, urgency, frequency  NEUROLOGICAL: denies numbness, headache,  MUSCULOSKELETAL: denies new joint pain, muscle aches  HEMATOLOGIC: denies gross bleeding, bruising        Vital Signs Last 24 Hrs  T(C): 36.8 (16 Jul 2018 05:24), Max: 36.9 (15 Jul 2018 21:20)  T(F): 98.2 (16 Jul 2018 05:24), Max: 98.5 (15 Jul 2018 21:20)  HR: 65 (16 Jul 2018 05:24) (65 - 92)  BP: 150/74 (16 Jul 2018 05:24) (117/74 - 150/74)  BP(mean): --  RR: 18 (16 Jul 2018 05:24) (18 - 20)  SpO2: 94% (16 Jul 2018 05:24) (92% - 96%)    PHYSICAL EXAM:  GENERAL:  morbidly obese female, resting comfortably in bed, no acute distress, appropriate  EYES: sclera clear, no exudates,  moist mucous membranes  NECK: supple, soft,  LUNGS: good air entry bilaterally, clear to auscultation, symmetric breath sounds, no wheezing or rhonchi appreciated  HEART: soft S1/S2, regular rate and rhythm, no murmurs noted, +1 lower extremity edema  GASTROINTESTINAL: abdomen is soft, nontender, nondistended, normoactive bowel sounds, no palpable masses  INTEGUMENT: good skin turgor, +Chronic lower leg skin changes (brown, scaling skin), +Pain to palpation of LE   MUSCULOSKELETAL: no cyanosis, no obvious deformity  NEUROLOGIC: awake, alert, oriented x3, good muscle tone in 4 extremities, no obvious sensory deficits  HEME/LYMPH:+ Left upper back ecchymosis    LABS:                        7.7    4.83  )-----------( 197      ( 16 Jul 2018 07:01 )             25.8     CBC Full  -  ( 16 Jul 2018 07:01 )  WBC Count : 4.83 K/uL  Hemoglobin : 7.7 g/dL  Hematocrit : 25.8 %  Platelet Count - Automated : 197 K/uL  Mean Cell Volume : 91.2 fl  Mean Cell Hemoglobin : 27.2 pg  Mean Cell Hemoglobin Concentration : 29.8 gm/dL  Auto Neutrophil # : x  Auto Lymphocyte # : x  Auto Monocyte # : x  Auto Eosinophil # : x  Auto Basophil # : x  Auto Neutrophil % : x  Auto Lymphocyte % : x  Auto Monocyte % : x  Auto Eosinophil % : x  Auto Basophil % : x    16 Jul 2018 07:01    144    |  112    |  46     ----------------------------<  109    4.8     |  27     |  1.90     Ca    8.2        16 Jul 2018 07:01          CAPILLARY BLOOD GLUCOSE      POCT Blood Glucose.: 119 mg/dL (16 Jul 2018 08:08)  POCT Blood Glucose.: 184 mg/dL (15 Jul 2018 21:15)  POCT Blood Glucose.: 205 mg/dL (15 Jul 2018 17:00)  POCT Blood Glucose.: 199 mg/dL (15 Jul 2018 11:56)        Culture - Urine (collected 07-14-18 @ 16:32)  Source: .Urine Catheterized  Preliminary Report (07-15-18 @ 18:30):    >100,000 CFU/ml Proteus mirabilis    <10,000 CFU/ml Normal Urogenital rome present    Culture - Urine (collected 07-13-18 @ 21:56)  Source: .Urine Catheterized  Final Report (07-15-18 @ 16:22):    >100,000 CFU/ml Proteus mirabilis  Organism: Proteus mirabilis (07-15-18 @ 16:22)  Organism: Proteus mirabilis (07-15-18 @ 16:22)      -  Amikacin: S <=8      -  Amoxicillin/Clavulanic Acid: R >16/8      -  Ampicillin: R >16 These ampicillin results predict results for amoxicillin      -  Ampicillin/Sulbactam: R >16/8      -  Aztreonam: S <=4      -  Cefazolin: R >16 This predicts results for oral agents cefaclor, cefdinir, cefpodoxime, cefprozil, cefuroxime axetil, cephalexin and locarbef for uncomplicated UTI. Note that some isolates may be susceptible to these agents while testing resistant to cefazolin.      -  Cefepime: S <=2      -  Cefoxitin: R >16      -  Ceftriaxone: R 8 Enterobacter, Citrobacter, and Serratia may develop resistance during prolonged therapy      -  Ciprofloxacin: S <=0.5      -  Ertapenem: S <=0.5      -  Gentamicin: S <=1      -  Levofloxacin: S <=1      -  Meropenem: S <=1      -  Nitrofurantoin: R >64 Should not be used to treat pyelonephritis      -  Piperacillin/Tazobactam: S <=8      -  Tobramycin: S <=2      -  Trimethoprim/Sulfamethoxazole: S <=0.5/9.5      Method Type: BROOKE        RADIOLOGY & ADDITIONAL TESTS:    Personally reviewed.     Consultant(s) Notes Reviewed:  [x] YES  [ ] NO

## 2018-07-16 NOTE — DISCHARGE NOTE ADULT - PATIENT PORTAL LINK FT
You can access the [facility], offered by [facility], by registering with the following website: [email or web address]

## 2018-07-16 NOTE — PROGRESS NOTE ADULT - PROBLEM SELECTOR PLAN 7
Corrected for hypoalbuminemia now normal.  Monitor. continue to HOLD ALL NSAIDS  - tylenol 650mg PO q6 PRN mild pain

## 2018-07-16 NOTE — PROGRESS NOTE ADULT - PROBLEM SELECTOR PLAN 1
EGD/colonoscopy planned for tomorrow, pt is NPO after midnight. Prep ordered. On clear liquids today.

## 2018-07-16 NOTE — DISCHARGE NOTE ADULT - HOSPITAL COURSE
84 y/o F with PMHx DM2, hypothyroidism, asthma, arthritis, meningoma (chronic), and  BIBEMS after syncopal episode at [facility]. Patient is a poor historian and is unsure why she is in the hospital.  Family at bedside reports that patient was discharged from [facility] the previous day after being admitted with AMS 2/2 UTI.  Family reports that yesterday patient was SOB.  They report that she bleeds easily, but denies knowledge of blood in stools, changes in urine color, open wounds. Patient confirms abdominal pain. She denies chest pain, shortness of breath, palpitations, nausea or vomiting. She also denies dizziness blurry vision.    Per ED nurse after stool guaiac performed patient had red streaked stool.    In ED patient vitals are 108/54, afebrile, O2 Sat 100% on supp O2.  Labs significant for Hbg 3.8, Hct 12.6, Na 148, Cl 120, Co2 20, BUN 62, Crt 1.9, GFR 24, Ca 6.4. UA+ for Large blood, leukocyte esterase. EKG shows NSR.  Patient received 2 u PRBCs.  CT head/neck shows no acute pathology. Patient CT A/P ordered. 84 y/o F with PMHx DM2, hypothyroidism, asthma, arthritis, meningoma (chronic), and  BIBEMS after syncopal episode at [facility]. Patient is a poor historian and is unsure why she is in the hospital.  Family at bedside reports that patient was discharged from [facility] the previous day after being admitted with AMS 2/2 UTI.  Family reports that yesterday patient was SOB.  They report that she bleeds easily, but denies knowledge of blood in stools, changes in urine color, open wounds. Patient confirms abdominal pain. She denies chest pain, shortness of breath, palpitations, nausea or vomiting. She also denies dizziness blurry vision.    Per ED nurse after stool guaiac performed patient had red streaked stool.    In ED patient vitals are 108/54, afebrile, O2 Sat 100% on supp O2.  Labs significant for Hbg 3.8, Hct 12.6, Na 148, Cl 120, Co2 20, BUN 62, Crt 1.9, GFR 24, Ca 6.4. UA+ for Large blood, leukocyte esterase. EKG shows NSR.  Patient received 2 u PRBCs.  CT head/neck shows no acute pathology. Patient CT A/P ordered.   GI Dr Daly  Had endoscopy and colonoscopy 82 y/o F with PMHx DM2, hypothyroidism, asthma, arthritis, meningoma (chronic), and  BIBEMS after syncopal episode at [facility]. Patient is a poor historian and is unsure why she is in the hospital.  Family at bedside reports that patient was discharged from [facility] the previous day after being admitted with AMS 2/2 UTI.  Family reports that yesterday patient was SOB.  They report that she bleeds easily, but denies knowledge of blood in stools, changes in urine color, open wounds. Patient confirms abdominal pain. She denies chest pain, shortness of breath, palpitations, nausea or vomiting. She also denies dizziness blurry vision. Per ED nurse after stool guaiac performed patient had red streaked stool. In ED patient vitals are 108/54, afebrile, O2 Sat 100% on supp O2.  Labs significant for Hbg 3.8, Hct 12.6, Na 148, Cl 120, Co2 20, BUN 62, Crt 1.9, GFR 24, Ca 6.4. UA+ for Large blood, leukocyte esterase. EKG shows NSR.  CT head/neck shows no acute pathology. Patient CT A/P ordered. Was seen by GI Dr Daly    Admitted to ICU w/ profound anemia hgb 3.8, possibly 2/2 acute GIB sec to duodenal ulcer found on EGD. Pt received 5 units prbc and platelet transfusion. Pt h/h stable. Pt seen by psych and deemed not to have medical decision making capacity. Daughter thien hcp. EGD showed gastritis, hiatal hernia, duodenal ulcer. repeat EGD same.  Leg pain, Doppler for DVT negative. SCDs for preventative. patient was seen psych Dr. Brumfield and deemed no capacity to make medical decisions. Daughter Sheila to make medical deicsions. Patient refused colonscopy. FOBT positive.     7/20 - Flex Sig today, neuro consulted Augustine as per daughter request 82 y/o F with PMHx DM2, hypothyroidism, asthma, arthritis, meningoma (chronic), and  BIBEMS after syncopal episode at [facility]. Patient is a poor historian and is unsure why she is in the hospital.  Family at bedside reports that patient was discharged from [facility] the previous day after being admitted with AMS 2/2 UTI.  Family reports that yesterday patient was SOB.  They report that she bleeds easily, but denies knowledge of blood in stools, changes in urine color, open wounds. Patient confirms abdominal pain. She denies chest pain, shortness of breath, palpitations, nausea or vomiting. She also denies dizziness blurry vision. Per ED nurse after stool guaiac performed patient had red streaked stool. In ED patient vitals are 108/54, afebrile, O2 Sat 100% on supp O2.  Labs significant for Hbg 3.8, Hct 12.6, Na 148, Cl 120, Co2 20, BUN 62, Crt 1.9, GFR 24, Ca 6.4. UA+ for Large blood, leukocyte esterase. EKG shows NSR.  CT head/neck shows no acute pathology. Patient CT A/P ordered. Was seen by GI Dr Daly. Admitted to ICU w/ profound anemia hgb 3.8, possibly 2/2 acute GIB sec to duodenal ulcer found on EGD. Pt received 5 units prbc and platelet transfusion. Pt's h/h was stabilized and improved and was transitioned to general medical floor. . Pt seen by psych and deemed not to have medical decision making capacity. Daughter thien hcp. EGD showed gastritis, hiatal hernia, duodenal ulcer..  Leg pain, Doppler for DVT negative. SCDs for preventative. patient was seen psych Dr. Brumfield and deemed no capacity to make medical decisions. Daughter Sheila to make medical deicsions.  FOBT positive. Pt was seen by neurology and deemed to have mild cognitive impairment with poss early dementia she was given aricept 5mg qhs. She also underwent a flex sigmoidoscopy which showed a sigmoid polyp which was removed. Pt h/h stayed stable and pt improved for discharge to rehab.      Time spent: 75 minutes. 82 y/o F with PMHx DM2, hypothyroidism, asthma, arthritis, meningoma (chronic), and  BIBEMS after syncopal episode at [facility]. Patient is a poor historian and is unsure why she is in the hospital.  Family at bedside reports that patient was discharged from [facility] the previous day after being admitted with AMS 2/2 UTI.  Family reports that yesterday patient was SOB.  They report that she bleeds easily, but denies knowledge of blood in stools, changes in urine color, open wounds. Patient confirms abdominal pain. She denies chest pain, shortness of breath, palpitations, nausea or vomiting. She also denies dizziness blurry vision. Per ED nurse after stool guaiac performed patient had red streaked stool. In ED patient vitals are 108/54, afebrile, O2 Sat 100% on supp O2.  Labs significant for Hbg 3.8, Hct 12.6, Na 148, Cl 120, Co2 20, BUN 62, Crt 1.9, GFR 24, Ca 6.4. UA+ for Large blood, leukocyte esterase. EKG shows NSR.  CT head/neck shows no acute pathology. Patient CT A/P ordered. Was seen by GI Dr Daly. Admitted to ICU w/ profound anemia hgb 3.8, possibly 2/2 acute GIB sec to duodenal ulcer found on EGD. Pt received 5 units prbc and platelet transfusion. Pt's h/h was stabilized and improved and was transitioned to general medical floor. . Pt seen by psych and deemed not to have medical decision making capacity. Daughter thien hcp. EGD showed gastritis, hiatal hernia, duodenal ulcer..  Leg pain, Doppler for DVT negative. SCDs for preventative. patient was seen psych Dr. Brumfield and deemed no capacity to make medical decisions. Daughter Sheila to make medical deicsions.  FOBT positive. Pt was seen by neurology and deemed to have mild cognitive impairment with poss early dementia she was given aricept 5mg qhs. She also underwent a flex sigmoidoscopy which showed a sigmoid polyp which was removed. Pt was seen by PT who recommended NAOMY for weakness adn unsteady lonny.  Pt h/h stayed stable and pt improved for discharge to rehab.      Time spent: 75 minutes.

## 2018-07-16 NOTE — DISCHARGE NOTE ADULT - PLAN OF CARE
resolution -have your cbc check by your primary medical provider or hematologist within 1 week of discharge stable -follow up with your primary medical provider or nephrologist and have your renal function checked by with your routine labwork -cont dm diet and follow up with your primary medical provider -cont home meds  -follow up with your primary medical provider

## 2018-07-16 NOTE — DISCHARGE NOTE ADULT - CARE PROVIDER_API CALL
Patricia Louise (NP, PMD) [phone]  Phone: (   )    -  Fax: (   )    - Patricia Louise (NP, PMD) [phone]  Phone: (   )    -  Fax: (   )    -    Jarek Maldonado), Nephrology  [address]  [address]  [place] [address]  Phone: [phone]  Fax: [phone]    Perlman, Craig D (DO), Medicine  [address] [place] [address]  [address] [place] [address]  Phone: [phone]  Fax: [phone]    Hon REMA Figueroa (MD), Hematology; Internal Medicine; Medical Oncology  [address] [facility] [address] [facility]  [address] [place] [address]  Phone: [phone]  Fax: [phone]

## 2018-07-16 NOTE — DISCHARGE NOTE ADULT - MEDICATION SUMMARY - MEDICATIONS TO TAKE
I will START or STAY ON the medications listed below when I get home from the hospital:    calcium 500 with vitamin d2  -- Indication: For supplement    acetaminophen 325 mg oral tablet  -- 2 tab(s) by mouth every 6 hours, As needed, Mild Pain  -- Indication: For pain    aspirin 81 mg oral delayed release tablet  -- 1 tab(s) by mouth once a day  -- Indication: For Home med    sodium bicarbonate 650 mg oral tablet  -- 1 tab(s) by mouth 2 times a day  -- Indication: For Home med    clonazePAM 0.5 mg oral tablet  -- Indication: For Anxiety    halobetasol 0.05% topical cream  -- Indication: For Derm home med    ciclopirox 0.77% topical cream  -- Indication: For Derm home med    ketoconazole-pyrithione zinc 2%-1% topical kit  -- Indication: For Dern home med    ferrous sulfate 324 mg (65 mg elemental iron) oral tablet  -- Indication: For iron deficiency anemia    magnesium oxide 400 mg (241.3 mg elemental magnesium) oral tablet  -- 1 tab(s) by mouth once a day  -- Indication: For supplement    sucralfate 1 g oral tablet  -- 1 tab(s) by mouth 4 times a day  -- Indication: For Gi duondenal ulcer    ocular lubricant ophthalmic solution  -- 1 drop(s) to each affected eye once a day  -- Indication: For supplement    amoxicillin-clavulanate 500 mg-125 mg oral tablet  -- Indication: For Antibx    lactobacillus acidophilus oral capsule  --  by mouth   -- Indication: For supplement    pantoprazole 40 mg oral delayed release tablet  -- 1 tab(s) by mouth 2 times a day  -- Indication: For gerd    Synthroid 75 mcg (0.075 mg) oral tablet  -- 1 tab(s) by mouth once a day  -- Indication: For Hypothyroidism    Multiple Vitamins oral tablet  -- 1 tab(s) by mouth once a day  -- Indication: For supplement    vitamin E oral capsule  -- Indication: For suppleement I will START or STAY ON the medications listed below when I get home from the hospital:    calcium 500 with vitamin d2  -- Indication: For supplement    acetaminophen 325 mg oral tablet  -- 2 tab(s) by mouth every 6 hours, As needed, Mild Pain  -- Indication: For pain    aspirin 81 mg oral delayed release tablet  -- 1 tab(s) by mouth once a day  -- Indication: For Home med    sodium bicarbonate 650 mg oral tablet  -- 1 tab(s) by mouth 2 times a day  -- Indication: For Home med    clonazePAM 0.5 mg oral tablet  -- Indication: For Anxiety    halobetasol 0.05% topical cream  -- Indication: For Derm home med    ciclopirox 0.77% topical cream  -- Indication: For Derm home med    ketoconazole-pyrithione zinc 2%-1% topical kit  -- Indication: For Dern home med    ferrous sulfate 324 mg (65 mg elemental iron) oral tablet  -- Indication: For iron deficiency anemia    magnesium oxide 400 mg (241.3 mg elemental magnesium) oral tablet  -- 1 tab(s) by mouth once a day  -- Indication: For supplement    sucralfate 1 g oral tablet  -- 1 tab(s) by mouth 4 times a day  -- Indication: For Gi duondenal ulcer    ocular lubricant ophthalmic solution  -- 1 drop(s) to each affected eye once a day  -- Indication: For supplement    lactobacillus acidophilus oral capsule  --  by mouth   -- Indication: For supplement    pantoprazole 40 mg oral delayed release tablet  -- 1 tab(s) by mouth 2 times a day  -- Indication: For gerd    Synthroid 75 mcg (0.075 mg) oral tablet  -- 1 tab(s) by mouth once a day  -- Indication: For Hypothyroidism    Multiple Vitamins oral tablet  -- 1 tab(s) by mouth once a day  -- Indication: For supplement    vitamin E oral capsule  -- Indication: For suppleement

## 2018-07-16 NOTE — PROGRESS NOTE ADULT - SUBJECTIVE AND OBJECTIVE BOX
Date/Time Patient Seen:  		  Referring MD:   Data Reviewed	       Patient is a 83y old  Female who presents with a chief complaint of profound anemia (07 Jul 2018 14:43)  vs and meds reviewed        Subjective/HPI     PAST MEDICAL & SURGICAL HISTORY:  Arthritis  Asthma  Diabetes mellitus  Hypothyroidism  No significant past surgical history        Medication list         MEDICATIONS  (STANDING):  ALBUTerol/ipratropium for Nebulization 3 milliLiter(s) Nebulizer every 6 hours  dextrose 5%. 1000 milliLiter(s) (50 mL/Hr) IV Continuous <Continuous>  dextrose 50% Injectable 12.5 Gram(s) IV Push once  dextrose 50% Injectable 25 Gram(s) IV Push once  dextrose 50% Injectable 25 Gram(s) IV Push once  folic acid 1 milliGRAM(s) Oral daily  insulin lispro (HumaLOG) corrective regimen sliding scale   SubCutaneous three times a day before meals  insulin lispro (HumaLOG) corrective regimen sliding scale   SubCutaneous at bedtime  lactobacillus acidophilus 1 Tablet(s) Oral three times a day with meals  levothyroxine 75 MICROGram(s) Oral daily  pantoprazole    Tablet 40 milliGRAM(s) Oral two times a day  sucralfate 1 Gram(s) Oral four times a day    MEDICATIONS  (PRN):  acetaminophen   Tablet 650 milliGRAM(s) Oral every 6 hours PRN Mild Pain  dextrose 40% Gel 15 Gram(s) Oral once PRN Blood Glucose LESS THAN 70 milliGRAM(s)/deciliter  glucagon  Injectable 1 milliGRAM(s) IntraMuscular once PRN Glucose LESS THAN 70 milligrams/deciliter         Vitals log        ICU Vital Signs Last 24 Hrs  T(C): 36.8 (16 Jul 2018 05:24), Max: 36.9 (15 Jul 2018 21:20)  T(F): 98.2 (16 Jul 2018 05:24), Max: 98.5 (15 Jul 2018 21:20)  HR: 65 (16 Jul 2018 05:24) (65 - 92)  BP: 150/74 (16 Jul 2018 05:24) (117/74 - 150/74)  BP(mean): --  ABP: --  ABP(mean): --  RR: 18 (16 Jul 2018 05:24) (18 - 20)  SpO2: 94% (16 Jul 2018 05:24) (92% - 96%)           Input and Output:  I&O's Detail    14 Jul 2018 07:01  -  15 Jul 2018 07:00  --------------------------------------------------------  IN:    Oral Fluid: 360 mL  Total IN: 360 mL    OUT:    Indwelling Catheter - Urethral: 2900 mL  Total OUT: 2900 mL    Total NET: -2540 mL      15 Jul 2018 07:01  -  16 Jul 2018 06:16  --------------------------------------------------------  IN:    Oral Fluid: 1060 mL  Total IN: 1060 mL    OUT:    Indwelling Catheter - Urethral: 2100 mL  Total OUT: 2100 mL    Total NET: -1040 mL          Lab Data                        7.6    5.02  )-----------( 177      ( 15 Jul 2018 07:49 )             25.4     07-15    145  |  113<H>  |  51<H>  ----------------------------<  110<H>  5.0   |  27  |  2.10<H>    Ca    8.3<L>      15 Jul 2018 07:49        CARDIAC MARKERS ( 14 Jul 2018 07:38 )  .017 ng/mL / x     / 29 U/L / x     / 3.1 ng/mL        Review of Systems	      Objective     Physical Examination    heart s1s2  lung dec BS  abd soft      Pertinent Lab findings & Imaging      Hazel:  NO   Adequate UO     I&O's Detail    14 Jul 2018 07:01  -  15 Jul 2018 07:00  --------------------------------------------------------  IN:    Oral Fluid: 360 mL  Total IN: 360 mL    OUT:    Indwelling Catheter - Urethral: 2900 mL  Total OUT: 2900 mL    Total NET: -2540 mL      15 Jul 2018 07:01  -  16 Jul 2018 06:16  --------------------------------------------------------  IN:    Oral Fluid: 1060 mL  Total IN: 1060 mL    OUT:    Indwelling Catheter - Urethral: 2100 mL  Total OUT: 2100 mL    Total NET: -1040 mL               Discussed with:     Cultures:	        Radiology

## 2018-07-16 NOTE — DISCHARGE NOTE ADULT - MEDICATION SUMMARY - MEDICATIONS TO STOP TAKING
I will STOP taking the medications listed below when I get home from the hospital:    insulin lispro 100 units/mL subcutaneous solution  --  subcutaneous 3 times a day (before meals); 1 Unit(s) if Glucose 151 - 200  2 Unit(s) if Glucose 201 - 250  3 Unit(s) if Glucose 251 - 300  4 Unit(s) if Glucose 301 - 350  5 Unit(s) if Glucose 351 - 400  6 Unit(s) if Glucose Greater Than 400 I will STOP taking the medications listed below when I get home from the hospital:    insulin lispro 100 units/mL subcutaneous solution  --  subcutaneous 3 times a day (before meals); 1 Unit(s) if Glucose 151 - 200  2 Unit(s) if Glucose 201 - 250  3 Unit(s) if Glucose 251 - 300  4 Unit(s) if Glucose 301 - 350  5 Unit(s) if Glucose 351 - 400  6 Unit(s) if Glucose Greater Than 400    amoxicillin-clavulanate 500 mg-125 mg oral tablet    doxycycline hyclate 100 mg oral delayed release tablet  -- 1 tab(s) by mouth 2 times a day

## 2018-07-16 NOTE — PROGRESS NOTE ADULT - PROBLEM SELECTOR PLAN 1
- possibly 2/2 GIB: GI (Dr. Daly) EGD showed duodenal ulcer  Colonoscopy today showed XXXX  - Patient is  s/p transfusion of total 5 units PRBCs and 1 unit platelets.  - monitor H/H, transfuse as necessary.  - continue Protonix   -apprec gi and hemat recs  -pt underwent bleeding scan but could only tolerate 45 minutes dues to being uncomfortable on back and in pain  -tolerating diet well  -dc planning as h.h stable, daughter does not want pt to go back to [facility] rehab prefers another facility - possibly 2/2 GIB: GI (Dr. Daly) EGD showed duodenal ulcer  Colonoscopy scheduled for tomorrow.   Patient on clears, NPO after midnight  - Patient is  s/p transfusion of total 5 units PRBCs and 1 unit platelets.  - monitor H/H, transfuse as necessary.  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

## 2018-07-17 DIAGNOSIS — J45.909 UNSPECIFIED ASTHMA, UNCOMPLICATED: ICD-10-CM

## 2018-07-17 LAB
ANION GAP SERPL CALC-SCNC: 6 MMOL/L — SIGNIFICANT CHANGE UP (ref 5–17)
BUN SERPL-MCNC: 37 MG/DL — HIGH (ref 7–23)
CALCIUM SERPL-MCNC: 8.3 MG/DL — LOW (ref 8.5–10.1)
CHLORIDE SERPL-SCNC: 111 MMOL/L — HIGH (ref 96–108)
CO2 SERPL-SCNC: 27 MMOL/L — SIGNIFICANT CHANGE UP (ref 22–31)
CREAT SERPL-MCNC: 1.8 MG/DL — HIGH (ref 0.5–1.3)
GLUCOSE SERPL-MCNC: 114 MG/DL — HIGH (ref 70–99)
HCT VFR BLD CALC: 27.2 % — LOW (ref 34.5–45)
HGB BLD-MCNC: 8.2 G/DL — LOW (ref 11.5–15.5)
INR BLD: 1.23 RATIO — HIGH (ref 0.88–1.16)
MCHC RBC-ENTMCNC: 27.4 PG — SIGNIFICANT CHANGE UP (ref 27–34)
MCHC RBC-ENTMCNC: 30.1 GM/DL — LOW (ref 32–36)
MCV RBC AUTO: 91 FL — SIGNIFICANT CHANGE UP (ref 80–100)
NRBC # BLD: 0 /100 WBCS — SIGNIFICANT CHANGE UP (ref 0–0)
PLATELET # BLD AUTO: 209 K/UL — SIGNIFICANT CHANGE UP (ref 150–400)
POTASSIUM SERPL-MCNC: 4.5 MMOL/L — SIGNIFICANT CHANGE UP (ref 3.5–5.3)
POTASSIUM SERPL-SCNC: 4.5 MMOL/L — SIGNIFICANT CHANGE UP (ref 3.5–5.3)
PROTHROM AB SERPL-ACNC: 13.5 SEC — HIGH (ref 9.8–12.7)
RBC # BLD: 2.99 M/UL — LOW (ref 3.8–5.2)
RBC # FLD: 18.6 % — HIGH (ref 10.3–14.5)
SODIUM SERPL-SCNC: 144 MMOL/L — SIGNIFICANT CHANGE UP (ref 135–145)
WBC # BLD: 5.15 K/UL — SIGNIFICANT CHANGE UP (ref 3.8–10.5)
WBC # FLD AUTO: 5.15 K/UL — SIGNIFICANT CHANGE UP (ref 3.8–10.5)

## 2018-07-17 PROCEDURE — 99232 SBSQ HOSP IP/OBS MODERATE 35: CPT

## 2018-07-17 PROCEDURE — 99233 SBSQ HOSP IP/OBS HIGH 50: CPT

## 2018-07-17 PROCEDURE — 88305 TISSUE EXAM BY PATHOLOGIST: CPT | Mod: 26

## 2018-07-17 PROCEDURE — 88312 SPECIAL STAINS GROUP 1: CPT | Mod: 26

## 2018-07-17 RX ORDER — BENZOCAINE AND MENTHOL 5; 1 G/100ML; G/100ML
1 LIQUID ORAL ONCE
Qty: 0 | Refills: 0 | Status: COMPLETED | OUTPATIENT
Start: 2018-07-17 | End: 2018-07-17

## 2018-07-17 RX ORDER — ONDANSETRON 8 MG/1
4 TABLET, FILM COATED ORAL EVERY 6 HOURS
Qty: 0 | Refills: 0 | Status: DISCONTINUED | OUTPATIENT
Start: 2018-07-17 | End: 2018-07-25

## 2018-07-17 RX ADMIN — Medication 1 GRAM(S): at 01:07

## 2018-07-17 RX ADMIN — Medication 1 GRAM(S): at 05:16

## 2018-07-17 RX ADMIN — Medication 650 MILLIGRAM(S): at 19:31

## 2018-07-17 RX ADMIN — Medication 3 MILLILITER(S): at 07:19

## 2018-07-17 RX ADMIN — Medication 1 GRAM(S): at 17:25

## 2018-07-17 RX ADMIN — Medication 3 MILLILITER(S): at 19:39

## 2018-07-17 RX ADMIN — PANTOPRAZOLE SODIUM 40 MILLIGRAM(S): 20 TABLET, DELAYED RELEASE ORAL at 05:16

## 2018-07-17 RX ADMIN — PANTOPRAZOLE SODIUM 40 MILLIGRAM(S): 20 TABLET, DELAYED RELEASE ORAL at 17:25

## 2018-07-17 RX ADMIN — Medication 75 MICROGRAM(S): at 05:16

## 2018-07-17 RX ADMIN — Medication 1 TABLET(S): at 17:25

## 2018-07-17 RX ADMIN — BENZOCAINE AND MENTHOL 1 LOZENGE: 5; 1 LIQUID ORAL at 22:46

## 2018-07-17 NOTE — PROGRESS NOTE ADULT - SUBJECTIVE AND OBJECTIVE BOX
NEPHROLOGY INTERVAL HPI/OVERNIGHT EVENTS:  HPI:  84 y/o F with PMHx DM2, hypothyroidism, asthma, arthritis, meningoma (chronic), and  BIBEMS after syncopal episode at [facility]. Patient is a poor historian and is unsure why she is in the hospital.  Family at bedside reports that patient was discharged from [facility] the previous day after being admitted with AMS 2/2 UTI.  Family reports that yesterday patient was SOB.  They report that she bleeds easily, but denies knowledge of blood in stools, changes in urine color, open wounds. Patient confirms abdominal pain. She denies chest pain, shortness of breath, palpitations, nausea or vomiting. She also denies dizziness blurry vision.    Per ED nurse after stool guaiac performed patient had red streaked stool.    In ED patient vitals are 108/54, afebrile, O2 Sat 100% on supp O2.  Labs significant for Hbg 3.8, Hct 12.6, Na 148, Cl 120, Co2 20, BUN 62, Crt 1.9, GFR 24, Ca 6.4. UA+ for Large blood, leukocyte esterase. EKG shows NSR.  Patient received 2 u PRBCs.  CT head/neck shows no acute pathology. Patient CT A/P ordered. (07 Jul 2018 14:43)      PAST MEDICAL & SURGICAL HISTORY:  Arthritis  Asthma  Diabetes mellitus  Hypothyroidism  No significant past surgical history      MEDICATIONS  (STANDING):  ALBUTerol/ipratropium for Nebulization 3 milliLiter(s) Nebulizer every 6 hours  bisacodyl 20 milliGRAM(s) Oral at bedtime  dextrose 5%. 1000 milliLiter(s) (50 mL/Hr) IV Continuous <Continuous>  dextrose 50% Injectable 12.5 Gram(s) IV Push once  dextrose 50% Injectable 25 Gram(s) IV Push once  dextrose 50% Injectable 25 Gram(s) IV Push once  epoetin sherita Injectable 55190 Unit(s) SubCutaneous <User Schedule>  folic acid 1 milliGRAM(s) Oral daily  insulin lispro (HumaLOG) corrective regimen sliding scale   SubCutaneous three times a day before meals  insulin lispro (HumaLOG) corrective regimen sliding scale   SubCutaneous at bedtime  lactobacillus acidophilus 1 Tablet(s) Oral three times a day with meals  levothyroxine 75 MICROGram(s) Oral daily  pantoprazole    Tablet 40 milliGRAM(s) Oral two times a day  sucralfate 1 Gram(s) Oral four times a day    MEDICATIONS  (PRN):  acetaminophen   Tablet 650 milliGRAM(s) Oral every 6 hours PRN Mild Pain  dextrose 40% Gel 15 Gram(s) Oral once PRN Blood Glucose LESS THAN 70 milliGRAM(s)/deciliter  glucagon  Injectable 1 milliGRAM(s) IntraMuscular once PRN Glucose LESS THAN 70 milligrams/deciliter  ondansetron Injectable 4 milliGRAM(s) IV Push every 6 hours PRN Nausea and/or Vomiting      Allergies    Levaquin (Hives)  sulfa drugs (Unknown)    Intolerances        Vital Signs Last 24 Hrs  T(C): 37.3 (17 Jul 2018 14:29), Max: 37.3 (17 Jul 2018 14:29)  T(F): 99.1 (17 Jul 2018 14:29), Max: 99.1 (17 Jul 2018 14:29)  HR: 78 (17 Jul 2018 14:29) (70 - 82)  BP: 185/86 (17 Jul 2018 14:29) (147/79 - 185/86)  BP(mean): --  RR: 15 (17 Jul 2018 14:29) (15 - 18)  SpO2: 93% (17 Jul 2018 14:29) (92% - 98%)  Daily     Daily     PHYSICAL EXAM:    GENERAL: NAD, well-groomed, well-developed  HEAD:  Atraumatic, Normocephalic  EYES: EOMI, PERRLA, conjunctiva and sclera clear  ENMT: No tonsillar erythema, exudates, or enlargement; Moist mucous membranes, Good dentition, No lesions  NECK: Supple, No JVD, Normal thyroid  NERVOUS SYSTEM:  Alert & Oriented X3, Good concentration; Motor Strength 5/5 B/L upper and lower extremities; DTRs 2+ intact and symmetric  CHEST/LUNG: Clear to percussion bilaterally; No rales, rhonchi, wheezing, or rubs  HEART: Regular rate and rhythm; No murmurs, rubs, or gallops  ABDOMEN: Soft, Nontender, Nondistended; Bowel sounds present  EXTREMITIES:  2+ Peripheral Pulses, No clubbing, cyanosis, or edema  SKIN: No rashes or lesions    LABS:                        8.2    5.15  )-----------( 209      ( 17 Jul 2018 07:35 )             27.2     07-17    144  |  111<H>  |  37<H>  ----------------------------<  114<H>  4.5   |  27  |  1.80<H>    Ca    8.3<L>      17 Jul 2018 07:35    PT/INR - ( 17 Jul 2018 07:35 )   PT: 13.5 sec;   INR: 1.23 ratio    RADIOLOGY & ADDITIONAL TESTS:

## 2018-07-17 NOTE — PROGRESS NOTE ADULT - PROBLEM SELECTOR PLAN 5
improving  Based on labs in HIE, patient appears to have CKD2   apprec renal recs Dr. Sarkar/Joel  Expect creatinine will plateau soon and then improve. Continue to monitor daily. 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 Continue to monitor daily.

## 2018-07-17 NOTE — PROGRESS NOTE ADULT - SUBJECTIVE AND OBJECTIVE BOX
sp egd with bx    hiatal hernia   esophagitis  gastritis  bile reflux    recs:  ppi bid   carafate qid  colonoscopy

## 2018-07-17 NOTE — PROGRESS NOTE ADULT - PROBLEM SELECTOR PLAN 1
Severe Anemia possibly 2/2 GIB: GI (Dr. Daly) EGD showed duodenal ulcer  Colonoscopy today  - Patient is  s/p transfusion of total 5 units PRBCs and 1 unit platelets.  - monitor H/H, transfuse as necessary.  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 Daly) EGD showed duodenal ulcer  EGD today. Plan for Colonscopy tomorrow per Dr. Kwong  - Patient is  s/p transfusion of total 5 units PRBCs and 1 unit platelets.  - monitor H/H, transfuse as necessary.  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

## 2018-07-17 NOTE — PROGRESS NOTE ADULT - PROBLEM SELECTOR PLAN 2
Bilateral  LE edema  and pain on palpitation-- -resolved???  Dopplers- negative for DVT  Continue  with SCDs, no anticoag due to workup of GI bleed Bilateral  LE edema  and pain on palpitation--Resolved  Dopplers- negative for DVT  Continue  with SCDs, no anticoag due to workup of GI bleed

## 2018-07-17 NOTE — PROGRESS NOTE ADULT - PROBLEM SELECTOR PLAN 1
NEBS  monitor vs and Sat  anemia  planned for EGD and Colonoscopy  oral and skin care  serial Hgb  monitor HD and VS  out of bed to chair  will follow

## 2018-07-17 NOTE — PROGRESS NOTE ADULT - SUBJECTIVE AND OBJECTIVE BOX
Patient is a 83y old  Female who presents with a chief complaint of profound anemia (16 Jul 2018 09:11)      INTERVAL HPI/OVERNIGHT EVENTS:    MEDICATIONS  (STANDING):  ALBUTerol/ipratropium for Nebulization 3 milliLiter(s) Nebulizer every 6 hours  bisacodyl 20 milliGRAM(s) Oral at bedtime  dextrose 5%. 1000 milliLiter(s) (50 mL/Hr) IV Continuous <Continuous>  dextrose 50% Injectable 12.5 Gram(s) IV Push once  dextrose 50% Injectable 25 Gram(s) IV Push once  dextrose 50% Injectable 25 Gram(s) IV Push once  epoetin sherita Injectable 03325 Unit(s) SubCutaneous <User Schedule>  folic acid 1 milliGRAM(s) Oral daily  insulin lispro (HumaLOG) corrective regimen sliding scale   SubCutaneous three times a day before meals  insulin lispro (HumaLOG) corrective regimen sliding scale   SubCutaneous at bedtime  lactobacillus acidophilus 1 Tablet(s) Oral three times a day with meals  levothyroxine 75 MICROGram(s) Oral daily  pantoprazole    Tablet 40 milliGRAM(s) Oral two times a day  sucralfate 1 Gram(s) Oral four times a day    MEDICATIONS  (PRN):  acetaminophen   Tablet 650 milliGRAM(s) Oral every 6 hours PRN Mild Pain  dextrose 40% Gel 15 Gram(s) Oral once PRN Blood Glucose LESS THAN 70 milliGRAM(s)/deciliter  glucagon  Injectable 1 milliGRAM(s) IntraMuscular once PRN Glucose LESS THAN 70 milligrams/deciliter      Allergies    Levaquin (Hives)  sulfa drugs (Unknown)    Intolerances        REVIEW OF SYSTEMS:            Vital Signs Last 24 Hrs  T(C): 36.7 (17 Jul 2018 05:21), Max: 37 (16 Jul 2018 13:28)  T(F): 98.1 (17 Jul 2018 05:21), Max: 98.6 (16 Jul 2018 13:28)  HR: 70 (17 Jul 2018 07:22) (70 - 82)  BP: 159/86 (17 Jul 2018 05:21) (147/79 - 159/86)  BP(mean): --  RR: 18 (17 Jul 2018 05:21) (18 - 18)  SpO2: 98% (17 Jul 2018 07:22) (92% - 98%)    PHYSICAL EXAM:          LABS:                        8.2    5.15  )-----------( 209      ( 17 Jul 2018 07:35 )             27.2     CBC Full  -  ( 17 Jul 2018 07:35 )  WBC Count : 5.15 K/uL  Hemoglobin : 8.2 g/dL  Hematocrit : 27.2 %  Platelet Count - Automated : 209 K/uL  Mean Cell Volume : 91.0 fl  Mean Cell Hemoglobin : 27.4 pg  Mean Cell Hemoglobin Concentration : 30.1 gm/dL          17 Jul 2018 07:35    144    |  111    |  37     ----------------------------<  114    4.5     |  27     |  1.80     Ca    8.3        17 Jul 2018 07:35      PT/INR - ( 17 Jul 2018 07:35 )   PT: 13.5 sec;   INR: 1.23 ratio             CAPILLARY BLOOD GLUCOSE      POCT Blood Glucose.: 136 mg/dL (17 Jul 2018 08:02)  POCT Blood Glucose.: 183 mg/dL (16 Jul 2018 21:12)  POCT Blood Glucose.: 164 mg/dL (16 Jul 2018 17:12)  POCT Blood Glucose.: 164 mg/dL (16 Jul 2018 11:36)        Culture - Urine (collected 07-14-18 @ 16:32)  Source: .Urine Catheterized  Final Report (07-16-18 @ 18:20):    >100,000 CFU/ml Proteus mirabilis    <10,000 CFU/ml Normal Urogenital rome present  Organism: Proteus mirabilis (07-16-18 @ 18:20)  Organism: Proteus mirabilis (07-16-18 @ 18:20)      -  Amikacin: S <=8      -  Amoxicillin/Clavulanic Acid: R >16/8      -  Ampicillin: R >16 These ampicillin results predict results for amoxicillin      -  Ampicillin/Sulbactam: R >16/8      -  Aztreonam: S <=4      -  Cefazolin: R >16 This predicts results for oral agents cefaclor, cefdinir, cefpodoxime, cefprozil, cefuroxime axetil, cephalexin and locarbef for uncomplicated UTI. Note that some isolates may be susceptible to these agents while testing resistant to cefazolin.      -  Cefepime: S 4      -  Cefoxitin: R >16      -  Ceftriaxone: R 8 Enterobacter, Citrobacter, and Serratia may develop resistance during prolonged therapy      -  Ciprofloxacin: S <=0.5      -  Ertapenem: S <=0.5      -  Gentamicin: S 2      -  Levofloxacin: S <=1      -  Meropenem: S <=1      -  Nitrofurantoin: R >64 Should not be used to treat pyelonephritis      -  Piperacillin/Tazobactam: S <=8      -  Tobramycin: S 4      -  Trimethoprim/Sulfamethoxazole: S <=0.5/9.5      Method Type: BROOKE    Culture - Urine (collected 07-13-18 @ 21:56)  Source: .Urine Catheterized  Final Report (07-15-18 @ 16:22):    >100,000 CFU/ml Proteus mirabilis  Organism: Proteus mirabilis (07-15-18 @ 16:22)  Organism: Proteus mirabilis (07-15-18 @ 16:22)      -  Amikacin: S <=8      -  Amoxicillin/Clavulanic Acid: R >16/8      -  Ampicillin: R >16 These ampicillin results predict results for amoxicillin      -  Ampicillin/Sulbactam: R >16/8      -  Aztreonam: S <=4      -  Cefazolin: R >16 This predicts results for oral agents cefaclor, cefdinir, cefpodoxime, cefprozil, cefuroxime axetil, cephalexin and locarbef for uncomplicated UTI. Note that some isolates may be susceptible to these agents while testing resistant to cefazolin.      -  Cefepime: S <=2      -  Cefoxitin: R >16      -  Ceftriaxone: R 8 Enterobacter, Citrobacter, and Serratia may develop resistance during prolonged therapy      -  Ciprofloxacin: S <=0.5      -  Ertapenem: S <=0.5      -  Gentamicin: S <=1      -  Levofloxacin: S <=1      -  Meropenem: S <=1      -  Nitrofurantoin: R >64 Should not be used to treat pyelonephritis      -  Piperacillin/Tazobactam: S <=8      -  Tobramycin: S <=2      -  Trimethoprim/Sulfamethoxazole: S <=0.5/9.5      Method Type: BROOKE        RADIOLOGY & ADDITIONAL TESTS:    Personally reviewed.     Consultant(s) Notes Reviewed:  [x] YES  [ ] NO Patient is a 83y old  Female who presents with a chief complaint of profound anemia (16 Jul 2018 09:11)      INTERVAL HPI/OVERNIGHT EVENTS:  2 melanotic bowel movements last night. Complains of nausea from being NPO.    MEDICATIONS  (STANDING):  ALBUTerol/ipratropium for Nebulization 3 milliLiter(s) Nebulizer every 6 hours  bisacodyl 20 milliGRAM(s) Oral at bedtime  dextrose 5%. 1000 milliLiter(s) (50 mL/Hr) IV Continuous <Continuous>  dextrose 50% Injectable 12.5 Gram(s) IV Push once  dextrose 50% Injectable 25 Gram(s) IV Push once  dextrose 50% Injectable 25 Gram(s) IV Push once  epoetin sherita Injectable 14220 Unit(s) SubCutaneous <User Schedule>  folic acid 1 milliGRAM(s) Oral daily  insulin lispro (HumaLOG) corrective regimen sliding scale   SubCutaneous three times a day before meals  insulin lispro (HumaLOG) corrective regimen sliding scale   SubCutaneous at bedtime  lactobacillus acidophilus 1 Tablet(s) Oral three times a day with meals  levothyroxine 75 MICROGram(s) Oral daily  pantoprazole    Tablet 40 milliGRAM(s) Oral two times a day  sucralfate 1 Gram(s) Oral four times a day    MEDICATIONS  (PRN):  acetaminophen   Tablet 650 milliGRAM(s) Oral every 6 hours PRN Mild Pain  dextrose 40% Gel 15 Gram(s) Oral once PRN Blood Glucose LESS THAN 70 milliGRAM(s)/deciliter  glucagon  Injectable 1 milliGRAM(s) IntraMuscular once PRN Glucose LESS THAN 70 milligrams/deciliter      Allergies    Levaquin (Hives)  sulfa drugs (Unknown)    Intolerances    REVIEW OF SYSTEMS:  CONSTITUTIONAL: denies fever, chills, fatigue, weakness  SKIN: denies new lesions, rash  CARDIOVASCULAR: denies chest pain, chest pressure, palpitations  RESPIRATORY: denies shortness of breath, cough  GASTROINTESTINAL: + nausea, denies vomiting, diarrhea, abdominal pain  GENITOURINARY: denies dysuria, discharge  NEUROLOGICAL: denies numbness, headache, focal weakness  MUSCULOSKELETAL: denies new joint pain, muscle aches  HEMATOLOGIC: denies gross bleeding, bruising  LYMPHATICS:  + chronic lower extremity swelling        Vital Signs Last 24 Hrs  T(C): 36.7 (17 Jul 2018 05:21), Max: 37 (16 Jul 2018 13:28)  T(F): 98.1 (17 Jul 2018 05:21), Max: 98.6 (16 Jul 2018 13:28)  HR: 70 (17 Jul 2018 07:22) (70 - 82)  BP: 159/86 (17 Jul 2018 05:21) (147/79 - 159/86)  BP(mean): --  RR: 18 (17 Jul 2018 05:21) (18 - 18)  SpO2: 98% (17 Jul 2018 07:22) (92% - 98%)    PHYSICAL EXAM:  GENERAL:  morbidly obese female, resting comfortably in bed, no acute distress,  EYES: sclera clear, no exudates,  moist mucous membranes  LUNGS: good air entry bilaterally, clear to auscultation, symmetric breath sounds, no wheezing or rhonchi appreciated  HEART: soft S1/S2, regular rate and rhythm, no murmurs noted, trace lower extremity edema  GASTROINTESTINAL: abdomen is soft, nontender, nondistended, normoactive bowel sounds, no palpable masses  INTEGUMENT: good skin turgor, +Chronic lower leg skin changes (brown, scaling skin),   MUSCULOSKELETAL: no cyanosis, no obvious deformity  NEUROLOGIC: awake, alert, oriented x3, good muscle tone in 4 extremities, no obvious sensory deficits  HEME/LYMPH:+ Left upper back ecchymosis          LABS:                        8.2    5.15  )-----------( 209      ( 17 Jul 2018 07:35 )             27.2     CBC Full  -  ( 17 Jul 2018 07:35 )  WBC Count : 5.15 K/uL  Hemoglobin : 8.2 g/dL  Hematocrit : 27.2 %  Platelet Count - Automated : 209 K/uL  Mean Cell Volume : 91.0 fl  Mean Cell Hemoglobin : 27.4 pg  Mean Cell Hemoglobin Concentration : 30.1 gm/dL          17 Jul 2018 07:35    144    |  111    |  37     ----------------------------<  114    4.5     |  27     |  1.80     Ca    8.3        17 Jul 2018 07:35      PT/INR - ( 17 Jul 2018 07:35 )   PT: 13.5 sec;   INR: 1.23 ratio             CAPILLARY BLOOD GLUCOSE      POCT Blood Glucose.: 136 mg/dL (17 Jul 2018 08:02)  POCT Blood Glucose.: 183 mg/dL (16 Jul 2018 21:12)  POCT Blood Glucose.: 164 mg/dL (16 Jul 2018 17:12)  POCT Blood Glucose.: 164 mg/dL (16 Jul 2018 11:36)        Culture - Urine (collected 07-14-18 @ 16:32)  Source: .Urine Catheterized  Final Report (07-16-18 @ 18:20):    >100,000 CFU/ml Proteus mirabilis    <10,000 CFU/ml Normal Urogenital rome present  Organism: Proteus mirabilis (07-16-18 @ 18:20)  Organism: Proteus mirabilis (07-16-18 @ 18:20)      -  Amikacin: S <=8      -  Amoxicillin/Clavulanic Acid: R >16/8      -  Ampicillin: R >16 These ampicillin results predict results for amoxicillin      -  Ampicillin/Sulbactam: R >16/8      -  Aztreonam: S <=4      -  Cefazolin: R >16 This predicts results for oral agents cefaclor, cefdinir, cefpodoxime, cefprozil, cefuroxime axetil, cephalexin and locarbef for uncomplicated UTI. Note that some isolates may be susceptible to these agents while testing resistant to cefazolin.      -  Cefepime: S 4      -  Cefoxitin: R >16      -  Ceftriaxone: R 8 Enterobacter, Citrobacter, and Serratia may develop resistance during prolonged therapy      -  Ciprofloxacin: S <=0.5      -  Ertapenem: S <=0.5      -  Gentamicin: S 2      -  Levofloxacin: S <=1      -  Meropenem: S <=1      -  Nitrofurantoin: R >64 Should not be used to treat pyelonephritis      -  Piperacillin/Tazobactam: S <=8      -  Tobramycin: S 4      -  Trimethoprim/Sulfamethoxazole: S <=0.5/9.5      Method Type: BROOKE    Culture - Urine (collected 07-13-18 @ 21:56)  Source: .Urine Catheterized  Final Report (07-15-18 @ 16:22):    >100,000 CFU/ml Proteus mirabilis  Organism: Proteus mirabilis (07-15-18 @ 16:22)  Organism: Proteus mirabilis (07-15-18 @ 16:22)      -  Amikacin: S <=8      -  Amoxicillin/Clavulanic Acid: R >16/8      -  Ampicillin: R >16 These ampicillin results predict results for amoxicillin      -  Ampicillin/Sulbactam: R >16/8      -  Aztreonam: S <=4      -  Cefazolin: R >16 This predicts results for oral agents cefaclor, cefdinir, cefpodoxime, cefprozil, cefuroxime axetil, cephalexin and locarbef for uncomplicated UTI. Note that some isolates may be susceptible to these agents while testing resistant to cefazolin.      -  Cefepime: S <=2      -  Cefoxitin: R >16      -  Ceftriaxone: R 8 Enterobacter, Citrobacter, and Serratia may develop resistance during prolonged therapy      -  Ciprofloxacin: S <=0.5      -  Ertapenem: S <=0.5      -  Gentamicin: S <=1      -  Levofloxacin: S <=1      -  Meropenem: S <=1      -  Nitrofurantoin: R >64 Should not be used to treat pyelonephritis      -  Piperacillin/Tazobactam: S <=8      -  Tobramycin: S <=2      -  Trimethoprim/Sulfamethoxazole: S <=0.5/9.5      Method Type: BROOKE        RADIOLOGY & ADDITIONAL TESTS:    Personally reviewed.     Consultant(s) Notes Reviewed:  [x] YES  [ ] NO

## 2018-07-17 NOTE — PROGRESS NOTE ADULT - PROBLEM SELECTOR PLAN 8
Holding Clonazepam as of 7/15  patient tolerating   may have be contributing to disorientation/somnolence

## 2018-07-17 NOTE — PROGRESS NOTE ADULT - ASSESSMENT
83 year old female with DM2, HTN, here with an episode of syncope, found to have significant anemia. Improvement in lethargy with PRBC transfusions    - Patient presented with profound anemia, s/p PRBC's x 5 units total and 1 unit of platelets    - H/H has been stable since transfusion.  Continue to trend and transfuse as needed   - s/p EGD 7/9 with clean based duodenal ulcer, gastritis, hiatal hernia  - s/p bleeding scan which was negative, although, unable to complete due to patient's refusal to have anymore tests  - Pt is agreeable for colonoscopy and is scheduled for today  -  Currently optimized as best as possible from a CV POV for a possible low risk endoscopic GI procedure if pt is willing to undergo  -  Chronic lower extremity edema unchanged.    Administer Lasix after blood products transfusion to avoid fluid volume overload   - There is no echo on record so her LV function is unknown.     - Hemodynamics stable as per flow sheet   - Very mild troponin leak 2/2 demand in the setting of severe anemia  - EKG is SR with no sign of ischemia or chamber enlargement  - Optimized for the OR from a cardiac point of view with no evidence of active ischemic heart disease, decompensated heart failure, severe obstructive valvular disease, or uncontrolled arrhythmia.  - Baseline creatinine unknown, though normal in 2016, BARBARA likely due to her profound anemia.  Avoid nephrotoxics.  Renal following  - Monitor electrolytes. Keep K>4, Mg>2  - Further cardiac workup as case unfolds

## 2018-07-17 NOTE — PROGRESS NOTE ADULT - SUBJECTIVE AND OBJECTIVE BOX
Date/Time Patient Seen:  		  Referring MD:   Data Reviewed	       Patient is a 83y old  Female who presents with a chief complaint of profound anemia (16 Jul 2018 09:11)  planned for EGD      Subjective/HPI     PAST MEDICAL & SURGICAL HISTORY:  Arthritis  Asthma  Diabetes mellitus  Hypothyroidism  No significant past surgical history        Medication list         MEDICATIONS  (STANDING):  ALBUTerol/ipratropium for Nebulization 3 milliLiter(s) Nebulizer every 6 hours  bisacodyl 20 milliGRAM(s) Oral at bedtime  dextrose 5%. 1000 milliLiter(s) (50 mL/Hr) IV Continuous <Continuous>  dextrose 50% Injectable 12.5 Gram(s) IV Push once  dextrose 50% Injectable 25 Gram(s) IV Push once  dextrose 50% Injectable 25 Gram(s) IV Push once  epoetin sherita Injectable 73371 Unit(s) SubCutaneous <User Schedule>  folic acid 1 milliGRAM(s) Oral daily  insulin lispro (HumaLOG) corrective regimen sliding scale   SubCutaneous three times a day before meals  insulin lispro (HumaLOG) corrective regimen sliding scale   SubCutaneous at bedtime  lactobacillus acidophilus 1 Tablet(s) Oral three times a day with meals  levothyroxine 75 MICROGram(s) Oral daily  pantoprazole    Tablet 40 milliGRAM(s) Oral two times a day  sucralfate 1 Gram(s) Oral four times a day    MEDICATIONS  (PRN):  acetaminophen   Tablet 650 milliGRAM(s) Oral every 6 hours PRN Mild Pain  dextrose 40% Gel 15 Gram(s) Oral once PRN Blood Glucose LESS THAN 70 milliGRAM(s)/deciliter  glucagon  Injectable 1 milliGRAM(s) IntraMuscular once PRN Glucose LESS THAN 70 milligrams/deciliter         Vitals log        ICU Vital Signs Last 24 Hrs  T(C): 36.7 (17 Jul 2018 05:21), Max: 37 (16 Jul 2018 13:28)  T(F): 98.1 (17 Jul 2018 05:21), Max: 98.6 (16 Jul 2018 13:28)  HR: 71 (17 Jul 2018 05:21) (70 - 82)  BP: 159/86 (17 Jul 2018 05:21) (147/79 - 159/86)  BP(mean): --  ABP: --  ABP(mean): --  RR: 18 (17 Jul 2018 05:21) (18 - 18)  SpO2: 92% (17 Jul 2018 05:21) (92% - 97%)           Input and Output:  I&O's Detail    15 Jul 2018 07:01  -  16 Jul 2018 07:00  --------------------------------------------------------  IN:    Oral Fluid: 1060 mL  Total IN: 1060 mL    OUT:    Indwelling Catheter - Urethral: 2100 mL  Total OUT: 2100 mL    Total NET: -1040 mL      16 Jul 2018 07:01  -  17 Jul 2018 06:09  --------------------------------------------------------  IN:  Total IN: 0 mL    OUT:    Indwelling Catheter - Urethral: 700 mL  Total OUT: 700 mL    Total NET: -700 mL          Lab Data                        7.7    4.83  )-----------( 197      ( 16 Jul 2018 07:01 )             25.8     07-16    144  |  112<H>  |  46<H>  ----------------------------<  109<H>  4.8   |  27  |  1.90<H>    Ca    8.2<L>      16 Jul 2018 07:01              Review of Systems	      Objective     Physical Examination    heart s1s2  lung dec BS      Pertinent Lab findings & Imaging      Hazel:  NO   Adequate UO     I&O's Detail    15 Jul 2018 07:01  -  16 Jul 2018 07:00  --------------------------------------------------------  IN:    Oral Fluid: 1060 mL  Total IN: 1060 mL    OUT:    Indwelling Catheter - Urethral: 2100 mL  Total OUT: 2100 mL    Total NET: -1040 mL      16 Jul 2018 07:01  -  17 Jul 2018 06:09  --------------------------------------------------------  IN:  Total IN: 0 mL    OUT:    Indwelling Catheter - Urethral: 700 mL  Total OUT: 700 mL    Total NET: -700 mL               Discussed with:     Cultures:	        Radiology

## 2018-07-17 NOTE — PROGRESS NOTE ADULT - ASSESSMENT
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 Renal function fluctuates but relatively stable , eGFR 26-30 ml/min, cr now 1.8  Hypernatremia - Resolved  Hyperkalemia  - Resolved   Anemia: Blood loss anemia, severe: GIB  Arthritis  Asthma  Diabetes mellitus  Lymphedema    - No indication for RRT, shall follow closely  - No indication for kidney biopsy. No signs of underlying GN or nephrotic syndrome   - No uremic bleeding diathesis. If bleeding becomes a recurrent issue, will consider on dose of DDAVP 0.3 /kg bw  - Urine studies reviewed. No real proteinuria. FeNa does not suggest pre-renal. S/p brief IVF. Off IVF  - Low K diet   - Edema is most lymphedema although some contribution from fluid. We will consider adding low dose diuretics. S/p Lasix 20 mg IV x 1  - She will benefit from ACEi if renal function. This can be started outpatient     No renal objection for d/c planning. 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 Renal function fluctuates but relatively stable , eGFR 26-30 ml/min, cr now 1.8  Hypernatremia - Resolved  Hyperkalemia  - Resolved   Anemia: Blood loss anemia, severe: GIB  Arthritis  Asthma  Diabetes mellitus  Lymphedema  s/p EGD attempted. d/w GI    will need colonoscopy, per Dr Daly    - No indication for RRT, shall follow closely  - No indication for kidney biopsy. No signs of underlying GN or nephrotic syndrome   - No uremic bleeding diathesis. If bleeding becomes a recurrent issue, will consider on dose of DDAVP 0.3 /kg bw  - Urine studies reviewed. No real proteinuria. FeNa does not suggest pre-renal. S/p brief IVF. Off IVF  - Low K diet   - Edema is most lymphedema although some contribution from fluid. We will consider adding low dose diuretics. S/p Lasix 20 mg IV x 1  - She will benefit from ACEi if renal function. This can be started outpatient     No renal objection for colonoscopy plan

## 2018-07-17 NOTE — PROGRESS NOTE ADULT - ASSESSMENT
82 y/o F with PMHx DM2, hypothyroidism, asthma, arthritis, meningoma (chronic), and  brought in by EMS after syncopal episode at [facility]. Admitted to ICU w/ profound anemia hgb 3.8, possibly 2/2 acute GIB sec to duodenal ulcer. Doppler neg for DVT. Colonoscopy today. 84 y/o F with PMHx DM2, hypothyroidism, asthma, arthritis, meningoma (chronic), and  brought in by EMS after syncopal episode at [facility]. Admitted to ICU w/ profound anemia hgb 3.8, possibly 2/2 acute GIB sec to duodenal ulcer. Doppler neg for DVT. EGD today.

## 2018-07-17 NOTE — GOALS OF CARE CONVERSATION - PERSONAL ADVANCE DIRECTIVE - CONVERSATION DETAILS
spoke to daughterSheila on phone, pt has no hcp, educated daughter regarding surrogate for medical decisions. pt lacks capacity per daughter from psych MD. daughter & son will make decisions: inquired of pt wishes, pt will remain a full code at this time, pt did not discuss fully w family, pt never told family not to resuscitate her. daughter wants len for pt.

## 2018-07-17 NOTE — PROGRESS NOTE ADULT - SUBJECTIVE AND OBJECTIVE BOX
[facility] - Mabel Hernandez, Jessica, Cesar, Holyl, Mayda Hartman  Office Number:  [phone]    Patient resting comfortably in bed in NAD.  Laying flat with no respiratory distress.  Offers no complaints    MEDICATIONS  (STANDING):  ALBUTerol/ipratropium for Nebulization 3 milliLiter(s) Nebulizer every 6 hours  bisacodyl 20 milliGRAM(s) Oral at bedtime  dextrose 5%. 1000 milliLiter(s) (50 mL/Hr) IV Continuous <Continuous>  dextrose 50% Injectable 12.5 Gram(s) IV Push once  dextrose 50% Injectable 25 Gram(s) IV Push once  dextrose 50% Injectable 25 Gram(s) IV Push once  epoetin sherita Injectable 67082 Unit(s) SubCutaneous <User Schedule>  folic acid 1 milliGRAM(s) Oral daily  insulin lispro (HumaLOG) corrective regimen sliding scale   SubCutaneous three times a day before meals  insulin lispro (HumaLOG) corrective regimen sliding scale   SubCutaneous at bedtime  lactobacillus acidophilus 1 Tablet(s) Oral three times a day with meals  levothyroxine 75 MICROGram(s) Oral daily  pantoprazole    Tablet 40 milliGRAM(s) Oral two times a day  sucralfate 1 Gram(s) Oral four times a day    MEDICATIONS  (PRN):  acetaminophen   Tablet 650 milliGRAM(s) Oral every 6 hours PRN Mild Pain  dextrose 40% Gel 15 Gram(s) Oral once PRN Blood Glucose LESS THAN 70 milliGRAM(s)/deciliter  glucagon  Injectable 1 milliGRAM(s) IntraMuscular once PRN Glucose LESS THAN 70 milligrams/deciliter      Allergies    Levaquin (Hives)  sulfa drugs (Unknown)    Intolerances        Vital Signs Last 24 Hrs  T(C): 36.7 (17 Jul 2018 05:21), Max: 37 (16 Jul 2018 13:28)  T(F): 98.1 (17 Jul 2018 05:21), Max: 98.6 (16 Jul 2018 13:28)  HR: 70 (17 Jul 2018 07:22) (70 - 82)  BP: 159/86 (17 Jul 2018 05:21) (147/79 - 159/86)  BP(mean): --  RR: 18 (17 Jul 2018 05:21) (18 - 18)  SpO2: 98% (17 Jul 2018 07:22) (92% - 98%)    I&O's Summary    16 Jul 2018 07:01  -  17 Jul 2018 07:00  --------------------------------------------------------  IN: 0 mL / OUT: 2100 mL / NET: -2100 mL        ON EXAM:    General: NAD, awake and alert, oriented x 3  HEENT: Mucous membranes are moist, anicteric  Lungs: Non-labored, breath sounds are clear bilaterally, No wheezing, rales or rhonchi  Cardiovascular: Regular, S1 and S2, no murmurs, rubs, or gallops  Gastrointestinal: Bowel Sounds present, soft, nontender.   Lymph: b/l chronic peripheral edema. No lymphadenopathy.  Skin: No rashes or ulcers      LABS: All Labs Reviewed:                        8.2    5.15  )-----------( 209      ( 17 Jul 2018 07:35 )             27.2                         7.7    4.83  )-----------( 197      ( 16 Jul 2018 07:01 )             25.8                         7.6    5.02  )-----------( 177      ( 15 Jul 2018 07:49 )             25.4     17 Jul 2018 07:35    144    |  111    |  37     ----------------------------<  114    4.5     |  27     |  1.80   16 Jul 2018 07:01    144    |  112    |  46     ----------------------------<  109    4.8     |  27     |  1.90   15 Jul 2018 07:49    145    |  113    |  51     ----------------------------<  110    5.0     |  27     |  2.10     Ca    8.3        17 Jul 2018 07:35  Ca    8.2        16 Jul 2018 07:01  Ca    8.3        15 Jul 2018 07:49      PT/INR - ( 17 Jul 2018 07:35 )   PT: 13.5 sec;   INR: 1.23 ratio               Blood Culture: Organism Proteus mirabilis  Gram Stain Blood -- Gram Stain --  Specimen Source .Urine Catheterized  Culture-Blood --    Organism Proteus mirabilis  Gram Stain Blood -- Gram Stain --  Specimen Source .Urine Catheterized  Culture-Blood --

## 2018-07-18 DIAGNOSIS — F03.90 UNSPECIFIED DEMENTIA WITHOUT BEHAVIORAL DISTURBANCE: ICD-10-CM

## 2018-07-18 LAB
ANION GAP SERPL CALC-SCNC: 5 MMOL/L — SIGNIFICANT CHANGE UP (ref 5–17)
BUN SERPL-MCNC: 32 MG/DL — HIGH (ref 7–23)
CALCIUM SERPL-MCNC: 8.2 MG/DL — LOW (ref 8.5–10.1)
CHLORIDE SERPL-SCNC: 111 MMOL/L — HIGH (ref 96–108)
CO2 SERPL-SCNC: 28 MMOL/L — SIGNIFICANT CHANGE UP (ref 22–31)
CREAT SERPL-MCNC: 1.9 MG/DL — HIGH (ref 0.5–1.3)
GLUCOSE SERPL-MCNC: 118 MG/DL — HIGH (ref 70–99)
HCT VFR BLD CALC: 26.5 % — LOW (ref 34.5–45)
HGB BLD-MCNC: 8 G/DL — LOW (ref 11.5–15.5)
MCHC RBC-ENTMCNC: 27.5 PG — SIGNIFICANT CHANGE UP (ref 27–34)
MCHC RBC-ENTMCNC: 30.2 GM/DL — LOW (ref 32–36)
MCV RBC AUTO: 91.1 FL — SIGNIFICANT CHANGE UP (ref 80–100)
NRBC # BLD: 0 /100 WBCS — SIGNIFICANT CHANGE UP (ref 0–0)
PLATELET # BLD AUTO: 228 K/UL — SIGNIFICANT CHANGE UP (ref 150–400)
POTASSIUM SERPL-MCNC: 4.4 MMOL/L — SIGNIFICANT CHANGE UP (ref 3.5–5.3)
POTASSIUM SERPL-SCNC: 4.4 MMOL/L — SIGNIFICANT CHANGE UP (ref 3.5–5.3)
RBC # BLD: 2.91 M/UL — LOW (ref 3.8–5.2)
RBC # FLD: 18.2 % — HIGH (ref 10.3–14.5)
SODIUM SERPL-SCNC: 144 MMOL/L — SIGNIFICANT CHANGE UP (ref 135–145)
WBC # BLD: 5.44 K/UL — SIGNIFICANT CHANGE UP (ref 3.8–10.5)
WBC # FLD AUTO: 5.44 K/UL — SIGNIFICANT CHANGE UP (ref 3.8–10.5)

## 2018-07-18 PROCEDURE — 99232 SBSQ HOSP IP/OBS MODERATE 35: CPT

## 2018-07-18 PROCEDURE — 99233 SBSQ HOSP IP/OBS HIGH 50: CPT | Mod: GC

## 2018-07-18 RX ORDER — MULTIVIT WITH MIN/MFOLATE/K2 340-15/3 G
296 POWDER (GRAM) ORAL ONCE
Qty: 0 | Refills: 0 | Status: COMPLETED | OUTPATIENT
Start: 2018-07-19 | End: 2018-07-19

## 2018-07-18 RX ORDER — MULTIVIT WITH MIN/MFOLATE/K2 340-15/3 G
296 POWDER (GRAM) ORAL EVERY 8 HOURS
Qty: 0 | Refills: 0 | Status: COMPLETED | OUTPATIENT
Start: 2018-07-18 | End: 2018-07-18

## 2018-07-18 RX ADMIN — PANTOPRAZOLE SODIUM 40 MILLIGRAM(S): 20 TABLET, DELAYED RELEASE ORAL at 18:26

## 2018-07-18 RX ADMIN — Medication 75 MICROGRAM(S): at 06:08

## 2018-07-18 RX ADMIN — Medication 1: at 12:15

## 2018-07-18 RX ADMIN — Medication 1 GRAM(S): at 23:44

## 2018-07-18 RX ADMIN — Medication 3 MILLILITER(S): at 01:41

## 2018-07-18 RX ADMIN — PANTOPRAZOLE SODIUM 40 MILLIGRAM(S): 20 TABLET, DELAYED RELEASE ORAL at 06:08

## 2018-07-18 RX ADMIN — Medication 1 GRAM(S): at 06:08

## 2018-07-18 RX ADMIN — Medication 1 DROP(S): at 19:04

## 2018-07-18 RX ADMIN — Medication 1 TABLET(S): at 08:41

## 2018-07-18 RX ADMIN — Medication 3 MILLILITER(S): at 07:27

## 2018-07-18 RX ADMIN — Medication 1 GRAM(S): at 12:07

## 2018-07-18 RX ADMIN — Medication 1 GRAM(S): at 18:26

## 2018-07-18 RX ADMIN — Medication 1 TABLET(S): at 12:07

## 2018-07-18 RX ADMIN — Medication 1 TABLET(S): at 18:26

## 2018-07-18 RX ADMIN — ERYTHROPOIETIN 10000 UNIT(S): 10000 INJECTION, SOLUTION INTRAVENOUS; SUBCUTANEOUS at 09:04

## 2018-07-18 RX ADMIN — Medication 1 MILLIGRAM(S): at 12:13

## 2018-07-18 NOTE — PROGRESS NOTE ADULT - PROBLEM SELECTOR PLAN 4
-chronic, stable  -Continue Levothyroxine - continue SSI (low) w/ accuchecks and hypoglycemia protocol

## 2018-07-18 NOTE — PROGRESS NOTE ADULT - SUBJECTIVE AND OBJECTIVE BOX
NEPHROLOGY INTERVAL HPI/OVERNIGHT EVENTS:  HPI:  84 y/o F with PMHx DM2, hypothyroidism, asthma, arthritis, meningoma (chronic), and  BIBEMS after syncopal episode at [facility]. Patient is a poor historian and is unsure why she is in the hospital.  Family at bedside reports that patient was discharged from [facility] the previous day after being admitted with AMS 2/2 UTI.  Family reports that yesterday patient was SOB.  They report that she bleeds easily, but denies knowledge of blood in stools, changes in urine color, open wounds. Patient confirms abdominal pain. She denies chest pain, shortness of breath, palpitations, nausea or vomiting. She also denies dizziness blurry vision.    Per ED nurse after stool guaiac performed patient had red streaked stool.    In ED patient vitals are 108/54, afebrile, O2 Sat 100% on supp O2.  Labs significant for Hbg 3.8, Hct 12.6, Na 148, Cl 120, Co2 20, BUN 62, Crt 1.9, GFR 24, Ca 6.4. UA+ for Large blood, leukocyte esterase. EKG shows NSR.  Patient received 2 u PRBCs.  CT head/neck shows no acute pathology. Patient CT A/P ordered. (2018 14:43)    Follow up Acute on CKD  No acute events noted overnight    PAST MEDICAL & SURGICAL HISTORY:  Arthritis  Asthma  Diabetes mellitus  Hypothyroidism  No significant past surgical history      MEDICATIONS  (STANDING):  ALBUTerol/ipratropium for Nebulization 3 milliLiter(s) Nebulizer every 6 hours  bisacodyl 20 milliGRAM(s) Oral at bedtime  dextrose 5%. 1000 milliLiter(s) (50 mL/Hr) IV Continuous <Continuous>  dextrose 50% Injectable 12.5 Gram(s) IV Push once  dextrose 50% Injectable 25 Gram(s) IV Push once  dextrose 50% Injectable 25 Gram(s) IV Push once  epoetin sherita Injectable 62024 Unit(s) SubCutaneous <User Schedule>  folic acid 1 milliGRAM(s) Oral daily  insulin lispro (HumaLOG) corrective regimen sliding scale   SubCutaneous three times a day before meals  insulin lispro (HumaLOG) corrective regimen sliding scale   SubCutaneous at bedtime  lactobacillus acidophilus 1 Tablet(s) Oral three times a day with meals  levothyroxine 75 MICROGram(s) Oral daily  pantoprazole    Tablet 40 milliGRAM(s) Oral two times a day  sucralfate 1 Gram(s) Oral four times a day    MEDICATIONS  (PRN):  acetaminophen   Tablet 650 milliGRAM(s) Oral every 6 hours PRN Mild Pain  dextrose 40% Gel 15 Gram(s) Oral once PRN Blood Glucose LESS THAN 70 milliGRAM(s)/deciliter  glucagon  Injectable 1 milliGRAM(s) IntraMuscular once PRN Glucose LESS THAN 70 milligrams/deciliter  ondansetron Injectable 4 milliGRAM(s) IV Push every 6 hours PRN Nausea and/or Vomiting      Allergies    Levaquin (Hives)  sulfa drugs (Unknown)    Intolerances        Vital Signs Last 24 Hrs  T(C): 37.1 (2018 05:58), Max: 37.3 (2018 14:29)  T(F): 98.7 (2018 05:58), Max: 99.1 (2018 14:29)  HR: 72 (2018 07:27) (71 - 84)  BP: 142/81 (2018 05:58) (124/84 - 185/86)  BP(mean): --  RR: 18 (2018 05:58) (15 - 18)  SpO2: 92% (2018 07:27) (92% - 100%)  Daily     Daily Weight in k (2018 05:58)    PHYSICAL EXAM:  GENERAL: No distress  HEAD:  Atraumatic, Normocephalic  EYES: Conjunctiva and sclera clear  ENMT: Moist mucous membranes  NECK: Supple  NERVOUS SYSTEM:  Alert & Oriented X3, moves extremities  CHEST/LUNG: Clear to percussion bilaterally; No rales, rhonchi, wheezing, or rubs  HEART: Regular rate and rhythm; No murmurs, rubs, or gallops  ABDOMEN: Soft, Nontender, Nondistended; Bowel sounds present  EXTREMITIES:  +Edema B/L  SKIN: Chronic venous changes to LE    LABS:                        8.0    5.44  )-----------( 228      ( 2018 07:16 )             26.5     07-18    144  |  111<H>  |  32<H>  ----------------------------<  118<H>  4.4   |  28  |  1.90<H>    Ca    8.2<L>      2018 07:16      PT/INR - ( 2018 07:35 )   PT: 13.5 sec;   INR: 1.23 ratio                   RADIOLOGY & ADDITIONAL TESTS:

## 2018-07-18 NOTE — PROGRESS NOTE ADULT - PROBLEM SELECTOR PROBLEM 3
Type 2 diabetes mellitus with diabetic chronic kidney disease, unspecified CKD stage, unspecified whether long term insulin use Lower extremity edema

## 2018-07-18 NOTE — PROGRESS NOTE ADULT - ASSESSMENT
83 year old female with DM2, HTN, here with an episode of syncope, found to have significant anemia. Improvement in lethargy with PRBC transfusions    - Patient presented with profound anemia, s/p PRBC's x 5 units total and 1 unit of platelets    - H/H has been stable since transfusion.  Continue to trend and transfuse as needed   - s/p EGD 7/9 with clean based duodenal ulcer, gastritis, hiatal hernia  - s/p bleeding scan which was negative, although, unable to complete due to patient's refusal to have anymore tests  - Pt was agreeable for colonoscopy but now wishes to defer  -  Currently optimized as best as possible from a CV POV for a possible low risk endoscopic GI procedure if pt is willing to undergo  -  Chronic lower extremity edema unchanged.    Administer Lasix after blood products transfusion to avoid fluid volume overload   - There is no echo on record so her LV function is unknown.   It is reasonable, given her extensive workup, to have her ef known, torie in light of her initial pos trop  - Hemodynamics stable as per flow sheet   - EKG is SR with no sign of ischemia or chamber enlargement  - Baseline creatinine unknown, though normal in 2016, BARBARA likely due to her profound anemia.  Avoid nephrotoxics.  Renal following  - Monitor electrolytes. Keep K>4, Mg>2  - will follow

## 2018-07-18 NOTE — PROGRESS NOTE ADULT - PROBLEM SELECTOR PLAN 1
nebs as needed  oral and skin care  multi organ medical issues  supportive medical regimen  Nephro follow up noted  out of bed to chair  serial labs  keep sat > 88 pct

## 2018-07-18 NOTE — PROGRESS NOTE ADULT - PROBLEM SELECTOR PLAN 3
- continue SSI (low) w/ accuchecks and hypoglycemia protocol Bilateral  LE edema  and pain on palpitation  Dopplers ordered to r/o DVT  Continue  with SCDs, no anticoag due to workup of GI bleed

## 2018-07-18 NOTE — PROGRESS NOTE ADULT - SUBJECTIVE AND OBJECTIVE BOX
[facility]    Mabel Hernandez, Jessica, Cesar, Holly, Devan, Mayda      [phone]    CHIEF COMPLAINT: Patient is a 83y old  Female who presents with a chief complaint of profound anemia (16 Jul 2018 09:11)      Follow Up: severe anemia    Interim history: The patient reports no new symptoms.  Denies chest discomfort and shortness of breath.  No abdominal pain.  No new neurologic symptoms. Is tired of tests and wants a break.      MEDICATIONS  (STANDING):  ALBUTerol/ipratropium for Nebulization 3 milliLiter(s) Nebulizer every 6 hours  dextrose 5%. 1000 milliLiter(s) (50 mL/Hr) IV Continuous <Continuous>  dextrose 50% Injectable 12.5 Gram(s) IV Push once  dextrose 50% Injectable 25 Gram(s) IV Push once  dextrose 50% Injectable 25 Gram(s) IV Push once  epoetin sherita Injectable 42677 Unit(s) SubCutaneous <User Schedule>  folic acid 1 milliGRAM(s) Oral daily  insulin lispro (HumaLOG) corrective regimen sliding scale   SubCutaneous three times a day before meals  insulin lispro (HumaLOG) corrective regimen sliding scale   SubCutaneous at bedtime  lactobacillus acidophilus 1 Tablet(s) Oral three times a day with meals  levothyroxine 75 MICROGram(s) Oral daily  magnesium citrate Solution 296 milliLiter(s) Oral every 8 hours  pantoprazole    Tablet 40 milliGRAM(s) Oral two times a day  sucralfate 1 Gram(s) Oral four times a day    MEDICATIONS  (PRN):  acetaminophen   Tablet 650 milliGRAM(s) Oral every 6 hours PRN Mild Pain  dextrose 40% Gel 15 Gram(s) Oral once PRN Blood Glucose LESS THAN 70 milliGRAM(s)/deciliter  glucagon  Injectable 1 milliGRAM(s) IntraMuscular once PRN Glucose LESS THAN 70 milligrams/deciliter  ondansetron Injectable 4 milliGRAM(s) IV Push every 6 hours PRN Nausea and/or Vomiting      REVIEW OF SYSTEMS:  eye, ent, GI, , allergic, dermatologic, musculoskeletal and neurologic are negative except as described above    Vital Signs Last 24 Hrs  T(C): 37.1 (18 Jul 2018 05:58), Max: 37.3 (17 Jul 2018 14:29)  T(F): 98.7 (18 Jul 2018 05:58), Max: 99.1 (17 Jul 2018 14:29)  HR: 72 (18 Jul 2018 07:27) (71 - 84)  BP: 142/81 (18 Jul 2018 05:58) (124/84 - 185/86)  BP(mean): --  RR: 18 (18 Jul 2018 05:58) (15 - 18)  SpO2: 92% (18 Jul 2018 07:27) (92% - 100%)    I&O's Summary    17 Jul 2018 07:01  -  18 Jul 2018 07:00  --------------------------------------------------------  IN: 660 mL / OUT: 1250 mL / NET: -590 mL        Telemetry past 24h:    PHYSICAL EXAM:    Constitutional: well-nourished, well-developed, NAD   HEENT:  MMM, sclerae anicteric, conjunctivae clear, no oral cyanosis.  Pulmonary: Non-labored, breath sounds are clear bilaterally, No wheezing, rales or rhonchi  Cardiovascular: Regular, S1 and S2.  No murmur.  No rubs, gallops or clicks  Gastrointestinal: Bowel Sounds present, soft, nontender.   Lymph: No peripheral edema.   Neurological: Alert, no focal deficits  Skin: No rashes.  Psych:  Mood & affect appropriate    LABS: All Labs Reviewed:                        8.0    5.44  )-----------( 228      ( 18 Jul 2018 07:16 )             26.5                         8.2    5.15  )-----------( 209      ( 17 Jul 2018 07:35 )             27.2                         7.7    4.83  )-----------( 197      ( 16 Jul 2018 07:01 )             25.8     18 Jul 2018 07:16    144    |  111    |  32     ----------------------------<  118    4.4     |  28     |  1.90   17 Jul 2018 07:35    144    |  111    |  37     ----------------------------<  114    4.5     |  27     |  1.80   16 Jul 2018 07:01    144    |  112    |  46     ----------------------------<  109    4.8     |  27     |  1.90     Ca    8.2        18 Jul 2018 07:16  Ca    8.3        17 Jul 2018 07:35  Ca    8.2        16 Jul 2018 07:01      PT/INR - ( 17 Jul 2018 07:35 )   PT: 13.5 sec;   INR: 1.23 ratio               Blood Culture: Organism Proteus mirabilis  Gram Stain Blood -- Gram Stain --  Specimen Source .Urine Catheterized  Culture-Blood --    Organism Proteus mirabilis  Gram Stain Blood -- Gram Stain --  Specimen Source .Urine Catheterized  Culture-Blood --            RADIOLOGY:    EKG:    Echo:

## 2018-07-18 NOTE — PROGRESS NOTE ADULT - SUBJECTIVE AND OBJECTIVE BOX
Date/Time Patient Seen:  		  Referring MD:   Data Reviewed	       Patient is a 83y old  Female who presents with a chief complaint of profound anemia (16 Jul 2018 09:11)  vs and meds reviewed      Subjective/HPI     PAST MEDICAL & SURGICAL HISTORY:  Arthritis  Asthma  Diabetes mellitus  Hypothyroidism  No significant past surgical history        Medication list         MEDICATIONS  (STANDING):  ALBUTerol/ipratropium for Nebulization 3 milliLiter(s) Nebulizer every 6 hours  bisacodyl 20 milliGRAM(s) Oral at bedtime  dextrose 5%. 1000 milliLiter(s) (50 mL/Hr) IV Continuous <Continuous>  dextrose 50% Injectable 12.5 Gram(s) IV Push once  dextrose 50% Injectable 25 Gram(s) IV Push once  dextrose 50% Injectable 25 Gram(s) IV Push once  epoetin sherita Injectable 55102 Unit(s) SubCutaneous <User Schedule>  folic acid 1 milliGRAM(s) Oral daily  insulin lispro (HumaLOG) corrective regimen sliding scale   SubCutaneous three times a day before meals  insulin lispro (HumaLOG) corrective regimen sliding scale   SubCutaneous at bedtime  lactobacillus acidophilus 1 Tablet(s) Oral three times a day with meals  levothyroxine 75 MICROGram(s) Oral daily  pantoprazole    Tablet 40 milliGRAM(s) Oral two times a day  sucralfate 1 Gram(s) Oral four times a day    MEDICATIONS  (PRN):  acetaminophen   Tablet 650 milliGRAM(s) Oral every 6 hours PRN Mild Pain  dextrose 40% Gel 15 Gram(s) Oral once PRN Blood Glucose LESS THAN 70 milliGRAM(s)/deciliter  glucagon  Injectable 1 milliGRAM(s) IntraMuscular once PRN Glucose LESS THAN 70 milligrams/deciliter  ondansetron Injectable 4 milliGRAM(s) IV Push every 6 hours PRN Nausea and/or Vomiting         Vitals log        ICU Vital Signs Last 24 Hrs  T(C): 37.1 (18 Jul 2018 05:58), Max: 37.3 (17 Jul 2018 14:29)  T(F): 98.7 (18 Jul 2018 05:58), Max: 99.1 (17 Jul 2018 14:29)  HR: 77 (18 Jul 2018 05:58) (70 - 84)  BP: 142/81 (18 Jul 2018 05:58) (124/84 - 185/86)  BP(mean): --  ABP: --  ABP(mean): --  RR: 18 (18 Jul 2018 05:58) (15 - 18)  SpO2: 93% (18 Jul 2018 05:58) (93% - 100%)           Input and Output:  I&O's Detail    16 Jul 2018 07:01  -  17 Jul 2018 07:00  --------------------------------------------------------  IN:  Total IN: 0 mL    OUT:    Indwelling Catheter - Urethral: 2100 mL  Total OUT: 2100 mL    Total NET: -2100 mL      17 Jul 2018 07:01  -  18 Jul 2018 06:12  --------------------------------------------------------  IN:  Total IN: 0 mL    OUT:    Indwelling Catheter - Urethral: 1250 mL  Total OUT: 1250 mL    Total NET: -1250 mL          Lab Data                        8.2    5.15  )-----------( 209      ( 17 Jul 2018 07:35 )             27.2     07-17    144  |  111<H>  |  37<H>  ----------------------------<  114<H>  4.5   |  27  |  1.80<H>    Ca    8.3<L>      17 Jul 2018 07:35              Review of Systems	      Objective     Physical Examination    heart s1s2  lung dec BS  abd soft      Pertinent Lab findings & Imaging      Hazel:  NO   Adequate UO     I&O's Detail    16 Jul 2018 07:01  -  17 Jul 2018 07:00  --------------------------------------------------------  IN:  Total IN: 0 mL    OUT:    Indwelling Catheter - Urethral: 2100 mL  Total OUT: 2100 mL    Total NET: -2100 mL      17 Jul 2018 07:01  -  18 Jul 2018 06:12  --------------------------------------------------------  IN:  Total IN: 0 mL    OUT:    Indwelling Catheter - Urethral: 1250 mL  Total OUT: 1250 mL    Total NET: -1250 mL               Discussed with:     Cultures:	        Radiology

## 2018-07-18 NOTE — PROGRESS NOTE ADULT - PROBLEM SELECTOR PROBLEM 4
Hypothyroidism, unspecified type Type 2 diabetes mellitus with diabetic chronic kidney disease, unspecified CKD stage, unspecified whether long term insulin use

## 2018-07-18 NOTE — PROGRESS NOTE ADULT - PROBLEM SELECTOR PLAN 9
SCD's given GIB: hold all a/c  PPI for GIB pending further workup Holding Clonazepam as of 7/15  patient tolerating   may have be contributing to disorientation/somnolence

## 2018-07-18 NOTE — PROGRESS NOTE ADULT - PROBLEM SELECTOR PLAN 6
currently on supplemental O2 given symptomatic anemia  nebs q6 PRN wheezing/SOB improving  Based on labs in HIE, patient appears to have CKD2   apprec renal recs Dr. Sarkar/Joel  Expect creatinine will plateau soon and then improve. Continue to monitor daily.

## 2018-07-18 NOTE — PROGRESS NOTE ADULT - ASSESSMENT
84 y/o F with PMHx DM2, hypothyroidism, asthma, arthritis, meningoma (chronic), and  brought in by EMS after syncopal episode at [facility]. Admitted to ICU w/ profound anemia hgb 3.8, possibly 2/2 acute GIB sec to duodenal ulcer. Colonoscopy tomorrow. Dopplers to r/o DVT in LE

## 2018-07-18 NOTE — PROGRESS NOTE ADULT - PROBLEM SELECTOR PLAN 8
Holding Clonazepam as of 7/15  patient tolerating   may have be contributing to disorientation/somnolence continue to HOLD ALL NSAIDS  - tylenol 650mg PO q6 PRN mild pain

## 2018-07-18 NOTE — PROGRESS NOTE ADULT - PROBLEM SELECTOR PLAN 2
Bilateral  LE edema  and pain on palpitation  Dopplers ordered to r/o DVT  Continue  with SCDs, no anticoag due to workup of GI bleed Per Psych note 7/11, patient unable to make decisions on medical care. Daughter Sheila and son Bora health care proxies.

## 2018-07-18 NOTE — PROGRESS NOTE ADULT - PROBLEM SELECTOR PLAN 1
sp egd 7/9 showed non bleeding du, gastritis and hh  sp repeat egd 7/17 findings of hh, esophagitis, gastritis and bile reflux  f/u bxs  pt needs colon, per dw dr bah, planning for friday, will do clear liquids with 2 day prep (3 bottles of mag citrate over 2 days); npo after midnight thursday night, cards following for clearance, please medically optimize  monitor renal function  trend h/h, transfuse prn  ppi bid/carafate qid  hold ac asa nsaids sp egd 7/9 showed non bleeding du, gastritis and hh  sp repeat egd 7/17 findings of hh, esophagitis, gastritis and bile reflux  f/u bxs  pt needs colon  per psych, pt does not have decision making capacity  per dw dr bah planning for colon on  friday, will do clear liquids with 2 day prep (3 bottles of mag citrate over 2 days); npo after midnight thursday night, cards following for clearance, please medically optimize; dw rn  monitor renal function  trend h/h, transfuse prn  ppi bid/carafate qid  hold ac asa nsaids

## 2018-07-18 NOTE — PROGRESS NOTE ADULT - ASSESSMENT
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 Renal function fluctuates but relatively stable , eGFR 26-30 ml/min  Hypernatremia - Resolved  Hyperkalemia  - Resolved   Anemia: Blood loss anemia, severe: GIB  Arthritis  Asthma  Diabetes mellitus  Lymphedema  s/p EGD attempted. d/w GI    will need colonoscopy, per Dr Daly    - Renal indices are stable at baseline; electrolytes are stable  - No indication for RRT, shall follow closely  - No indication for kidney biopsy. No signs of underlying GN or nephrotic syndrome   - No uremic bleeding diathesis. If bleeding becomes a recurrent issue, will consider on dose of DDAVP 0.3 /kg bw  - Urine studies reviewed. No real proteinuria. FeNa does not suggest pre-renal. S/p brief IVF. Off IVF  - Low K diet   - Edema is most lymphedema although some contribution from fluid. Can add low dose maintenance Lasix; will monitor renal function  - She will benefit from ACEi if renal function remains stable. This can be started outpatient     No renal objection for colonoscopy plan    Thank you

## 2018-07-18 NOTE — PROGRESS NOTE ADULT - SUBJECTIVE AND OBJECTIVE BOX
Patient is a 83y old  Female who presents with a chief complaint of profound anemia (16 Jul 2018 09:11)      INTERVAL HPI/OVERNIGHT EVENTS:     MEDICATIONS  (STANDING):  ALBUTerol/ipratropium for Nebulization 3 milliLiter(s) Nebulizer every 6 hours  bisacodyl 20 milliGRAM(s) Oral at bedtime  dextrose 5%. 1000 milliLiter(s) (50 mL/Hr) IV Continuous <Continuous>  dextrose 50% Injectable 12.5 Gram(s) IV Push once  dextrose 50% Injectable 25 Gram(s) IV Push once  dextrose 50% Injectable 25 Gram(s) IV Push once  epoetin sherita Injectable 99483 Unit(s) SubCutaneous <User Schedule>  folic acid 1 milliGRAM(s) Oral daily  insulin lispro (HumaLOG) corrective regimen sliding scale   SubCutaneous three times a day before meals  insulin lispro (HumaLOG) corrective regimen sliding scale   SubCutaneous at bedtime  lactobacillus acidophilus 1 Tablet(s) Oral three times a day with meals  levothyroxine 75 MICROGram(s) Oral daily  pantoprazole    Tablet 40 milliGRAM(s) Oral two times a day  sucralfate 1 Gram(s) Oral four times a day    MEDICATIONS  (PRN):  acetaminophen   Tablet 650 milliGRAM(s) Oral every 6 hours PRN Mild Pain  dextrose 40% Gel 15 Gram(s) Oral once PRN Blood Glucose LESS THAN 70 milliGRAM(s)/deciliter  glucagon  Injectable 1 milliGRAM(s) IntraMuscular once PRN Glucose LESS THAN 70 milligrams/deciliter      Allergies    Levaquin (Hives)  sulfa drugs (Unknown)    Intolerances    REVIEW OF SYSTEMS:          Vital Signs Last 24 Hrs  T(C): 36.7 (17 Jul 2018 05:21), Max: 37 (16 Jul 2018 13:28)  T(F): 98.1 (17 Jul 2018 05:21), Max: 98.6 (16 Jul 2018 13:28)  HR: 70 (17 Jul 2018 07:22) (70 - 82)  BP: 159/86 (17 Jul 2018 05:21) (147/79 - 159/86)  BP(mean): --  RR: 18 (17 Jul 2018 05:21) (18 - 18)  SpO2: 98% (17 Jul 2018 07:22) (92% - 98%)    PHYSICAL EXAM:      GENERAL:  morbidly obese female, resting comfortably in bed, no acute distress,  EYES: sclera clear, no exudates,  moist mucous membranes  LUNGS: good air entry bilaterally, clear to auscultation, symmetric breath sounds, no wheezing or rhonchi appreciated  HEART: soft S1/S2, regular rate and rhythm, no murmurs noted, trace lower extremity edema  GASTROINTESTINAL: abdomen is soft, nontender, nondistended, normoactive bowel sounds, no palpable masses  INTEGUMENT: good skin turgor, +Chronic lower leg skin changes (brown, scaling skin),   MUSCULOSKELETAL: no cyanosis, no obvious deformity  NEUROLOGIC: awake, alert, oriented x3, good muscle tone in 4 extremities, no obvious sensory deficits  HEME/LYMPH:+ Left upper back ecchymosis          LABS:                        8.2    5.15  )-----------( 209      ( 17 Jul 2018 07:35 )             27.2     CBC Full  -  ( 17 Jul 2018 07:35 )  WBC Count : 5.15 K/uL  Hemoglobin : 8.2 g/dL  Hematocrit : 27.2 %  Platelet Count - Automated : 209 K/uL  Mean Cell Volume : 91.0 fl  Mean Cell Hemoglobin : 27.4 pg  Mean Cell Hemoglobin Concentration : 30.1 gm/dL          17 Jul 2018 07:35    144    |  111    |  37     ----------------------------<  114    4.5     |  27     |  1.80     Ca    8.3        17 Jul 2018 07:35      PT/INR - ( 17 Jul 2018 07:35 )   PT: 13.5 sec;   INR: 1.23 ratio             CAPILLARY BLOOD GLUCOSE      POCT Blood Glucose.: 136 mg/dL (17 Jul 2018 08:02)  POCT Blood Glucose.: 183 mg/dL (16 Jul 2018 21:12)  POCT Blood Glucose.: 164 mg/dL (16 Jul 2018 17:12)  POCT Blood Glucose.: 164 mg/dL (16 Jul 2018 11:36)        Culture - Urine (collected 07-14-18 @ 16:32)  Source: .Urine Catheterized  Final Report (07-16-18 @ 18:20):    >100,000 CFU/ml Proteus mirabilis    <10,000 CFU/ml Normal Urogenital rome present  Organism: Proteus mirabilis (07-16-18 @ 18:20)  Organism: Proteus mirabilis (07-16-18 @ 18:20)      -  Amikacin: S <=8      -  Amoxicillin/Clavulanic Acid: R >16/8      -  Ampicillin: R >16 These ampicillin results predict results for amoxicillin      -  Ampicillin/Sulbactam: R >16/8      -  Aztreonam: S <=4      -  Cefazolin: R >16 This predicts results for oral agents cefaclor, cefdinir, cefpodoxime, cefprozil, cefuroxime axetil, cephalexin and locarbef for uncomplicated UTI. Note that some isolates may be susceptible to these agents while testing resistant to cefazolin.      -  Cefepime: S 4      -  Cefoxitin: R >16      -  Ceftriaxone: R 8 Enterobacter, Citrobacter, and Serratia may develop resistance during prolonged therapy      -  Ciprofloxacin: S <=0.5      -  Ertapenem: S <=0.5      -  Gentamicin: S 2      -  Levofloxacin: S <=1      -  Meropenem: S <=1      -  Nitrofurantoin: R >64 Should not be used to treat pyelonephritis      -  Piperacillin/Tazobactam: S <=8      -  Tobramycin: S 4      -  Trimethoprim/Sulfamethoxazole: S <=0.5/9.5      Method Type: BROOKE    Culture - Urine (collected 07-13-18 @ 21:56)  Source: .Urine Catheterized  Final Report (07-15-18 @ 16:22):    >100,000 CFU/ml Proteus mirabilis  Organism: Proteus mirabilis (07-15-18 @ 16:22)  Organism: Proteus mirabilis (07-15-18 @ 16:22)      -  Amikacin: S <=8      -  Amoxicillin/Clavulanic Acid: R >16/8      -  Ampicillin: R >16 These ampicillin results predict results for amoxicillin      -  Ampicillin/Sulbactam: R >16/8      -  Aztreonam: S <=4      -  Cefazolin: R >16 This predicts results for oral agents cefaclor, cefdinir, cefpodoxime, cefprozil, cefuroxime axetil, cephalexin and locarbef for uncomplicated UTI. Note that some isolates may be susceptible to these agents while testing resistant to cefazolin.      -  Cefepime: S <=2      -  Cefoxitin: R >16      -  Ceftriaxone: R 8 Enterobacter, Citrobacter, and Serratia may develop resistance during prolonged therapy      -  Ciprofloxacin: S <=0.5      -  Ertapenem: S <=0.5      -  Gentamicin: S <=1      -  Levofloxacin: S <=1      -  Meropenem: S <=1      -  Nitrofurantoin: R >64 Should not be used to treat pyelonephritis      -  Piperacillin/Tazobactam: S <=8      -  Tobramycin: S <=2      -  Trimethoprim/Sulfamethoxazole: S <=0.5/9.5      Method Type: BROOKE        RADIOLOGY & ADDITIONAL TESTS:    Personally reviewed.     Consultant(s) Notes Reviewed:  [x] YES  [ ] NO Patient is a 83y old  Female who presents with a chief complaint of profound anemia (16 Jul 2018 09:11)      INTERVAL HPI/OVERNIGHT EVENTS: No events overnight. Patient offers no complaints.  Did not have BM since yesterday.    MEDICATIONS  (STANDING):  ALBUTerol/ipratropium for Nebulization 3 milliLiter(s) Nebulizer every 6 hours  bisacodyl 20 milliGRAM(s) Oral at bedtime  dextrose 5%. 1000 milliLiter(s) (50 mL/Hr) IV Continuous <Continuous>  dextrose 50% Injectable 12.5 Gram(s) IV Push once  dextrose 50% Injectable 25 Gram(s) IV Push once  dextrose 50% Injectable 25 Gram(s) IV Push once  epoetin sherita Injectable 50684 Unit(s) SubCutaneous <User Schedule>  folic acid 1 milliGRAM(s) Oral daily  insulin lispro (HumaLOG) corrective regimen sliding scale   SubCutaneous three times a day before meals  insulin lispro (HumaLOG) corrective regimen sliding scale   SubCutaneous at bedtime  lactobacillus acidophilus 1 Tablet(s) Oral three times a day with meals  levothyroxine 75 MICROGram(s) Oral daily  pantoprazole    Tablet 40 milliGRAM(s) Oral two times a day  sucralfate 1 Gram(s) Oral four times a day    MEDICATIONS  (PRN):  acetaminophen   Tablet 650 milliGRAM(s) Oral every 6 hours PRN Mild Pain  dextrose 40% Gel 15 Gram(s) Oral once PRN Blood Glucose LESS THAN 70 milliGRAM(s)/deciliter  glucagon  Injectable 1 milliGRAM(s) IntraMuscular once PRN Glucose LESS THAN 70 milligrams/deciliter      Allergies    Levaquin (Hives)  sulfa drugs (Unknown)    Intolerances    REVIEW OF SYSTEMS:          Vital Signs Last 24 Hrs  T(C): 36.7 (17 Jul 2018 05:21), Max: 37 (16 Jul 2018 13:28)  T(F): 98.1 (17 Jul 2018 05:21), Max: 98.6 (16 Jul 2018 13:28)  HR: 70 (17 Jul 2018 07:22) (70 - 82)  BP: 159/86 (17 Jul 2018 05:21) (147/79 - 159/86)  BP(mean): --  RR: 18 (17 Jul 2018 05:21) (18 - 18)  SpO2: 98% (17 Jul 2018 07:22) (92% - 98%)    PHYSICAL EXAM:      GENERAL:  morbidly obese female, resting comfortably in bed, no acute distress,  EYES: sclera clear, no exudates,  moist mucous membranes  LUNGS: good air entry bilaterally, clear to auscultation, symmetric breath sounds, no wheezing or rhonchi appreciated  HEART: soft S1/S2, regular rate and rhythm, no murmurs noted, trace lower extremity edema  GASTROINTESTINAL: abdomen is soft, nontender, nondistended, normoactive bowel sounds, no palpable masses  INTEGUMENT: good skin turgor, +Chronic lower leg skin changes (brown, scaling skin),   MUSCULOSKELETAL: no cyanosis, no obvious deformity  NEUROLOGIC: awake, alert, oriented x3, good muscle tone in 4 extremities, no obvious sensory deficits  HEME/LYMPH:+ Left upper back ecchymosis          LABS:                        8.2    5.15  )-----------( 209      ( 17 Jul 2018 07:35 )             27.2     CBC Full  -  ( 17 Jul 2018 07:35 )  WBC Count : 5.15 K/uL  Hemoglobin : 8.2 g/dL  Hematocrit : 27.2 %  Platelet Count - Automated : 209 K/uL  Mean Cell Volume : 91.0 fl  Mean Cell Hemoglobin : 27.4 pg  Mean Cell Hemoglobin Concentration : 30.1 gm/dL          17 Jul 2018 07:35    144    |  111    |  37     ----------------------------<  114    4.5     |  27     |  1.80     Ca    8.3        17 Jul 2018 07:35      PT/INR - ( 17 Jul 2018 07:35 )   PT: 13.5 sec;   INR: 1.23 ratio             CAPILLARY BLOOD GLUCOSE      POCT Blood Glucose.: 136 mg/dL (17 Jul 2018 08:02)  POCT Blood Glucose.: 183 mg/dL (16 Jul 2018 21:12)  POCT Blood Glucose.: 164 mg/dL (16 Jul 2018 17:12)  POCT Blood Glucose.: 164 mg/dL (16 Jul 2018 11:36)        Culture - Urine (collected 07-14-18 @ 16:32)  Source: .Urine Catheterized  Final Report (07-16-18 @ 18:20):    >100,000 CFU/ml Proteus mirabilis    <10,000 CFU/ml Normal Urogenital rome present  Organism: Proteus mirabilis (07-16-18 @ 18:20)  Organism: Proteus mirabilis (07-16-18 @ 18:20)      -  Amikacin: S <=8      -  Amoxicillin/Clavulanic Acid: R >16/8      -  Ampicillin: R >16 These ampicillin results predict results for amoxicillin      -  Ampicillin/Sulbactam: R >16/8      -  Aztreonam: S <=4      -  Cefazolin: R >16 This predicts results for oral agents cefaclor, cefdinir, cefpodoxime, cefprozil, cefuroxime axetil, cephalexin and locarbef for uncomplicated UTI. Note that some isolates may be susceptible to these agents while testing resistant to cefazolin.      -  Cefepime: S 4      -  Cefoxitin: R >16      -  Ceftriaxone: R 8 Enterobacter, Citrobacter, and Serratia may develop resistance during prolonged therapy      -  Ciprofloxacin: S <=0.5      -  Ertapenem: S <=0.5      -  Gentamicin: S 2      -  Levofloxacin: S <=1      -  Meropenem: S <=1      -  Nitrofurantoin: R >64 Should not be used to treat pyelonephritis      -  Piperacillin/Tazobactam: S <=8      -  Tobramycin: S 4      -  Trimethoprim/Sulfamethoxazole: S <=0.5/9.5      Method Type: BROOKE    Culture - Urine (collected 07-13-18 @ 21:56)  Source: .Urine Catheterized  Final Report (07-15-18 @ 16:22):    >100,000 CFU/ml Proteus mirabilis  Organism: Proteus mirabilis (07-15-18 @ 16:22)  Organism: Proteus mirabilis (07-15-18 @ 16:22)      -  Amikacin: S <=8      -  Amoxicillin/Clavulanic Acid: R >16/8      -  Ampicillin: R >16 These ampicillin results predict results for amoxicillin      -  Ampicillin/Sulbactam: R >16/8      -  Aztreonam: S <=4      -  Cefazolin: R >16 This predicts results for oral agents cefaclor, cefdinir, cefpodoxime, cefprozil, cefuroxime axetil, cephalexin and locarbef for uncomplicated UTI. Note that some isolates may be susceptible to these agents while testing resistant to cefazolin.      -  Cefepime: S <=2      -  Cefoxitin: R >16      -  Ceftriaxone: R 8 Enterobacter, Citrobacter, and Serratia may develop resistance during prolonged therapy      -  Ciprofloxacin: S <=0.5      -  Ertapenem: S <=0.5      -  Gentamicin: S <=1      -  Levofloxacin: S <=1      -  Meropenem: S <=1      -  Nitrofurantoin: R >64 Should not be used to treat pyelonephritis      -  Piperacillin/Tazobactam: S <=8      -  Tobramycin: S <=2      -  Trimethoprim/Sulfamethoxazole: S <=0.5/9.5      Method Type: BROOKE        RADIOLOGY & ADDITIONAL TESTS:    Personally reviewed.     Consultant(s) Notes Reviewed:  [x] YES  [ ] NO Patient is a 83y old  Female who presents with a chief complaint of profound anemia (16 Jul 2018 09:11)      INTERVAL HPI/OVERNIGHT EVENTS: No events overnight. Patient offers no complaints.  Did not have BM since yesterday.     MEDICATIONS  (STANDING):  ALBUTerol/ipratropium for Nebulization 3 milliLiter(s) Nebulizer every 6 hours  bisacodyl 20 milliGRAM(s) Oral at bedtime  dextrose 5%. 1000 milliLiter(s) (50 mL/Hr) IV Continuous <Continuous>  dextrose 50% Injectable 12.5 Gram(s) IV Push once  dextrose 50% Injectable 25 Gram(s) IV Push once  dextrose 50% Injectable 25 Gram(s) IV Push once  epoetin sherita Injectable 91231 Unit(s) SubCutaneous <User Schedule>  folic acid 1 milliGRAM(s) Oral daily  insulin lispro (HumaLOG) corrective regimen sliding scale   SubCutaneous three times a day before meals  insulin lispro (HumaLOG) corrective regimen sliding scale   SubCutaneous at bedtime  lactobacillus acidophilus 1 Tablet(s) Oral three times a day with meals  levothyroxine 75 MICROGram(s) Oral daily  pantoprazole    Tablet 40 milliGRAM(s) Oral two times a day  sucralfate 1 Gram(s) Oral four times a day    MEDICATIONS  (PRN):  acetaminophen   Tablet 650 milliGRAM(s) Oral every 6 hours PRN Mild Pain  dextrose 40% Gel 15 Gram(s) Oral once PRN Blood Glucose LESS THAN 70 milliGRAM(s)/deciliter  glucagon  Injectable 1 milliGRAM(s) IntraMuscular once PRN Glucose LESS THAN 70 milligrams/deciliter      Allergies    Levaquin (Hives)  sulfa drugs (Unknown)    Intolerances    REVIEW OF SYSTEMS:          Vital Signs Last 24 Hrs  T(C): 36.7 (17 Jul 2018 05:21), Max: 37 (16 Jul 2018 13:28)  T(F): 98.1 (17 Jul 2018 05:21), Max: 98.6 (16 Jul 2018 13:28)  HR: 70 (17 Jul 2018 07:22) (70 - 82)  BP: 159/86 (17 Jul 2018 05:21) (147/79 - 159/86)  BP(mean): --  RR: 18 (17 Jul 2018 05:21) (18 - 18)  SpO2: 98% (17 Jul 2018 07:22) (92% - 98%)    PHYSICAL EXAM:      GENERAL:  morbidly obese female, resting comfortably in bed, no acute distress,  EYES: sclera clear, no exudates,  moist mucous membranes  LUNGS: good air entry bilaterally, clear to auscultation, symmetric breath sounds, no wheezing or rhonchi appreciated  HEART: soft S1/S2, regular rate and rhythm, no murmurs noted, trace lower extremity edema  GASTROINTESTINAL: abdomen is soft, nontender, nondistended, normoactive bowel sounds, no palpable masses  INTEGUMENT: good skin turgor, +Chronic lower leg skin changes (brown, scaling skin),   MUSCULOSKELETAL: no cyanosis, no obvious deformity  NEUROLOGIC: awake, alert, oriented x3, good muscle tone in 4 extremities, no obvious sensory deficits  HEME/LYMPH:+ Left upper back ecchymosis          LABS:                        8.2    5.15  )-----------( 209      ( 17 Jul 2018 07:35 )             27.2     CBC Full  -  ( 17 Jul 2018 07:35 )  WBC Count : 5.15 K/uL  Hemoglobin : 8.2 g/dL  Hematocrit : 27.2 %  Platelet Count - Automated : 209 K/uL  Mean Cell Volume : 91.0 fl  Mean Cell Hemoglobin : 27.4 pg  Mean Cell Hemoglobin Concentration : 30.1 gm/dL          17 Jul 2018 07:35    144    |  111    |  37     ----------------------------<  114    4.5     |  27     |  1.80     Ca    8.3        17 Jul 2018 07:35      PT/INR - ( 17 Jul 2018 07:35 )   PT: 13.5 sec;   INR: 1.23 ratio             CAPILLARY BLOOD GLUCOSE      POCT Blood Glucose.: 136 mg/dL (17 Jul 2018 08:02)  POCT Blood Glucose.: 183 mg/dL (16 Jul 2018 21:12)  POCT Blood Glucose.: 164 mg/dL (16 Jul 2018 17:12)  POCT Blood Glucose.: 164 mg/dL (16 Jul 2018 11:36)        Culture - Urine (collected 07-14-18 @ 16:32)  Source: .Urine Catheterized  Final Report (07-16-18 @ 18:20):    >100,000 CFU/ml Proteus mirabilis    <10,000 CFU/ml Normal Urogenital rome present  Organism: Proteus mirabilis (07-16-18 @ 18:20)  Organism: Proteus mirabilis (07-16-18 @ 18:20)      -  Amikacin: S <=8      -  Amoxicillin/Clavulanic Acid: R >16/8      -  Ampicillin: R >16 These ampicillin results predict results for amoxicillin      -  Ampicillin/Sulbactam: R >16/8      -  Aztreonam: S <=4      -  Cefazolin: R >16 This predicts results for oral agents cefaclor, cefdinir, cefpodoxime, cefprozil, cefuroxime axetil, cephalexin and locarbef for uncomplicated UTI. Note that some isolates may be susceptible to these agents while testing resistant to cefazolin.      -  Cefepime: S 4      -  Cefoxitin: R >16      -  Ceftriaxone: R 8 Enterobacter, Citrobacter, and Serratia may develop resistance during prolonged therapy      -  Ciprofloxacin: S <=0.5      -  Ertapenem: S <=0.5      -  Gentamicin: S 2      -  Levofloxacin: S <=1      -  Meropenem: S <=1      -  Nitrofurantoin: R >64 Should not be used to treat pyelonephritis      -  Piperacillin/Tazobactam: S <=8      -  Tobramycin: S 4      -  Trimethoprim/Sulfamethoxazole: S <=0.5/9.5      Method Type: BROOKE    Culture - Urine (collected 07-13-18 @ 21:56)  Source: .Urine Catheterized  Final Report (07-15-18 @ 16:22):    >100,000 CFU/ml Proteus mirabilis  Organism: Proteus mirabilis (07-15-18 @ 16:22)  Organism: Proteus mirabilis (07-15-18 @ 16:22)      -  Amikacin: S <=8      -  Amoxicillin/Clavulanic Acid: R >16/8      -  Ampicillin: R >16 These ampicillin results predict results for amoxicillin      -  Ampicillin/Sulbactam: R >16/8      -  Aztreonam: S <=4      -  Cefazolin: R >16 This predicts results for oral agents cefaclor, cefdinir, cefpodoxime, cefprozil, cefuroxime axetil, cephalexin and locarbef for uncomplicated UTI. Note that some isolates may be susceptible to these agents while testing resistant to cefazolin.      -  Cefepime: S <=2      -  Cefoxitin: R >16      -  Ceftriaxone: R 8 Enterobacter, Citrobacter, and Serratia may develop resistance during prolonged therapy      -  Ciprofloxacin: S <=0.5      -  Ertapenem: S <=0.5      -  Gentamicin: S <=1      -  Levofloxacin: S <=1      -  Meropenem: S <=1      -  Nitrofurantoin: R >64 Should not be used to treat pyelonephritis      -  Piperacillin/Tazobactam: S <=8      -  Tobramycin: S <=2      -  Trimethoprim/Sulfamethoxazole: S <=0.5/9.5      Method Type: BROOKE        RADIOLOGY & ADDITIONAL TESTS:    Personally reviewed.     Consultant(s) Notes Reviewed:  [x] YES  [ ] NO

## 2018-07-18 NOTE — PROGRESS NOTE ADULT - SUBJECTIVE AND OBJECTIVE BOX
83y Female     T(C): 36.6 (07-18-18 @ 13:55), Max: 37.1 (07-18-18 @ 05:58)  HR: 72 (07-18-18 @ 07:27) (71 - 84)  BP: 138/76 (07-18-18 @ 13:55) (124/84 - 153/78)  RR: 18 (07-18-18 @ 13:55) (15 - 18)  SpO2: 98% (07-18-18 @ 13:55) (92% - 100%)  Wt(kg): --    Pt seen, doing well, no anesthesia complications or complaints noted or reported.   No Nausea  Pain well controlled

## 2018-07-18 NOTE — PROGRESS NOTE ADULT - PROBLEM SELECTOR PLAN 5
improving  Based on labs in [facility], patient appears to have CKD2   apprec renal recs Dr. Sarkar/Joel  Expect creatinine will plateau soon and then improve. Continue to monitor daily. -chronic, stable  -Continue Levothyroxine

## 2018-07-18 NOTE — PROGRESS NOTE ADULT - PROBLEM SELECTOR PLAN 7
continue to HOLD ALL NSAIDS  - tylenol 650mg PO q6 PRN mild pain currently on supplemental O2 given symptomatic anemia  nebs q6 PRN wheezing/SOB

## 2018-07-19 LAB
ANION GAP SERPL CALC-SCNC: 6 MMOL/L — SIGNIFICANT CHANGE UP (ref 5–17)
BUN SERPL-MCNC: 28 MG/DL — HIGH (ref 7–23)
CALCIUM SERPL-MCNC: 8.3 MG/DL — LOW (ref 8.5–10.1)
CHLORIDE SERPL-SCNC: 110 MMOL/L — HIGH (ref 96–108)
CO2 SERPL-SCNC: 29 MMOL/L — SIGNIFICANT CHANGE UP (ref 22–31)
CREAT SERPL-MCNC: 1.8 MG/DL — HIGH (ref 0.5–1.3)
GLUCOSE SERPL-MCNC: 104 MG/DL — HIGH (ref 70–99)
HCT VFR BLD CALC: 27.6 % — LOW (ref 34.5–45)
HGB BLD-MCNC: 8.3 G/DL — LOW (ref 11.5–15.5)
MCHC RBC-ENTMCNC: 27.1 PG — SIGNIFICANT CHANGE UP (ref 27–34)
MCHC RBC-ENTMCNC: 30.1 GM/DL — LOW (ref 32–36)
MCV RBC AUTO: 90.2 FL — SIGNIFICANT CHANGE UP (ref 80–100)
NRBC # BLD: 0 /100 WBCS — SIGNIFICANT CHANGE UP (ref 0–0)
OB PNL STL: POSITIVE
PLATELET # BLD AUTO: 243 K/UL — SIGNIFICANT CHANGE UP (ref 150–400)
POTASSIUM SERPL-MCNC: 4.1 MMOL/L — SIGNIFICANT CHANGE UP (ref 3.5–5.3)
POTASSIUM SERPL-SCNC: 4.1 MMOL/L — SIGNIFICANT CHANGE UP (ref 3.5–5.3)
RBC # BLD: 3.06 M/UL — LOW (ref 3.8–5.2)
RBC # FLD: 17.7 % — HIGH (ref 10.3–14.5)
SODIUM SERPL-SCNC: 145 MMOL/L — SIGNIFICANT CHANGE UP (ref 135–145)
WBC # BLD: 6.46 K/UL — SIGNIFICANT CHANGE UP (ref 3.8–10.5)
WBC # FLD AUTO: 6.46 K/UL — SIGNIFICANT CHANGE UP (ref 3.8–10.5)

## 2018-07-19 PROCEDURE — 99232 SBSQ HOSP IP/OBS MODERATE 35: CPT

## 2018-07-19 PROCEDURE — 99233 SBSQ HOSP IP/OBS HIGH 50: CPT

## 2018-07-19 PROCEDURE — 93306 TTE W/DOPPLER COMPLETE: CPT | Mod: 26

## 2018-07-19 RX ORDER — MULTIVIT-MIN/FERROUS GLUCONATE 9 MG/15 ML
1 LIQUID (ML) ORAL DAILY
Qty: 0 | Refills: 0 | Status: CANCELLED | OUTPATIENT
Start: 2018-07-19 | End: 2018-07-25

## 2018-07-19 RX ORDER — LORATADINE 10 MG/1
10 TABLET ORAL DAILY
Qty: 0 | Refills: 0 | Status: DISCONTINUED | OUTPATIENT
Start: 2018-07-19 | End: 2018-07-25

## 2018-07-19 RX ORDER — FUROSEMIDE 40 MG
20 TABLET ORAL ONCE
Qty: 0 | Refills: 0 | Status: COMPLETED | OUTPATIENT
Start: 2018-07-19 | End: 2018-07-19

## 2018-07-19 RX ORDER — LACTULOSE 10 G/15ML
15 SOLUTION ORAL EVERY 8 HOURS
Qty: 0 | Refills: 0 | Status: COMPLETED | OUTPATIENT
Start: 2018-07-19 | End: 2018-07-20

## 2018-07-19 RX ORDER — ASCORBIC ACID 60 MG
500 TABLET,CHEWABLE ORAL DAILY
Qty: 0 | Refills: 0 | Status: CANCELLED | OUTPATIENT
Start: 2018-07-19 | End: 2018-07-25

## 2018-07-19 RX ADMIN — Medication 1 MILLIGRAM(S): at 11:31

## 2018-07-19 RX ADMIN — Medication 1: at 18:06

## 2018-07-19 RX ADMIN — Medication 75 MICROGRAM(S): at 06:24

## 2018-07-19 RX ADMIN — PANTOPRAZOLE SODIUM 40 MILLIGRAM(S): 20 TABLET, DELAYED RELEASE ORAL at 06:24

## 2018-07-19 RX ADMIN — Medication 10 MILLIGRAM(S): at 18:08

## 2018-07-19 RX ADMIN — Medication 20 MILLIGRAM(S): at 15:13

## 2018-07-19 RX ADMIN — Medication 1 DROP(S): at 15:15

## 2018-07-19 RX ADMIN — ONDANSETRON 4 MILLIGRAM(S): 8 TABLET, FILM COATED ORAL at 11:41

## 2018-07-19 RX ADMIN — Medication 1 TABLET(S): at 11:31

## 2018-07-19 RX ADMIN — LORATADINE 10 MILLIGRAM(S): 10 TABLET ORAL at 11:31

## 2018-07-19 RX ADMIN — Medication 10 MILLIGRAM(S): at 13:32

## 2018-07-19 RX ADMIN — LACTULOSE 15 GRAM(S): 10 SOLUTION ORAL at 13:36

## 2018-07-19 RX ADMIN — Medication 1 GRAM(S): at 18:08

## 2018-07-19 RX ADMIN — Medication 1 GRAM(S): at 11:31

## 2018-07-19 RX ADMIN — Medication 650 MILLIGRAM(S): at 06:24

## 2018-07-19 RX ADMIN — Medication 296 MILLILITER(S): at 11:31

## 2018-07-19 RX ADMIN — Medication 1 ENEMA: at 18:07

## 2018-07-19 RX ADMIN — Medication 1 TABLET(S): at 08:25

## 2018-07-19 RX ADMIN — LACTULOSE 15 GRAM(S): 10 SOLUTION ORAL at 15:10

## 2018-07-19 RX ADMIN — PANTOPRAZOLE SODIUM 40 MILLIGRAM(S): 20 TABLET, DELAYED RELEASE ORAL at 18:09

## 2018-07-19 RX ADMIN — Medication 1 TABLET(S): at 18:08

## 2018-07-19 RX ADMIN — Medication 1 GRAM(S): at 06:24

## 2018-07-19 NOTE — PROGRESS NOTE ADULT - PROBLEM SELECTOR PLAN 1
sp egd 7/9 showed non bleeding du, gastritis and hh  sp repeat egd 7/17 findings of hh, esophagitis, gastritis and bile reflux  f/u bxs  pt needs colon to eval for possible bleeding  per psych, pt does not have decision making capacity  per dw dr bah orginal plan for colon on  friday, w clear liquids/2 day prep (3 bottles of mag citrate over 2 days); however pt has been refusing; will dotty attg, ?flex sig with enemas; keep npo after midnight, cleared by cards, please medically optimize  monitor renal function  trend h/h, transfuse prn  ppi bid/carafate qid  hold ac asa nsaids

## 2018-07-19 NOTE — PROGRESS NOTE ADULT - ASSESSMENT
82 y/o F with PMHx DM2, hypothyroidism, asthma, arthritis, meningoma (chronic), and  brought in by EMS after syncopal episode at [facility]. Admitted to ICU w/ profound anemia hgb 3.8, possibly 2/2 acute GIB sec to duodenal ulcer. Colonoscopy tomorrow. Dopplers to r/o DVT in LE

## 2018-07-19 NOTE — PROGRESS NOTE ADULT - SUBJECTIVE AND OBJECTIVE BOX
NEPHROLOGY INTERVAL HPI/OVERNIGHT EVENTS:  HPI:  82 y/o F with PMHx DM2, hypothyroidism, asthma, arthritis, meningoma (chronic), and  BIBEMS after syncopal episode at [facility]. Patient is a poor historian and is unsure why she is in the hospital.  Family at bedside reports that patient was discharged from [facility] the previous day after being admitted with AMS 2/2 UTI.  Family reports that yesterday patient was SOB.  They report that she bleeds easily, but denies knowledge of blood in stools, changes in urine color, open wounds. Patient confirms abdominal pain. She denies chest pain, shortness of breath, palpitations, nausea or vomiting. She also denies dizziness blurry vision.    Per ED nurse after stool guaiac performed patient had red streaked stool.    In ED patient vitals are 108/54, afebrile, O2 Sat 100% on supp O2.  Labs significant for Hbg 3.8, Hct 12.6, Na 148, Cl 120, Co2 20, BUN 62, Crt 1.9, GFR 24, Ca 6.4. UA+ for Large blood, leukocyte esterase. EKG shows NSR.  Patient received 2 u PRBCs.  CT head/neck shows no acute pathology. Patient CT A/P ordered. (07 Jul 2018 14:43)    Follow up CKD  No acute events noted overnight    PAST MEDICAL & SURGICAL HISTORY:  Arthritis  Asthma  Diabetes mellitus  Hypothyroidism  No significant past surgical history      MEDICATIONS  (STANDING):  ALBUTerol/ipratropium for Nebulization 3 milliLiter(s) Nebulizer every 6 hours  artificial tears (preservative free) Ophthalmic Solution 1 Drop(s) Both EYES daily  bisacodyl 10 milliGRAM(s) Oral every 12 hours  dextrose 5%. 1000 milliLiter(s) (50 mL/Hr) IV Continuous <Continuous>  dextrose 50% Injectable 12.5 Gram(s) IV Push once  dextrose 50% Injectable 25 Gram(s) IV Push once  dextrose 50% Injectable 25 Gram(s) IV Push once  epoetin sherita Injectable 49447 Unit(s) SubCutaneous <User Schedule>  folic acid 1 milliGRAM(s) Oral daily  insulin lispro (HumaLOG) corrective regimen sliding scale   SubCutaneous three times a day before meals  insulin lispro (HumaLOG) corrective regimen sliding scale   SubCutaneous at bedtime  lactobacillus acidophilus 1 Tablet(s) Oral three times a day with meals  lactulose Syrup 15 Gram(s) Oral every 8 hours  levothyroxine 75 MICROGram(s) Oral daily  loratadine 10 milliGRAM(s) Oral daily  pantoprazole    Tablet 40 milliGRAM(s) Oral two times a day  sodium biphosphate Rectal Enema 1 Enema Rectal once  sucralfate 1 Gram(s) Oral four times a day    MEDICATIONS  (PRN):  acetaminophen   Tablet 650 milliGRAM(s) Oral every 6 hours PRN Mild Pain  dextrose 40% Gel 15 Gram(s) Oral once PRN Blood Glucose LESS THAN 70 milliGRAM(s)/deciliter  glucagon  Injectable 1 milliGRAM(s) IntraMuscular once PRN Glucose LESS THAN 70 milligrams/deciliter  ondansetron Injectable 4 milliGRAM(s) IV Push every 6 hours PRN Nausea and/or Vomiting      Allergies    Levaquin (Hives)  sulfa drugs (Unknown)    Intolerances        Vital Signs Last 24 Hrs  T(C): 36.7 (19 Jul 2018 04:54), Max: 36.8 (18 Jul 2018 20:56)  T(F): 98 (19 Jul 2018 04:54), Max: 98.2 (18 Jul 2018 20:56)  HR: 76 (19 Jul 2018 06:15) (73 - 76)  BP: 150/80 (19 Jul 2018 06:15) (138/76 - 171/84)  BP(mean): --  RR: 18 (19 Jul 2018 06:15) (18 - 18)  SpO2: 92% (19 Jul 2018 06:15) (90% - 98%)  Daily     Daily     PHYSICAL EXAM:  GENERAL: No distress  HEAD:  Atraumatic, Normocephalic  EYES: Conjunctiva and sclera clear  ENMT: Moist mucous membranes  NECK: Supple  NERVOUS SYSTEM:  Alert & Oriented X3, moves extremities  CHEST/LUNG: Clear to percussion bilaterally; No rales, rhonchi, wheezing, or rubs  HEART: Regular rate and rhythm; No murmurs, rubs, or gallops  ABDOMEN: Soft, Nontender, Nondistended; Bowel sounds present  EXTREMITIES:  +Edema B/L      LABS:                        8.3    6.46  )-----------( 243      ( 19 Jul 2018 06:55 )             27.6     07-19    145  |  110<H>  |  28<H>  ----------------------------<  104<H>  4.1   |  29  |  1.80<H>    Ca    8.3<L>      19 Jul 2018 06:55                RADIOLOGY & ADDITIONAL TESTS:

## 2018-07-19 NOTE — PROGRESS NOTE ADULT - ASSESSMENT
83 year old female with DM2, HTN, here with an episode of syncope, found to have significant anemia. Improvement in lethargy with PRBC transfusions    - Patient presented with profound anemia, s/p PRBC's x 5 units total and 1 unit of platelets    - H/H has been stable since transfusion.  Continue to trend and transfuse as needed   - s/p EGD 7/9 with clean based duodenal ulcer, gastritis, hiatal hernia  - s/p bleeding scan which was negative, although, unable to complete due to patient's refusal to have anymore tests  - Planning on Flex sig lizzy.   -  Currently optimized as best as possible from a CV POV for a possible low risk endoscopic GI procedure if pt is willing to undergo  -  Chronic lower extremity edema unchanged.    Administer Lasix after blood products transfusion to avoid fluid volume overload   - There is no echo on record so her LV function is unknown.   It is reasonable, given her extensive workup, to have her ef known, torie in light of her initial pos trop  - Hemodynamics stable as per flow sheet   - EKG is SR with no sign of ischemia or chamber enlargement  - Baseline creatinine unknown, though normal in 2016, BARBARA likely due to her profound anemia.  Avoid nephrotoxics.  Renal following  - Monitor electrolytes. Keep K>4, Mg>2  - will follow

## 2018-07-19 NOTE — PROGRESS NOTE ADULT - ASSESSMENT
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 Renal function fluctuates but relatively stable , eGFR 26-30 ml/min  Hypernatremia - Resolved  Hyperkalemia  - Resolved   Anemia: Blood loss anemia, severe: GIB  Arthritis  Asthma  Diabetes mellitus  Lymphedema  s/p EGD attempted. d/w GI    will need colonoscopy, per Dr Daly    - Renal indices are stable at baseline; electrolytes are stable  - No indication for RRT, shall follow closely  - No indication for kidney biopsy. No signs of underlying GN or nephrotic syndrome   - No uremic bleeding diathesis. If bleeding becomes a recurrent issue, will consider on dose of DDAVP 0.3 /kg bw  - Urine studies reviewed. No real proteinuria. FeNa does not suggest pre-renal. S/p brief IVF. Off IVF  - Low K diet   - Edema is most lymphedema although some contribution from fluid. Can add low dose maintenance Lasix; will monitor renal function  - She will benefit from ACEi if renal function remains stable. This can be started outpatient     No renal objection for colonoscopy/flex sig plan - 7/20/18    Thank you

## 2018-07-19 NOTE — PROGRESS NOTE ADULT - PROBLEM SELECTOR PLAN 3
Bilateral  LE edema  and pain on palpitation  Dopplers ordered to r/o DVT  Per Nephro, ordered low dose lasix  >>>   Continue  with SCDs, no anticoag due to workup of GI bleed

## 2018-07-19 NOTE — PROGRESS NOTE ADULT - PROBLEM SELECTOR PLAN 1
- possibly 2/2 GIB: GI (Dr. Daly) EGD showed gastritis and esophagitis  Colonoscopy scheduled for Friday   Per GI - 2 day prep, on clear fluids carb counting for DM  GI considering Flex Sig, as option as patient has not taken the Mg Citrate Prep  - Patient is  s/p transfusion of total 5 units PRBCs and 1 unit platelets.  - monitor H/H, transfuse as necessary.  - continue Protonix   -apprec gi and hemat recs  - appreciate Nephro recs  - On EPO  for severe CKD grade 3-4  -dc planning as h.h stable, daughter does not want pt to go back to [facility] rehab prefers another facility

## 2018-07-19 NOTE — PROGRESS NOTE ADULT - PROBLEM SELECTOR PLAN 1
monitor sx  monitor vs and HD and Sat  keep sat > 88 pct  s/p NEBS trial  oral and skin care  post procedure  planned for NAOMY dc  am labs pending  supportive measures

## 2018-07-19 NOTE — PROGRESS NOTE ADULT - PROBLEM SELECTOR PLAN 2
Per Psych note 7/11, patient unable to make decisions on medical care. Daughter Sheila and son Bora health care proxies.

## 2018-07-19 NOTE — PROGRESS NOTE ADULT - SUBJECTIVE AND OBJECTIVE BOX
Date/Time Patient Seen:  		  Referring MD:   Data Reviewed	       Patient is a 83y old  Female who presents with a chief complaint of profound anemia (16 Jul 2018 09:11)  vs and meds reviewed        Subjective/HPI     PAST MEDICAL & SURGICAL HISTORY:  Arthritis  Asthma  Diabetes mellitus  Hypothyroidism  No significant past surgical history        Medication list         MEDICATIONS  (STANDING):  ALBUTerol/ipratropium for Nebulization 3 milliLiter(s) Nebulizer every 6 hours  artificial tears (preservative free) Ophthalmic Solution 1 Drop(s) Both EYES daily  dextrose 5%. 1000 milliLiter(s) (50 mL/Hr) IV Continuous <Continuous>  dextrose 50% Injectable 12.5 Gram(s) IV Push once  dextrose 50% Injectable 25 Gram(s) IV Push once  dextrose 50% Injectable 25 Gram(s) IV Push once  epoetin sherita Injectable 53241 Unit(s) SubCutaneous <User Schedule>  folic acid 1 milliGRAM(s) Oral daily  insulin lispro (HumaLOG) corrective regimen sliding scale   SubCutaneous three times a day before meals  insulin lispro (HumaLOG) corrective regimen sliding scale   SubCutaneous at bedtime  lactobacillus acidophilus 1 Tablet(s) Oral three times a day with meals  levothyroxine 75 MICROGram(s) Oral daily  magnesium citrate Solution 296 milliLiter(s) Oral once  pantoprazole    Tablet 40 milliGRAM(s) Oral two times a day  sucralfate 1 Gram(s) Oral four times a day    MEDICATIONS  (PRN):  acetaminophen   Tablet 650 milliGRAM(s) Oral every 6 hours PRN Mild Pain  dextrose 40% Gel 15 Gram(s) Oral once PRN Blood Glucose LESS THAN 70 milliGRAM(s)/deciliter  glucagon  Injectable 1 milliGRAM(s) IntraMuscular once PRN Glucose LESS THAN 70 milligrams/deciliter  ondansetron Injectable 4 milliGRAM(s) IV Push every 6 hours PRN Nausea and/or Vomiting         Vitals log        ICU Vital Signs Last 24 Hrs  T(C): 36.7 (19 Jul 2018 04:54), Max: 36.8 (18 Jul 2018 20:56)  T(F): 98 (19 Jul 2018 04:54), Max: 98.2 (18 Jul 2018 20:56)  HR: 76 (19 Jul 2018 04:54) (72 - 76)  BP: 171/84 (19 Jul 2018 04:54) (138/76 - 171/84)  BP(mean): --  ABP: --  ABP(mean): --  RR: 18 (19 Jul 2018 04:54) (18 - 18)  SpO2: 90% (19 Jul 2018 04:54) (90% - 98%)           Input and Output:  I&O's Detail    17 Jul 2018 07:01  -  18 Jul 2018 07:00  --------------------------------------------------------  IN:    Oral Fluid: 660 mL  Total IN: 660 mL    OUT:    Indwelling Catheter - Urethral: 1250 mL  Total OUT: 1250 mL    Total NET: -590 mL          Lab Data                        8.0    5.44  )-----------( 228      ( 18 Jul 2018 07:16 )             26.5     07-18    144  |  111<H>  |  32<H>  ----------------------------<  118<H>  4.4   |  28  |  1.90<H>    Ca    8.2<L>      18 Jul 2018 07:16              Review of Systems	      Objective     Physical Examination    heart s1s2  lung dec BS    Pertinent Lab findings & Imaging      Hazel:  NO   Adequate UO     I&O's Detail    17 Jul 2018 07:01  -  18 Jul 2018 07:00  --------------------------------------------------------  IN:    Oral Fluid: 660 mL  Total IN: 660 mL    OUT:    Indwelling Catheter - Urethral: 1250 mL  Total OUT: 1250 mL    Total NET: -590 mL               Discussed with:     Cultures:	        Radiology

## 2018-07-19 NOTE — PROGRESS NOTE ADULT - SUBJECTIVE AND OBJECTIVE BOX
INTERVAL HPI/OVERNIGHT EVENTS:  pt seen and examined  pt denies n/v/d/abd pain, c/o headache  wants to eat  per overnight rn no s/s overt gib, +bm, pt refused mag citrate yesterday/unhappy about clears  labs noted afebrile overnight    MEDICATIONS  (STANDING):  ALBUTerol/ipratropium for Nebulization 3 milliLiter(s) Nebulizer every 6 hours  artificial tears (preservative free) Ophthalmic Solution 1 Drop(s) Both EYES daily  dextrose 5%. 1000 milliLiter(s) (50 mL/Hr) IV Continuous <Continuous>  dextrose 50% Injectable 12.5 Gram(s) IV Push once  dextrose 50% Injectable 25 Gram(s) IV Push once  dextrose 50% Injectable 25 Gram(s) IV Push once  epoetin sherita Injectable 83833 Unit(s) SubCutaneous <User Schedule>  folic acid 1 milliGRAM(s) Oral daily  insulin lispro (HumaLOG) corrective regimen sliding scale   SubCutaneous three times a day before meals  insulin lispro (HumaLOG) corrective regimen sliding scale   SubCutaneous at bedtime  lactobacillus acidophilus 1 Tablet(s) Oral three times a day with meals  levothyroxine 75 MICROGram(s) Oral daily  magnesium citrate Solution 296 milliLiter(s) Oral once  pantoprazole    Tablet 40 milliGRAM(s) Oral two times a day  sucralfate 1 Gram(s) Oral four times a day    MEDICATIONS  (PRN):  acetaminophen   Tablet 650 milliGRAM(s) Oral every 6 hours PRN Mild Pain  dextrose 40% Gel 15 Gram(s) Oral once PRN Blood Glucose LESS THAN 70 milliGRAM(s)/deciliter  glucagon  Injectable 1 milliGRAM(s) IntraMuscular once PRN Glucose LESS THAN 70 milligrams/deciliter  ondansetron Injectable 4 milliGRAM(s) IV Push every 6 hours PRN Nausea and/or Vomiting      Allergies    Levaquin (Hives)  sulfa drugs (Unknown)    Intolerances        Review of Systems:    General:  No wt loss, fevers, chills, night sweats, fatigue   Eyes:  Good vision, no reported pain  ENT:  No sore throat, pain, runny nose, dysphagia  CV:  No pain, palpitations, hypo/hypertension  Resp:  No dyspnea, cough, tachypnea, wheezing  GI:  No pain, No nausea, No vomiting, No diarrhea, No constipation, No weight loss, No fever, No pruritis, No rectal bleeding, No melena, No dysphagia  :  No pain, bleeding, incontinence, nocturia  Muscle:  No pain, weakness  Neuro:  ha  Psych:  No fatigue, insomnia, mood problems, depression  Endocrine:  No polyuria, polydypsia, cold/heat intolerance  Heme:  No petechiae, ecchymosis, easy bruisability  Skin:  No rash, tattoos, scars, edema      Vital Signs Last 24 Hrs  T(C): 36.7 (19 Jul 2018 04:54), Max: 36.8 (18 Jul 2018 20:56)  T(F): 98 (19 Jul 2018 04:54), Max: 98.2 (18 Jul 2018 20:56)  HR: 76 (19 Jul 2018 06:15) (73 - 76)  BP: 150/80 (19 Jul 2018 06:15) (138/76 - 171/84)  BP(mean): --  RR: 18 (19 Jul 2018 06:15) (18 - 18)  SpO2: 92% (19 Jul 2018 06:15) (90% - 98%)    PHYSICAL EXAM:    Constitutional: NAD, lying in bed  HEENT: EOMI, perrl  Neck: No LAD  Respiratory: dec bs  Cardiovascular: S1 and S2, RRR  Gastrointestinal: obese abd BS+, soft, NT, nd  Extremities: +edema and blistering to le  Vascular: 2+ peripheral pulses  Neurological: Awake alert confused agitated  Skin: see above      LABS:                        8.3    6.46  )-----------( 243      ( 19 Jul 2018 06:55 )             27.6     07-19    145  |  110<H>  |  28<H>  ----------------------------<  104<H>  4.1   |  29  |  1.80<H>    Ca    8.3<L>      19 Jul 2018 06:55            RADIOLOGY & ADDITIONAL TESTS:

## 2018-07-19 NOTE — PROGRESS NOTE ADULT - SUBJECTIVE AND OBJECTIVE BOX
[facility] -- Mabel Hernandez, Cesar Lopez Pannella, Patel, Savella  Office # [phone]      Follow Up:  anemia, edema    Subjective/Observations: Patient seen and examined. Events noted. Resting comfortably in bed. No complaints of chest pain, dyspnea, or palpitations reported. No signs of orthopnea or PND.       REVIEW OF SYSTEMS: All other review of systems is negative unless indicated above    PAST MEDICAL & SURGICAL HISTORY:  Arthritis  Asthma  Diabetes mellitus  Hypothyroidism  No significant past surgical history      MEDICATIONS  (STANDING):  ALBUTerol/ipratropium for Nebulization 3 milliLiter(s) Nebulizer every 6 hours  artificial tears (preservative free) Ophthalmic Solution 1 Drop(s) Both EYES daily  bisacodyl 10 milliGRAM(s) Oral every 12 hours  dextrose 5%. 1000 milliLiter(s) (50 mL/Hr) IV Continuous <Continuous>  dextrose 50% Injectable 12.5 Gram(s) IV Push once  dextrose 50% Injectable 25 Gram(s) IV Push once  dextrose 50% Injectable 25 Gram(s) IV Push once  epoetin sherita Injectable 46693 Unit(s) SubCutaneous <User Schedule>  folic acid 1 milliGRAM(s) Oral daily  furosemide   Injectable 20 milliGRAM(s) IV Push once  insulin lispro (HumaLOG) corrective regimen sliding scale   SubCutaneous three times a day before meals  insulin lispro (HumaLOG) corrective regimen sliding scale   SubCutaneous at bedtime  lactobacillus acidophilus 1 Tablet(s) Oral three times a day with meals  lactulose Syrup 15 Gram(s) Oral every 8 hours  levothyroxine 75 MICROGram(s) Oral daily  loratadine 10 milliGRAM(s) Oral daily  pantoprazole    Tablet 40 milliGRAM(s) Oral two times a day  sodium biphosphate Rectal Enema 1 Enema Rectal once  sucralfate 1 Gram(s) Oral four times a day    MEDICATIONS  (PRN):  acetaminophen   Tablet 650 milliGRAM(s) Oral every 6 hours PRN Mild Pain  dextrose 40% Gel 15 Gram(s) Oral once PRN Blood Glucose LESS THAN 70 milliGRAM(s)/deciliter  glucagon  Injectable 1 milliGRAM(s) IntraMuscular once PRN Glucose LESS THAN 70 milligrams/deciliter  ondansetron Injectable 4 milliGRAM(s) IV Push every 6 hours PRN Nausea and/or Vomiting      Allergies    Levaquin (Hives)  sulfa drugs (Unknown)    Intolerances            Vital Signs Last 24 Hrs  T(C): 36.7 (19 Jul 2018 13:32), Max: 36.8 (18 Jul 2018 20:56)  T(F): 98 (19 Jul 2018 13:32), Max: 98.2 (18 Jul 2018 20:56)  HR: 88 (19 Jul 2018 13:32) (73 - 88)  BP: 124/77 (19 Jul 2018 13:32) (124/77 - 171/84)  BP(mean): --  RR: 18 (19 Jul 2018 13:32) (18 - 18)  SpO2: 91% (19 Jul 2018 13:32) (90% - 95%)    I&O's Summary        PHYSICAL EXAM:     Constitutional: NAD, awake and alert, well-developed  HEENT: Moist Mucous Membranes, Anicteric  Pulmonary: Decreased breath sounds b/l. No rales, crackles or wheeze appreciated.   Cardiovascular: Regular, S1 and S2, No murmurs, rubs, gallops or clicks  Gastrointestinal: Bowel Sounds present, soft, nontender.   Lymph: trace -1 peripheral edema. No lymphadenopathy.  Skin: No visible rashes or ulcers.  Psych:  Mood & affect appropriate    LABS: All Labs Reviewed:                        8.3    6.46  )-----------( 243      ( 19 Jul 2018 06:55 )             27.6                         8.0    5.44  )-----------( 228      ( 18 Jul 2018 07:16 )             26.5                         8.2    5.15  )-----------( 209      ( 17 Jul 2018 07:35 )             27.2     19 Jul 2018 06:55    145    |  110    |  28     ----------------------------<  104    4.1     |  29     |  1.80   18 Jul 2018 07:16    144    |  111    |  32     ----------------------------<  118    4.4     |  28     |  1.90   17 Jul 2018 07:35    144    |  111    |  37     ----------------------------<  114    4.5     |  27     |  1.80     Ca    8.3        19 Jul 2018 06:55  Ca    8.2        18 Jul 2018 07:16  Ca    8.3        17 Jul 2018 07:35

## 2018-07-19 NOTE — PROGRESS NOTE ADULT - SUBJECTIVE AND OBJECTIVE BOX
Patient is a 83y old  Female who presents with a chief complaint of profound anemia (16 Jul 2018 09:11)      INTERVAL HPI/OVERNIGHT EVENTS: Patient offers no complaints. Denies blood in urine or stool or new bruises. No overnight events. Per nursing patient did not take her Mg Citrate Prep for GI.     MEDICATIONS  (STANDING):  ALBUTerol/ipratropium for Nebulization 3 milliLiter(s) Nebulizer every 6 hours  artificial tears (preservative free) Ophthalmic Solution 1 Drop(s) Both EYES daily  dextrose 5%. 1000 milliLiter(s) (50 mL/Hr) IV Continuous <Continuous>  dextrose 50% Injectable 12.5 Gram(s) IV Push once  dextrose 50% Injectable 25 Gram(s) IV Push once  dextrose 50% Injectable 25 Gram(s) IV Push once  epoetin sherita Injectable 41770 Unit(s) SubCutaneous <User Schedule>  folic acid 1 milliGRAM(s) Oral daily  insulin lispro (HumaLOG) corrective regimen sliding scale   SubCutaneous three times a day before meals  insulin lispro (HumaLOG) corrective regimen sliding scale   SubCutaneous at bedtime  lactobacillus acidophilus 1 Tablet(s) Oral three times a day with meals  levothyroxine 75 MICROGram(s) Oral daily  loratadine 10 milliGRAM(s) Oral daily  magnesium citrate Solution 296 milliLiter(s) Oral once  pantoprazole    Tablet 40 milliGRAM(s) Oral two times a day  sucralfate 1 Gram(s) Oral four times a day    MEDICATIONS  (PRN):  acetaminophen   Tablet 650 milliGRAM(s) Oral every 6 hours PRN Mild Pain  dextrose 40% Gel 15 Gram(s) Oral once PRN Blood Glucose LESS THAN 70 milliGRAM(s)/deciliter  glucagon  Injectable 1 milliGRAM(s) IntraMuscular once PRN Glucose LESS THAN 70 milligrams/deciliter  ondansetron Injectable 4 milliGRAM(s) IV Push every 6 hours PRN Nausea and/or Vomiting      Allergies    Levaquin (Hives)  sulfa drugs (Unknown)    Intolerances        REVIEW OF SYSTEMS:  CONSTITUTIONAL: No fever, + feeling cold   HEENT:  No headache, + itchy throat, + watering eyes  RESPIRATORY: No cough, wheezing, or shortness of breath  CARDIOVASCULAR: No chest pain, palpitations, or leg swelling  GASTROINTESTINAL: No abd pain, nausea, vomiting, or diarrhea  GENITOURINARY: No dysuria, frequency, or hematuria  NEUROLOGICAL: no focal weakness or dizziness  MUSCULOSKELETAL: no myalgias , no new joint pains  Skin: no petechiae, no rashes, no new bruising      Vital Signs Last 24 Hrs  T(C): 36.7 (19 Jul 2018 04:54), Max: 36.8 (18 Jul 2018 20:56)  T(F): 98 (19 Jul 2018 04:54), Max: 98.2 (18 Jul 2018 20:56)  HR: 76 (19 Jul 2018 06:15) (73 - 76)  BP: 150/80 (19 Jul 2018 06:15) (138/76 - 171/84)  BP(mean): --  RR: 18 (19 Jul 2018 06:15) (18 - 18)  SpO2: 92% (19 Jul 2018 06:15) (90% - 98%)    PHYSICAL EXAM:  GENERAL: patient appears well, no acute distress, appropriate,  EYES: sclera clear, + watery exudates  ENMT: oropharynx clear without erythema, no exudates, moist mucous membranes  NECK: supple, soft,   LUNGS: good air entry bilaterally, clear to auscultation, symmetric breath sounds, no wheezing or rhonchi appreciated  HEART: soft S1/S2, regular rate and rhythm, no murmurs noted,+1  non pitting lower extremity edema  GASTROINTESTINAL: abdomen is soft, nontender, nondistended, normoactive bowel sounds, no palpable masses  INTEGUMENT: good skin turgor, no lesions noted, chronic bl LE skin changes from venous stasis  MUSCULOSKELETAL: no clubbing or cyanosis, no obvious deformity  NEUROLOGIC: awake, alert, oriented x2, not oriented to date, good muscle tone in 4 extremities, no obvious sensory deficits  HEME/LYMPH: , no obvious  petechiae or rashes        LABS:                        8.3    6.46  )-----------( 243      ( 19 Jul 2018 06:55 )             27.6     CBC Full  -  ( 19 Jul 2018 06:55 )  WBC Count : 6.46 K/uL  Hemoglobin : 8.3 g/dL  Hematocrit : 27.6 %  Platelet Count - Automated : 243 K/uL  Mean Cell Volume : 90.2 fl  Mean Cell Hemoglobin : 27.1 pg  Mean Cell Hemoglobin Concentration : 30.1 gm/dL  Auto Neutrophil # : x  Auto Lymphocyte # : x  Auto Monocyte # : x  Auto Eosinophil # : x  Auto Basophil # : x  Auto Neutrophil % : x  Auto Lymphocyte % : x  Auto Monocyte % : x  Auto Eosinophil % : x  Auto Basophil % : x    19 Jul 2018 06:55    145    |  110    |  28     ----------------------------<  104    4.1     |  29     |  1.80     Ca    8.3        19 Jul 2018 06:55          CAPILLARY BLOOD GLUCOSE      POCT Blood Glucose.: 118 mg/dL (19 Jul 2018 07:41)  POCT Blood Glucose.: 181 mg/dL (18 Jul 2018 21:47)  POCT Blood Glucose.: 130 mg/dL (18 Jul 2018 17:06)  POCT Blood Glucose.: 152 mg/dL (18 Jul 2018 11:55)        Culture - Urine (collected 07-14-18 @ 16:32)  Source: .Urine Catheterized  Final Report (07-16-18 @ 18:20):    >100,000 CFU/ml Proteus mirabilis    <10,000 CFU/ml Normal Urogenital rome present  Organism: Proteus mirabilis (07-16-18 @ 18:20)  Organism: Proteus mirabilis (07-16-18 @ 18:20)      -  Amikacin: S <=8      -  Amoxicillin/Clavulanic Acid: R >16/8      -  Ampicillin: R >16 These ampicillin results predict results for amoxicillin      -  Ampicillin/Sulbactam: R >16/8      -  Aztreonam: S <=4      -  Cefazolin: R >16 This predicts results for oral agents cefaclor, cefdinir, cefpodoxime, cefprozil, cefuroxime axetil, cephalexin and locarbef for uncomplicated UTI. Note that some isolates may be susceptible to these agents while testing resistant to cefazolin.      -  Cefepime: S 4      -  Cefoxitin: R >16      -  Ceftriaxone: R 8 Enterobacter, Citrobacter, and Serratia may develop resistance during prolonged therapy      -  Ciprofloxacin: S <=0.5      -  Ertapenem: S <=0.5      -  Gentamicin: S 2      -  Levofloxacin: S <=1      -  Meropenem: S <=1      -  Nitrofurantoin: R >64 Should not be used to treat pyelonephritis      -  Piperacillin/Tazobactam: S <=8      -  Tobramycin: S 4      -  Trimethoprim/Sulfamethoxazole: S <=0.5/9.5      Method Type: BROOKE    Culture - Urine (collected 07-13-18 @ 21:56)  Source: .Urine Catheterized  Final Report (07-15-18 @ 16:22):    >100,000 CFU/ml Proteus mirabilis  Organism: Proteus mirabilis (07-15-18 @ 16:22)  Organism: Proteus mirabilis (07-15-18 @ 16:22)      -  Amikacin: S <=8      -  Amoxicillin/Clavulanic Acid: R >16/8      -  Ampicillin: R >16 These ampicillin results predict results for amoxicillin      -  Ampicillin/Sulbactam: R >16/8      -  Aztreonam: S <=4      -  Cefazolin: R >16 This predicts results for oral agents cefaclor, cefdinir, cefpodoxime, cefprozil, cefuroxime axetil, cephalexin and locarbef for uncomplicated UTI. Note that some isolates may be susceptible to these agents while testing resistant to cefazolin.      -  Cefepime: S <=2      -  Cefoxitin: R >16      -  Ceftriaxone: R 8 Enterobacter, Citrobacter, and Serratia may develop resistance during prolonged therapy      -  Ciprofloxacin: S <=0.5      -  Ertapenem: S <=0.5      -  Gentamicin: S <=1      -  Levofloxacin: S <=1      -  Meropenem: S <=1      -  Nitrofurantoin: R >64 Should not be used to treat pyelonephritis      -  Piperacillin/Tazobactam: S <=8      -  Tobramycin: S <=2      -  Trimethoprim/Sulfamethoxazole: S <=0.5/9.5      Method Type: BROOKE        RADIOLOGY & ADDITIONAL TESTS:    Personally reviewed.     Consultant(s) Notes Reviewed:  [x] YES  [ ] NO

## 2018-07-19 NOTE — CHART NOTE - NSCHARTNOTEFT_GEN_A_CORE
Assessment: As per chart pt is a 83 year old female with PMH of DM2, hypothyroidism, asthma, arthritis, meningoma admitted with syncope secondary to anemia. Unclear source of bleed. Pt confused at time of visit, however was able to answer basic questions. Pt reports good appetite and PO intake. Pt denies any chewing or swallowing difficulties, denies any GI distress. Pt is planned for a colonoscopy tomorrow    Factors impacting intake: [x ] none     Diet Presciption: Diet, Consistent Carbohydrate Clear Liquid (07-18-18 @ 08:31)  Diet, NPO after Midnight:      NPO Start Date: 19-Jul-2018,   NPO Start Time: 23:59  Except Medications (07-19-18 @ 08:31)    Previous Diet: Consistent CHO w/ Evening snack, renal     Intake: good     Current Weight: (7/18) 176.3lbs   Updated dosing wt: 198.6lbs- pt visually appears closer to updated dosing weight.     Pertinent Medications: MEDICATIONS  (STANDING):  ALBUTerol/ipratropium for Nebulization 3 milliLiter(s) Nebulizer every 6 hours  artificial tears (preservative free) Ophthalmic Solution 1 Drop(s) Both EYES daily  bisacodyl 10 milliGRAM(s) Oral every 12 hours  dextrose 5%. 1000 milliLiter(s) (50 mL/Hr) IV Continuous <Continuous>  dextrose 50% Injectable 12.5 Gram(s) IV Push once  dextrose 50% Injectable 25 Gram(s) IV Push once  dextrose 50% Injectable 25 Gram(s) IV Push once  epoetin sherita Injectable 28769 Unit(s) SubCutaneous <User Schedule>  folic acid 1 milliGRAM(s) Oral daily  insulin lispro (HumaLOG) corrective regimen sliding scale   SubCutaneous three times a day before meals  insulin lispro (HumaLOG) corrective regimen sliding scale   SubCutaneous at bedtime  lactobacillus acidophilus 1 Tablet(s) Oral three times a day with meals  lactulose Syrup 15 Gram(s) Oral every 8 hours  levothyroxine 75 MICROGram(s) Oral daily  loratadine 10 milliGRAM(s) Oral daily  pantoprazole    Tablet 40 milliGRAM(s) Oral two times a day  sodium biphosphate Rectal Enema 1 Enema Rectal once  sucralfate 1 Gram(s) Oral four times a day    MEDICATIONS  (PRN):  acetaminophen   Tablet 650 milliGRAM(s) Oral every 6 hours PRN Mild Pain  dextrose 40% Gel 15 Gram(s) Oral once PRN Blood Glucose LESS THAN 70 milliGRAM(s)/deciliter  glucagon  Injectable 1 milliGRAM(s) IntraMuscular once PRN Glucose LESS THAN 70 milligrams/deciliter  ondansetron Injectable 4 milliGRAM(s) IV Push every 6 hours PRN Nausea and/or Vomiting    Pertinent Labs: 07-19 Na145 mmol/L Glu 104 mg/dL<H> K+ 4.1 mmol/L Cr  1.80 mg/dL<H> BUN 28 mg/dL<H> 07-13 Alb 2.4 g/dL<L> 07-08 [id number] 5.8 %, Calcium 104     CAPILLARY BLOOD GLUCOSE  POCT Blood Glucose.: 154 mg/dL (19 Jul 2018 12:13)  POCT Blood Glucose.: 144 mg/dL (19 Jul 2018 11:29)  POCT Blood Glucose.: 118 mg/dL (19 Jul 2018 07:41)  POCT Blood Glucose.: 181 mg/dL (18 Jul 2018 21:47)  POCT Blood Glucose.: 130 mg/dL (18 Jul 2018 17:06)    Skin: +1 b/l feet edema; Stage 1 sacrum/coccyx, right thigh DTI with stage 2 pressure injuries, Left thigh DTI     Estimated Needs:   [ x] no change since previous assessment  [ ] recalculated:     Previous Nutrition Diagnosis:    [x ] Increased Nutrient Needs  [x ] Altered GI Function    Nutrition Diagnosis is [x ] ongoing-being addressed with encouraged good PO intake, supplements     New Nutrition Diagnosis: [x ] not applicable       Interventions:   Recommend  [x ] Change Diet To: Recommend to change diet to Consistent CHO w/Evening snack, Renal when medically feasible   [ ] Nutrition Supplement  [ ] Nutrition Support  [x ] Other:   1) Recommend MVI and Vitamin C to assist with wound healing   2) Monitor pt's tolerance to diet  3) Pt aware of RD to remain available     Monitoring and Evaluation:   [x ] PO intake [ x ] Tolerance to diet prescription [ x ] weights [ x ] labs[ x ] follow up per protocol  [ ] other:

## 2018-07-20 LAB
ANION GAP SERPL CALC-SCNC: 6 MMOL/L — SIGNIFICANT CHANGE UP (ref 5–17)
BUN SERPL-MCNC: 23 MG/DL — SIGNIFICANT CHANGE UP (ref 7–23)
CALCIUM SERPL-MCNC: 8.1 MG/DL — LOW (ref 8.5–10.1)
CHLORIDE SERPL-SCNC: 110 MMOL/L — HIGH (ref 96–108)
CO2 SERPL-SCNC: 30 MMOL/L — SIGNIFICANT CHANGE UP (ref 22–31)
CREAT SERPL-MCNC: 1.7 MG/DL — HIGH (ref 0.5–1.3)
GLUCOSE SERPL-MCNC: 97 MG/DL — SIGNIFICANT CHANGE UP (ref 70–99)
HCT VFR BLD CALC: 30.1 % — LOW (ref 34.5–45)
HGB BLD-MCNC: 8.9 G/DL — LOW (ref 11.5–15.5)
INR BLD: 1.14 RATIO — SIGNIFICANT CHANGE UP (ref 0.88–1.16)
MCHC RBC-ENTMCNC: 27.6 PG — SIGNIFICANT CHANGE UP (ref 27–34)
MCHC RBC-ENTMCNC: 29.6 GM/DL — LOW (ref 32–36)
MCV RBC AUTO: 93.5 FL — SIGNIFICANT CHANGE UP (ref 80–100)
NRBC # BLD: 0 /100 WBCS — SIGNIFICANT CHANGE UP (ref 0–0)
PLATELET # BLD AUTO: 242 K/UL — SIGNIFICANT CHANGE UP (ref 150–400)
POTASSIUM SERPL-MCNC: 3.7 MMOL/L — SIGNIFICANT CHANGE UP (ref 3.5–5.3)
POTASSIUM SERPL-SCNC: 3.7 MMOL/L — SIGNIFICANT CHANGE UP (ref 3.5–5.3)
PROTHROM AB SERPL-ACNC: 12.5 SEC — SIGNIFICANT CHANGE UP (ref 9.8–12.7)
RBC # BLD: 3.22 M/UL — LOW (ref 3.8–5.2)
RBC # FLD: 17.9 % — HIGH (ref 10.3–14.5)
SODIUM SERPL-SCNC: 146 MMOL/L — HIGH (ref 135–145)
WBC # BLD: 7.34 K/UL — SIGNIFICANT CHANGE UP (ref 3.8–10.5)
WBC # FLD AUTO: 7.34 K/UL — SIGNIFICANT CHANGE UP (ref 3.8–10.5)

## 2018-07-20 PROCEDURE — 99233 SBSQ HOSP IP/OBS HIGH 50: CPT

## 2018-07-20 PROCEDURE — 88305 TISSUE EXAM BY PATHOLOGIST: CPT | Mod: 26

## 2018-07-20 PROCEDURE — 99232 SBSQ HOSP IP/OBS MODERATE 35: CPT

## 2018-07-20 RX ORDER — DONEPEZIL HYDROCHLORIDE 10 MG/1
5 TABLET, FILM COATED ORAL AT BEDTIME
Qty: 0 | Refills: 0 | Status: DISCONTINUED | OUTPATIENT
Start: 2018-07-20 | End: 2018-07-25

## 2018-07-20 RX ADMIN — ERYTHROPOIETIN 10000 UNIT(S): 10000 INJECTION, SOLUTION INTRAVENOUS; SUBCUTANEOUS at 13:30

## 2018-07-20 RX ADMIN — Medication 75 MICROGRAM(S): at 06:46

## 2018-07-20 RX ADMIN — Medication 1 GRAM(S): at 17:39

## 2018-07-20 RX ADMIN — PANTOPRAZOLE SODIUM 40 MILLIGRAM(S): 20 TABLET, DELAYED RELEASE ORAL at 17:39

## 2018-07-20 RX ADMIN — Medication 1 DROP(S): at 17:39

## 2018-07-20 RX ADMIN — Medication 1 TABLET(S): at 17:39

## 2018-07-20 RX ADMIN — DONEPEZIL HYDROCHLORIDE 5 MILLIGRAM(S): 10 TABLET, FILM COATED ORAL at 23:22

## 2018-07-20 RX ADMIN — Medication 1 GRAM(S): at 06:46

## 2018-07-20 RX ADMIN — PANTOPRAZOLE SODIUM 40 MILLIGRAM(S): 20 TABLET, DELAYED RELEASE ORAL at 06:46

## 2018-07-20 RX ADMIN — Medication 1 MILLIGRAM(S): at 17:39

## 2018-07-20 RX ADMIN — LORATADINE 10 MILLIGRAM(S): 10 TABLET ORAL at 17:39

## 2018-07-20 RX ADMIN — Medication 1 ENEMA: at 06:45

## 2018-07-20 RX ADMIN — Medication 1 GRAM(S): at 23:22

## 2018-07-20 RX ADMIN — Medication 10 MILLIGRAM(S): at 06:53

## 2018-07-20 RX ADMIN — LACTULOSE 15 GRAM(S): 10 SOLUTION ORAL at 06:45

## 2018-07-20 NOTE — PROGRESS NOTE ADULT - PROBLEM SELECTOR PLAN 1
GI   monitor sx  oral and skin care  asthma  monitor sat  s.p. NEBS trial  keep sat > 88 pct  am labs pending  serial labs  serial PE  increase activity as tolerated

## 2018-07-20 NOTE — PROGRESS NOTE ADULT - ASSESSMENT
83 year old female with DM2, HTN, here with an episode of syncope, found to have significant anemia. Improvement in lethargy with PRBC transfusions    - Patient presented with profound anemia, s/p PRBC's x 5 units total and 1 unit of platelets    - H/H has been stable since transfusion.  Continue to trend and transfuse as needed   - s/p EGD 7/9 with clean based duodenal ulcer, gastritis, hiatal hernia  - s/p bleeding scan which was negative, although, unable to complete due to patient's refusal to have anymore tests  - Planning on Flex sig today.   -  Currently optimized as best as possible from a CV POV for a possible low risk endoscopic GI procedure if pt is willing to undergo  -  Chronic lower extremity edema unchanged.    Administer Lasix after blood products transfusion to avoid fluid volume overload   - Official echo pending. LV function appears normal.  - Hemodynamics stable as per flow sheet   - EKG is SR with no sign of ischemia or chamber enlargement  - Baseline creatinine unknown, though normal in 2016, BARBARA likely due to her profound anemia.  Avoid nephrotoxics.  Renal following  - Monitor electrolytes. Keep K>4, Mg>2  - will follow

## 2018-07-20 NOTE — PROGRESS NOTE ADULT - ASSESSMENT
82 y/o F with PMHx DM2, hypothyroidism, asthma, arthritis, meningoma (chronic), and  brought in by EMS after syncopal episode at [facility]. Admitted to ICU w/ profound anemia hgb 3.8, possibly 2/2 acute GIB sec to duodenal ulcer.  Dopplers to r/o DVT in LE. Flex Sig today.

## 2018-07-20 NOTE — PROGRESS NOTE ADULT - ASSESSMENT
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 Renal function fluctuates but relatively stable , eGFR 26-30 ml/min  Hypernatremia - Resolved  Hyperkalemia  - Resolved   Anemia: Blood loss anemia, severe: GIB  Arthritis  Asthma  Diabetes mellitus  Lymphedema  s/p EGD attempted. d/w GI    will need colonoscopy, per Dr Daly    - Renal indices are stable at baseline; electrolytes are stable  - No indication for RRT, shall follow closely  - No indication for kidney biopsy. No signs of underlying GN or nephrotic syndrome   - No uremic bleeding diathesis. If bleeding becomes a recurrent issue, will consider on dose of DDAVP 0.3 /kg bw  - Urine studies reviewed. No real proteinuria. FeNa does not suggest pre-renal. S/p brief IVF. Off IVF  - Low K diet   - Edema is most lymphedema although some contribution from fluid. Can add low dose maintenance Lasix; will monitor renal function  - She will benefit from ACEi if renal function remains stable. 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 Renal function fluctuates but relatively stable , eGFR 26-30 ml/min  Hypernatremia - Resolved  Hyperkalemia  - Resolved   Anemia: Blood loss anemia, severe: GIB  Arthritis  Asthma  Diabetes mellitus  Lymphedema  s/p EGD attempted. d/w GI  per Dr Daly,  sp colonoscopy with polypectomy    1) Pedunculated Sigmoid Polyp    2) Right sided polyp    3) Diverticulosis    4) Hemorrhoids    - Renal indices are stable at baseline; electrolytes are stable  - No indication for RRT, shall follow closely  - No indication for kidney biopsy. No signs of underlying GN or nephrotic syndrome   - No uremic bleeding diathesis. If bleeding becomes a recurrent issue, will consider on dose of DDAVP 0.3 /kg bw  - Urine studies reviewed. No real proteinuria. FeNa does not suggest pre-renal. S/p brief IVF. Off IVF  - Low K diet   - Edema is most lymphedema although some contribution from fluid. Can add low dose maintenance Lasix; will monitor renal function  - She will benefit from ACEi if renal function remains stable. This can be started outpatient     Thank you

## 2018-07-20 NOTE — PROGRESS NOTE ADULT - SUBJECTIVE AND OBJECTIVE BOX
Date/Time Patient Seen:  		  Referring MD:   Data Reviewed	       Patient is a 83y old  Female who presents with a chief complaint of profound anemia (16 Jul 2018 09:11)    vs and meds reviewed    Subjective/HPI     PAST MEDICAL & SURGICAL HISTORY:  Arthritis  Asthma  Diabetes mellitus  Hypothyroidism  No significant past surgical history        Medication list         MEDICATIONS  (STANDING):  ALBUTerol/ipratropium for Nebulization 3 milliLiter(s) Nebulizer every 6 hours  artificial tears (preservative free) Ophthalmic Solution 1 Drop(s) Both EYES daily  bisacodyl 10 milliGRAM(s) Oral every 12 hours  dextrose 5%. 1000 milliLiter(s) (50 mL/Hr) IV Continuous <Continuous>  dextrose 50% Injectable 12.5 Gram(s) IV Push once  dextrose 50% Injectable 25 Gram(s) IV Push once  dextrose 50% Injectable 25 Gram(s) IV Push once  epoetin sherita Injectable 32996 Unit(s) SubCutaneous <User Schedule>  folic acid 1 milliGRAM(s) Oral daily  insulin lispro (HumaLOG) corrective regimen sliding scale   SubCutaneous three times a day before meals  insulin lispro (HumaLOG) corrective regimen sliding scale   SubCutaneous at bedtime  lactobacillus acidophilus 1 Tablet(s) Oral three times a day with meals  lactulose Syrup 15 Gram(s) Oral every 8 hours  levothyroxine 75 MICROGram(s) Oral daily  loratadine 10 milliGRAM(s) Oral daily  pantoprazole    Tablet 40 milliGRAM(s) Oral two times a day  sodium biphosphate Rectal Enema 1 Enema Rectal once  sodium biphosphate Rectal Enema 1 Enema Rectal once  sucralfate 1 Gram(s) Oral four times a day    MEDICATIONS  (PRN):  acetaminophen   Tablet 650 milliGRAM(s) Oral every 6 hours PRN Mild Pain  dextrose 40% Gel 15 Gram(s) Oral once PRN Blood Glucose LESS THAN 70 milliGRAM(s)/deciliter  glucagon  Injectable 1 milliGRAM(s) IntraMuscular once PRN Glucose LESS THAN 70 milligrams/deciliter  ondansetron Injectable 4 milliGRAM(s) IV Push every 6 hours PRN Nausea and/or Vomiting         Vitals log        ICU Vital Signs Last 24 Hrs  T(C): 36.3 (20 Jul 2018 05:49), Max: 36.8 (19 Jul 2018 20:45)  T(F): 97.4 (20 Jul 2018 05:49), Max: 98.2 (19 Jul 2018 20:45)  HR: 80 (20 Jul 2018 05:49) (70 - 89)  BP: 123/83 (20 Jul 2018 05:49) (123/83 - 150/80)  BP(mean): --  ABP: --  ABP(mean): --  RR: 18 (20 Jul 2018 05:49) (18 - 18)  SpO2: 95% (20 Jul 2018 05:49) (90% - 95%)           Input and Output:  I&O's Detail      Lab Data                        8.3    6.46  )-----------( 243      ( 19 Jul 2018 06:55 )             27.6     07-19    145  |  110<H>  |  28<H>  ----------------------------<  104<H>  4.1   |  29  |  1.80<H>    Ca    8.3<L>      19 Jul 2018 06:55              Review of Systems	      Objective     Physical Examination    heart s1s2  lung dec BS  abd soft      Pertinent Lab findings & Imaging      Hazel:  YANELI   Adequate UO     I&O's Detail           Discussed with:     Cultures:	        Radiology

## 2018-07-20 NOTE — PROGRESS NOTE ADULT - PROBLEM SELECTOR PLAN 1
- possibly 2/2 GIB: GI (Dr. Daly) EGD showed gastritis and esophagitis  GI : Flex Sig scheduled for today   Per GI - 2 day prep, on clear fluids carb counting for DM   - Patient is  s/p transfusion of total 5 units PRBCs and 1 unit platelets.  - monitor H/H, transfuse as necessary.  - continue Protonix   -apprec gi and hemat recs  - appreciate Nephro recs  - On EPO  for severe CKD grade 3-4  -dc planning as h.h stable, daughter does not want pt to go back to [facility] rehab prefers another facility - possibly 2/2 GIB: GI (Dr. Daly) EGD showed gastritis and esophagitis  GI : Flex Sig scheduled for today   Per GI - 2 day prep, on clear fluids carb counting for DM   - Patient is  s/p transfusion of total 5 units PRBCs and 1 unit platelets.  - monitor H/H, transfuse as necessary.  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

## 2018-07-20 NOTE — PROGRESS NOTE ADULT - SUBJECTIVE AND OBJECTIVE BOX
NEPHROLOGY INTERVAL HPI/OVERNIGHT EVENTS:  HPI:  84 y/o F with PMHx DM2, hypothyroidism, asthma, arthritis, meningoma (chronic), and  BIBEMS after syncopal episode at [facility]. Patient is a poor historian and is unsure why she is in the hospital.  Family at bedside reports that patient was discharged from [facility] the previous day after being admitted with AMS 2/2 UTI.  Family reports that yesterday patient was SOB.  They report that she bleeds easily, but denies knowledge of blood in stools, changes in urine color, open wounds. Patient confirms abdominal pain. She denies chest pain, shortness of breath, palpitations, nausea or vomiting. She also denies dizziness blurry vision.    Per ED nurse after stool guaiac performed patient had red streaked stool.    In ED patient vitals are 108/54, afebrile, O2 Sat 100% on supp O2.  Labs significant for Hbg 3.8, Hct 12.6, Na 148, Cl 120, Co2 20, BUN 62, Crt 1.9, GFR 24, Ca 6.4. UA+ for Large blood, leukocyte esterase. EKG shows NSR.  Patient received 2 u PRBCs.  CT head/neck shows no acute pathology. Patient CT A/P ordered. (07 Jul 2018 14:43)      PAST MEDICAL & SURGICAL HISTORY:  Arthritis  Asthma  Diabetes mellitus  Hypothyroidism  No significant past surgical history      MEDICATIONS  (STANDING):  ALBUTerol/ipratropium for Nebulization 3 milliLiter(s) Nebulizer every 6 hours  artificial tears (preservative free) Ophthalmic Solution 1 Drop(s) Both EYES daily  dextrose 5%. 1000 milliLiter(s) (50 mL/Hr) IV Continuous <Continuous>  dextrose 50% Injectable 12.5 Gram(s) IV Push once  dextrose 50% Injectable 25 Gram(s) IV Push once  dextrose 50% Injectable 25 Gram(s) IV Push once  donepezil 5 milliGRAM(s) Oral at bedtime  epoetin sherita Injectable 17872 Unit(s) SubCutaneous <User Schedule>  folic acid 1 milliGRAM(s) Oral daily  insulin lispro (HumaLOG) corrective regimen sliding scale   SubCutaneous three times a day before meals  insulin lispro (HumaLOG) corrective regimen sliding scale   SubCutaneous at bedtime  lactobacillus acidophilus 1 Tablet(s) Oral three times a day with meals  levothyroxine 75 MICROGram(s) Oral daily  loratadine 10 milliGRAM(s) Oral daily  pantoprazole    Tablet 40 milliGRAM(s) Oral two times a day  sucralfate 1 Gram(s) Oral four times a day    MEDICATIONS  (PRN):  acetaminophen   Tablet 650 milliGRAM(s) Oral every 6 hours PRN Mild Pain  dextrose 40% Gel 15 Gram(s) Oral once PRN Blood Glucose LESS THAN 70 milliGRAM(s)/deciliter  glucagon  Injectable 1 milliGRAM(s) IntraMuscular once PRN Glucose LESS THAN 70 milligrams/deciliter  ondansetron Injectable 4 milliGRAM(s) IV Push every 6 hours PRN Nausea and/or Vomiting      Allergies    Levaquin (Hives)  sulfa drugs (Unknown)    Intolerances        Vital Signs Last 24 Hrs  T(C): 36.2 (20 Jul 2018 16:15), Max: 36.8 (19 Jul 2018 20:45)  T(F): 97.2 (20 Jul 2018 16:15), Max: 98.2 (19 Jul 2018 20:45)  HR: 68 (20 Jul 2018 16:15) (68 - 80)  BP: 138/81 (20 Jul 2018 16:15) (123/83 - 143/73)  BP(mean): --  RR: 16 (20 Jul 2018 16:15) (14 - 20)  SpO2: 100% (20 Jul 2018 16:15) (90% - 100%)  Daily     Daily     PHYSICAL EXAM:    GENERAL: NAD, well-groomed, well-developed  HEAD:  Atraumatic, Normocephalic  EYES: EOMI, PERRLA, conjunctiva and sclera clear  ENMT: No tonsillar erythema, exudates, or enlargement; Moist mucous membranes, Good dentition, No lesions  NECK: Supple, No JVD, Normal thyroid  NERVOUS SYSTEM:  Alert & Oriented X3, Good concentration; Motor Strength 5/5 B/L upper and lower extremities; DTRs 2+ intact and symmetric  CHEST/LUNG: Clear to percussion bilaterally; No rales, rhonchi, wheezing, or rubs  HEART: Regular rate and rhythm; No murmurs, rubs, or gallops  ABDOMEN: Soft, Nontender, Nondistended; Bowel sounds present  EXTREMITIES:  2+ Peripheral Pulses, No clubbing, cyanosis, or edema  SKIN: No rashes or lesions    LABS:                        8.9    7.34  )-----------( 242      ( 20 Jul 2018 07:40 )             30.1     07-20    146<H>  |  110<H>  |  23  ----------------------------<  97  3.7   |  30  |  1.70<H>    Ca    8.1<L>      20 Jul 2018 07:40      PT/INR - ( 20 Jul 2018 07:40 )   PT: 12.5 sec;   INR: 1.14 ratio      RADIOLOGY & ADDITIONAL TESTS:

## 2018-07-20 NOTE — PROGRESS NOTE ADULT - SUBJECTIVE AND OBJECTIVE BOX
[facility] - Mabel Hernandez, Jessica, Cesar, Holly, Mayda Hartman  Office Number:  [phone]    Patient resting comfortably in bed in NAD.  Laying flat with no respiratory distress.  No complaints of chest pain, dyspnea, palpitations, PND, or orthopnea.  Reports being weak.    ROS: negative unless otherwise mentioned.    Telemetry:  not on tele    MEDICATIONS  (STANDING):  ALBUTerol/ipratropium for Nebulization 3 milliLiter(s) Nebulizer every 6 hours  artificial tears (preservative free) Ophthalmic Solution 1 Drop(s) Both EYES daily  dextrose 5%. 1000 milliLiter(s) (50 mL/Hr) IV Continuous <Continuous>  dextrose 50% Injectable 12.5 Gram(s) IV Push once  dextrose 50% Injectable 25 Gram(s) IV Push once  dextrose 50% Injectable 25 Gram(s) IV Push once  epoetin sherita Injectable 78917 Unit(s) SubCutaneous <User Schedule>  folic acid 1 milliGRAM(s) Oral daily  insulin lispro (HumaLOG) corrective regimen sliding scale   SubCutaneous three times a day before meals  insulin lispro (HumaLOG) corrective regimen sliding scale   SubCutaneous at bedtime  lactobacillus acidophilus 1 Tablet(s) Oral three times a day with meals  levothyroxine 75 MICROGram(s) Oral daily  loratadine 10 milliGRAM(s) Oral daily  pantoprazole    Tablet 40 milliGRAM(s) Oral two times a day  sucralfate 1 Gram(s) Oral four times a day    MEDICATIONS  (PRN):  acetaminophen   Tablet 650 milliGRAM(s) Oral every 6 hours PRN Mild Pain  dextrose 40% Gel 15 Gram(s) Oral once PRN Blood Glucose LESS THAN 70 milliGRAM(s)/deciliter  glucagon  Injectable 1 milliGRAM(s) IntraMuscular once PRN Glucose LESS THAN 70 milligrams/deciliter  ondansetron Injectable 4 milliGRAM(s) IV Push every 6 hours PRN Nausea and/or Vomiting      Allergies    Levaquin (Hives)  sulfa drugs (Unknown)    Intolerances        Vital Signs Last 24 Hrs  T(C): 36.6 (20 Jul 2018 13:19), Max: 36.8 (19 Jul 2018 20:45)  T(F): 97.8 (20 Jul 2018 13:19), Max: 98.2 (19 Jul 2018 20:45)  HR: 71 (20 Jul 2018 13:19) (70 - 89)  BP: 137/83 (20 Jul 2018 13:19) (123/83 - 142/85)  BP(mean): --  RR: 16 (20 Jul 2018 13:19) (16 - 18)  SpO2: 100% (20 Jul 2018 13:19) (90% - 100%)    I&O's Summary      ON EXAM:    Constitutional: NAD, awake and alert, well-developed  HEENT: Moist Mucous Membranes, Anicteric  Pulmonary: Decreased breath sounds b/l. No rales, crackles or wheeze appreciated.   Cardiovascular: Regular, S1 and S2, No murmurs, rubs, gallops or clicks  Gastrointestinal: Bowel Sounds present, soft, nontender.   Lymph: trace -1 peripheral edema. No lymphadenopathy.  Skin: No visible rashes or ulcers.  Psych:  Mood & affect appropriate    LABS: All Labs Reviewed:                        8.9    7.34  )-----------( 242      ( 20 Jul 2018 07:40 )             30.1                         8.3    6.46  )-----------( 243      ( 19 Jul 2018 06:55 )             27.6                         8.0    5.44  )-----------( 228      ( 18 Jul 2018 07:16 )             26.5     20 Jul 2018 07:40    146    |  110    |  23     ----------------------------<  97     3.7     |  30     |  1.70   19 Jul 2018 06:55    145    |  110    |  28     ----------------------------<  104    4.1     |  29     |  1.80   18 Jul 2018 07:16    144    |  111    |  32     ----------------------------<  118    4.4     |  28     |  1.90     Ca    8.1        20 Jul 2018 07:40  Ca    8.3        19 Jul 2018 06:55  Ca    8.2        18 Jul 2018 07:16      PT/INR - ( 20 Jul 2018 07:40 )   PT: 12.5 sec;   INR: 1.14 ratio               Blood Culture:

## 2018-07-20 NOTE — PROGRESS NOTE ADULT - PROBLEM SELECTOR PLAN 3
Bilateral  LE edema  and pain on palpitation  Dopplers ordered to r/o DVT  Per Nephro, ordered low dose lasix 20 IV x 1  Continue  with SCDs, no anticoag due to workup of GI bleed

## 2018-07-20 NOTE — PROGRESS NOTE ADULT - SUBJECTIVE AND OBJECTIVE BOX
Patient is a 83y old  Female who presents with a chief complaint of profound anemia (16 Jul 2018 09:11)      INTERVAL HPI/OVERNIGHT EVENTS:        MEDICATIONS  (STANDING):  ALBUTerol/ipratropium for Nebulization 3 milliLiter(s) Nebulizer every 6 hours  artificial tears (preservative free) Ophthalmic Solution 1 Drop(s) Both EYES daily  dextrose 5%. 1000 milliLiter(s) (50 mL/Hr) IV Continuous <Continuous>  dextrose 50% Injectable 12.5 Gram(s) IV Push once  dextrose 50% Injectable 25 Gram(s) IV Push once  dextrose 50% Injectable 25 Gram(s) IV Push once  epoetin sherita Injectable 95281 Unit(s) SubCutaneous <User Schedule>  folic acid 1 milliGRAM(s) Oral daily  insulin lispro (HumaLOG) corrective regimen sliding scale   SubCutaneous three times a day before meals  insulin lispro (HumaLOG) corrective regimen sliding scale   SubCutaneous at bedtime  lactobacillus acidophilus 1 Tablet(s) Oral three times a day with meals  levothyroxine 75 MICROGram(s) Oral daily  loratadine 10 milliGRAM(s) Oral daily  pantoprazole    Tablet 40 milliGRAM(s) Oral two times a day  sodium biphosphate Rectal Enema 1 Enema Rectal once  sucralfate 1 Gram(s) Oral four times a day    MEDICATIONS  (PRN):  acetaminophen   Tablet 650 milliGRAM(s) Oral every 6 hours PRN Mild Pain  dextrose 40% Gel 15 Gram(s) Oral once PRN Blood Glucose LESS THAN 70 milliGRAM(s)/deciliter  glucagon  Injectable 1 milliGRAM(s) IntraMuscular once PRN Glucose LESS THAN 70 milligrams/deciliter  ondansetron Injectable 4 milliGRAM(s) IV Push every 6 hours PRN Nausea and/or Vomiting      Allergies    Levaquin (Hives)  sulfa drugs (Unknown)    Intolerances        REVIEW OF SYSTEMS:            Vital Signs Last 24 Hrs  T(C): 36.3 (20 Jul 2018 05:49), Max: 36.8 (19 Jul 2018 20:45)  T(F): 97.4 (20 Jul 2018 05:49), Max: 98.2 (19 Jul 2018 20:45)  HR: 80 (20 Jul 2018 05:49) (70 - 89)  BP: 123/83 (20 Jul 2018 05:49) (123/83 - 142/85)  BP(mean): --  RR: 18 (20 Jul 2018 05:49) (18 - 18)  SpO2: 95% (20 Jul 2018 05:49) (90% - 95%)    PHYSICAL EXAM:              LABS:                        8.9    7.34  )-----------( 242      ( 20 Jul 2018 07:40 )             30.1     CBC Full  -  ( 20 Jul 2018 07:40 )  WBC Count : 7.34 K/uL  Hemoglobin : 8.9 g/dL  Hematocrit : 30.1 %  Platelet Count - Automated : 242 K/uL  Mean Cell Volume : 93.5 fl  Mean Cell Hemoglobin : 27.6 pg  Mean Cell Hemoglobin Concentration : 29.6 gm/dL        Ca    8.3        19 Jul 2018 06:55    PT/INR - ( 20 Jul 2018 07:40 )   PT: 12.5 sec;   INR: 1.14 ratio          POCT Blood Glucose.: 106 mg/dL (20 Jul 2018 06:35)  POCT Blood Glucose.: 129 mg/dL (19 Jul 2018 22:28)  POCT Blood Glucose.: 183 mg/dL (19 Jul 2018 17:13)  POCT Blood Glucose.: 154 mg/dL (19 Jul 2018 12:13)  POCT Blood Glucose.: 144 mg/dL (19 Jul 2018 11:29)        Culture - Urine (collected 07-14-18 @ 16:32)  Source: .Urine Catheterized  Final Report (07-16-18 @ 18:20):    >100,000 CFU/ml Proteus mirabilis    <10,000 CFU/ml Normal Urogenital rome present  Organism: Proteus mirabilis (07-16-18 @ 18:20)  Organism: Proteus mirabilis (07-16-18 @ 18:20)      -  Amikacin: S <=8      -  Amoxicillin/Clavulanic Acid: R >16/8      -  Ampicillin: R >16 These ampicillin results predict results for amoxicillin      -  Ampicillin/Sulbactam: R >16/8      -  Aztreonam: S <=4      -  Cefazolin: R >16 This predicts results for oral agents cefaclor, cefdinir, cefpodoxime, cefprozil, cefuroxime axetil, cephalexin and locarbef for uncomplicated UTI. Note that some isolates may be susceptible to these agents while testing resistant to cefazolin.      -  Cefepime: S 4      -  Cefoxitin: R >16      -  Ceftriaxone: R 8 Enterobacter, Citrobacter, and Serratia may develop resistance during prolonged therapy      -  Ciprofloxacin: S <=0.5      -  Ertapenem: S <=0.5      -  Gentamicin: S 2      -  Levofloxacin: S <=1      -  Meropenem: S <=1      -  Nitrofurantoin: R >64 Should not be used to treat pyelonephritis      -  Piperacillin/Tazobactam: S <=8      -  Tobramycin: S 4      -  Trimethoprim/Sulfamethoxazole: S <=0.5/9.5      Method Type: BROOKE    Culture - Urine (collected 07-13-18 @ 21:56)  Source: .Urine Catheterized  Final Report (07-15-18 @ 16:22):    >100,000 CFU/ml Proteus mirabilis  Organism: Proteus mirabilis (07-15-18 @ 16:22)  Organism: Proteus mirabilis (07-15-18 @ 16:22)      -  Amikacin: S <=8      -  Amoxicillin/Clavulanic Acid: R >16/8      -  Ampicillin: R >16 These ampicillin results predict results for amoxicillin      -  Ampicillin/Sulbactam: R >16/8      -  Aztreonam: S <=4      -  Cefazolin: R >16 This predicts results for oral agents cefaclor, cefdinir, cefpodoxime, cefprozil, cefuroxime axetil, cephalexin and locarbef for uncomplicated UTI. Note that some isolates may be susceptible to these agents while testing resistant to cefazolin.      -  Cefepime: S <=2      -  Cefoxitin: R >16      -  Ceftriaxone: R 8 Enterobacter, Citrobacter, and Serratia may develop resistance during prolonged therapy      -  Ciprofloxacin: S <=0.5      -  Ertapenem: S <=0.5      -  Gentamicin: S <=1      -  Levofloxacin: S <=1      -  Meropenem: S <=1      -  Nitrofurantoin: R >64 Should not be used to treat pyelonephritis      -  Piperacillin/Tazobactam: S <=8      -  Tobramycin: S <=2      -  Trimethoprim/Sulfamethoxazole: S <=0.5/9.5      Method Type: BROOKE        RADIOLOGY & ADDITIONAL TESTS:    Personally reviewed.     Consultant(s) Notes Reviewed:  [x] YES  [ ] NO Patient is a 83y old  Female who presents with a chief complaint of profound anemia (16 Jul 2018 09:11)      INTERVAL HPI/OVERNIGHT EVENTS: NO overnight events/ Patient sleeping comfortably. No complaints. Patient denies BMs.        MEDICATIONS  (STANDING):  ALBUTerol/ipratropium for Nebulization 3 milliLiter(s) Nebulizer every 6 hours  artificial tears (preservative free) Ophthalmic Solution 1 Drop(s) Both EYES daily  dextrose 5%. 1000 milliLiter(s) (50 mL/Hr) IV Continuous <Continuous>  dextrose 50% Injectable 12.5 Gram(s) IV Push once  dextrose 50% Injectable 25 Gram(s) IV Push once  dextrose 50% Injectable 25 Gram(s) IV Push once  epoetin sherita Injectable 58432 Unit(s) SubCutaneous <User Schedule>  folic acid 1 milliGRAM(s) Oral daily  insulin lispro (HumaLOG) corrective regimen sliding scale   SubCutaneous three times a day before meals  insulin lispro (HumaLOG) corrective regimen sliding scale   SubCutaneous at bedtime  lactobacillus acidophilus 1 Tablet(s) Oral three times a day with meals  levothyroxine 75 MICROGram(s) Oral daily  loratadine 10 milliGRAM(s) Oral daily  pantoprazole    Tablet 40 milliGRAM(s) Oral two times a day  sodium biphosphate Rectal Enema 1 Enema Rectal once  sucralfate 1 Gram(s) Oral four times a day    MEDICATIONS  (PRN):  acetaminophen   Tablet 650 milliGRAM(s) Oral every 6 hours PRN Mild Pain  dextrose 40% Gel 15 Gram(s) Oral once PRN Blood Glucose LESS THAN 70 milliGRAM(s)/deciliter  glucagon  Injectable 1 milliGRAM(s) IntraMuscular once PRN Glucose LESS THAN 70 milligrams/deciliter  ondansetron Injectable 4 milliGRAM(s) IV Push every 6 hours PRN Nausea and/or Vomiting      Allergies    Levaquin (Hives)  sulfa drugs (Unknown)    Intolerances        REVIEW OF SYSTEMS:  CONSTITUTIONAL: No fever, + feeling cold   HEENT:  No headache,itchy throat, + watering eyes  RESPIRATORY: No cough, wheezing, or shortness of breath  CARDIOVASCULAR: No chest pain, palpitations, or leg swelling  GASTROINTESTINAL: No abd pain, nausea, vomiting, or diarrhea  GENITOURINARY: No dysuria, frequency, or hematuria  NEUROLOGICAL: no focal weakness or dizziness  MUSCULOSKELETAL: no myalgias , no new joint pains  Skin: no petechiae, no rashes, no new bruising          Vital Signs Last 24 Hrs  T(C): 36.3 (20 Jul 2018 05:49), Max: 36.8 (19 Jul 2018 20:45)  T(F): 97.4 (20 Jul 2018 05:49), Max: 98.2 (19 Jul 2018 20:45)  HR: 80 (20 Jul 2018 05:49) (70 - 89)  BP: 123/83 (20 Jul 2018 05:49) (123/83 - 142/85)  BP(mean): --  RR: 18 (20 Jul 2018 05:49) (18 - 18)  SpO2: 95% (20 Jul 2018 05:49) (90% - 95%)    PHYSICAL EXAM:  GENERAL: patient appears no acute distress, appropriate,  EYES: sclera clear, exudates  ENMT: oropharynx clear without erythema, no exudates, moist mucous membranes  NECK: supple, soft,   LUNGS: good air entry bilaterally, clear to auscultation, symmetric breath sounds, no wheezing or rhonchi appreciated  HEART: soft S1/S2, regular rate and rhythm, no murmurs noted,+1  non pitting lower extremity edema  GASTROINTESTINAL: abdomen is soft, nontender, nondistended, normoactive bowel sounds, no palpable masses  INTEGUMENT: good skin turgor, no lesions noted, chronic bl LE skin changes from venous stasis  MUSCULOSKELETAL: no clubbing or cyanosis, no obvious deformity  NEUROLOGIC: awake, alert, oriented x1 only to self, good muscle tone in 4 extremities, no obvious sensory deficits  HEME/LYMPH: , no obvious  petechiae or rashes              LABS:                        8.9    7.34  )-----------( 242      ( 20 Jul 2018 07:40 )             30.1     CBC Full  -  ( 20 Jul 2018 07:40 )  WBC Count : 7.34 K/uL  Hemoglobin : 8.9 g/dL  Hematocrit : 30.1 %  Platelet Count - Automated : 242 K/uL  Mean Cell Volume : 93.5 fl  Mean Cell Hemoglobin : 27.6 pg  Mean Cell Hemoglobin Concentration : 29.6 gm/dL        Ca    8.3        19 Jul 2018 06:55    PT/INR - ( 20 Jul 2018 07:40 )   PT: 12.5 sec;   INR: 1.14 ratio          POCT Blood Glucose.: 106 mg/dL (20 Jul 2018 06:35)  POCT Blood Glucose.: 129 mg/dL (19 Jul 2018 22:28)  POCT Blood Glucose.: 183 mg/dL (19 Jul 2018 17:13)  POCT Blood Glucose.: 154 mg/dL (19 Jul 2018 12:13)  POCT Blood Glucose.: 144 mg/dL (19 Jul 2018 11:29)        Culture - Urine (collected 07-14-18 @ 16:32)  Source: .Urine Catheterized  Final Report (07-16-18 @ 18:20):    >100,000 CFU/ml Proteus mirabilis    <10,000 CFU/ml Normal Urogenital rome present  Organism: Proteus mirabilis (07-16-18 @ 18:20)  Organism: Proteus mirabilis (07-16-18 @ 18:20)      -  Amikacin: S <=8      -  Amoxicillin/Clavulanic Acid: R >16/8      -  Ampicillin: R >16 These ampicillin results predict results for amoxicillin      -  Ampicillin/Sulbactam: R >16/8      -  Aztreonam: S <=4      -  Cefazolin: R >16 This predicts results for oral agents cefaclor, cefdinir, cefpodoxime, cefprozil, cefuroxime axetil, cephalexin and locarbef for uncomplicated UTI. Note that some isolates may be susceptible to these agents while testing resistant to cefazolin.      -  Cefepime: S 4      -  Cefoxitin: R >16      -  Ceftriaxone: R 8 Enterobacter, Citrobacter, and Serratia may develop resistance during prolonged therapy      -  Ciprofloxacin: S <=0.5      -  Ertapenem: S <=0.5      -  Gentamicin: S 2      -  Levofloxacin: S <=1      -  Meropenem: S <=1      -  Nitrofurantoin: R >64 Should not be used to treat pyelonephritis      -  Piperacillin/Tazobactam: S <=8      -  Tobramycin: S 4      -  Trimethoprim/Sulfamethoxazole: S <=0.5/9.5      Method Type: BROOKE    Culture - Urine (collected 07-13-18 @ 21:56)  Source: .Urine Catheterized  Final Report (07-15-18 @ 16:22):    >100,000 CFU/ml Proteus mirabilis  Organism: Proteus mirabilis (07-15-18 @ 16:22)  Organism: Proteus mirabilis (07-15-18 @ 16:22)      -  Amikacin: S <=8      -  Amoxicillin/Clavulanic Acid: R >16/8      -  Ampicillin: R >16 These ampicillin results predict results for amoxicillin      -  Ampicillin/Sulbactam: R >16/8      -  Aztreonam: S <=4      -  Cefazolin: R >16 This predicts results for oral agents cefaclor, cefdinir, cefpodoxime, cefprozil, cefuroxime axetil, cephalexin and locarbef for uncomplicated UTI. Note that some isolates may be susceptible to these agents while testing resistant to cefazolin.      -  Cefepime: S <=2      -  Cefoxitin: R >16      -  Ceftriaxone: R 8 Enterobacter, Citrobacter, and Serratia may develop resistance during prolonged therapy      -  Ciprofloxacin: S <=0.5      -  Ertapenem: S <=0.5      -  Gentamicin: S <=1      -  Levofloxacin: S <=1      -  Meropenem: S <=1      -  Nitrofurantoin: R >64 Should not be used to treat pyelonephritis      -  Piperacillin/Tazobactam: S <=8      -  Tobramycin: S <=2      -  Trimethoprim/Sulfamethoxazole: S <=0.5/9.5      Method Type: BROOKE        RADIOLOGY & ADDITIONAL TESTS:    Personally reviewed.     Consultant(s) Notes Reviewed:  [x] YES  [ ] NO Patient is a 83y old  Female who presents with a chief complaint of profound anemia (16 Jul 2018 09:11)      INTERVAL HPI/OVERNIGHT EVENTS: NO overnight events/ Patient sleeping comfortably. No complaints. Patient denies BMs.        MEDICATIONS  (STANDING):  ALBUTerol/ipratropium for Nebulization 3 milliLiter(s) Nebulizer every 6 hours  artificial tears (preservative free) Ophthalmic Solution 1 Drop(s) Both EYES daily  dextrose 5%. 1000 milliLiter(s) (50 mL/Hr) IV Continuous <Continuous>  dextrose 50% Injectable 12.5 Gram(s) IV Push once  dextrose 50% Injectable 25 Gram(s) IV Push once  dextrose 50% Injectable 25 Gram(s) IV Push once  epoetin sherita Injectable 54725 Unit(s) SubCutaneous <User Schedule>  folic acid 1 milliGRAM(s) Oral daily  insulin lispro (HumaLOG) corrective regimen sliding scale   SubCutaneous three times a day before meals  insulin lispro (HumaLOG) corrective regimen sliding scale   SubCutaneous at bedtime  lactobacillus acidophilus 1 Tablet(s) Oral three times a day with meals  levothyroxine 75 MICROGram(s) Oral daily  loratadine 10 milliGRAM(s) Oral daily  pantoprazole    Tablet 40 milliGRAM(s) Oral two times a day  sodium biphosphate Rectal Enema 1 Enema Rectal once  sucralfate 1 Gram(s) Oral four times a day    MEDICATIONS  (PRN):  acetaminophen   Tablet 650 milliGRAM(s) Oral every 6 hours PRN Mild Pain  dextrose 40% Gel 15 Gram(s) Oral once PRN Blood Glucose LESS THAN 70 milliGRAM(s)/deciliter  glucagon  Injectable 1 milliGRAM(s) IntraMuscular once PRN Glucose LESS THAN 70 milligrams/deciliter  ondansetron Injectable 4 milliGRAM(s) IV Push every 6 hours PRN Nausea and/or Vomiting      Allergies    Levaquin (Hives)  sulfa drugs (Unknown)    Intolerances        REVIEW OF SYSTEMS:  CONSTITUTIONAL: No fever, + feeling cold   HEENT:  No headache,itchy throat, + watering eyes  RESPIRATORY: No cough, wheezing, or shortness of breath  CARDIOVASCULAR: No chest pain, palpitations, or leg swelling  GASTROINTESTINAL: No abd pain, nausea, vomiting, or diarrhea  GENITOURINARY: No dysuria, frequency, or hematuria  NEUROLOGICAL: no focal weakness or dizziness  MUSCULOSKELETAL: no myalgias , no new joint pains  Skin: no petechiae, no rashes, no new bruising          Vital Signs Last 24 Hrs  T(C): 36.3 (20 Jul 2018 05:49), Max: 36.8 (19 Jul 2018 20:45)  T(F): 97.4 (20 Jul 2018 05:49), Max: 98.2 (19 Jul 2018 20:45)  HR: 80 (20 Jul 2018 05:49) (70 - 89)  BP: 123/83 (20 Jul 2018 05:49) (123/83 - 142/85)  BP(mean): --  RR: 18 (20 Jul 2018 05:49) (18 - 18)  SpO2: 95% (20 Jul 2018 05:49) (90% - 95%)    PHYSICAL EXAM:  GENERAL: patient appears no acute distress, appropriate,  EYES: sclera clear, exudates  ENMT: oropharynx clear without erythema, no exudates, moist mucous membranes  NECK: supple, soft,   LUNGS: good air entry bilaterally, clear to auscultation, symmetric breath sounds, no wheezing or rhonchi appreciated  HEART: soft S1/S2, regular rate and rhythm, no murmurs noted,+1  non pitting lower extremity edema  GASTROINTESTINAL: abdomen is soft, nontender, nondistended, normoactive bowel sounds, no palpable masses  INTEGUMENT: good skin turgor, no lesions noted, chronic bl LE skin changes from venous stasis  MUSCULOSKELETAL: no clubbing or cyanosis, no obvious deformity  NEUROLOGIC: awake, alert, oriented x1 only to self, good muscle tone in 4 extremities, no obvious sensory deficits  HEME/LYMPH: , no obvious  petechiae or rashes        LABS:                        8.9    7.34  )-----------( 242      ( 20 Jul 2018 07:40 )             30.1     CBC Full  -  ( 20 Jul 2018 07:40 )  WBC Count : 7.34 K/uL  Hemoglobin : 8.9 g/dL  Hematocrit : 30.1 %  Platelet Count - Automated : 242 K/uL  Mean Cell Volume : 93.5 fl  Mean Cell Hemoglobin : 27.6 pg  Mean Cell Hemoglobin Concentration : 29.6 gm/dL        Ca    8.3        19 Jul 2018 06:55    PT/INR - ( 20 Jul 2018 07:40 )   PT: 12.5 sec;   INR: 1.14 ratio          POCT Blood Glucose.: 106 mg/dL (20 Jul 2018 06:35)  POCT Blood Glucose.: 129 mg/dL (19 Jul 2018 22:28)  POCT Blood Glucose.: 183 mg/dL (19 Jul 2018 17:13)  POCT Blood Glucose.: 154 mg/dL (19 Jul 2018 12:13)  POCT Blood Glucose.: 144 mg/dL (19 Jul 2018 11:29)        Culture - Urine (collected 07-14-18 @ 16:32)  Source: .Urine Catheterized  Final Report (07-16-18 @ 18:20):    >100,000 CFU/ml Proteus mirabilis    <10,000 CFU/ml Normal Urogenital rome present  Organism: Proteus mirabilis (07-16-18 @ 18:20)  Organism: Proteus mirabilis (07-16-18 @ 18:20)      -  Amikacin: S <=8      -  Amoxicillin/Clavulanic Acid: R >16/8      -  Ampicillin: R >16 These ampicillin results predict results for amoxicillin      -  Ampicillin/Sulbactam: R >16/8      -  Aztreonam: S <=4      -  Cefazolin: R >16 This predicts results for oral agents cefaclor, cefdinir, cefpodoxime, cefprozil, cefuroxime axetil, cephalexin and locarbef for uncomplicated UTI. Note that some isolates may be susceptible to these agents while testing resistant to cefazolin.      -  Cefepime: S 4      -  Cefoxitin: R >16      -  Ceftriaxone: R 8 Enterobacter, Citrobacter, and Serratia may develop resistance during prolonged therapy      -  Ciprofloxacin: S <=0.5      -  Ertapenem: S <=0.5      -  Gentamicin: S 2      -  Levofloxacin: S <=1      -  Meropenem: S <=1      -  Nitrofurantoin: R >64 Should not be used to treat pyelonephritis      -  Piperacillin/Tazobactam: S <=8      -  Tobramycin: S 4      -  Trimethoprim/Sulfamethoxazole: S <=0.5/9.5      Method Type: BROOKE    Culture - Urine (collected 07-13-18 @ 21:56)  Source: .Urine Catheterized  Final Report (07-15-18 @ 16:22):    >100,000 CFU/ml Proteus mirabilis  Organism: Proteus mirabilis (07-15-18 @ 16:22)  Organism: Proteus mirabilis (07-15-18 @ 16:22)      -  Amikacin: S <=8      -  Amoxicillin/Clavulanic Acid: R >16/8      -  Ampicillin: R >16 These ampicillin results predict results for amoxicillin      -  Ampicillin/Sulbactam: R >16/8      -  Aztreonam: S <=4      -  Cefazolin: R >16 This predicts results for oral agents cefaclor, cefdinir, cefpodoxime, cefprozil, cefuroxime axetil, cephalexin and locarbef for uncomplicated UTI. Note that some isolates may be susceptible to these agents while testing resistant to cefazolin.      -  Cefepime: S <=2      -  Cefoxitin: R >16      -  Ceftriaxone: R 8 Enterobacter, Citrobacter, and Serratia may develop resistance during prolonged therapy      -  Ciprofloxacin: S <=0.5      -  Ertapenem: S <=0.5      -  Gentamicin: S <=1      -  Levofloxacin: S <=1      -  Meropenem: S <=1      -  Nitrofurantoin: R >64 Should not be used to treat pyelonephritis      -  Piperacillin/Tazobactam: S <=8      -  Tobramycin: S <=2      -  Trimethoprim/Sulfamethoxazole: S <=0.5/9.5      Method Type: BROOKE        RADIOLOGY & ADDITIONAL TESTS:    Personally reviewed.     Consultant(s) Notes Reviewed:  [x] YES  [ ] NO

## 2018-07-20 NOTE — PROGRESS NOTE ADULT - PROBLEM SELECTOR PLAN 2
Per Psych note 7/11, patient unable to make decisions on medical care. Daughter Sheila and son Bora health care proxies. Per Psych note 7/11, patient unable to make decisions on medical care. Daughter Sheila and son Bora health care proxies.  -dc planning as h[facility]h stable, [facility]  Dementia long term prognosis and treatment, per Daughter reqiest  neuro consult Dr. Jackson

## 2018-07-20 NOTE — PROGRESS NOTE ADULT - SUBJECTIVE AND OBJECTIVE BOX
sp colonoscopy with polypectomy    1) Pedunculated Sigmoid Polyp  2) Right sided polyp  3) Diverticulosis  4) Hemorrhoids    Recs:  Fu bx   no asa , anticoaguation for 5 days  outpatient capsule

## 2018-07-21 LAB
ANION GAP SERPL CALC-SCNC: 7 MMOL/L — SIGNIFICANT CHANGE UP (ref 5–17)
BUN SERPL-MCNC: 21 MG/DL — SIGNIFICANT CHANGE UP (ref 7–23)
CALCIUM SERPL-MCNC: 8.1 MG/DL — LOW (ref 8.5–10.1)
CHLORIDE SERPL-SCNC: 109 MMOL/L — HIGH (ref 96–108)
CO2 SERPL-SCNC: 28 MMOL/L — SIGNIFICANT CHANGE UP (ref 22–31)
CREAT SERPL-MCNC: 1.9 MG/DL — HIGH (ref 0.5–1.3)
GLUCOSE SERPL-MCNC: 107 MG/DL — HIGH (ref 70–99)
HCT VFR BLD CALC: 31 % — LOW (ref 34.5–45)
HGB BLD-MCNC: 8.8 G/DL — LOW (ref 11.5–15.5)
MCHC RBC-ENTMCNC: 26.4 PG — LOW (ref 27–34)
MCHC RBC-ENTMCNC: 28.4 GM/DL — LOW (ref 32–36)
MCV RBC AUTO: 93.1 FL — SIGNIFICANT CHANGE UP (ref 80–100)
NRBC # BLD: 0 /100 WBCS — SIGNIFICANT CHANGE UP (ref 0–0)
PLATELET # BLD AUTO: 265 K/UL — SIGNIFICANT CHANGE UP (ref 150–400)
POTASSIUM SERPL-MCNC: 4.2 MMOL/L — SIGNIFICANT CHANGE UP (ref 3.5–5.3)
POTASSIUM SERPL-SCNC: 4.2 MMOL/L — SIGNIFICANT CHANGE UP (ref 3.5–5.3)
RBC # BLD: 3.33 M/UL — LOW (ref 3.8–5.2)
RBC # FLD: 17.3 % — HIGH (ref 10.3–14.5)
SODIUM SERPL-SCNC: 144 MMOL/L — SIGNIFICANT CHANGE UP (ref 135–145)
TSH SERPL-MCNC: 6.9 UIU/ML — HIGH (ref 0.36–3.74)
WBC # BLD: 7.95 K/UL — SIGNIFICANT CHANGE UP (ref 3.8–10.5)
WBC # FLD AUTO: 7.95 K/UL — SIGNIFICANT CHANGE UP (ref 3.8–10.5)

## 2018-07-21 PROCEDURE — 99232 SBSQ HOSP IP/OBS MODERATE 35: CPT

## 2018-07-21 PROCEDURE — 99233 SBSQ HOSP IP/OBS HIGH 50: CPT

## 2018-07-21 RX ORDER — ALBUTEROL 90 UG/1
2.5 AEROSOL, METERED ORAL EVERY 6 HOURS
Qty: 0 | Refills: 0 | Status: DISCONTINUED | OUTPATIENT
Start: 2018-07-21 | End: 2018-07-25

## 2018-07-21 RX ADMIN — DONEPEZIL HYDROCHLORIDE 5 MILLIGRAM(S): 10 TABLET, FILM COATED ORAL at 21:55

## 2018-07-21 RX ADMIN — Medication 1 TABLET(S): at 08:52

## 2018-07-21 RX ADMIN — Medication 1: at 17:56

## 2018-07-21 RX ADMIN — Medication 1 TABLET(S): at 17:57

## 2018-07-21 RX ADMIN — Medication 1 GRAM(S): at 17:57

## 2018-07-21 RX ADMIN — ALBUTEROL 2.5 MILLIGRAM(S): 90 AEROSOL, METERED ORAL at 07:58

## 2018-07-21 RX ADMIN — PANTOPRAZOLE SODIUM 40 MILLIGRAM(S): 20 TABLET, DELAYED RELEASE ORAL at 17:57

## 2018-07-21 RX ADMIN — Medication 1: at 12:35

## 2018-07-21 RX ADMIN — Medication 1 TABLET(S): at 11:43

## 2018-07-21 RX ADMIN — PANTOPRAZOLE SODIUM 40 MILLIGRAM(S): 20 TABLET, DELAYED RELEASE ORAL at 05:45

## 2018-07-21 RX ADMIN — Medication 1 GRAM(S): at 05:45

## 2018-07-21 RX ADMIN — LORATADINE 10 MILLIGRAM(S): 10 TABLET ORAL at 11:43

## 2018-07-21 RX ADMIN — Medication 75 MICROGRAM(S): at 05:45

## 2018-07-21 RX ADMIN — Medication 1 DROP(S): at 08:53

## 2018-07-21 RX ADMIN — Medication 1 GRAM(S): at 11:43

## 2018-07-21 RX ADMIN — Medication 1 MILLIGRAM(S): at 11:43

## 2018-07-21 NOTE — PROGRESS NOTE ADULT - PROBLEM SELECTOR PLAN 1
albuterol NEBS prn  dementia  assist with ADL  oral and skin care  renal follow up  neuro follow up  am labs pending  serial PE  will follow  prognosis guarded  may need placement

## 2018-07-21 NOTE — PROGRESS NOTE ADULT - SUBJECTIVE AND OBJECTIVE BOX
Date/Time Patient Seen:  		  Referring MD:   Data Reviewed	       Patient is a 83y old  Female who presents with a chief complaint of profound anemia (16 Jul 2018 09:11)  vs and meds reviewed      Subjective/HPI     PAST MEDICAL & SURGICAL HISTORY:  Arthritis  Asthma  Diabetes mellitus  Hypothyroidism  No significant past surgical history        Medication list         MEDICATIONS  (STANDING):  artificial tears (preservative free) Ophthalmic Solution 1 Drop(s) Both EYES daily  dextrose 5%. 1000 milliLiter(s) (50 mL/Hr) IV Continuous <Continuous>  dextrose 50% Injectable 12.5 Gram(s) IV Push once  dextrose 50% Injectable 25 Gram(s) IV Push once  dextrose 50% Injectable 25 Gram(s) IV Push once  donepezil 5 milliGRAM(s) Oral at bedtime  epoetin sherita Injectable 54273 Unit(s) SubCutaneous <User Schedule>  folic acid 1 milliGRAM(s) Oral daily  insulin lispro (HumaLOG) corrective regimen sliding scale   SubCutaneous three times a day before meals  insulin lispro (HumaLOG) corrective regimen sliding scale   SubCutaneous at bedtime  lactobacillus acidophilus 1 Tablet(s) Oral three times a day with meals  levothyroxine 75 MICROGram(s) Oral daily  loratadine 10 milliGRAM(s) Oral daily  pantoprazole    Tablet 40 milliGRAM(s) Oral two times a day  sucralfate 1 Gram(s) Oral four times a day    MEDICATIONS  (PRN):  acetaminophen   Tablet 650 milliGRAM(s) Oral every 6 hours PRN Mild Pain  ALBUTerol    0.083% 2.5 milliGRAM(s) Nebulizer every 6 hours PRN Shortness of Breath and/or Wheezing  dextrose 40% Gel 15 Gram(s) Oral once PRN Blood Glucose LESS THAN 70 milliGRAM(s)/deciliter  glucagon  Injectable 1 milliGRAM(s) IntraMuscular once PRN Glucose LESS THAN 70 milligrams/deciliter  ondansetron Injectable 4 milliGRAM(s) IV Push every 6 hours PRN Nausea and/or Vomiting         Vitals log        ICU Vital Signs Last 24 Hrs  T(C): 36.6 (21 Jul 2018 05:30), Max: 36.8 (20 Jul 2018 15:15)  T(F): 97.9 (21 Jul 2018 05:30), Max: 98.2 (20 Jul 2018 15:15)  HR: 76 (21 Jul 2018 05:30) (68 - 76)  BP: 120/61 (21 Jul 2018 05:30) (119/67 - 143/73)  BP(mean): --  ABP: --  ABP(mean): --  RR: 16 (21 Jul 2018 05:30) (14 - 20)  SpO2: 94% (21 Jul 2018 05:30) (91% - 100%)           Input and Output:  I&O's Detail      Lab Data                        8.9    7.34  )-----------( 242      ( 20 Jul 2018 07:40 )             30.1     07-20    146<H>  |  110<H>  |  23  ----------------------------<  97  3.7   |  30  |  1.70<H>    Ca    8.1<L>      20 Jul 2018 07:40              Review of Systems	      Objective     Physical Examination    heart s1s2  lung dec BS  abd soft      Pertinent Lab findings & Imaging      Hazel:  NO   Adequate UO     I&O's Detail           Discussed with:     Cultures:	        Radiology

## 2018-07-21 NOTE — PROGRESS NOTE ADULT - ASSESSMENT
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 Renal function fluctuates but relatively stable , eGFR 26-30 ml/min  Hypernatremia - Resolved  Hyperkalemia  - Resolved   Anemia: Blood loss anemia, severe: GIB  Arthritis  Asthma  Diabetes mellitus  Lymphedema  s/p EGD attempted. d/w GI  per Dr Daly,  sp colonoscopy with polypectomy    1) Pedunculated Sigmoid Polyp    2) Right sided polyp    3) Diverticulosis    4) Hemorrhoids    - Renal indices are stable at baseline; electrolytes are stable  - No indication for kidney biopsy. No signs of underlying GN or nephrotic syndrome   - No uremic bleeding diathesis. If bleeding becomes a recurrent issue, will consider on dose of DDAVP 0.3 /kg bw  - Urine studies reviewed. No real proteinuria. FeNa does not suggest pre-renal. S/p brief IVF. Off IVF  - Low K diet   - Edema is most lymphedema although some contribution from fluid. Can add low dose maintenance Lasix; will monitor renal function  - She will benefit from ACEi if renal function remains stable. This can be started outpatient     Thank you; Renally stable for outpatient follow up

## 2018-07-21 NOTE — PROGRESS NOTE ADULT - PROBLEM SELECTOR PLAN 1
- possibly 2/2 GIB: GI (Dr. Daly) EGD showed gastritis and esophagitis  GI : finding as aboveavoid ac for 5 days, cont recs as per GI: Fu bx, no asa, anticoaguation for 5 days  outpatient capsule   - Patient is  s/p transfusion of total 5 units PRBCs and 1 unit platelets.  - monitor H/H, transfuse as necessary.  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

## 2018-07-21 NOTE — PROGRESS NOTE ADULT - PROBLEM SELECTOR PLAN 2
Per Psych note 7/11, patient unable to make decisions on medical care. Daughter Sheila and son Bora health care proxies.  -dc planning as h.h stable, [facility]  Dementia long term prognosis and treatment, per Daughter reqiest  neuro consult Dr. Jackson

## 2018-07-21 NOTE — PROGRESS NOTE ADULT - SUBJECTIVE AND OBJECTIVE BOX
[facility] - Mabel Hernandez, Jessica, Cesar, Holly, Mayda Hartman  Office Number:  [phone]    Patient resting comfortably in bed in NAD.  Laying flat with no respiratory distress.  No complaints of chest pain, dyspnea, palpitations, PND, or orthopnea.  Still feeling weak overall    ROS: negative unless otherwise mentioned.    Telemetry:  not on tele    MEDICATIONS  (STANDING):  artificial tears (preservative free) Ophthalmic Solution 1 Drop(s) Both EYES daily  dextrose 5%. 1000 milliLiter(s) (50 mL/Hr) IV Continuous <Continuous>  dextrose 50% Injectable 12.5 Gram(s) IV Push once  dextrose 50% Injectable 25 Gram(s) IV Push once  dextrose 50% Injectable 25 Gram(s) IV Push once  donepezil 5 milliGRAM(s) Oral at bedtime  epoetin sherita Injectable 35101 Unit(s) SubCutaneous <User Schedule>  folic acid 1 milliGRAM(s) Oral daily  insulin lispro (HumaLOG) corrective regimen sliding scale   SubCutaneous three times a day before meals  insulin lispro (HumaLOG) corrective regimen sliding scale   SubCutaneous at bedtime  lactobacillus acidophilus 1 Tablet(s) Oral three times a day with meals  levothyroxine 75 MICROGram(s) Oral daily  loratadine 10 milliGRAM(s) Oral daily  pantoprazole    Tablet 40 milliGRAM(s) Oral two times a day  sucralfate 1 Gram(s) Oral four times a day    MEDICATIONS  (PRN):  acetaminophen   Tablet 650 milliGRAM(s) Oral every 6 hours PRN Mild Pain  ALBUTerol    0.083% 2.5 milliGRAM(s) Nebulizer every 6 hours PRN Shortness of Breath and/or Wheezing  dextrose 40% Gel 15 Gram(s) Oral once PRN Blood Glucose LESS THAN 70 milliGRAM(s)/deciliter  glucagon  Injectable 1 milliGRAM(s) IntraMuscular once PRN Glucose LESS THAN 70 milligrams/deciliter  ondansetron Injectable 4 milliGRAM(s) IV Push every 6 hours PRN Nausea and/or Vomiting      Allergies    Levaquin (Hives)  sulfa drugs (Unknown)    Intolerances        Vital Signs Last 24 Hrs  T(C): 36.6 (21 Jul 2018 05:30), Max: 36.8 (20 Jul 2018 15:15)  T(F): 97.9 (21 Jul 2018 05:30), Max: 98.2 (20 Jul 2018 15:15)  HR: 82 (21 Jul 2018 07:59) (68 - 88)  BP: 120/61 (21 Jul 2018 05:30) (119/67 - 143/73)  BP(mean): --  RR: 16 (21 Jul 2018 05:30) (14 - 20)  SpO2: 94% (21 Jul 2018 05:30) (91% - 100%)    I&O's Summary      ON EXAM:    Constitutional: NAD, awake and alert, well-developed  HEENT: Moist Mucous Membranes, Anicteric  Pulmonary: Decreased breath sounds b/l. No rales, crackles or wheeze appreciated.   Cardiovascular: Regular, S1 and S2, No murmurs, rubs, gallops or clicks  Gastrointestinal: Bowel Sounds present, soft, nontender.   Lymph: trace -1 peripheral edema. No lymphadenopathy.  Skin: No visible rashes or ulcers.  Psych:  Mood & affect appropriate    LABS: All Labs Reviewed:                        8.8    7.95  )-----------( 265      ( 21 Jul 2018 09:07 )             31.0                         8.9    7.34  )-----------( 242      ( 20 Jul 2018 07:40 )             30.1                         8.3    6.46  )-----------( 243      ( 19 Jul 2018 06:55 )             27.6     21 Jul 2018 09:07    144    |  109    |  21     ----------------------------<  107    4.2     |  28     |  1.90   20 Jul 2018 07:40    146    |  110    |  23     ----------------------------<  97     3.7     |  30     |  1.70   19 Jul 2018 06:55    145    |  110    |  28     ----------------------------<  104    4.1     |  29     |  1.80     Ca    8.1        21 Jul 2018 09:07  Ca    8.1        20 Jul 2018 07:40  Ca    8.3        19 Jul 2018 06:55      PT/INR - ( 20 Jul 2018 07:40 )   PT: 12.5 sec;   INR: 1.14 ratio               Blood Culture:     07-21 @ 09:07  TSH: 6.90

## 2018-07-21 NOTE — PROVIDER CONTACT NOTE (CHANGE IN STATUS NOTIFICATION) - ASSESSMENT
Patient out of room in procedure will see 7/21/18
RN educated in the care and treatment of stage one and stage 2 pressure injuries

## 2018-07-21 NOTE — PROGRESS NOTE ADULT - SUBJECTIVE AND OBJECTIVE BOX
The patient was interviewed and evaluated.    83y Female    T(C): 36.6 (07-21-18 @ 05:30), Max: 36.8 (07-20-18 @ 15:15)  HR: 82 (07-21-18 @ 07:59) (68 - 88)  BP: 120/61 (07-21-18 @ 05:30) (119/67 - 143/73)  RR: 16 (07-21-18 @ 05:30) (14 - 20)  SpO2: 94% (07-21-18 @ 05:30) (91% - 100%)  Wt(kg): --    No Nausea/vomiting, recall, sore throat or headache.    No anesthesia related complaints or sequelae.    No additional recommendations.

## 2018-07-21 NOTE — PROGRESS NOTE ADULT - ASSESSMENT
83 year old female with DM2, HTN, here with an episode of syncope, found to have significant anemia. Improvement in lethargy with PRBC transfusions    - Patient presented with profound anemia, s/p PRBC's x 5 units total and 1 unit of platelets    - H/H has been stable since transfusion.  Continue to trend and transfuse as needed   - s/p EGD 7/9 with clean based duodenal ulcer, gastritis, hiatal hernia  - s/p bleeding scan which was negative, although, unable to complete due to patient's refusal to have anymore tests  - s/p colon yesterday with polypectomy.  - Chronic lower extremity edema unchanged.    Administer Lasix after blood products transfusion to avoid fluid volume overload   - Echo with normal LV function.  - Hemodynamics stable as per flow sheet   - EKG is SR with no sign of ischemia or chamber enlargement  - Baseline creatinine unknown, though normal in 2016, BARBARA likely due to her profound anemia.  Avoid nephrotoxics.  Renal following  - Monitor electrolytes. Keep K>4, Mg>2  - will follow

## 2018-07-21 NOTE — PROGRESS NOTE ADULT - SUBJECTIVE AND OBJECTIVE BOX
INTERVAL HPI/OVERNIGHT EVENTS:  pt seen and examined  pt denies n/v/d/abd pain, c/o headache  denies bm   per overnight rn no s/s overt gib    MEDICATIONS  (STANDING):  ALBUTerol/ipratropium for Nebulization 3 milliLiter(s) Nebulizer every 6 hours  artificial tears (preservative free) Ophthalmic Solution 1 Drop(s) Both EYES daily  dextrose 5%. 1000 milliLiter(s) (50 mL/Hr) IV Continuous <Continuous>  dextrose 50% Injectable 12.5 Gram(s) IV Push once  dextrose 50% Injectable 25 Gram(s) IV Push once  dextrose 50% Injectable 25 Gram(s) IV Push once  epoetin sherita Injectable 61126 Unit(s) SubCutaneous <User Schedule>  folic acid 1 milliGRAM(s) Oral daily  insulin lispro (HumaLOG) corrective regimen sliding scale   SubCutaneous three times a day before meals  insulin lispro (HumaLOG) corrective regimen sliding scale   SubCutaneous at bedtime  lactobacillus acidophilus 1 Tablet(s) Oral three times a day with meals  levothyroxine 75 MICROGram(s) Oral daily  magnesium citrate Solution 296 milliLiter(s) Oral once  pantoprazole    Tablet 40 milliGRAM(s) Oral two times a day  sucralfate 1 Gram(s) Oral four times a day    MEDICATIONS  (PRN):  acetaminophen   Tablet 650 milliGRAM(s) Oral every 6 hours PRN Mild Pain  dextrose 40% Gel 15 Gram(s) Oral once PRN Blood Glucose LESS THAN 70 milliGRAM(s)/deciliter  glucagon  Injectable 1 milliGRAM(s) IntraMuscular once PRN Glucose LESS THAN 70 milligrams/deciliter  ondansetron Injectable 4 milliGRAM(s) IV Push every 6 hours PRN Nausea and/or Vomiting      Allergies    Levaquin (Hives)  sulfa drugs (Unknown)    Intolerances        Review of Systems:    General:  No wt loss, fevers, chills, night sweats, fatigue   Eyes:  Good vision, no reported pain  ENT:  No sore throat, pain, runny nose, dysphagia  CV:  No pain, palpitations, hypo/hypertension  Resp:  No dyspnea, cough, tachypnea, wheezing  GI:  No pain, No nausea, No vomiting, No diarrhea, No constipation, No weight loss, No fever, No pruritis, No rectal bleeding, No melena, No dysphagia  :  No pain, bleeding, incontinence, nocturia  Muscle:  No pain, weakness  Neuro:  ha  Psych:  No fatigue, insomnia, mood problems, depression  Endocrine:  No polyuria, polydypsia, cold/heat intolerance  Heme:  No petechiae, ecchymosis, easy bruisability  Skin:  No rash, tattoos, scars, edema      Vital Signs Last 24 Hrs  T(C): 36.6 (21 Jul 2018 05:30), Max: 36.8 (20 Jul 2018 15:15)  T(F): 97.9 (21 Jul 2018 05:30), Max: 98.2 (20 Jul 2018 15:15)  HR: 82 (21 Jul 2018 07:59) (68 - 88)  BP: 120/61 (21 Jul 2018 05:30) (119/67 - 143/73)  BP(mean): --  RR: 16 (21 Jul 2018 05:30) (14 - 20)  SpO2: 94% (21 Jul 2018 05:30) (91% - 100%)    PHYSICAL EXAM:    Constitutional: NAD, lying in bed  HEENT: EOMI, perrl  Neck: No LAD  Respiratory: dec bs  Cardiovascular: S1 and S2, RRR  Gastrointestinal: obese abd BS+, soft, NT, nd  Extremities: +edema and blistering to le  Vascular: 2+ peripheral pulses  Neurological: Awake alert confused agitated  Skin: see above      LABS:                               8.8    7.95  )-----------( 265      ( 21 Jul 2018 09:07 )             31.0   07-21    144  |  109<H>  |  21  ----------------------------<  107<H>  4.2   |  28  |  1.90<H>    Ca    8.1<L>      21 Jul 2018 09:07          RADIOLOGY & ADDITIONAL TESTS:

## 2018-07-21 NOTE — PROGRESS NOTE ADULT - SUBJECTIVE AND OBJECTIVE BOX
Neurology Follow up note    RAO [id number]    HPI:  82 y/o F with PMHx DM2, hypothyroidism, asthma, arthritis, meningoma (chronic), and  BIBEMS after syncopal episode at [facility]. Patient is a poor historian and is unsure why she is in the hospital.  Family at bedside reports that patient was discharged from [facility] the previous day after being admitted with AMS 2/2 UTI.  Family reports that yesterday patient was SOB.  They report that she bleeds easily, but denies knowledge of blood in stools, changes in urine color, open wounds. Patient confirms abdominal pain. She denies chest pain, shortness of breath, palpitations, nausea or vomiting. She also denies dizziness blurry vision.    Per ED nurse after stool guaiac performed patient had red streaked stool.    In ED patient vitals are 108/54, afebrile, O2 Sat 100% on supp O2.  Labs significant for Hbg 3.8, Hct 12.6, Na 148, Cl 120, Co2 20, BUN 62, Crt 1.9, GFR 24, Ca 6.4. UA+ for Large blood, leukocyte esterase. EKG shows NSR.  Patient received 2 u PRBCs.  CT head/neck shows no acute pathology. Patient CT A/P ordered. (07 Jul 2018 14:43)      Interval History - no new events    Patient is seen, chart was reviewed and case was discussed with the treatment team.  Pt is not in any distress.   Lying on bed comfortably.   No events reported overnight.   No clinical seizure was reported.  Sitting on chair bed comfortably.    is at bedside.    Vital Signs Last 24 Hrs  T(C): 37 (21 Jul 2018 14:22), Max: 37 (21 Jul 2018 14:22)  T(F): 98.6 (21 Jul 2018 14:22), Max: 98.6 (21 Jul 2018 14:22)  HR: 77 (21 Jul 2018 14:22) (76 - 88)  BP: 130/67 (21 Jul 2018 14:22) (119/67 - 130/67)  BP(mean): --  RR: 17 (21 Jul 2018 14:22) (16 - 17)  SpO2: 96% (21 Jul 2018 14:22) (91% - 96%)        REVIEW OF SYSTEMS:      On Neurological Examination:    Mental Status - Pt is alert, awake, oriented X3.  Follows commands well and able to answer questions appropriately.  Mood and affect  normal    Speech -  Normal.     Cranial Nerves - Pupils 3 mm equal and reactive to light,   Pt has nofacial asymmetry.    Muscle tone - is normal    Motor Exam - 4/5.    Sensory Exam - P Pt withdraws all extremities equally on stimulation. No asymmetry seen. No complaints of tingling, numbness.        coordination:    Finger to nose: normal.      Deep tendon Reflexes - 2 plus all over    Neck Supple -  Yes.     MEDICATIONS    acetaminophen   Tablet 650 milliGRAM(s) Oral every 6 hours PRN  ALBUTerol    0.083% 2.5 milliGRAM(s) Nebulizer every 6 hours PRN  artificial tears (preservative free) Ophthalmic Solution 1 Drop(s) Both EYES daily  dextrose 40% Gel 15 Gram(s) Oral once PRN  dextrose 5%. 1000 milliLiter(s) IV Continuous <Continuous>  dextrose 50% Injectable 12.5 Gram(s) IV Push once  dextrose 50% Injectable 25 Gram(s) IV Push once  dextrose 50% Injectable 25 Gram(s) IV Push once  donepezil 5 milliGRAM(s) Oral at bedtime  epoetin sherita Injectable 83196 Unit(s) SubCutaneous <User Schedule>  folic acid 1 milliGRAM(s) Oral daily  glucagon  Injectable 1 milliGRAM(s) IntraMuscular once PRN  insulin lispro (HumaLOG) corrective regimen sliding scale   SubCutaneous three times a day before meals  insulin lispro (HumaLOG) corrective regimen sliding scale   SubCutaneous at bedtime  lactobacillus acidophilus 1 Tablet(s) Oral three times a day with meals  levothyroxine 75 MICROGram(s) Oral daily  loratadine 10 milliGRAM(s) Oral daily  ondansetron Injectable 4 milliGRAM(s) IV Push every 6 hours PRN  pantoprazole    Tablet 40 milliGRAM(s) Oral two times a day  sucralfate 1 Gram(s) Oral four times a day      Allergies    Levaquin (Hives)  sulfa drugs (Unknown)    Intolerances        LABS:  CBC Full  -  ( 21 Jul 2018 09:07 )  WBC Count : 7.95 K/uL  Hemoglobin : 8.8 g/dL  Hematocrit : 31.0 %  Platelet Count - Automated : 265 K/uL  Mean Cell Volume : 93.1 fl  Mean Cell Hemoglobin : 26.4 pg  Mean Cell Hemoglobin Concentration : 28.4 gm/dL  Auto Neutrophil # : x  Auto Lymphocyte # : x  Auto Monocyte # : x  Auto Eosinophil # : x  Auto Basophil # : x  Auto Neutrophil % : x  Auto Lymphocyte % : x  Auto Monocyte % : x  Auto Eosinophil % : x  Auto Basophil % : x      07-21    144  |  109<H>  |  21  ----------------------------<  107<H>  4.2   |  28  |  1.90<H>    Ca    8.1<L>      21 Jul 2018 09:07      Hemoglobin A1C:     Vitamin B12     RADIOLOGY    ASSESSMENT AND PLAN:      MCI VS MILD DEMENTIA.    CONTINUE ARICEPT; NO SIDED EFFECTS,  Physical therapy evaluation.  OOB to chair/ambulation with assistance only.  Pain is accessed and addressed.  Would continue to follow.

## 2018-07-21 NOTE — PROGRESS NOTE ADULT - PROBLEM SELECTOR PLAN 1
sp egd 7/9 showed non bleeding du, gastritis and hh  sp repeat egd 7/17 findings of hh, esophagitis, gastritis and bile reflux  sp colonoscopy with polypectomy 7/20  Fu bx   no asa , anticoaguation for 5 days  outpatient capsule  monitor renal function  trend h/h, transfuse prn  ppi bid/carafate qid

## 2018-07-21 NOTE — PROGRESS NOTE ADULT - SUBJECTIVE AND OBJECTIVE BOX
Patient is a 83y old  Female who presents with a chief complaint of profound anemia (16 Jul 2018 09:11)      INTERVAL HPI: Pt seen and examined. States that she feels ok, denies any acute complaints at this time.     OVERNIGHT EVENTS: none noted  T(F): 97.9 (07-21-18 @ 05:30), Max: 98.2 (07-20-18 @ 15:15)  HR: 82 (07-21-18 @ 07:59) (68 - 88)  BP: 120/61 (07-21-18 @ 05:30) (119/67 - 143/73)  RR: 16 (07-21-18 @ 05:30) (14 - 20)  SpO2: 94% (07-21-18 @ 05:30) (91% - 100%)  I&O's Summary      REVIEW OF SYSTEMS: 12 systems noncontributory other than that stated in hpi    PHYSICAL EXAM:  GENERAL: NAD, chronically ill appearing  HEAD:  Atraumatic, Normocephalic  EYES: EOMI, PERRLA, conjunctiva and sclera clear  ENMT: No tonsillar erythema, exudates, or enlargement; Moist mucous membranes, Good dentition, No lesions  NECK: Supple, No JVD,   NERVOUS SYSTEM:  Alert & Oriented X3, Good concentration; Motor Strength 3/5 B/L upper and lower extremities  CHEST/LUNG: Clear to percussion bilaterally; No rales, rhonchi, wheezing, or rubs  HEART: Regular rate and rhythm; No murmurs, rubs, or gallops  ABDOMEN: Soft, Nontender, Nondistended; Bowel sounds present  EXTREMITIES:  2+ Peripheral Pulses, No clubbing, cyanosis, or edema  SKIN: pale appearance, chronic b/l LE dermatatis    LABS:                        8.8    7.95  )-----------( 265      ( 21 Jul 2018 09:07 )             31.0     07-21    144  |  109<H>  |  21  ----------------------------<  107<H>  4.2   |  28  |  1.90<H>    Ca    8.1<L>      21 Jul 2018 09:07      PT/INR - ( 20 Jul 2018 07:40 )   PT: 12.5 sec;   INR: 1.14 ratio             CAPILLARY BLOOD GLUCOSE      POCT Blood Glucose.: 185 mg/dL (21 Jul 2018 12:04)  POCT Blood Glucose.: 119 mg/dL (21 Jul 2018 08:16)  POCT Blood Glucose.: 166 mg/dL (20 Jul 2018 22:34)  POCT Blood Glucose.: 91 mg/dL (20 Jul 2018 17:03)              MEDICATIONS  (STANDING):  artificial tears (preservative free) Ophthalmic Solution 1 Drop(s) Both EYES daily  dextrose 5%. 1000 milliLiter(s) (50 mL/Hr) IV Continuous <Continuous>  dextrose 50% Injectable 12.5 Gram(s) IV Push once  dextrose 50% Injectable 25 Gram(s) IV Push once  dextrose 50% Injectable 25 Gram(s) IV Push once  donepezil 5 milliGRAM(s) Oral at bedtime  epoetin sherita Injectable 79611 Unit(s) SubCutaneous <User Schedule>  folic acid 1 milliGRAM(s) Oral daily  insulin lispro (HumaLOG) corrective regimen sliding scale   SubCutaneous three times a day before meals  insulin lispro (HumaLOG) corrective regimen sliding scale   SubCutaneous at bedtime  lactobacillus acidophilus 1 Tablet(s) Oral three times a day with meals  levothyroxine 75 MICROGram(s) Oral daily  loratadine 10 milliGRAM(s) Oral daily  pantoprazole    Tablet 40 milliGRAM(s) Oral two times a day  sucralfate 1 Gram(s) Oral four times a day    MEDICATIONS  (PRN):  acetaminophen   Tablet 650 milliGRAM(s) Oral every 6 hours PRN Mild Pain  ALBUTerol    0.083% 2.5 milliGRAM(s) Nebulizer every 6 hours PRN Shortness of Breath and/or Wheezing  dextrose 40% Gel 15 Gram(s) Oral once PRN Blood Glucose LESS THAN 70 milliGRAM(s)/deciliter  glucagon  Injectable 1 milliGRAM(s) IntraMuscular once PRN Glucose LESS THAN 70 milligrams/deciliter  ondansetron Injectable 4 milliGRAM(s) IV Push every 6 hours PRN Nausea and/or Vomiting

## 2018-07-21 NOTE — PROGRESS NOTE ADULT - ASSESSMENT
82 y/o F with PMHx DM2, hypothyroidism, asthma, arthritis, meningoma (chronic), and  brought in by EMS after syncopal episode at [facility]. Admitted to ICU w/ profound anemia hgb 3.8, possibly 2/2 acute GIB s/p sp colonoscopy with polypectomy, Pedunculated Sigmoid Polyp, Right sided polyp, Diverticulosis and Hemorrhoids

## 2018-07-22 LAB
ALBUMIN SERPL ELPH-MCNC: 2.2 G/DL — LOW (ref 3.3–5)
ALP SERPL-CCNC: 150 U/L — HIGH (ref 40–120)
ALT FLD-CCNC: 6 U/L — LOW (ref 12–78)
ANION GAP SERPL CALC-SCNC: 4 MMOL/L — LOW (ref 5–17)
AST SERPL-CCNC: 7 U/L — LOW (ref 15–37)
BASOPHILS # BLD AUTO: 0.02 K/UL — SIGNIFICANT CHANGE UP (ref 0–0.2)
BASOPHILS NFR BLD AUTO: 0.3 % — SIGNIFICANT CHANGE UP (ref 0–2)
BILIRUB SERPL-MCNC: 0.2 MG/DL — SIGNIFICANT CHANGE UP (ref 0.2–1.2)
BUN SERPL-MCNC: 25 MG/DL — HIGH (ref 7–23)
CALCIUM SERPL-MCNC: 7.7 MG/DL — LOW (ref 8.5–10.1)
CHLORIDE SERPL-SCNC: 109 MMOL/L — HIGH (ref 96–108)
CO2 SERPL-SCNC: 31 MMOL/L — SIGNIFICANT CHANGE UP (ref 22–31)
CREAT SERPL-MCNC: 2 MG/DL — HIGH (ref 0.5–1.3)
EOSINOPHIL # BLD AUTO: 0.2 K/UL — SIGNIFICANT CHANGE UP (ref 0–0.5)
EOSINOPHIL NFR BLD AUTO: 2.9 % — SIGNIFICANT CHANGE UP (ref 0–6)
FOLATE SERPL-MCNC: >20 NG/ML — SIGNIFICANT CHANGE UP
GLUCOSE SERPL-MCNC: 105 MG/DL — HIGH (ref 70–99)
HCT VFR BLD CALC: 27.4 % — LOW (ref 34.5–45)
HGB BLD-MCNC: 8.1 G/DL — LOW (ref 11.5–15.5)
IMM GRANULOCYTES NFR BLD AUTO: 0.7 % — SIGNIFICANT CHANGE UP (ref 0–1.5)
LYMPHOCYTES # BLD AUTO: 1.08 K/UL — SIGNIFICANT CHANGE UP (ref 1–3.3)
LYMPHOCYTES # BLD AUTO: 15.8 % — SIGNIFICANT CHANGE UP (ref 13–44)
MCHC RBC-ENTMCNC: 27.3 PG — SIGNIFICANT CHANGE UP (ref 27–34)
MCHC RBC-ENTMCNC: 29.6 GM/DL — LOW (ref 32–36)
MCV RBC AUTO: 92.3 FL — SIGNIFICANT CHANGE UP (ref 80–100)
MONOCYTES # BLD AUTO: 0.51 K/UL — SIGNIFICANT CHANGE UP (ref 0–0.9)
MONOCYTES NFR BLD AUTO: 7.5 % — SIGNIFICANT CHANGE UP (ref 2–14)
NEUTROPHILS # BLD AUTO: 4.96 K/UL — SIGNIFICANT CHANGE UP (ref 1.8–7.4)
NEUTROPHILS NFR BLD AUTO: 72.8 % — SIGNIFICANT CHANGE UP (ref 43–77)
PLATELET # BLD AUTO: 217 K/UL — SIGNIFICANT CHANGE UP (ref 150–400)
POTASSIUM SERPL-MCNC: 4.2 MMOL/L — SIGNIFICANT CHANGE UP (ref 3.5–5.3)
POTASSIUM SERPL-SCNC: 4.2 MMOL/L — SIGNIFICANT CHANGE UP (ref 3.5–5.3)
PROT SERPL-MCNC: 5.5 G/DL — LOW (ref 6–8.3)
RBC # BLD: 2.97 M/UL — LOW (ref 3.8–5.2)
RBC # FLD: 17 % — HIGH (ref 10.3–14.5)
SODIUM SERPL-SCNC: 144 MMOL/L — SIGNIFICANT CHANGE UP (ref 135–145)
T3 SERPL-MCNC: 60 NG/DL — LOW (ref 80–200)
T4 AB SER-ACNC: 4.6 UG/DL — SIGNIFICANT CHANGE UP (ref 4.6–12)
TSH SERPL-MCNC: 6.51 UIU/ML — HIGH (ref 0.36–3.74)
VIT B12 SERPL-MCNC: 1061 PG/ML — SIGNIFICANT CHANGE UP (ref 232–1245)
WBC # BLD: 6.82 K/UL — SIGNIFICANT CHANGE UP (ref 3.8–10.5)
WBC # FLD AUTO: 6.82 K/UL — SIGNIFICANT CHANGE UP (ref 3.8–10.5)

## 2018-07-22 PROCEDURE — 99233 SBSQ HOSP IP/OBS HIGH 50: CPT

## 2018-07-22 PROCEDURE — 99232 SBSQ HOSP IP/OBS MODERATE 35: CPT

## 2018-07-22 RX ADMIN — Medication 1 GRAM(S): at 17:32

## 2018-07-22 RX ADMIN — Medication 1: at 12:08

## 2018-07-22 RX ADMIN — DONEPEZIL HYDROCHLORIDE 5 MILLIGRAM(S): 10 TABLET, FILM COATED ORAL at 21:11

## 2018-07-22 RX ADMIN — Medication 1 TABLET(S): at 17:32

## 2018-07-22 RX ADMIN — Medication 1 TABLET(S): at 08:31

## 2018-07-22 RX ADMIN — LORATADINE 10 MILLIGRAM(S): 10 TABLET ORAL at 12:09

## 2018-07-22 RX ADMIN — Medication 1 MILLIGRAM(S): at 12:11

## 2018-07-22 RX ADMIN — Medication 1 GRAM(S): at 12:11

## 2018-07-22 RX ADMIN — Medication 75 MICROGRAM(S): at 06:21

## 2018-07-22 RX ADMIN — PANTOPRAZOLE SODIUM 40 MILLIGRAM(S): 20 TABLET, DELAYED RELEASE ORAL at 17:32

## 2018-07-22 RX ADMIN — Medication 1 TABLET(S): at 12:11

## 2018-07-22 RX ADMIN — Medication 1 DROP(S): at 12:12

## 2018-07-22 RX ADMIN — Medication 1 GRAM(S): at 06:21

## 2018-07-22 RX ADMIN — PANTOPRAZOLE SODIUM 40 MILLIGRAM(S): 20 TABLET, DELAYED RELEASE ORAL at 06:21

## 2018-07-22 RX ADMIN — Medication 650 MILLIGRAM(S): at 08:31

## 2018-07-22 NOTE — PROGRESS NOTE ADULT - ASSESSMENT
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 Renal function fluctuates but relatively stable , eGFR 26-30 ml/min  Hypernatremia - Resolved  Hyperkalemia  - Resolved   Anemia: Blood loss anemia, severe: GIB  Arthritis  Asthma  Diabetes mellitus  Lymphedema  s/p EGD attempted. d/w GI  per Dr Daly,  sp colonoscopy with polypectomy    1) Pedunculated Sigmoid Polyp    2) Right sided polyp    3) Diverticulosis    4) Hemorrhoids    - Renal indices are slowly rising again, still within baseline range; electrolytes are stable  - If creatinine rises further, will consider gentle fluids  - No indication for kidney biopsy. No signs of underlying GN or nephrotic syndrome   - No uremic bleeding diathesis. If bleeding becomes a recurrent issue, will consider on dose of DDAVP 0.3 /kg bw  - Urine studies reviewed. No real proteinuria. FeNa does not suggest pre-renal. S/p brief IVF.   - Low K diet   - Edema is most lymphedema although some contribution from fluid.   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 Renal function fluctuates but relatively stable , eGFR 26-30 ml/min  Hypernatremia - Resolved  Hyperkalemia  - Resolved   Anemia: Blood loss anemia, severe: GIB  Arthritis  Asthma  Diabetes mellitus  Lymphedema  s/p EGD attempted. d/w GI  per Dr Daly,  sp colonoscopy with polypectomy    1) Pedunculated Sigmoid Polyp    2) Right sided polyp    3) Diverticulosis    4) Hemorrhoids    - Renal indices are slowly rising again, still within what appears to be baseline range; electrolytes are stable  - Daily BMP  - No indication for kidney biopsy. No signs of underlying GN or nephrotic syndrome   - No uremic bleeding diathesis. If bleeding becomes a recurrent issue, will consider on dose of DDAVP 0.3 /kg bw  - Urine studies reviewed. No real proteinuria. FeNa does not suggest pre-renal. S/p brief IVF.   - Low K diet   - Edema is most lymphedema although some contribution from fluid.   - No need for Lasix at this time  - ACEi can be started outpatient  - Cardio eval noted  - EPO as ordered    Thank you; Renally stable for outpatient follow up

## 2018-07-22 NOTE — PROGRESS NOTE ADULT - PROBLEM SELECTOR PLAN 4
- controlled  -a1c 5.8  - resume diet control  -hypoglycemic protocol  -apprec endocrine recs, low t3 and high tsh

## 2018-07-22 NOTE — PROGRESS NOTE ADULT - PROBLEM SELECTOR PLAN 1
chr renal disease, anemia, obesity, dementia  asthma  nebs prn  o2 support  keep sat > 88 pct  assist with ADL  HOB elev  planned for NAOMY  am labs pending  will follow

## 2018-07-22 NOTE — PROGRESS NOTE ADULT - PROBLEM SELECTOR PLAN 1
- possibly 2/2 GIB: GI (Dr. Daly) EGD showed gastritis and esophagitis  outpatient capsule   - Patient is  s/p transfusion of total 5 units PRBCs and 1 unit platelets.  - monitor H/H, transfuse as necessary.  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

## 2018-07-22 NOTE — PROGRESS NOTE ADULT - ASSESSMENT
84 y/o F with PMHx DM2, hypothyroidism, asthma, arthritis, meningoma (chronic), and  brought in by EMS after syncopal episode at [facility]. Admitted to ICU w/ profound anemia hgb 3.8, possibly 2/2 acute GIB s/p sp colonoscopy with polypectomy, Pedunculated Sigmoid Polyp, Right sided polyp, Diverticulosis and Hemorrhoids

## 2018-07-22 NOTE — PROGRESS NOTE ADULT - SUBJECTIVE AND OBJECTIVE BOX
NEPHROLOGY INTERVAL HPI/OVERNIGHT EVENTS:  HPI:  84 y/o F with PMHx DM2, hypothyroidism, asthma, arthritis, meningoma (chronic), and  BIBEMS after syncopal episode at [facility]. Patient is a poor historian and is unsure why she is in the hospital.  Family at bedside reports that patient was discharged from [facility] the previous day after being admitted with AMS 2/2 UTI.  Family reports that yesterday patient was SOB.  They report that she bleeds easily, but denies knowledge of blood in stools, changes in urine color, open wounds. Patient confirms abdominal pain. She denies chest pain, shortness of breath, palpitations, nausea or vomiting. She also denies dizziness blurry vision.    Per ED nurse after stool guaiac performed patient had red streaked stool.    In ED patient vitals are 108/54, afebrile, O2 Sat 100% on supp O2.  Labs significant for Hbg 3.8, Hct 12.6, Na 148, Cl 120, Co2 20, BUN 62, Crt 1.9, GFR 24, Ca 6.4. UA+ for Large blood, leukocyte esterase. EKG shows NSR.  Patient received 2 u PRBCs.  CT head/neck shows no acute pathology. Patient CT A/P ordered. (07 Jul 2018 14:43)    Follow up Acute on CKD  No complaints    PAST MEDICAL & SURGICAL HISTORY:  Arthritis  Asthma  Diabetes mellitus  Hypothyroidism  No significant past surgical history      MEDICATIONS  (STANDING):  artificial tears (preservative free) Ophthalmic Solution 1 Drop(s) Both EYES daily  dextrose 5%. 1000 milliLiter(s) (50 mL/Hr) IV Continuous <Continuous>  dextrose 50% Injectable 12.5 Gram(s) IV Push once  dextrose 50% Injectable 25 Gram(s) IV Push once  dextrose 50% Injectable 25 Gram(s) IV Push once  donepezil 5 milliGRAM(s) Oral at bedtime  epoetin sherita Injectable 73133 Unit(s) SubCutaneous <User Schedule>  folic acid 1 milliGRAM(s) Oral daily  lactobacillus acidophilus 1 Tablet(s) Oral three times a day with meals  levothyroxine 75 MICROGram(s) Oral daily  loratadine 10 milliGRAM(s) Oral daily  pantoprazole    Tablet 40 milliGRAM(s) Oral two times a day  sucralfate 1 Gram(s) Oral four times a day    MEDICATIONS  (PRN):  acetaminophen   Tablet 650 milliGRAM(s) Oral every 6 hours PRN Mild Pain  ALBUTerol    0.083% 2.5 milliGRAM(s) Nebulizer every 6 hours PRN Shortness of Breath and/or Wheezing  dextrose 40% Gel 15 Gram(s) Oral once PRN Blood Glucose LESS THAN 70 milliGRAM(s)/deciliter  glucagon  Injectable 1 milliGRAM(s) IntraMuscular once PRN Glucose LESS THAN 70 milligrams/deciliter  ondansetron Injectable 4 milliGRAM(s) IV Push every 6 hours PRN Nausea and/or Vomiting      Allergies    Levaquin (Hives)  sulfa drugs (Unknown)    Intolerances        Vital Signs Last 24 Hrs  T(C): 36.8 (22 Jul 2018 13:09), Max: 37.1 (21 Jul 2018 20:15)  T(F): 98.2 (22 Jul 2018 13:09), Max: 98.7 (21 Jul 2018 20:15)  HR: 78 (22 Jul 2018 13:09) (72 - 78)  BP: 144/89 (22 Jul 2018 13:09) (124/76 - 144/89)  BP(mean): --  RR: 18 (22 Jul 2018 13:09) (18 - 18)  SpO2: 96% (22 Jul 2018 13:09) (90% - 96%)  Daily     Daily     PHYSICAL EXAM:  GENERAL: Obese, no distress  HEAD:  Atraumatic, Normocephalic  EYES: EOMI, PERRLA, conjunctiva and sclera clear  ENMT: Moist mucous membranes, Good dentition, No lesions  NECK: Supple, No JVD  NERVOUS SYSTEM:  Alert & Oriented X3, Good concentration; Motor Strength 5/5 B/L upper and lower extremities; DTRs 2+ intact and symmetric  CHEST/LUNG: Clear to percussion bilaterally; No rales, rhonchi, wheezing, or rubs  HEART: Regular rate and rhythm; No murmurs, rubs, or gallops  ABDOMEN: Soft, Nontender, Nondistended; Bowel sounds present  EXTREMITIES:  2+ Peripheral Pulses, No clubbing, cyanosis, or edema  SKIN: No rashes or lesions      LABS:                        8.1    6.82  )-----------( 217      ( 22 Jul 2018 08:38 )             27.4     07-22    144  |  109<H>  |  25<H>  ----------------------------<  105<H>  4.2   |  31  |  2.00<H>    Ca    7.7<L>      22 Jul 2018 09:10    TPro  5.5<L>  /  Alb  2.2<L>  /  TBili  0.2  /  DBili  x   /  AST  7<L>  /  ALT  6<L>  /  AlkPhos  150<H>  07-22              RADIOLOGY & ADDITIONAL TESTS:

## 2018-07-22 NOTE — PROGRESS NOTE ADULT - SUBJECTIVE AND OBJECTIVE BOX
Patient is a 83y old  Female who presents with a chief complaint of profound anemia (16 Jul 2018 09:11)      INTERVAL HPI: Pt seen and examined. States she is looking forward to being discharge, denies any acute complaints at this time.     OVERNIGHT EVENTS: none noted  T(F): 98.2 (07-22-18 @ 13:09), Max: 98.7 (07-21-18 @ 20:15)  HR: 78 (07-22-18 @ 13:09) (72 - 78)  BP: 144/89 (07-22-18 @ 13:09) (124/76 - 144/89)  RR: 18 (07-22-18 @ 13:09) (18 - 18)  SpO2: 96% (07-22-18 @ 13:09) (90% - 96%)  I&O's Summary      REVIEW OF SYSTEMS: 12 systems negative other than that stated in hpi although limited due to insight and poss mci    PHYSICAL EXAM:  GENERAL: NAD, chronically ill appearing, elder, obese  HEAD:  Atraumatic, Normocephalic  EYES: EOMI, PERRLA, conjunctiva and sclera clear  ENMT: No tonsillar erythema, exudates, or enlargement; Moist mucous membranes, Good dentition, No lesions  NECK: Supple, No JVD,   NERVOUS SYSTEM:  Alert & Oriented X3,Motor Strength 3/5 B/L upper and lower extremities  CHEST/LUNG: Clear to percussion bilaterally; No rales, rhonchi, wheezing, or rubs  HEART: Regular rate and rhythm; No murmurs, rubs, or gallops  ABDOMEN: Soft, Nontender, Nondistended; Bowel sounds present  EXTREMITIES:  2+ Peripheral Pulses, No clubbing, cyanosis, or edema  SKIN: No rashes or lesions    LABS:                        8.1    6.82  )-----------( 217      ( 22 Jul 2018 08:38 )             27.4     07-22    144  |  109<H>  |  25<H>  ----------------------------<  105<H>  4.2   |  31  |  2.00<H>    Ca    7.7<L>      22 Jul 2018 09:10    TPro  5.5<L>  /  Alb  2.2<L>  /  TBili  0.2  /  DBili  x   /  AST  7<L>  /  ALT  6<L>  /  AlkPhos  150<H>  07-22        CAPILLARY BLOOD GLUCOSE      POCT Blood Glucose.: 168 mg/dL (22 Jul 2018 11:55)  POCT Blood Glucose.: 120 mg/dL (22 Jul 2018 08:26)  POCT Blood Glucose.: 168 mg/dL (21 Jul 2018 21:52)  POCT Blood Glucose.: 158 mg/dL (21 Jul 2018 17:00)              MEDICATIONS  (STANDING):  artificial tears (preservative free) Ophthalmic Solution 1 Drop(s) Both EYES daily  dextrose 5%. 1000 milliLiter(s) (50 mL/Hr) IV Continuous <Continuous>  dextrose 50% Injectable 12.5 Gram(s) IV Push once  dextrose 50% Injectable 25 Gram(s) IV Push once  dextrose 50% Injectable 25 Gram(s) IV Push once  donepezil 5 milliGRAM(s) Oral at bedtime  epoetin sherita Injectable 47532 Unit(s) SubCutaneous <User Schedule>  folic acid 1 milliGRAM(s) Oral daily  lactobacillus acidophilus 1 Tablet(s) Oral three times a day with meals  levothyroxine 75 MICROGram(s) Oral daily  loratadine 10 milliGRAM(s) Oral daily  pantoprazole    Tablet 40 milliGRAM(s) Oral two times a day  sucralfate 1 Gram(s) Oral four times a day    MEDICATIONS  (PRN):  acetaminophen   Tablet 650 milliGRAM(s) Oral every 6 hours PRN Mild Pain  ALBUTerol    0.083% 2.5 milliGRAM(s) Nebulizer every 6 hours PRN Shortness of Breath and/or Wheezing  dextrose 40% Gel 15 Gram(s) Oral once PRN Blood Glucose LESS THAN 70 milliGRAM(s)/deciliter  glucagon  Injectable 1 milliGRAM(s) IntraMuscular once PRN Glucose LESS THAN 70 milligrams/deciliter  ondansetron Injectable 4 milliGRAM(s) IV Push every 6 hours PRN Nausea and/or Vomiting

## 2018-07-22 NOTE — PROGRESS NOTE ADULT - SUBJECTIVE AND OBJECTIVE BOX
[facility] - Mabel Hernandez, Jessica, Cesar, Holly, Mayda Hartman  Office Number:  [phone]    Patient resting comfortably in bed in NAD.  Laying flat with no respiratory distress.  No complaints of chest pain, dyspnea, palpitations, PND, or orthopnea.    ROS: negative unless otherwise mentioned.    Telemetry:  not on tele    MEDICATIONS  (STANDING):  artificial tears (preservative free) Ophthalmic Solution 1 Drop(s) Both EYES daily  dextrose 5%. 1000 milliLiter(s) (50 mL/Hr) IV Continuous <Continuous>  dextrose 50% Injectable 12.5 Gram(s) IV Push once  dextrose 50% Injectable 25 Gram(s) IV Push once  dextrose 50% Injectable 25 Gram(s) IV Push once  donepezil 5 milliGRAM(s) Oral at bedtime  epoetin sherita Injectable 73816 Unit(s) SubCutaneous <User Schedule>  folic acid 1 milliGRAM(s) Oral daily  insulin lispro (HumaLOG) corrective regimen sliding scale   SubCutaneous three times a day before meals  insulin lispro (HumaLOG) corrective regimen sliding scale   SubCutaneous at bedtime  lactobacillus acidophilus 1 Tablet(s) Oral three times a day with meals  levothyroxine 75 MICROGram(s) Oral daily  loratadine 10 milliGRAM(s) Oral daily  pantoprazole    Tablet 40 milliGRAM(s) Oral two times a day  sucralfate 1 Gram(s) Oral four times a day    MEDICATIONS  (PRN):  acetaminophen   Tablet 650 milliGRAM(s) Oral every 6 hours PRN Mild Pain  ALBUTerol    0.083% 2.5 milliGRAM(s) Nebulizer every 6 hours PRN Shortness of Breath and/or Wheezing  dextrose 40% Gel 15 Gram(s) Oral once PRN Blood Glucose LESS THAN 70 milliGRAM(s)/deciliter  glucagon  Injectable 1 milliGRAM(s) IntraMuscular once PRN Glucose LESS THAN 70 milligrams/deciliter  ondansetron Injectable 4 milliGRAM(s) IV Push every 6 hours PRN Nausea and/or Vomiting      Allergies    Levaquin (Hives)  sulfa drugs (Unknown)    Intolerances        Vital Signs Last 24 Hrs  T(C): 36.8 (22 Jul 2018 04:18), Max: 37.1 (21 Jul 2018 20:15)  T(F): 98.2 (22 Jul 2018 04:18), Max: 98.7 (21 Jul 2018 20:15)  HR: 72 (22 Jul 2018 04:18) (72 - 77)  BP: 124/76 (22 Jul 2018 04:18) (124/76 - 130/67)  BP(mean): --  RR: 18 (22 Jul 2018 04:18) (17 - 18)  SpO2: 94% (22 Jul 2018 04:18) (90% - 96%)    I&O's Summary      ON EXAM:    Constitutional: NAD, awake and alert, well-developed  HEENT: Moist Mucous Membranes, Anicteric  Pulmonary: Decreased breath sounds b/l. No rales, crackles or wheeze appreciated.   Cardiovascular: Regular, S1 and S2, No murmurs, rubs, gallops or clicks  Gastrointestinal: Bowel Sounds present, soft, nontender.   Lymph: trace -1 peripheral edema. No lymphadenopathy.  Skin: No visible rashes or ulcers.  Psych:  Mood & affect appropriate    LABS: All Labs Reviewed:                        8.1    6.82  )-----------( 217      ( 22 Jul 2018 08:38 )             27.4                         8.8    7.95  )-----------( 265      ( 21 Jul 2018 09:07 )             31.0                         8.9    7.34  )-----------( 242      ( 20 Jul 2018 07:40 )             30.1     22 Jul 2018 09:10    144    |  109    |  25     ----------------------------<  105    4.2     |  31     |  2.00   21 Jul 2018 09:07    144    |  109    |  21     ----------------------------<  107    4.2     |  28     |  1.90   20 Jul 2018 07:40    146    |  110    |  23     ----------------------------<  97     3.7     |  30     |  1.70     Ca    7.7        22 Jul 2018 09:10  Ca    8.1        21 Jul 2018 09:07  Ca    8.1        20 Jul 2018 07:40    TPro  5.5    /  Alb  2.2    /  TBili  0.2    /  DBili  x      /  AST  7      /  ALT  6      /  AlkPhos  150    22 Jul 2018 09:10          Blood Culture:     07-22 @ 09:10  TSH: 6.51  07-21 @ 09:07  TSH: 6.90

## 2018-07-22 NOTE — PROGRESS NOTE ADULT - SUBJECTIVE AND OBJECTIVE BOX
INTERVAL HPI/OVERNIGHT EVENTS:  pt seen and examined  pt denies n/v/d/abd pain,   denies bm   per overnight rn no s/s overt gib    MEDICATIONS  (STANDING):  ALBUTerol/ipratropium for Nebulization 3 milliLiter(s) Nebulizer every 6 hours  artificial tears (preservative free) Ophthalmic Solution 1 Drop(s) Both EYES daily  dextrose 5%. 1000 milliLiter(s) (50 mL/Hr) IV Continuous <Continuous>  dextrose 50% Injectable 12.5 Gram(s) IV Push once  dextrose 50% Injectable 25 Gram(s) IV Push once  dextrose 50% Injectable 25 Gram(s) IV Push once  epoetin sherita Injectable 56075 Unit(s) SubCutaneous <User Schedule>  folic acid 1 milliGRAM(s) Oral daily  insulin lispro (HumaLOG) corrective regimen sliding scale   SubCutaneous three times a day before meals  insulin lispro (HumaLOG) corrective regimen sliding scale   SubCutaneous at bedtime  lactobacillus acidophilus 1 Tablet(s) Oral three times a day with meals  levothyroxine 75 MICROGram(s) Oral daily  magnesium citrate Solution 296 milliLiter(s) Oral once  pantoprazole    Tablet 40 milliGRAM(s) Oral two times a day  sucralfate 1 Gram(s) Oral four times a day    MEDICATIONS  (PRN):  acetaminophen   Tablet 650 milliGRAM(s) Oral every 6 hours PRN Mild Pain  dextrose 40% Gel 15 Gram(s) Oral once PRN Blood Glucose LESS THAN 70 milliGRAM(s)/deciliter  glucagon  Injectable 1 milliGRAM(s) IntraMuscular once PRN Glucose LESS THAN 70 milligrams/deciliter  ondansetron Injectable 4 milliGRAM(s) IV Push every 6 hours PRN Nausea and/or Vomiting      Allergies    Levaquin (Hives)  sulfa drugs (Unknown)    Intolerances        Review of Systems:    General:  No wt loss, fevers, chills, night sweats, fatigue   Eyes:  Good vision, no reported pain  ENT:  No sore throat, pain, runny nose, dysphagia  CV:  No pain, palpitations, hypo/hypertension  Resp:  No dyspnea, cough, tachypnea, wheezing  GI:  No pain, No nausea, No vomiting, No diarrhea, No constipation, No weight loss, No fever, No pruritis, No rectal bleeding, No melena, No dysphagia  :  No pain, bleeding, incontinence, nocturia  Muscle:  No pain, weakness  Neuro:  ha  Psych:  No fatigue, insomnia, mood problems, depression  Endocrine:  No polyuria, polydypsia, cold/heat intolerance  Heme:  No petechiae, ecchymosis, easy bruisability  Skin:  No rash, tattoos, scars, edema      Vital Signs Last 24 Hrs  T(C): 36.8 (22 Jul 2018 04:18), Max: 37.1 (21 Jul 2018 20:15)  T(F): 98.2 (22 Jul 2018 04:18), Max: 98.7 (21 Jul 2018 20:15)  HR: 72 (22 Jul 2018 04:18) (72 - 77)  BP: 124/76 (22 Jul 2018 04:18) (124/76 - 130/67)  BP(mean): --  RR: 18 (22 Jul 2018 04:18) (17 - 18)  SpO2: 94% (22 Jul 2018 04:18) (90% - 96%)    PHYSICAL EXAM:    Constitutional: NAD, lying in bed  HEENT: EOMI, perrl  Neck: No LAD  Respiratory: dec bs  Cardiovascular: S1 and S2, RRR  Gastrointestinal: obese abd BS+, soft, NT, nd  Extremities: +edema and blistering to le  Vascular: 2+ peripheral pulses  Neurological: Awake alert confused agitated  Skin: see above      LABS:                                              8.1    6.82  )-----------( 217      ( 22 Jul 2018 08:38 )             27.4   07-22    144  |  109<H>  |  25<H>  ----------------------------<  105<H>  4.2   |  31  |  2.00<H>    Ca    7.7<L>      22 Jul 2018 09:10    TPro  5.5<L>  /  Alb  2.2<L>  /  TBili  0.2  /  DBili  x   /  AST  7<L>  /  ALT  6<L>  /  AlkPhos  150<H>  07-22          RADIOLOGY & ADDITIONAL TESTS:

## 2018-07-22 NOTE — PROGRESS NOTE ADULT - SUBJECTIVE AND OBJECTIVE BOX
Date/Time Patient Seen:  		  Referring MD:   Data Reviewed	       Patient is a 83y old  Female who presents with a chief complaint of profound anemia (16 Jul 2018 09:11)  vs and meds reviewed      Subjective/HPI     PAST MEDICAL & SURGICAL HISTORY:  Arthritis  Asthma  Diabetes mellitus  Hypothyroidism  No significant past surgical history        Medication list         MEDICATIONS  (STANDING):  artificial tears (preservative free) Ophthalmic Solution 1 Drop(s) Both EYES daily  dextrose 5%. 1000 milliLiter(s) (50 mL/Hr) IV Continuous <Continuous>  dextrose 50% Injectable 12.5 Gram(s) IV Push once  dextrose 50% Injectable 25 Gram(s) IV Push once  dextrose 50% Injectable 25 Gram(s) IV Push once  donepezil 5 milliGRAM(s) Oral at bedtime  epoetin sherita Injectable 66573 Unit(s) SubCutaneous <User Schedule>  folic acid 1 milliGRAM(s) Oral daily  insulin lispro (HumaLOG) corrective regimen sliding scale   SubCutaneous three times a day before meals  insulin lispro (HumaLOG) corrective regimen sliding scale   SubCutaneous at bedtime  lactobacillus acidophilus 1 Tablet(s) Oral three times a day with meals  levothyroxine 75 MICROGram(s) Oral daily  loratadine 10 milliGRAM(s) Oral daily  pantoprazole    Tablet 40 milliGRAM(s) Oral two times a day  sucralfate 1 Gram(s) Oral four times a day    MEDICATIONS  (PRN):  acetaminophen   Tablet 650 milliGRAM(s) Oral every 6 hours PRN Mild Pain  ALBUTerol    0.083% 2.5 milliGRAM(s) Nebulizer every 6 hours PRN Shortness of Breath and/or Wheezing  dextrose 40% Gel 15 Gram(s) Oral once PRN Blood Glucose LESS THAN 70 milliGRAM(s)/deciliter  glucagon  Injectable 1 milliGRAM(s) IntraMuscular once PRN Glucose LESS THAN 70 milligrams/deciliter  ondansetron Injectable 4 milliGRAM(s) IV Push every 6 hours PRN Nausea and/or Vomiting         Vitals log        ICU Vital Signs Last 24 Hrs  T(C): 36.8 (22 Jul 2018 04:18), Max: 37.1 (21 Jul 2018 20:15)  T(F): 98.2 (22 Jul 2018 04:18), Max: 98.7 (21 Jul 2018 20:15)  HR: 72 (22 Jul 2018 04:18) (72 - 88)  BP: 124/76 (22 Jul 2018 04:18) (124/76 - 130/67)  BP(mean): --  ABP: --  ABP(mean): --  RR: 18 (22 Jul 2018 04:18) (17 - 18)  SpO2: 94% (22 Jul 2018 04:18) (90% - 96%)           Input and Output:  I&O's Detail      Lab Data                        8.8    7.95  )-----------( 265      ( 21 Jul 2018 09:07 )             31.0     07-21    144  |  109<H>  |  21  ----------------------------<  107<H>  4.2   |  28  |  1.90<H>    Ca    8.1<L>      21 Jul 2018 09:07              Review of Systems	      Objective     Physical Examination    heart s1s2  lung dec BS  abd soft      Pertinent Lab findings & Imaging      Hazel:  NO   Adequate UO     I&O's Detail           Discussed with:     Cultures:	        Radiology

## 2018-07-22 NOTE — PROGRESS NOTE ADULT - PROBLEM SELECTOR PLAN 2
Per Psych note 7/11, patient unable to make decisions on medical care. Daughter Sheila and son Bora health care proxies.  -dc planning as h[facility]h stable, [facility]  Dementia long term prognosis and treatment, per Daughter reqiest  neuro consult Dr. Jung

## 2018-07-22 NOTE — PROGRESS NOTE ADULT - SUBJECTIVE AND OBJECTIVE BOX
Neurology Follow up note    RAO [id number]    HPI:  84 y/o F with PMHx DM2, hypothyroidism, asthma, arthritis, meningoma (chronic), and  BIBEMS after syncopal episode at [facility]. Patient is a poor historian and is unsure why she is in the hospital.  Family at bedside reports that patient was discharged from [facility] the previous day after being admitted with AMS 2/2 UTI.  Family reports that yesterday patient was SOB.  They report that she bleeds easily, but denies knowledge of blood in stools, changes in urine color, open wounds. Patient confirms abdominal pain. She denies chest pain, shortness of breath, palpitations, nausea or vomiting. She also denies dizziness blurry vision.    Per ED nurse after stool guaiac performed patient had red streaked stool.    In ED patient vitals are 108/54, afebrile, O2 Sat 100% on supp O2.  Labs significant for Hbg 3.8, Hct 12.6, Na 148, Cl 120, Co2 20, BUN 62, Crt 1.9, GFR 24, Ca 6.4. UA+ for Large blood, leukocyte esterase. EKG shows NSR.  Patient received 2 u PRBCs.  CT head/neck shows no acute pathology. Patient CT A/P ordered. (07 Jul 2018 14:43)      Interval History - no new events.    Patient is seen, chart was reviewed and case was discussed with the treatment team.  Pt is not in any distress.   Lying on bed comfortably.   No events reported overnight.   No clinical seizure was reported.  Sitting on chair bed comfortably.    is at bedside.    Vital Signs Last 24 Hrs  T(C): 36.8 (22 Jul 2018 04:18), Max: 37.1 (21 Jul 2018 20:15)  T(F): 98.2 (22 Jul 2018 04:18), Max: 98.7 (21 Jul 2018 20:15)  HR: 72 (22 Jul 2018 04:18) (72 - 74)  BP: 124/76 (22 Jul 2018 04:18) (124/76 - 129/75)  BP(mean): --  RR: 18 (22 Jul 2018 04:18) (18 - 18)  SpO2: 94% (22 Jul 2018 04:18) (90% - 94%)        REVIEW OF SYSTEMS:      On Neurological Examination:    Mental Status - Pt is alert, awake, oriented X3.  Follows commands well and able to answer questions appropriately.  Mood and affect  normal    Speech -  Normal.     Cranial Nerves - Pupils 3 mm equal and reactive to light,   Pt has nofacial asymmetry.    Muscle tone - is normal    Motor Exam - 4/5.    Sensory Exam -  Pt withdraws all extremities equally on stimulation. No asymmetry seen. No complaints of tingling, numbness.        coordination:    Finger to nose: normal.      Deep tendon Reflexes - 2 plus all over    Neck Supple -  Yes.     MEDICATIONS    acetaminophen   Tablet 650 milliGRAM(s) Oral every 6 hours PRN  ALBUTerol    0.083% 2.5 milliGRAM(s) Nebulizer every 6 hours PRN  artificial tears (preservative free) Ophthalmic Solution 1 Drop(s) Both EYES daily  dextrose 40% Gel 15 Gram(s) Oral once PRN  dextrose 5%. 1000 milliLiter(s) IV Continuous <Continuous>  dextrose 50% Injectable 12.5 Gram(s) IV Push once  dextrose 50% Injectable 25 Gram(s) IV Push once  dextrose 50% Injectable 25 Gram(s) IV Push once  donepezil 5 milliGRAM(s) Oral at bedtime  epoetin sherita Injectable 68175 Unit(s) SubCutaneous <User Schedule>  folic acid 1 milliGRAM(s) Oral daily  glucagon  Injectable 1 milliGRAM(s) IntraMuscular once PRN  insulin lispro (HumaLOG) corrective regimen sliding scale   SubCutaneous three times a day before meals  insulin lispro (HumaLOG) corrective regimen sliding scale   SubCutaneous at bedtime  lactobacillus acidophilus 1 Tablet(s) Oral three times a day with meals  levothyroxine 75 MICROGram(s) Oral daily  loratadine 10 milliGRAM(s) Oral daily  ondansetron Injectable 4 milliGRAM(s) IV Push every 6 hours PRN  pantoprazole    Tablet 40 milliGRAM(s) Oral two times a day  sucralfate 1 Gram(s) Oral four times a day      Allergies    Levaquin (Hives)  sulfa drugs (Unknown)    Intolerances        LABS:  CBC Full  -  ( 21 Jul 2018 09:07 )  WBC Count : 7.95 K/uL  Hemoglobin : 8.8 g/dL  Hematocrit : 31.0 %  Platelet Count - Automated : 265 K/uL  Mean Cell Volume : 93.1 fl  Mean Cell Hemoglobin : 26.4 pg  Mean Cell Hemoglobin Concentration : 28.4 gm/dL  Auto Neutrophil # : x  Auto Lymphocyte # : x  Auto Monocyte # : x  Auto Eosinophil # : x  Auto Basophil # : x  Auto Neutrophil % : x  Auto Lymphocyte % : x  Auto Monocyte % : x  Auto Eosinophil % : x  Auto Basophil % : x      07-21    144  |  109<H>  |  21  ----------------------------<  107<H>  4.2   |  28  |  1.90<H>    Ca    8.1<L>      21 Jul 2018 09:07      Hemoglobin A1C:     Vitamin B12     RADIOLOGY    ASSESSMENT AND PLAN:      MCI VS MILD DEMENTIA.  DM.    CONTINUE ARICEPT; tolerating aricept.  Physical therapy evaluation.  OOB to chair/ambulation with assistance only.  Pain is accessed and addressed.  Would continue to follow.

## 2018-07-22 NOTE — PROGRESS NOTE ADULT - PROBLEM SELECTOR PLAN 3
Bilateral  LE edema  and pain on palpitation  negative b/l dopplers  apprec nephro recs  Continue  with SCDs, no anticoag due to workup of GI bleed

## 2018-07-23 DIAGNOSIS — E03.9 HYPOTHYROIDISM, UNSPECIFIED: ICD-10-CM

## 2018-07-23 LAB
ANION GAP SERPL CALC-SCNC: 5 MMOL/L — SIGNIFICANT CHANGE UP (ref 5–17)
BUN SERPL-MCNC: 35 MG/DL — HIGH (ref 7–23)
CALCIUM SERPL-MCNC: 7.8 MG/DL — LOW (ref 8.5–10.1)
CHLORIDE SERPL-SCNC: 107 MMOL/L — SIGNIFICANT CHANGE UP (ref 96–108)
CO2 SERPL-SCNC: 30 MMOL/L — SIGNIFICANT CHANGE UP (ref 22–31)
CREAT SERPL-MCNC: 1.9 MG/DL — HIGH (ref 0.5–1.3)
GLUCOSE SERPL-MCNC: 112 MG/DL — HIGH (ref 70–99)
HCT VFR BLD CALC: 26.8 % — LOW (ref 34.5–45)
HGB BLD-MCNC: 7.9 G/DL — LOW (ref 11.5–15.5)
MCHC RBC-ENTMCNC: 26.3 PG — LOW (ref 27–34)
MCHC RBC-ENTMCNC: 29.5 GM/DL — LOW (ref 32–36)
MCV RBC AUTO: 89.3 FL — SIGNIFICANT CHANGE UP (ref 80–100)
NRBC # BLD: 0 /100 WBCS — SIGNIFICANT CHANGE UP (ref 0–0)
PLATELET # BLD AUTO: 225 K/UL — SIGNIFICANT CHANGE UP (ref 150–400)
POTASSIUM SERPL-MCNC: 4.5 MMOL/L — SIGNIFICANT CHANGE UP (ref 3.5–5.3)
POTASSIUM SERPL-SCNC: 4.5 MMOL/L — SIGNIFICANT CHANGE UP (ref 3.5–5.3)
RBC # BLD: 3 M/UL — LOW (ref 3.8–5.2)
RBC # FLD: 16.3 % — HIGH (ref 10.3–14.5)
SODIUM SERPL-SCNC: 142 MMOL/L — SIGNIFICANT CHANGE UP (ref 135–145)
WBC # BLD: 8.26 K/UL — SIGNIFICANT CHANGE UP (ref 3.8–10.5)
WBC # FLD AUTO: 8.26 K/UL — SIGNIFICANT CHANGE UP (ref 3.8–10.5)

## 2018-07-23 PROCEDURE — 99233 SBSQ HOSP IP/OBS HIGH 50: CPT

## 2018-07-23 PROCEDURE — 99232 SBSQ HOSP IP/OBS MODERATE 35: CPT

## 2018-07-23 RX ORDER — FUROSEMIDE 40 MG
20 TABLET ORAL DAILY
Qty: 0 | Refills: 0 | Status: DISCONTINUED | OUTPATIENT
Start: 2018-07-23 | End: 2018-07-25

## 2018-07-23 RX ORDER — FERROUS SULFATE 325(65) MG
325 TABLET ORAL
Qty: 0 | Refills: 0 | Status: DISCONTINUED | OUTPATIENT
Start: 2018-07-23 | End: 2018-07-25

## 2018-07-23 RX ADMIN — Medication 75 MICROGRAM(S): at 05:38

## 2018-07-23 RX ADMIN — Medication 1 GRAM(S): at 17:34

## 2018-07-23 RX ADMIN — Medication 1 MILLIGRAM(S): at 11:49

## 2018-07-23 RX ADMIN — Medication 325 MILLIGRAM(S): at 17:34

## 2018-07-23 RX ADMIN — ERYTHROPOIETIN 10000 UNIT(S): 10000 INJECTION, SOLUTION INTRAVENOUS; SUBCUTANEOUS at 11:48

## 2018-07-23 RX ADMIN — Medication 1 TABLET(S): at 17:34

## 2018-07-23 RX ADMIN — LORATADINE 10 MILLIGRAM(S): 10 TABLET ORAL at 11:48

## 2018-07-23 RX ADMIN — Medication 1 TABLET(S): at 11:48

## 2018-07-23 RX ADMIN — Medication 1 GRAM(S): at 11:48

## 2018-07-23 RX ADMIN — DONEPEZIL HYDROCHLORIDE 5 MILLIGRAM(S): 10 TABLET, FILM COATED ORAL at 21:21

## 2018-07-23 RX ADMIN — Medication 1 GRAM(S): at 05:38

## 2018-07-23 RX ADMIN — PANTOPRAZOLE SODIUM 40 MILLIGRAM(S): 20 TABLET, DELAYED RELEASE ORAL at 05:38

## 2018-07-23 RX ADMIN — Medication 1 TABLET(S): at 09:39

## 2018-07-23 RX ADMIN — Medication 1 DROP(S): at 11:50

## 2018-07-23 RX ADMIN — PANTOPRAZOLE SODIUM 40 MILLIGRAM(S): 20 TABLET, DELAYED RELEASE ORAL at 17:34

## 2018-07-23 RX ADMIN — Medication 20 MILLIGRAM(S): at 11:55

## 2018-07-23 NOTE — PROGRESS NOTE ADULT - ASSESSMENT
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 Renal function fluctuates but relatively stable , eGFR 26-30 ml/min  Hypernatremia - Resolved  Hyperkalemia  - Resolved   Anemia: Blood loss anemia, severe: GIB  Arthritis  Asthma  Diabetes mellitus  Lymphedema  s/p EGD attempted. d/w GI  per Dr Daly,  sp colonoscopy with polypectomy    1) Pedunculated Sigmoid Polyp    2) Right sided polyp    3) Diverticulosis    4) Hemorrhoids    - Renal indices still within what appears to be baseline range; electrolytes are stable  - Daily BMP  - No indication for kidney biopsy. No signs of underlying GN or nephrotic syndrome   - No uremic bleeding diathesis. If bleeding becomes a recurrent issue, will consider on dose of DDAVP 0.3 /kg bw  - Urine studies reviewed. No real proteinuria. FeNa does not suggest pre-renal. S/p brief IVF.   - Low K diet   - Edema is most lymphedema although some contribution from fluid.   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 Renal function fluctuates but relatively stable , eGFR 26-30 ml/min  Hypernatremia - Resolved  Hyperkalemia  - Resolved   Anemia: Blood loss anemia, severe: GIB  Arthritis  Asthma  Diabetes mellitus  Lymphedema  s/p EGD attempted. d/w GI  per Dr Daly,  sp colonoscopy with polypectomy    1) Pedunculated Sigmoid Polyp    2) Right sided polyp    3) Diverticulosis    4) Hemorrhoids    - Renal indices still within what appears to be baseline range; electrolytes are stable  - Daily BMP  - No indication for kidney biopsy. No signs of underlying GN or nephrotic syndrome   - No uremic bleeding diathesis. If bleeding becomes a recurrent issue, will consider on dose of DDAVP 0.3 /kg bw  - Urine studies reviewed. No real proteinuria. FeNa does not suggest pre-renal. S/p brief IVF.   - Low K diet   - Edema is most lymphedema although some contribution from fluid.   - No need for Lasix at this time  - ACEi can be started outpatient  - Cardio eval noted  - EPO as ordered  - started lasix 20 mg for fluid retention, on 7/23/18  d/w RN on bedside    Thank you; Renally stable for outpatient follow up

## 2018-07-23 NOTE — PROGRESS NOTE ADULT - PROBLEM SELECTOR PLAN 3
Bilateral  LE edema  and pain on palpitation  negative for dvt on b/l dopplers  started on lasix 7/23 by nephro Dr. Sarkar, monitor renal function  Continue  with SCDs, no anticoag due to workup of GI bleed

## 2018-07-23 NOTE — PROGRESS NOTE ADULT - PROBLEM SELECTOR PLAN 1
NEBS prn for asthma  supportive medical regimen  assist with ADL  dementia - neuro follow up  am labs pending  may need placement, NAOMY  will follow

## 2018-07-23 NOTE — PROGRESS NOTE ADULT - NSHPATTENDINGPLANDISCUSS_GEN_ALL_CORE
Jazlyn THORNE
RN
Arelis MUHAMMAD
Dr. Pleitez ICU attending
VAIBHAV, LILI Lagos
ICU team
daughter
Consultants, Residents, RN
Patient, RN
Patient, RN, Consultants
VAIBHAV MUHAMMAD, daughter/hcp Sheila
Patient, RN, Residents, GI
Daughter, RN, SW/CM, Residents
Patient, daughter, consultants, SW/CM
Residents, SW and PT

## 2018-07-23 NOTE — PROGRESS NOTE ADULT - SUBJECTIVE AND OBJECTIVE BOX
NEPHROLOGY INTERVAL HPI/OVERNIGHT EVENTS:  HPI:  82 y/o F with PMHx DM2, hypothyroidism, asthma, arthritis, meningoma (chronic), and  BIBEMS after syncopal episode at [facility]. Patient is a poor historian and is unsure why she is in the hospital.  Family at bedside reports that patient was discharged from [facility] the previous day after being admitted with AMS 2/2 UTI.  Family reports that yesterday patient was SOB.  They report that she bleeds easily, but denies knowledge of blood in stools, changes in urine color, open wounds. Patient confirms abdominal pain. She denies chest pain, shortness of breath, palpitations, nausea or vomiting. She also denies dizziness blurry vision.    Per ED nurse after stool guaiac performed patient had red streaked stool.    In ED patient vitals are 108/54, afebrile, O2 Sat 100% on supp O2.  Labs significant for Hbg 3.8, Hct 12.6, Na 148, Cl 120, Co2 20, BUN 62, Crt 1.9, GFR 24, Ca 6.4. UA+ for Large blood, leukocyte esterase. EKG shows NSR.  Patient received 2 u PRBCs.  CT head/neck shows no acute pathology. Patient CT A/P ordered. (2018 14:43)      PAST MEDICAL & SURGICAL HISTORY:  Arthritis  Asthma  Diabetes mellitus  Hypothyroidism  No significant past surgical history      MEDICATIONS  (STANDING):  artificial tears (preservative free) Ophthalmic Solution 1 Drop(s) Both EYES daily  dextrose 5%. 1000 milliLiter(s) (50 mL/Hr) IV Continuous <Continuous>  dextrose 50% Injectable 12.5 Gram(s) IV Push once  dextrose 50% Injectable 25 Gram(s) IV Push once  dextrose 50% Injectable 25 Gram(s) IV Push once  donepezil 5 milliGRAM(s) Oral at bedtime  epoetin sherita Injectable 56717 Unit(s) SubCutaneous <User Schedule>  folic acid 1 milliGRAM(s) Oral daily  lactobacillus acidophilus 1 Tablet(s) Oral three times a day with meals  levothyroxine 75 MICROGram(s) Oral daily  loratadine 10 milliGRAM(s) Oral daily  pantoprazole    Tablet 40 milliGRAM(s) Oral two times a day  sucralfate 1 Gram(s) Oral four times a day    MEDICATIONS  (PRN):  acetaminophen   Tablet 650 milliGRAM(s) Oral every 6 hours PRN Mild Pain  ALBUTerol    0.083% 2.5 milliGRAM(s) Nebulizer every 6 hours PRN Shortness of Breath and/or Wheezing  dextrose 40% Gel 15 Gram(s) Oral once PRN Blood Glucose LESS THAN 70 milliGRAM(s)/deciliter  glucagon  Injectable 1 milliGRAM(s) IntraMuscular once PRN Glucose LESS THAN 70 milligrams/deciliter  ondansetron Injectable 4 milliGRAM(s) IV Push every 6 hours PRN Nausea and/or Vomiting      Allergies    Levaquin (Hives)  sulfa drugs (Unknown)    Intolerances        Vital Signs Last 24 Hrs  T(C): 37.6 (2018 04:00), Max: 37.6 (2018 04:00)  T(F): 99.7 (2018 04:00), Max: 99.7 (2018 04:00)  HR: 78 (2018 04:00) (78 - 78)  BP: 122/71 (2018 04:00) (122/71 - 167/80)  BP(mean): --  RR: 15 (2018 04:00) (15 - 18)  SpO2: 93% (2018 04:00) (90% - 96%)  Daily     Daily Weight in k.6 (2018 04:00)    PHYSICAL EXAM:    GENERAL: NAD, well-groomed, well-developed  HEAD:  Atraumatic, Normocephalic  EYES: EOMI, PERRLA, conjunctiva and sclera clear  ENMT: No tonsillar erythema, exudates, or enlargement; Moist mucous membranes, Good dentition, No lesions  NECK: Supple, No JVD, Normal thyroid  NERVOUS SYSTEM:  Alert & Oriented X3, Good concentration; Motor Strength 5/5 B/L upper and lower extremities; DTRs 2+ intact and symmetric  CHEST/LUNG: Clear to percussion bilaterally; No rales, rhonchi, wheezing, or rubs  HEART: Regular rate and rhythm; No murmurs, rubs, or gallops  ABDOMEN: Soft, Nontender, Nondistended; Bowel sounds present  EXTREMITIES:  2+ Peripheral Pulses, No clubbing, cyanosis, or edema  SKIN: No rashes or lesions    LABS:                        7.9    8.26  )-----------( 225      ( 2018 07:05 )             26.8         142  |  107  |  35<H>  ----------------------------<  112<H>  4.5   |  30  |  1.90<H>    Ca    7.8<L>      2018 07:05    TPro  5.5<L>  /  Alb  2.2<L>  /  TBili  0.2  /  DBili  x   /  AST  7<L>  /  ALT  6<L>  /  AlkPhos  150<H>                RADIOLOGY & ADDITIONAL TESTS:

## 2018-07-23 NOTE — PROGRESS NOTE ADULT - ASSESSMENT
82 y/o F with PMHx DM2, hypothyroidism, asthma, arthritis, meningoma (chronic), and  brought in by EMS after syncopal episode at [facility]. Admitted to ICU w/ profound anemia hgb 3.8, possibly 2/2 acute GIB s/p sp colonoscopy with polypectomy, Pedunculated Sigmoid Polyp, Right sided polyp, Diverticulosis and Hemorrhoids

## 2018-07-23 NOTE — PROGRESS NOTE ADULT - SUBJECTIVE AND OBJECTIVE BOX
[facility] -- Mabel Hernandez, Cesar Lopez Pannella, Patel, Savella  Office # [phone]      Follow Up:  anemia    Subjective/Observations: Patient seen and examined. Events noted. Resting comfortably in bed. No complaints of chest pain, dyspnea, or palpitations reported. No signs of orthopnea or PND. Starting to cough      REVIEW OF SYSTEMS: All other review of systems is negative unless indicated above    PAST MEDICAL & SURGICAL HISTORY:  Arthritis  Asthma  Diabetes mellitus  Hypothyroidism  No significant past surgical history      MEDICATIONS  (STANDING):  artificial tears (preservative free) Ophthalmic Solution 1 Drop(s) Both EYES daily  dextrose 5%. 1000 milliLiter(s) (50 mL/Hr) IV Continuous <Continuous>  dextrose 50% Injectable 12.5 Gram(s) IV Push once  dextrose 50% Injectable 25 Gram(s) IV Push once  dextrose 50% Injectable 25 Gram(s) IV Push once  donepezil 5 milliGRAM(s) Oral at bedtime  epoetin sherita Injectable 29740 Unit(s) SubCutaneous <User Schedule>  ferrous    sulfate 325 milliGRAM(s) Oral two times a day  folic acid 1 milliGRAM(s) Oral daily  furosemide    Tablet 20 milliGRAM(s) Oral daily  lactobacillus acidophilus 1 Tablet(s) Oral three times a day with meals  levothyroxine 75 MICROGram(s) Oral daily  loratadine 10 milliGRAM(s) Oral daily  pantoprazole    Tablet 40 milliGRAM(s) Oral two times a day  sucralfate 1 Gram(s) Oral four times a day    MEDICATIONS  (PRN):  acetaminophen   Tablet 650 milliGRAM(s) Oral every 6 hours PRN Mild Pain  ALBUTerol    0.083% 2.5 milliGRAM(s) Nebulizer every 6 hours PRN Shortness of Breath and/or Wheezing  dextrose 40% Gel 15 Gram(s) Oral once PRN Blood Glucose LESS THAN 70 milliGRAM(s)/deciliter  glucagon  Injectable 1 milliGRAM(s) IntraMuscular once PRN Glucose LESS THAN 70 milligrams/deciliter  ondansetron Injectable 4 milliGRAM(s) IV Push every 6 hours PRN Nausea and/or Vomiting      Allergies    Levaquin (Hives)  sulfa drugs (Unknown)    Intolerances            Vital Signs Last 24 Hrs  T(C): 37.6 (23 Jul 2018 04:00), Max: 37.6 (23 Jul 2018 04:00)  T(F): 99.7 (23 Jul 2018 04:00), Max: 99.7 (23 Jul 2018 04:00)  HR: 73 (23 Jul 2018 11:54) (73 - 78)  BP: 138/69 (23 Jul 2018 11:54) (122/71 - 167/80)  BP(mean): --  RR: 15 (23 Jul 2018 04:00) (15 - 18)  SpO2: 93% (23 Jul 2018 04:00) (90% - 93%)    I&O's Summary        PHYSICAL EXAM:     Constitutional: NAD, awake and alert, well-developed  HEENT: Moist Mucous Membranes, Anicteric  Pulmonary: Decreased breath sounds b/l. No rales, crackles or wheeze appreciated.   Cardiovascular: Regular, S1 and S2, distant  Gastrointestinal: Bowel Sounds present, soft, nontender.   Lymph: trace-1 dependent peripheral edema. No lymphadenopathy.  Skin: No visible rashes or ulcers.  Psych:  Mood & affect appropriate for situation    LABS: All Labs Reviewed:                        7.9    8.26  )-----------( 225      ( 23 Jul 2018 07:05 )             26.8                         8.1    6.82  )-----------( 217      ( 22 Jul 2018 08:38 )             27.4                         8.8    7.95  )-----------( 265      ( 21 Jul 2018 09:07 )             31.0     23 Jul 2018 07:05    142    |  107    |  35     ----------------------------<  112    4.5     |  30     |  1.90   22 Jul 2018 09:10    144    |  109    |  25     ----------------------------<  105    4.2     |  31     |  2.00   21 Jul 2018 09:07    144    |  109    |  21     ----------------------------<  107    4.2     |  28     |  1.90     Ca    7.8        23 Jul 2018 07:05  Ca    7.7        22 Jul 2018 09:10  Ca    8.1        21 Jul 2018 09:07    TPro  5.5    /  Alb  2.2    /  TBili  0.2    /  DBili  x      /  AST  7      /  ALT  6      /  AlkPhos  150    22 Jul 2018 09:10

## 2018-07-23 NOTE — PROGRESS NOTE ADULT - SUBJECTIVE AND OBJECTIVE BOX
Neurology follow up note    RAO [id number]      Interval History:    Patient feels ok no new complaints.    MEDICATIONS    acetaminophen   Tablet 650 milliGRAM(s) Oral every 6 hours PRN  ALBUTerol    0.083% 2.5 milliGRAM(s) Nebulizer every 6 hours PRN  artificial tears (preservative free) Ophthalmic Solution 1 Drop(s) Both EYES daily  dextrose 40% Gel 15 Gram(s) Oral once PRN  dextrose 5%. 1000 milliLiter(s) IV Continuous <Continuous>  dextrose 50% Injectable 12.5 Gram(s) IV Push once  dextrose 50% Injectable 25 Gram(s) IV Push once  dextrose 50% Injectable 25 Gram(s) IV Push once  donepezil 5 milliGRAM(s) Oral at bedtime  epoetin sherita Injectable 84352 Unit(s) SubCutaneous <User Schedule>  folic acid 1 milliGRAM(s) Oral daily  glucagon  Injectable 1 milliGRAM(s) IntraMuscular once PRN  lactobacillus acidophilus 1 Tablet(s) Oral three times a day with meals  levothyroxine 75 MICROGram(s) Oral daily  loratadine 10 milliGRAM(s) Oral daily  ondansetron Injectable 4 milliGRAM(s) IV Push every 6 hours PRN  pantoprazole    Tablet 40 milliGRAM(s) Oral two times a day  sucralfate 1 Gram(s) Oral four times a day      Allergies    Levaquin (Hives)  sulfa drugs (Unknown)    Intolerances            Vital Signs Last 24 Hrs  T(C): 37.6 (23 Jul 2018 04:00), Max: 37.6 (23 Jul 2018 04:00)  T(F): 99.7 (23 Jul 2018 04:00), Max: 99.7 (23 Jul 2018 04:00)  HR: 78 (23 Jul 2018 04:00) (78 - 78)  BP: 122/71 (23 Jul 2018 04:00) (122/71 - 167/80)  BP(mean): --  RR: 15 (23 Jul 2018 04:00) (15 - 18)  SpO2: 93% (23 Jul 2018 04:00) (90% - 96%)      REVIEW OF SYSTEMS:     Constitutional: No fever, chills, fatigue, weakness  Eyes: no eye pain, visual disturbances, or discharge  ENT:  No difficulty hearing, tinnitus, vertigo; No sinus or throat pain  Neck: No pain or stiffness  Respiratory: No cough, dyspnea, wheezing   Cardiovascular: No chest pain, palpitations,   Gastrointestinal: No abdominal or epigastric pain. No nausea, vomiting  No diarrhea or constipation.   Genitourinary: No dysuria, frequency, hematuria or incontinence  Neurological: No headaches, lightheadedness, vertigo, numbness or tremors  Psychiatric: No depression, anxiety, mood swings or difficulty sleeping  Musculoskeletal: No joint pain or swelling; No muscle, back or extremity pain  Skin: No itching, burning, rashes or lesions   Lymph Nodes: No enlarged glands  Endocrine: No heat or cold intolerance; No hair loss   Allergy and Immunologic: No hives or eczema    On Neurological Examination:    Mental Status - Patient is alert, awake,     Follow simple commands  Follow complex commands      Speech -   Fluent                        Cranial Nerves - Pupils 3 mm equal and reactive to light,   extraocular eye movements intact.   smile symmetric  intact bilateral NLF    Motor Exam -   Right upper 4/5  Left upper 4/5  bialteral lower able wiggle feet    Muscle tone - is normal all over.  No asymmetry is seen.      GENERAL Exam: Nontoxic , No Acute Distress   	  HEENT:  normocephalic, atraumatic  		  LUNGS: Decreased bilaterally  	  HEART: Normal S1S2   No murmur RRR        	  GI/ ABDOMEN:  Soft  Non tender    EXTREMITIES:   No Edema  No Clubbing  No Cyanosis   	   SKIN: Normal  No Ecchymosis               LABS:  CBC Full  -  ( 23 Jul 2018 07:05 )  WBC Count : 8.26 K/uL  Hemoglobin : 7.9 g/dL  Hematocrit : 26.8 %  Platelet Count - Automated : 225 K/uL  Mean Cell Volume : 89.3 fl  Mean Cell Hemoglobin : 26.3 pg  Mean Cell Hemoglobin Concentration : 29.5 gm/dL  Auto Neutrophil # : x  Auto Lymphocyte # : x  Auto Monocyte # : x  Auto Eosinophil # : x  Auto Basophil # : x  Auto Neutrophil % : x  Auto Lymphocyte % : x  Auto Monocyte % : x  Auto Eosinophil % : x  Auto Basophil % : x      07-23    142  |  107  |  35<H>  ----------------------------<  112<H>  4.5   |  30  |  1.90<H>    Ca    7.8<L>      23 Jul 2018 07:05    TPro  5.5<L>  /  Alb  2.2<L>  /  TBili  0.2  /  DBili  x   /  AST  7<L>  /  ALT  6<L>  /  AlkPhos  150<H>  07-22    Hemoglobin A1C:     LIVER FUNCTIONS - ( 22 Jul 2018 09:10 )  Alb: 2.2 g/dL / Pro: 5.5 g/dL / ALK PHOS: 150 U/L / ALT: 6 U/L / AST: 7 U/L / GGT: x           Vitamin B12         RADIOLOGY    ASSESSMENT AND PLAN     MCI vs slight dementia   on Aricept no side effects    monitor H/H as needed    spoke to daughter       Physical therapy evaluation.  OOB to chair/ambulation with assistance only.    Greater than 45 minutes spent in direct patient care reviewing  the notes, lab data/ imaging , discussion with multidisciplinary team.

## 2018-07-23 NOTE — PROGRESS NOTE ADULT - PROBLEM SELECTOR PLAN 2
Per Psych note 7/11, patient unable to make decisions on medical care. Daughter Sheila and son Bora health care proxies.  -dc planning as h.h stable, [facility]  Dementia long term prognosis and treatment, per Daughter reqiest  neuro consult Dr. Jung

## 2018-07-23 NOTE — PROGRESS NOTE ADULT - SUBJECTIVE AND OBJECTIVE BOX
Date/Time Patient Seen:  		  Referring MD:   Data Reviewed	       Patient is a 83y old  Female who presents with a chief complaint of profound anemia (16 Jul 2018 09:11)    vs and meds reviewed      Subjective/HPI     PAST MEDICAL & SURGICAL HISTORY:  Arthritis  Asthma  Diabetes mellitus  Hypothyroidism  No significant past surgical history        Medication list         MEDICATIONS  (STANDING):  artificial tears (preservative free) Ophthalmic Solution 1 Drop(s) Both EYES daily  dextrose 5%. 1000 milliLiter(s) (50 mL/Hr) IV Continuous <Continuous>  dextrose 50% Injectable 12.5 Gram(s) IV Push once  dextrose 50% Injectable 25 Gram(s) IV Push once  dextrose 50% Injectable 25 Gram(s) IV Push once  donepezil 5 milliGRAM(s) Oral at bedtime  epoetin sherita Injectable 05666 Unit(s) SubCutaneous <User Schedule>  folic acid 1 milliGRAM(s) Oral daily  lactobacillus acidophilus 1 Tablet(s) Oral three times a day with meals  levothyroxine 75 MICROGram(s) Oral daily  loratadine 10 milliGRAM(s) Oral daily  pantoprazole    Tablet 40 milliGRAM(s) Oral two times a day  sucralfate 1 Gram(s) Oral four times a day    MEDICATIONS  (PRN):  acetaminophen   Tablet 650 milliGRAM(s) Oral every 6 hours PRN Mild Pain  ALBUTerol    0.083% 2.5 milliGRAM(s) Nebulizer every 6 hours PRN Shortness of Breath and/or Wheezing  dextrose 40% Gel 15 Gram(s) Oral once PRN Blood Glucose LESS THAN 70 milliGRAM(s)/deciliter  glucagon  Injectable 1 milliGRAM(s) IntraMuscular once PRN Glucose LESS THAN 70 milligrams/deciliter  ondansetron Injectable 4 milliGRAM(s) IV Push every 6 hours PRN Nausea and/or Vomiting         Vitals log        ICU Vital Signs Last 24 Hrs  T(C): 37.6 (23 Jul 2018 04:00), Max: 37.6 (23 Jul 2018 04:00)  T(F): 99.7 (23 Jul 2018 04:00), Max: 99.7 (23 Jul 2018 04:00)  HR: 78 (23 Jul 2018 04:00) (78 - 78)  BP: 122/71 (23 Jul 2018 04:00) (122/71 - 167/80)  BP(mean): --  ABP: --  ABP(mean): --  RR: 15 (23 Jul 2018 04:00) (15 - 18)  SpO2: 93% (23 Jul 2018 04:00) (90% - 96%)           Input and Output:  I&O's Detail      Lab Data                        8.1    6.82  )-----------( 217      ( 22 Jul 2018 08:38 )             27.4     07-22    144  |  109<H>  |  25<H>  ----------------------------<  105<H>  4.2   |  31  |  2.00<H>    Ca    7.7<L>      22 Jul 2018 09:10    TPro  5.5<L>  /  Alb  2.2<L>  /  TBili  0.2  /  DBili  x   /  AST  7<L>  /  ALT  6<L>  /  AlkPhos  150<H>  07-22            Review of Systems	      Objective     Physical Examination    heart s1s2  lung dec BS  abd soft      Pertinent Lab findings & Imaging      Hazel:  NO   Adequate UO     I&O's Detail           Discussed with:     Cultures:	        Radiology

## 2018-07-23 NOTE — CONSULT NOTE ADULT - PROBLEM SELECTOR RECOMMENDATION 9
continue Protonix gtt  serial cbc   transfuse as need   hematology eval   for egd tomorrow
OA  monitor sx  avoid NSAIDs
cont current dosage of lt4 75mcg/day

## 2018-07-23 NOTE — PROGRESS NOTE ADULT - SUBJECTIVE AND OBJECTIVE BOX
INTERVAL HPI/OVERNIGHT EVENTS:  No new overnight event.  No N/V/D.  Tolerating diet.     MEDICATIONS  (STANDING):  artificial tears (preservative free) Ophthalmic Solution 1 Drop(s) Both EYES daily  dextrose 5%. 1000 milliLiter(s) (50 mL/Hr) IV Continuous <Continuous>  dextrose 50% Injectable 12.5 Gram(s) IV Push once  dextrose 50% Injectable 25 Gram(s) IV Push once  dextrose 50% Injectable 25 Gram(s) IV Push once  donepezil 5 milliGRAM(s) Oral at bedtime  epoetin sherita Injectable 57703 Unit(s) SubCutaneous <User Schedule>  ferrous    sulfate 325 milliGRAM(s) Oral two times a day  folic acid 1 milliGRAM(s) Oral daily  furosemide    Tablet 20 milliGRAM(s) Oral daily  lactobacillus acidophilus 1 Tablet(s) Oral three times a day with meals  levothyroxine 75 MICROGram(s) Oral daily  loratadine 10 milliGRAM(s) Oral daily  pantoprazole    Tablet 40 milliGRAM(s) Oral two times a day  sucralfate 1 Gram(s) Oral four times a day    MEDICATIONS  (PRN):  acetaminophen   Tablet 650 milliGRAM(s) Oral every 6 hours PRN Mild Pain  ALBUTerol    0.083% 2.5 milliGRAM(s) Nebulizer every 6 hours PRN Shortness of Breath and/or Wheezing  dextrose 40% Gel 15 Gram(s) Oral once PRN Blood Glucose LESS THAN 70 milliGRAM(s)/deciliter  glucagon  Injectable 1 milliGRAM(s) IntraMuscular once PRN Glucose LESS THAN 70 milligrams/deciliter  ondansetron Injectable 4 milliGRAM(s) IV Push every 6 hours PRN Nausea and/or Vomiting      Allergies    Levaquin (Hives)  sulfa drugs (Unknown)    Intolerances        Review of Systems:    General:  No wt loss, fevers, chills, night sweats,fatigue,   Eyes:  Good vision, no reported pain  ENT:  No sore throat, pain, runny nose, dysphagia  CV:  No pain, palpitatioins, hypo/hypertension  Resp:  No dyspnea, cough, tachypnea, wheezing  GI:  No pain, No nausea, No vomiting, No diarrhea, No constipatiion, No weight loss, No fever, No pruritis, No rectal bleeding, No tarry stools, No dysphagia,  :  No pain, bleeding, incontinence, nocturia  Muscle:  No pain, weakness  Neuro:  No weakness, tingling, memory problems  Psych:  No fatigue, insomnia, mood problems, depression  Endocrine:  No polyuria, polydypsia, cold/heat intolerance  Heme:  No petechiae, ecchymosis, easy bruisability  Skin:  No rash, tattoos, scars, edema      Vital Signs Last 24 Hrs  T(C): 37.6 (23 Jul 2018 04:00), Max: 37.6 (23 Jul 2018 04:00)  T(F): 99.7 (23 Jul 2018 04:00), Max: 99.7 (23 Jul 2018 04:00)  HR: 78 (23 Jul 2018 04:00) (78 - 78)  BP: 122/71 (23 Jul 2018 04:00) (122/71 - 167/80)  BP(mean): --  RR: 15 (23 Jul 2018 04:00) (15 - 18)  SpO2: 93% (23 Jul 2018 04:00) (90% - 96%)    PHYSICAL EXAM:    Constitutional: NAD, well-developed  HEENT: EOMI, throat clear  Neck: No LAD, supple  Respiratory: CTA and P  Cardiovascular: S1 and S2, RRR, no M  Gastrointestinal: BS+, soft, NT/ND, neg HSM,  Extremities: No peripheral edema, neg clubing, cyanosis  Vascular: 2+ peripheral pulses  Neurological: A/O x 3, no focal deficits  Psychiatric: Normal mood, normal affect  Skin: No rashes      LABS:                        7.9    8.26  )-----------( 225      ( 23 Jul 2018 07:05 )             26.8     07-23    142  |  107  |  35<H>  ----------------------------<  112<H>  4.5   |  30  |  1.90<H>    Ca    7.8<L>      23 Jul 2018 07:05    TPro  5.5<L>  /  Alb  2.2<L>  /  TBili  0.2  /  DBili  x   /  AST  7<L>  /  ALT  6<L>  /  AlkPhos  150<H>  07-22          RADIOLOGY & ADDITIONAL TESTS:

## 2018-07-23 NOTE — CONSULT NOTE ADULT - SUBJECTIVE AND OBJECTIVE BOX
Patient is a 83y old  Female who presents with a chief complaint of profound anemia (16 Jul 2018 09:11)      Reason For Consult: abnl tfts    HPI:  84 y/o F with PMHx DM2, hypothyroidism, asthma, arthritis, meningoma (chronic), and  BIBEMS after syncopal episode at [facility]. Patient is a poor historian and is unsure why she is in the hospital.  Family at bedside reports that patient was discharged from [facility] the previous day after being admitted with AMS 2/2 UTI.  Family reports that yesterday patient was SOB.  They report that she bleeds easily, but denies knowledge of blood in stools, changes in urine color, open wounds. Patient confirms abdominal pain. She denies chest pain, shortness of breath, palpitations, nausea or vomiting. She also denies dizziness blurry vision.    Per ED nurse after stool guaiac performed patient had red streaked stool.    In ED patient vitals are 108/54, afebrile, O2 Sat 100% on supp O2.  Labs significant for Hbg 3.8, Hct 12.6, Na 148, Cl 120, Co2 20, BUN 62, Crt 1.9, GFR 24, Ca 6.4. UA+ for Large blood, leukocyte esterase. EKG shows NSR.  Patient received 2 u PRBCs.  CT head/neck shows no acute pathology. Patient CT A/P ordered. (07 Jul 2018 14:43)      PAST MEDICAL & SURGICAL HISTORY:  Arthritis  Asthma  Diabetes mellitus  Hypothyroidism  No significant past surgical history      FAMILY HISTORY:  No pertinent family history in first degree relatives        Social History:    MEDICATIONS  (STANDING):  artificial tears (preservative free) Ophthalmic Solution 1 Drop(s) Both EYES daily  dextrose 5%. 1000 milliLiter(s) (50 mL/Hr) IV Continuous <Continuous>  dextrose 50% Injectable 12.5 Gram(s) IV Push once  dextrose 50% Injectable 25 Gram(s) IV Push once  dextrose 50% Injectable 25 Gram(s) IV Push once  donepezil 5 milliGRAM(s) Oral at bedtime  epoetin sherita Injectable 54933 Unit(s) SubCutaneous <User Schedule>  folic acid 1 milliGRAM(s) Oral daily  lactobacillus acidophilus 1 Tablet(s) Oral three times a day with meals  levothyroxine 75 MICROGram(s) Oral daily  loratadine 10 milliGRAM(s) Oral daily  pantoprazole    Tablet 40 milliGRAM(s) Oral two times a day  sucralfate 1 Gram(s) Oral four times a day    MEDICATIONS  (PRN):  acetaminophen   Tablet 650 milliGRAM(s) Oral every 6 hours PRN Mild Pain  ALBUTerol    0.083% 2.5 milliGRAM(s) Nebulizer every 6 hours PRN Shortness of Breath and/or Wheezing  dextrose 40% Gel 15 Gram(s) Oral once PRN Blood Glucose LESS THAN 70 milliGRAM(s)/deciliter  glucagon  Injectable 1 milliGRAM(s) IntraMuscular once PRN Glucose LESS THAN 70 milligrams/deciliter  ondansetron Injectable 4 milliGRAM(s) IV Push every 6 hours PRN Nausea and/or Vomiting        T(C): 37.6 (07-23-18 @ 04:00), Max: 37.6 (07-23-18 @ 04:00)  HR: 78 (07-23-18 @ 04:00) (78 - 78)  BP: 122/71 (07-23-18 @ 04:00) (122/71 - 167/80)  RR: 15 (07-23-18 @ 04:00) (15 - 18)  SpO2: 93% (07-23-18 @ 04:00) (90% - 96%)  Wt(kg): --    PHYSICAL EXAM:  GENERAL: NAD, well-groomed, well-developed  HEAD:  Atraumatic, Normocephalic  NECK: Supple, No JVD, Normal thyroid  CHEST/LUNG: Clear to percussion bilaterally; No rales, rhonchi, wheezing, or rubs  HEART: Regular rate and rhythm; No murmurs, rubs, or gallops  ABDOMEN: Soft, Nontender, Nondistended; Bowel sounds present  EXTREMITIES:  b/l le edema    CAPILLARY BLOOD GLUCOSE      POCT Blood Glucose.: 168 mg/dL (22 Jul 2018 11:55)                            7.9    8.26  )-----------( 225      ( 23 Jul 2018 07:05 )             26.8       CMP:  07-23 @ 07:05  SGPT --  Albumin --   Alk Phos --   Anion Gap 5   SGOT --   Total Bili --   BUN 35   Calcium Total 7.8   CO2 30   Chloride 107   Creatinine 1.90   eGFR if AA 28   eGFR if non AA 24   Glucose 112   Potassium 4.5   Protein --   Sodium 142      Thyroid Function Tests:  07-22 @ 12:42 TSH -- FreeT4 -- T3 60 Anti TPO -- Anti Thyroglobulin Ab -- TSI --  07-22 @ 09:10 TSH 6.51 FreeT4 -- T3 -- Anti TPO -- Anti Thyroglobulin Ab -- TSI --      Diabetes Tests:       Radiology:

## 2018-07-23 NOTE — PROGRESS NOTE ADULT - SUBJECTIVE AND OBJECTIVE BOX
Patient is a 83y old  Female who presents with a chief complaint of profound anemia (16 Jul 2018 09:11)      INTERVAL HPI: Pt seen and examined. States she is feeling well and denies any acute complaints at this time.    OVERNIGHT EVENTS: none noted  T(F): 97.9 (07-23-18 @ 14:05), Max: 99.7 (07-23-18 @ 04:00)  HR: 71 (07-23-18 @ 14:05) (71 - 78)  BP: 162/76 (07-23-18 @ 14:05) (122/71 - 167/80)  RR: 18 (07-23-18 @ 14:05) (15 - 18)  SpO2: 100% (07-23-18 @ 14:05) (90% - 100%)  I&O's Summary      REVIEW OF SYSTEMS: 12 systems noncontributory other than that stated in hpi    PHYSICAL EXAM:  GENERAL: NAD, elder, obese  HEAD:  Atraumatic, Normocephalic  EYES: EOMI, PERRLA, conjunctiva and sclera clear  ENMT: No tonsillar erythema, exudates, or enlargement; Moist mucous membranes, Good dentition, No lesions  NECK: Supple, No JVD  NERVOUS SYSTEM:  Alert & Oriented X3, Good concentration; Motor Strength 4/5 B/L upper and lower extremities  CHEST/LUNG: Clear to percussion bilaterally; No rales, rhonchi, wheezing, or rubs  HEART: Regular rate and rhythm; No murmurs, rubs, or gallops  ABDOMEN: Soft, Nontender, Nondistended; Bowel sounds present, obese  EXTREMITIES:  2+ Peripheral Pulses, No clubbing, cyanosis, or edema  SKIN: chronic b/l leg stasis dermatitis, chronic    LABS:                        7.9    8.26  )-----------( 225      ( 23 Jul 2018 07:05 )             26.8     07-23    142  |  107  |  35<H>  ----------------------------<  112<H>  4.5   |  30  |  1.90<H>    Ca    7.8<L>      23 Jul 2018 07:05    TPro  5.5<L>  /  Alb  2.2<L>  /  TBili  0.2  /  DBili  x   /  AST  7<L>  /  ALT  6<L>  /  AlkPhos  150<H>  07-22        CAPILLARY BLOOD GLUCOSE                  MEDICATIONS  (STANDING):  artificial tears (preservative free) Ophthalmic Solution 1 Drop(s) Both EYES daily  dextrose 5%. 1000 milliLiter(s) (50 mL/Hr) IV Continuous <Continuous>  dextrose 50% Injectable 12.5 Gram(s) IV Push once  dextrose 50% Injectable 25 Gram(s) IV Push once  dextrose 50% Injectable 25 Gram(s) IV Push once  donepezil 5 milliGRAM(s) Oral at bedtime  epoetin sherita Injectable 82735 Unit(s) SubCutaneous <User Schedule>  ferrous    sulfate 325 milliGRAM(s) Oral two times a day  folic acid 1 milliGRAM(s) Oral daily  furosemide    Tablet 20 milliGRAM(s) Oral daily  lactobacillus acidophilus 1 Tablet(s) Oral three times a day with meals  levothyroxine 75 MICROGram(s) Oral daily  loratadine 10 milliGRAM(s) Oral daily  pantoprazole    Tablet 40 milliGRAM(s) Oral two times a day  sucralfate 1 Gram(s) Oral four times a day    MEDICATIONS  (PRN):  acetaminophen   Tablet 650 milliGRAM(s) Oral every 6 hours PRN Mild Pain  ALBUTerol    0.083% 2.5 milliGRAM(s) Nebulizer every 6 hours PRN Shortness of Breath and/or Wheezing  dextrose 40% Gel 15 Gram(s) Oral once PRN Blood Glucose LESS THAN 70 milliGRAM(s)/deciliter  glucagon  Injectable 1 milliGRAM(s) IntraMuscular once PRN Glucose LESS THAN 70 milligrams/deciliter  ondansetron Injectable 4 milliGRAM(s) IV Push every 6 hours PRN Nausea and/or Vomiting

## 2018-07-23 NOTE — PROGRESS NOTE ADULT - ASSESSMENT
83 year old female with DM2, HTN, here with an episode of syncope, found to have significant anemia. Improvement in lethargy with PRBC transfusions    - Patient presented with profound anemia, s/p PRBC's x 5 units total and 1 unit of platelets    - H/H has been stable but now trending down.  Continue to trend and transfuse as needed   - s/p EGD 7/9 with clean based duodenal ulcer, gastritis, hiatal hernia  - s/p colon with polypectomy.  - Chronic lower extremity edema unchanged.    Administer Lasix after blood products transfusion to avoid fluid volume overload. Lasix 20mg PO qday started by renal  - Echo with normal LV function.  - Hemodynamics stable as per flow sheet   - EKG is SR with no sign of ischemia or chamber enlargement  - BARBARA, Cr stable Baseline creatinine unknown, though normal in 2016, BARBARA likely due to her profound anemia.  Avoid nephrotoxics.  Renal following  - Monitor electrolytes. Keep K>4, Mg>2  - will follow

## 2018-07-24 LAB
ANION GAP SERPL CALC-SCNC: 3 MMOL/L — LOW (ref 5–17)
BUN SERPL-MCNC: 37 MG/DL — HIGH (ref 7–23)
CALCIUM SERPL-MCNC: 8.3 MG/DL — LOW (ref 8.5–10.1)
CHLORIDE SERPL-SCNC: 106 MMOL/L — SIGNIFICANT CHANGE UP (ref 96–108)
CO2 SERPL-SCNC: 33 MMOL/L — HIGH (ref 22–31)
CREAT SERPL-MCNC: 1.9 MG/DL — HIGH (ref 0.5–1.3)
GLUCOSE SERPL-MCNC: 97 MG/DL — SIGNIFICANT CHANGE UP (ref 70–99)
HCT VFR BLD CALC: 27.7 % — LOW (ref 34.5–45)
HGB BLD-MCNC: 8.3 G/DL — LOW (ref 11.5–15.5)
MCHC RBC-ENTMCNC: 27.2 PG — SIGNIFICANT CHANGE UP (ref 27–34)
MCHC RBC-ENTMCNC: 30 GM/DL — LOW (ref 32–36)
MCV RBC AUTO: 90.8 FL — SIGNIFICANT CHANGE UP (ref 80–100)
NRBC # BLD: 0 /100 WBCS — SIGNIFICANT CHANGE UP (ref 0–0)
PLATELET # BLD AUTO: 230 K/UL — SIGNIFICANT CHANGE UP (ref 150–400)
POTASSIUM SERPL-MCNC: 4.8 MMOL/L — SIGNIFICANT CHANGE UP (ref 3.5–5.3)
POTASSIUM SERPL-SCNC: 4.8 MMOL/L — SIGNIFICANT CHANGE UP (ref 3.5–5.3)
RBC # BLD: 3.05 M/UL — LOW (ref 3.8–5.2)
RBC # FLD: 16.6 % — HIGH (ref 10.3–14.5)
SODIUM SERPL-SCNC: 142 MMOL/L — SIGNIFICANT CHANGE UP (ref 135–145)
WBC # BLD: 7.13 K/UL — SIGNIFICANT CHANGE UP (ref 3.8–10.5)
WBC # FLD AUTO: 7.13 K/UL — SIGNIFICANT CHANGE UP (ref 3.8–10.5)

## 2018-07-24 PROCEDURE — 99233 SBSQ HOSP IP/OBS HIGH 50: CPT

## 2018-07-24 PROCEDURE — 99232 SBSQ HOSP IP/OBS MODERATE 35: CPT

## 2018-07-24 RX ORDER — ASCORBIC ACID 60 MG
500 TABLET,CHEWABLE ORAL
Qty: 0 | Refills: 0 | Status: DISCONTINUED | OUTPATIENT
Start: 2018-07-24 | End: 2018-07-25

## 2018-07-24 RX ORDER — INSULIN LISPRO 100/ML
VIAL (ML) SUBCUTANEOUS
Qty: 0 | Refills: 0 | Status: DISCONTINUED | OUTPATIENT
Start: 2018-07-24 | End: 2018-07-25

## 2018-07-24 RX ORDER — INSULIN LISPRO 100/ML
VIAL (ML) SUBCUTANEOUS AT BEDTIME
Qty: 0 | Refills: 0 | Status: DISCONTINUED | OUTPATIENT
Start: 2018-07-24 | End: 2018-07-25

## 2018-07-24 RX ADMIN — Medication 1 TABLET(S): at 11:45

## 2018-07-24 RX ADMIN — Medication 325 MILLIGRAM(S): at 17:36

## 2018-07-24 RX ADMIN — Medication 325 MILLIGRAM(S): at 05:17

## 2018-07-24 RX ADMIN — Medication 20 MILLIGRAM(S): at 05:17

## 2018-07-24 RX ADMIN — DONEPEZIL HYDROCHLORIDE 5 MILLIGRAM(S): 10 TABLET, FILM COATED ORAL at 21:32

## 2018-07-24 RX ADMIN — PANTOPRAZOLE SODIUM 40 MILLIGRAM(S): 20 TABLET, DELAYED RELEASE ORAL at 17:36

## 2018-07-24 RX ADMIN — Medication 1 GRAM(S): at 00:09

## 2018-07-24 RX ADMIN — Medication 1 TABLET(S): at 17:36

## 2018-07-24 RX ADMIN — Medication 1 MILLIGRAM(S): at 11:45

## 2018-07-24 RX ADMIN — Medication 1 GRAM(S): at 17:36

## 2018-07-24 RX ADMIN — LORATADINE 10 MILLIGRAM(S): 10 TABLET ORAL at 11:46

## 2018-07-24 RX ADMIN — Medication 1 GRAM(S): at 11:46

## 2018-07-24 RX ADMIN — Medication 1 GRAM(S): at 05:17

## 2018-07-24 RX ADMIN — PANTOPRAZOLE SODIUM 40 MILLIGRAM(S): 20 TABLET, DELAYED RELEASE ORAL at 05:17

## 2018-07-24 RX ADMIN — Medication 1 DROP(S): at 11:56

## 2018-07-24 RX ADMIN — Medication 1 TABLET(S): at 08:35

## 2018-07-24 RX ADMIN — Medication 500 MILLIGRAM(S): at 17:40

## 2018-07-24 RX ADMIN — Medication 75 MICROGRAM(S): at 05:17

## 2018-07-24 NOTE — PROGRESS NOTE ADULT - PROBLEM SELECTOR PROBLEM 10
Need for prophylactic measure

## 2018-07-24 NOTE — PROGRESS NOTE ADULT - ASSESSMENT
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 Renal function fluctuates but relatively stable , eGFR 26-30 ml/min  Hypernatremia - Resolved  Hyperkalemia  - Resolved   Anemia: Blood loss anemia, severe: GIB  Arthritis  Asthma  Diabetes mellitus  Lymphedema  s/p EGD attempted.   sp colonoscopy with polypectomy    1) Pedunculated Sigmoid Polyp    2) Right sided polyp    3) Diverticulosis    4) Hemorrhoids    - Renal indices are stable with a cr of 1.9; within baseline range  - Lasix restarted on 7/23/18; tolerating thus far  - No indication for kidney biopsy. No signs of underlying GN or nephrotic syndrome   - No uremic bleeding diathesis. If bleeding becomes a recurrent issue, will consider on dose of DDAVP 0.3 /kg bw  - Urine studies reviewed. No real proteinuria. FeNa does not suggest pre-renal. S/p brief IVF.   - Low K diet   - ACEi can be started outpatient  - Cardio eval noted  - EPO as ordered    D/W RN    Thank you; Renally stable for outpatient follow up

## 2018-07-24 NOTE — PROGRESS NOTE ADULT - SUBJECTIVE AND OBJECTIVE BOX
Neurology follow up note    RAO [id number]      Interval History:    Patient feels ok no new complaints.    MEDICATIONS    acetaminophen   Tablet 650 milliGRAM(s) Oral every 6 hours PRN  ALBUTerol    0.083% 2.5 milliGRAM(s) Nebulizer every 6 hours PRN  artificial tears (preservative free) Ophthalmic Solution 1 Drop(s) Both EYES daily  dextrose 40% Gel 15 Gram(s) Oral once PRN  dextrose 5%. 1000 milliLiter(s) IV Continuous <Continuous>  dextrose 50% Injectable 12.5 Gram(s) IV Push once  dextrose 50% Injectable 25 Gram(s) IV Push once  dextrose 50% Injectable 25 Gram(s) IV Push once  donepezil 5 milliGRAM(s) Oral at bedtime  epoetin sherita Injectable 24259 Unit(s) SubCutaneous <User Schedule>  ferrous    sulfate 325 milliGRAM(s) Oral two times a day  folic acid 1 milliGRAM(s) Oral daily  furosemide    Tablet 20 milliGRAM(s) Oral daily  glucagon  Injectable 1 milliGRAM(s) IntraMuscular once PRN  lactobacillus acidophilus 1 Tablet(s) Oral three times a day with meals  levothyroxine 75 MICROGram(s) Oral daily  loratadine 10 milliGRAM(s) Oral daily  ondansetron Injectable 4 milliGRAM(s) IV Push every 6 hours PRN  pantoprazole    Tablet 40 milliGRAM(s) Oral two times a day  sucralfate 1 Gram(s) Oral four times a day      Allergies    Levaquin (Hives)  sulfa drugs (Unknown)    Intolerances            Vital Signs Last 24 Hrs  T(C): 36.9 (24 Jul 2018 05:04), Max: 37.2 (23 Jul 2018 20:04)  T(F): 98.4 (24 Jul 2018 05:04), Max: 98.9 (23 Jul 2018 20:04)  HR: 69 (24 Jul 2018 05:04) (69 - 77)  BP: 146/80 (24 Jul 2018 05:04) (138/69 - 166/95)  BP(mean): --  RR: 18 (24 Jul 2018 05:04) (16 - 18)  SpO2: 96% (24 Jul 2018 05:04) (95% - 100%)    REVIEW OF SYSTEMS:     Constitutional: No fever, chills, fatigue, weakness  Eyes: no eye pain, visual disturbances, or discharge  ENT:  No difficulty hearing, tinnitus, vertigo; No sinus or throat pain  Neck: No pain or stiffness  Respiratory: No cough, dyspnea, wheezing   Cardiovascular: No chest pain, palpitations,   Gastrointestinal: No abdominal or epigastric pain. No nausea, vomiting  No diarrhea or constipation.   Genitourinary: No dysuria, frequency, hematuria or incontinence  Neurological: No headaches, lightheadedness, vertigo, numbness or tremors  Psychiatric: No depression, anxiety, mood swings or difficulty sleeping  Musculoskeletal: No joint pain or swelling; No muscle, back or extremity pain  Skin: No itching, burning, rashes or lesions   Lymph Nodes: No enlarged glands  Endocrine: No heat or cold intolerance; No hair loss   Allergy and Immunologic: No hives or eczema    On Neurological Examination:    Mental Status - Patient is alert, awake,     Follow simple commands  Follow complex commands      Speech -   Fluent                        Cranial Nerves - Pupils 3 mm equal and reactive to light,   extraocular eye movements intact.   smile symmetric  intact bilateral NLF    Motor Exam -   Right upper 4/5  Left upper 4/5  bialteral lower able wiggle feet    Muscle tone - is normal all over.  No asymmetry is seen.      GENERAL Exam: Nontoxic , No Acute Distress   	  HEENT:  normocephalic, atraumatic  		  LUNGS: Decreased bilaterally  	  HEART: Normal S1S2   No murmur RRR        	  GI/ ABDOMEN:  Soft  Non tender    EXTREMITIES:   No Edema  No Clubbing  No Cyanosis   	   SKIN: Normal  No Ecchymosis               LABS:  CBC Full  -  ( 24 Jul 2018 07:21 )  WBC Count : 7.13 K/uL  Hemoglobin : 8.3 g/dL  Hematocrit : 27.7 %  Platelet Count - Automated : 230 K/uL  Mean Cell Volume : 90.8 fl  Mean Cell Hemoglobin : 27.2 pg  Mean Cell Hemoglobin Concentration : 30.0 gm/dL  Auto Neutrophil # : x  Auto Lymphocyte # : x  Auto Monocyte # : x  Auto Eosinophil # : x  Auto Basophil # : x  Auto Neutrophil % : x  Auto Lymphocyte % : x  Auto Monocyte % : x  Auto Eosinophil % : x  Auto Basophil % : x      07-24    142  |  106  |  37<H>  ----------------------------<  97  4.8   |  33<H>  |  1.90<H>    Ca    8.3<L>      24 Jul 2018 07:21      Hemoglobin A1C:       Vitamin B12         RADIOLOGY    ASSESSMENT AND PLAN     MCI vs slight dementia   on Aricept no side effects    monitor H/H as needed    Hypothyroidism,  Levothyroxine    spoke to daughter yesterday      Physical therapy evaluation.  OOB to chair/ambulation with assistance only.    Greater than 40 minutes spent in direct patient care reviewing  the notes, lab data/ imaging , discussion with multidisciplinary team.

## 2018-07-24 NOTE — PROGRESS NOTE ADULT - ASSESSMENT
84 y/o F with PMHx DM2, hypothyroidism, asthma, arthritis, meningoma (chronic), and  brought in by EMS after syncopal episode at [facility]. Admitted to ICU w/ profound anemia hgb 3.8 s/p 5 u prbc, 1 unit platelet, H&H now stable.

## 2018-07-24 NOTE — PROGRESS NOTE ADULT - PROBLEM SELECTOR PLAN 10
- SCDs due to GIB  - add multivtiamin , Vit c 500 BID
SCD's given GIB: hold all a/c  PPI for GIB pending further workup
SCD's given GIB: hold all a/c  PPI for GIB
SCD's given GIB: hold all a/c  PPI for GIB pending further workup
IMPROVE VTE Individual Risk Assessment        RISK                                                          Points  [  ] Previous VTE                                                3  [  ] Thrombophilia                                             2  [ x ] Lower limb paralysis                                   2        (unable to hold up >15 seconds)    [  ] Current Cancer                                            2         (within 6 months)  [ x ] Immobilization > 24 hrs                              1  [  ] ICU/CCU stay > 24 hours                            1  [ x ] Age > 60                                                    1  IMPROVE VTE Score __4___    SCD's given GIB: hold all a/c  PPI for GIB pending further workup
SCD's given GIB: hold all a/c  PPI for GIB pending further workup

## 2018-07-24 NOTE — CHART NOTE - NSCHARTNOTEFT_GEN_A_CORE
Assessment: 82 y/o female adm with anemia, syncope, hypotension. Pt s/p colonoscopy on 7/20 ( polyps, diverticulosis, hemorrhoids). Fecal incontinence.  Pt states that she is eating well, no complaints.     Factors impacting intake: [ x] none [ ] nausea  [ ] vomiting [ ] diarrhea [ ] constipation  [ ]chewing problems [ ] swallowing issues  [ ] other:     Diet Presciption: Diet, DASH/TLC:   Sodium & Cholesterol Restricted (07-23-18 @ 09:00)    Intake: 100%    Current Weight: 7/23 197.5# 7/18 176.3# 7/7 249.5# (wt fluctuates) unsure of accurate wt.   % Weight Change    Pertinent Medications: MEDICATIONS  (STANDING):  artificial tears (preservative free) Ophthalmic Solution 1 Drop(s) Both EYES daily  dextrose 5%. 1000 milliLiter(s) (50 mL/Hr) IV Continuous <Continuous>  dextrose 50% Injectable 12.5 Gram(s) IV Push once  dextrose 50% Injectable 25 Gram(s) IV Push once  dextrose 50% Injectable 25 Gram(s) IV Push once  donepezil 5 milliGRAM(s) Oral at bedtime  epoetin sherita Injectable 89722 Unit(s) SubCutaneous <User Schedule>  ferrous    sulfate 325 milliGRAM(s) Oral two times a day  folic acid 1 milliGRAM(s) Oral daily  furosemide    Tablet 20 milliGRAM(s) Oral daily  lactobacillus acidophilus 1 Tablet(s) Oral three times a day with meals  levothyroxine 75 MICROGram(s) Oral daily  loratadine 10 milliGRAM(s) Oral daily  pantoprazole    Tablet 40 milliGRAM(s) Oral two times a day  sucralfate 1 Gram(s) Oral four times a day    MEDICATIONS  (PRN):  acetaminophen   Tablet 650 milliGRAM(s) Oral every 6 hours PRN Mild Pain  ALBUTerol    0.083% 2.5 milliGRAM(s) Nebulizer every 6 hours PRN Shortness of Breath and/or Wheezing  dextrose 40% Gel 15 Gram(s) Oral once PRN Blood Glucose LESS THAN 70 milliGRAM(s)/deciliter  glucagon  Injectable 1 milliGRAM(s) IntraMuscular once PRN Glucose LESS THAN 70 milligrams/deciliter  ondansetron Injectable 4 milliGRAM(s) IV Push every 6 hours PRN Nausea and/or Vomiting    Pertinent Labs: 07-24 Na142 mmol/L Glu 97 mg/dL K+ 4.8 mmol/L Cr  1.90 mg/dL<H> BUN 37 mg/dL<H> 07-22 Alb 2.2 g/dL<L> 07-08 [id number] 5.8 %<H>     CAPILLARY BLOOD GLUCOSE      POCT Blood Glucose.: 223 mg/dL (24 Jul 2018 11:45)  POCT Blood Glucose.: 114 mg/dL (24 Jul 2018 08:10)    Skin: Stage I-II sacrum, Stage II right thigh, DTI left thigh.     Estimated Needs:   [x ] no change since previous assessment  [ ] recalculated:     Previous Nutrition Diagnosis:   [ ] Inadequate Energy Intake [ ]Inadequate Oral Intake [ ] Excessive Energy Intake   [ ] Underweight [ x] Increased Nutrient Needs [ ] Overweight/Obesity   [x ] Altered GI Function [ ] Unintended Weight Loss [ ] Food & Nutrition Related Knowledge Deficit [ ] Malnutrition     Nutrition Diagnosis is [ x] ongoing  [ ] resolved [ ] not applicable     New Nutrition Diagnosis: [x ] not applicable       Interventions:   Recommend  [ ] Change Diet To:  [x ] Nutrition Supplement: MVI daily, vit C 500 mg BID  [ ] Nutrition Support  [x ] Other: rewei pt    Monitoring and Evaluation:   [x ] PO intake [ x ] Tolerance to diet prescription [ x ] weights [ x ] labs[ x ] follow up per protocol  [ ] other:

## 2018-07-24 NOTE — PROGRESS NOTE ADULT - ASSESSMENT
83 year old female with DM2, HTN, here with an episode of syncope, found to have significant anemia. Improvement in lethargy with PRBC transfusions    - Patient presented with profound anemia, s/p PRBC's x 5 units total and 1 unit of platelets    - H/H has been stable.  Continue to trend and transfuse as needed   - s/p EGD 7/9 with clean based duodenal ulcer, gastritis, hiatal hernia  - s/p colonoscopy with polypectomy.  - Chronic lower extremity edema unchanged.    Administer Lasix after blood products transfusion to avoid fluid volume overload. Lasix 20mg PO qday started by renal  - Echo with normal LV function.  - Hemodynamics stable as per flow sheet, with a tendency toward intermittent hypertension    - BARBARA, Cr stable Baseline creatinine unknown, though normal in 2016, BARBARA likely due to her profound anemia.  Avoid nephrotoxics.  Renal following  - Monitor electrolytes. Keep K>4, Mg>2  - will follow

## 2018-07-24 NOTE — PROGRESS NOTE ADULT - SUBJECTIVE AND OBJECTIVE BOX
[facility]    Mabel Hernandez, Jessica, Cesar, Holly, Devan, Mayda      [phone]    CHIEF COMPLAINT: Patient is a 83y old  Female who presents with a chief complaint of profound anemia (16 Jul 2018 09:11)      Follow Up: anemia, s/p syncope    Interim history: The patient reports no new symptoms.  Denies chest discomfort and shortness of breath.  No abdominal pain.  No new neurologic symptoms.      MEDICATIONS  (STANDING):  artificial tears (preservative free) Ophthalmic Solution 1 Drop(s) Both EYES daily  dextrose 5%. 1000 milliLiter(s) (50 mL/Hr) IV Continuous <Continuous>  dextrose 50% Injectable 12.5 Gram(s) IV Push once  dextrose 50% Injectable 25 Gram(s) IV Push once  dextrose 50% Injectable 25 Gram(s) IV Push once  donepezil 5 milliGRAM(s) Oral at bedtime  epoetin sherita Injectable 56848 Unit(s) SubCutaneous <User Schedule>  ferrous    sulfate 325 milliGRAM(s) Oral two times a day  folic acid 1 milliGRAM(s) Oral daily  furosemide    Tablet 20 milliGRAM(s) Oral daily  lactobacillus acidophilus 1 Tablet(s) Oral three times a day with meals  levothyroxine 75 MICROGram(s) Oral daily  loratadine 10 milliGRAM(s) Oral daily  pantoprazole    Tablet 40 milliGRAM(s) Oral two times a day  sucralfate 1 Gram(s) Oral four times a day    MEDICATIONS  (PRN):  acetaminophen   Tablet 650 milliGRAM(s) Oral every 6 hours PRN Mild Pain  ALBUTerol    0.083% 2.5 milliGRAM(s) Nebulizer every 6 hours PRN Shortness of Breath and/or Wheezing  dextrose 40% Gel 15 Gram(s) Oral once PRN Blood Glucose LESS THAN 70 milliGRAM(s)/deciliter  glucagon  Injectable 1 milliGRAM(s) IntraMuscular once PRN Glucose LESS THAN 70 milligrams/deciliter  ondansetron Injectable 4 milliGRAM(s) IV Push every 6 hours PRN Nausea and/or Vomiting      REVIEW OF SYSTEMS:  eye, ent, GI, , allergic, dermatologic, musculoskeletal and neurologic are negative except as described above    Vital Signs Last 24 Hrs  T(C): 36.9 (24 Jul 2018 05:04), Max: 37.2 (23 Jul 2018 20:04)  T(F): 98.4 (24 Jul 2018 05:04), Max: 98.9 (23 Jul 2018 20:04)  HR: 69 (24 Jul 2018 05:04) (69 - 77)  BP: 146/80 (24 Jul 2018 05:04) (138/69 - 166/95)  BP(mean): --  RR: 18 (24 Jul 2018 05:04) (16 - 18)  SpO2: 96% (24 Jul 2018 05:04) (95% - 100%)    I&O's Summary      Telemetry past 24h:    PHYSICAL EXAM:    Constitutional: well-nourished, well-developed, NAD   HEENT:  MMM, sclerae anicteric, conjunctivae clear, no oral cyanosis.  Pulmonary: Non-labored, breath sounds are clear bilaterally, No wheezing, rales or rhonchi  Cardiovascular: Regular, S1 and S2.  No murmur.  No rubs, gallops or clicks  Gastrointestinal: Bowel Sounds present, soft, nontender.   Lymph: No peripheral edema.   Neurological: Alert, no focal deficits  Skin: No rashes.  Psych:  Mood & affect appropriate    LABS: All Labs Reviewed:                        8.3    7.13  )-----------( 230      ( 24 Jul 2018 07:21 )             27.7                         7.9    8.26  )-----------( 225      ( 23 Jul 2018 07:05 )             26.8                         8.1    6.82  )-----------( 217      ( 22 Jul 2018 08:38 )             27.4     24 Jul 2018 07:21    142    |  106    |  37     ----------------------------<  97     4.8     |  33     |  1.90   23 Jul 2018 07:05    142    |  107    |  35     ----------------------------<  112    4.5     |  30     |  1.90   22 Jul 2018 09:10    144    |  109    |  25     ----------------------------<  105    4.2     |  31     |  2.00     Ca    8.3        24 Jul 2018 07:21  Ca    7.8        23 Jul 2018 07:05  Ca    7.7        22 Jul 2018 09:10    TPro  5.5    /  Alb  2.2    /  TBili  0.2    /  DBili  x      /  AST  7      /  ALT  6      /  AlkPhos  150    22 Jul 2018 09:10          Blood Culture:     07-22 @ 09:10  TSH: 6.51      RADIOLOGY:    EKG:    Echo:

## 2018-07-24 NOTE — PROGRESS NOTE ADULT - PROBLEM SELECTOR PLAN 1
- s/p 5 u prbc 1 unt plt   - H&H stable, transfuse <7  - continue protonix   - procrit  - GI following

## 2018-07-24 NOTE — PROGRESS NOTE ADULT - PROBLEM SELECTOR PLAN 1
hgb low but stable, no overt s/s gib  sp egd 7/9 showed non bleeding du, gastritis and hh  sp repeat egd 7/17 findings of hh, esophagitis, gastritis and bile reflux  sp colonoscopy with polypectomy 7/20  f/u path  no asa , anti coagulation for 5 days  pt needs outpatient capsule  trend h/h, transfuse prn  ppi bid/carafate qid

## 2018-07-24 NOTE — PROGRESS NOTE ADULT - PROBLEM SELECTOR PLAN 2
- patient unable to make medical decisions, daughter prince and son lizzy are HCP. pt came from [facility], family requesting d/c planning to alternate facility.   - neuro Dr. Jung  - aricept QD

## 2018-07-24 NOTE — PROGRESS NOTE ADULT - SUBJECTIVE AND OBJECTIVE BOX
Patient is a 83y old  Female who presents with a chief complaint of profound anemia (16 Jul 2018 09:11)      INTERVAL HPI/OVERNIGHT EVENTS: no events overnight. feels well this am.    MEDICATIONS  (STANDING):  artificial tears (preservative free) Ophthalmic Solution 1 Drop(s) Both EYES daily  dextrose 5%. 1000 milliLiter(s) (50 mL/Hr) IV Continuous <Continuous>  dextrose 50% Injectable 12.5 Gram(s) IV Push once  dextrose 50% Injectable 25 Gram(s) IV Push once  dextrose 50% Injectable 25 Gram(s) IV Push once  donepezil 5 milliGRAM(s) Oral at bedtime  epoetin sherita Injectable 64429 Unit(s) SubCutaneous <User Schedule>  ferrous    sulfate 325 milliGRAM(s) Oral two times a day  folic acid 1 milliGRAM(s) Oral daily  furosemide    Tablet 20 milliGRAM(s) Oral daily  lactobacillus acidophilus 1 Tablet(s) Oral three times a day with meals  levothyroxine 75 MICROGram(s) Oral daily  loratadine 10 milliGRAM(s) Oral daily  pantoprazole    Tablet 40 milliGRAM(s) Oral two times a day  sucralfate 1 Gram(s) Oral four times a day    MEDICATIONS  (PRN):  acetaminophen   Tablet 650 milliGRAM(s) Oral every 6 hours PRN Mild Pain  ALBUTerol    0.083% 2.5 milliGRAM(s) Nebulizer every 6 hours PRN Shortness of Breath and/or Wheezing  dextrose 40% Gel 15 Gram(s) Oral once PRN Blood Glucose LESS THAN 70 milliGRAM(s)/deciliter  glucagon  Injectable 1 milliGRAM(s) IntraMuscular once PRN Glucose LESS THAN 70 milligrams/deciliter  ondansetron Injectable 4 milliGRAM(s) IV Push every 6 hours PRN Nausea and/or Vomiting      Allergies    Levaquin (Hives)  sulfa drugs (Unknown)    Intolerances        REVIEW OF SYSTEMS:  CONSTITUTIONAL: denies fever, chills , weakness, fatigue  NECK: denies neck pain/stiffness  RESPIRATORY: denies cough, wheezing, SOB,  hemoptysis   CARDIOVASCULAR: denies chest pain, palpitations  GASTROINTESTINAL: denies abdominal pain,  N/V/D/C    GENITOURINARY: denies dysuria, frequency, urgency, hematuria  NEUROLOGICAL: denies headaches, dizziness, numbness, tingling  REST OF REVIEW Of SYSTEM - All other ROS negative.    Height (cm): 165.1 (07-15 @ 10:35)  Weight (kg): 90.1 (07-09 @ 20:21)  BMI (kg/m2): 33.1 (07-15 @ 10:35)  BSA (m2): 1.97 (07-15 @ 10:35)  Vital Signs Last 24 Hrs  T(C): 36.9 (24 Jul 2018 14:05), Max: 37.2 (23 Jul 2018 20:04)  T(F): 98.4 (24 Jul 2018 14:05), Max: 98.9 (23 Jul 2018 20:04)  HR: 84 (24 Jul 2018 14:05) (69 - 84)  BP: 166/94 (24 Jul 2018 14:05) (146/80 - 166/95)  BP(mean): --  RR: 17 (24 Jul 2018 14:05) (16 - 18)  SpO2: 93% (24 Jul 2018 14:05) (93% - 96%)  [ ] room air   [ ] 02    PHYSICAL EXAM:  GENERAL:  pale elderly female, NAD  HEAD:  NCAT, EOMI, PERRLA  ENMT: MMM  NECK:  Supple , no JVD  CHEST/LUNG: CTA B/L , No W/R/R  HEART:  Regular rate and rhythm, No M/R/G  ABDOMEN:  soft, nontender, nondistended, positive bowel sounds   EXTREMITIES: No clubbing, cyanosis. 2+non pitting edema mildly erythematous, chronic le chronic skin changes.   Neuro: No focal deficits     LABS:                        8.3    7.13  )-----------( 230      ( 24 Jul 2018 07:21 )             27.7     24 Jul 2018 07:21    142    |  106    |  37     ----------------------------<  97     4.8     |  33     |  1.90     Ca    8.3        24 Jul 2018 07:21        Hemoglobin A1C, Whole Blood: 5.8 % (07-08 @ 11:52)      CAPILLARY BLOOD GLUCOSE      POCT Blood Glucose.: 223 mg/dL (24 Jul 2018 11:45)  POCT Blood Glucose.: 114 mg/dL (24 Jul 2018 08:10)    Cultures          RADIOLOGY & ADDITIONAL TESTS:

## 2018-07-24 NOTE — PROGRESS NOTE ADULT - SUBJECTIVE AND OBJECTIVE BOX
Date/Time Patient Seen:  		  Referring MD:   Data Reviewed	       Patient is a 83y old  Female who presents with a chief complaint of profound anemia (16 Jul 2018 09:11)  vs and meds reviewed      Subjective/HPI     PAST MEDICAL & SURGICAL HISTORY:  Arthritis  Asthma  Diabetes mellitus  Hypothyroidism  No significant past surgical history        Medication list         MEDICATIONS  (STANDING):  artificial tears (preservative free) Ophthalmic Solution 1 Drop(s) Both EYES daily  dextrose 5%. 1000 milliLiter(s) (50 mL/Hr) IV Continuous <Continuous>  dextrose 50% Injectable 12.5 Gram(s) IV Push once  dextrose 50% Injectable 25 Gram(s) IV Push once  dextrose 50% Injectable 25 Gram(s) IV Push once  donepezil 5 milliGRAM(s) Oral at bedtime  epoetin sherita Injectable 48219 Unit(s) SubCutaneous <User Schedule>  ferrous    sulfate 325 milliGRAM(s) Oral two times a day  folic acid 1 milliGRAM(s) Oral daily  furosemide    Tablet 20 milliGRAM(s) Oral daily  lactobacillus acidophilus 1 Tablet(s) Oral three times a day with meals  levothyroxine 75 MICROGram(s) Oral daily  loratadine 10 milliGRAM(s) Oral daily  pantoprazole    Tablet 40 milliGRAM(s) Oral two times a day  sucralfate 1 Gram(s) Oral four times a day    MEDICATIONS  (PRN):  acetaminophen   Tablet 650 milliGRAM(s) Oral every 6 hours PRN Mild Pain  ALBUTerol    0.083% 2.5 milliGRAM(s) Nebulizer every 6 hours PRN Shortness of Breath and/or Wheezing  dextrose 40% Gel 15 Gram(s) Oral once PRN Blood Glucose LESS THAN 70 milliGRAM(s)/deciliter  glucagon  Injectable 1 milliGRAM(s) IntraMuscular once PRN Glucose LESS THAN 70 milligrams/deciliter  ondansetron Injectable 4 milliGRAM(s) IV Push every 6 hours PRN Nausea and/or Vomiting         Vitals log        ICU Vital Signs Last 24 Hrs  T(C): 36.9 (24 Jul 2018 05:04), Max: 37.2 (23 Jul 2018 20:04)  T(F): 98.4 (24 Jul 2018 05:04), Max: 98.9 (23 Jul 2018 20:04)  HR: 69 (24 Jul 2018 05:04) (69 - 77)  BP: 146/80 (24 Jul 2018 05:04) (138/69 - 166/95)  BP(mean): --  ABP: --  ABP(mean): --  RR: 18 (24 Jul 2018 05:04) (16 - 18)  SpO2: 96% (24 Jul 2018 05:04) (95% - 100%)           Input and Output:  I&O's Detail      Lab Data                        7.9    8.26  )-----------( 225      ( 23 Jul 2018 07:05 )             26.8     07-23    142  |  107  |  35<H>  ----------------------------<  112<H>  4.5   |  30  |  1.90<H>    Ca    7.8<L>      23 Jul 2018 07:05    TPro  5.5<L>  /  Alb  2.2<L>  /  TBili  0.2  /  DBili  x   /  AST  7<L>  /  ALT  6<L>  /  AlkPhos  150<H>  07-22            Review of Systems	      Objective     Physical Examination  heart s1s2  lung dec BS  abd soft        Pertinent Lab findings & Imaging      Hazel:  NO   Adequate UO     I&O's Detail           Discussed with:     Cultures:	        Radiology

## 2018-07-24 NOTE — PROGRESS NOTE ADULT - SUBJECTIVE AND OBJECTIVE BOX
INTERVAL HPI/OVERNIGHT EVENTS:  pt seen and examined  denies gi complaints, +bm today  per overnight rn no s/s overt gib  labs noted afebrile overnight    MEDICATIONS  (STANDING):  artificial tears (preservative free) Ophthalmic Solution 1 Drop(s) Both EYES daily  dextrose 5%. 1000 milliLiter(s) (50 mL/Hr) IV Continuous <Continuous>  dextrose 50% Injectable 12.5 Gram(s) IV Push once  dextrose 50% Injectable 25 Gram(s) IV Push once  dextrose 50% Injectable 25 Gram(s) IV Push once  donepezil 5 milliGRAM(s) Oral at bedtime  epoetin sherita Injectable 78256 Unit(s) SubCutaneous <User Schedule>  ferrous    sulfate 325 milliGRAM(s) Oral two times a day  folic acid 1 milliGRAM(s) Oral daily  furosemide    Tablet 20 milliGRAM(s) Oral daily  lactobacillus acidophilus 1 Tablet(s) Oral three times a day with meals  levothyroxine 75 MICROGram(s) Oral daily  loratadine 10 milliGRAM(s) Oral daily  pantoprazole    Tablet 40 milliGRAM(s) Oral two times a day  sucralfate 1 Gram(s) Oral four times a day    MEDICATIONS  (PRN):  acetaminophen   Tablet 650 milliGRAM(s) Oral every 6 hours PRN Mild Pain  ALBUTerol    0.083% 2.5 milliGRAM(s) Nebulizer every 6 hours PRN Shortness of Breath and/or Wheezing  dextrose 40% Gel 15 Gram(s) Oral once PRN Blood Glucose LESS THAN 70 milliGRAM(s)/deciliter  glucagon  Injectable 1 milliGRAM(s) IntraMuscular once PRN Glucose LESS THAN 70 milligrams/deciliter  ondansetron Injectable 4 milliGRAM(s) IV Push every 6 hours PRN Nausea and/or Vomiting      Allergies    Levaquin (Hives)  sulfa drugs (Unknown)    Intolerances        Review of Systems:    General:  No wt loss, fevers, chills, night sweats, fatigue   Eyes:  Good vision, no reported pain  ENT:  No sore throat, pain, runny nose, dysphagia  CV:  No pain, palpitations, hypo/hypertension  Resp:  No dyspnea, cough, tachypnea, wheezing  GI:  No pain, No nausea, No vomiting, No diarrhea, No constipation, No weight loss, No fever, No pruritis, No rectal bleeding, No melena, No dysphagia  :  No pain, bleeding, incontinence, nocturia  Muscle:  No pain, weakness  Neuro:  No weakness, tingling, memory problems  Psych:  No fatigue, insomnia, mood problems, depression  Endocrine:  No polyuria, polydypsia, cold/heat intolerance  Heme:  No petechiae, ecchymosis, easy bruisability  Skin:  No rash, tattoos, scars, edema      Vital Signs Last 24 Hrs  T(C): 36.9 (24 Jul 2018 05:04), Max: 37.2 (23 Jul 2018 20:04)  T(F): 98.4 (24 Jul 2018 05:04), Max: 98.9 (23 Jul 2018 20:04)  HR: 69 (24 Jul 2018 05:04) (69 - 77)  BP: 146/80 (24 Jul 2018 05:04) (138/69 - 166/95)  BP(mean): --  RR: 18 (24 Jul 2018 05:04) (16 - 18)  SpO2: 96% (24 Jul 2018 05:04) (95% - 100%)    PHYSICAL EXAM:  Constitutional: NAD, lying in bed  HEENT: EOMI  Neck: No LAD  Respiratory: dec bs  Cardiovascular: S1 and S2, RRR  Gastrointestinal: obese abd BS+, soft, NT, nd  Extremities: +edema and blistering to le  Vascular: 2+ peripheral pulses  Neurological: Awake alert confused   Skin: see above      LABS:                        8.3    7.13  )-----------( 230      ( 24 Jul 2018 07:21 )             27.7     07-24    142  |  106  |  37<H>  ----------------------------<  97  4.8   |  33<H>  |  1.90<H>    Ca    8.3<L>      24 Jul 2018 07:21            RADIOLOGY & ADDITIONAL TESTS:

## 2018-07-25 VITALS
RESPIRATION RATE: 17 BRPM | OXYGEN SATURATION: 98 % | SYSTOLIC BLOOD PRESSURE: 152 MMHG | TEMPERATURE: 99 F | DIASTOLIC BLOOD PRESSURE: 89 MMHG | HEART RATE: 93 BPM

## 2018-07-25 LAB
ANION GAP SERPL CALC-SCNC: 3 MMOL/L — LOW (ref 5–17)
BUN SERPL-MCNC: 41 MG/DL — HIGH (ref 7–23)
CALCIUM SERPL-MCNC: 8.1 MG/DL — LOW (ref 8.5–10.1)
CHLORIDE SERPL-SCNC: 105 MMOL/L — SIGNIFICANT CHANGE UP (ref 96–108)
CO2 SERPL-SCNC: 33 MMOL/L — HIGH (ref 22–31)
CREAT SERPL-MCNC: 2.1 MG/DL — HIGH (ref 0.5–1.3)
GLUCOSE SERPL-MCNC: 111 MG/DL — HIGH (ref 70–99)
HCT VFR BLD CALC: 28.1 % — LOW (ref 34.5–45)
HGB BLD-MCNC: 8.4 G/DL — LOW (ref 11.5–15.5)
MCHC RBC-ENTMCNC: 26.6 PG — LOW (ref 27–34)
MCHC RBC-ENTMCNC: 29.9 GM/DL — LOW (ref 32–36)
MCV RBC AUTO: 88.9 FL — SIGNIFICANT CHANGE UP (ref 80–100)
NRBC # BLD: 0 /100 WBCS — SIGNIFICANT CHANGE UP (ref 0–0)
PLATELET # BLD AUTO: 234 K/UL — SIGNIFICANT CHANGE UP (ref 150–400)
POTASSIUM SERPL-MCNC: 5 MMOL/L — SIGNIFICANT CHANGE UP (ref 3.5–5.3)
POTASSIUM SERPL-SCNC: 5 MMOL/L — SIGNIFICANT CHANGE UP (ref 3.5–5.3)
RBC # BLD: 3.16 M/UL — LOW (ref 3.8–5.2)
RBC # FLD: 15.9 % — HIGH (ref 10.3–14.5)
SODIUM SERPL-SCNC: 141 MMOL/L — SIGNIFICANT CHANGE UP (ref 135–145)
WBC # BLD: 7.72 K/UL — SIGNIFICANT CHANGE UP (ref 3.8–10.5)
WBC # FLD AUTO: 7.72 K/UL — SIGNIFICANT CHANGE UP (ref 3.8–10.5)

## 2018-07-25 PROCEDURE — 83735 ASSAY OF MAGNESIUM: CPT

## 2018-07-25 PROCEDURE — 87186 SC STD MICRODIL/AGAR DIL: CPT

## 2018-07-25 PROCEDURE — 85018 HEMOGLOBIN: CPT

## 2018-07-25 PROCEDURE — 72125 CT NECK SPINE W/O DYE: CPT

## 2018-07-25 PROCEDURE — 99231 SBSQ HOSP IP/OBS SF/LOW 25: CPT

## 2018-07-25 PROCEDURE — A9560: CPT

## 2018-07-25 PROCEDURE — 94640 AIRWAY INHALATION TREATMENT: CPT

## 2018-07-25 PROCEDURE — 83690 ASSAY OF LIPASE: CPT

## 2018-07-25 PROCEDURE — 84156 ASSAY OF PROTEIN URINE: CPT

## 2018-07-25 PROCEDURE — 86850 RBC ANTIBODY SCREEN: CPT

## 2018-07-25 PROCEDURE — 83935 ASSAY OF URINE OSMOLALITY: CPT

## 2018-07-25 PROCEDURE — 99239 HOSP IP/OBS DSCHRG MGMT >30: CPT

## 2018-07-25 PROCEDURE — 94664 DEMO&/EVAL PT USE INHALER: CPT

## 2018-07-25 PROCEDURE — 82607 VITAMIN B-12: CPT

## 2018-07-25 PROCEDURE — 85014 HEMATOCRIT: CPT

## 2018-07-25 PROCEDURE — 85730 THROMBOPLASTIN TIME PARTIAL: CPT

## 2018-07-25 PROCEDURE — 82962 GLUCOSE BLOOD TEST: CPT

## 2018-07-25 PROCEDURE — 81001 URINALYSIS AUTO W/SCOPE: CPT

## 2018-07-25 PROCEDURE — 83605 ASSAY OF LACTIC ACID: CPT

## 2018-07-25 PROCEDURE — 99221 1ST HOSP IP/OBS SF/LOW 40: CPT

## 2018-07-25 PROCEDURE — 74176 CT ABD & PELVIS W/O CONTRAST: CPT

## 2018-07-25 PROCEDURE — 86901 BLOOD TYPING SEROLOGIC RH(D): CPT

## 2018-07-25 PROCEDURE — 82746 ASSAY OF FOLIC ACID SERUM: CPT

## 2018-07-25 PROCEDURE — 84436 ASSAY OF TOTAL THYROXINE: CPT

## 2018-07-25 PROCEDURE — 84100 ASSAY OF PHOSPHORUS: CPT

## 2018-07-25 PROCEDURE — 82570 ASSAY OF URINE CREATININE: CPT

## 2018-07-25 PROCEDURE — 86923 COMPATIBILITY TEST ELECTRIC: CPT

## 2018-07-25 PROCEDURE — 96365 THER/PROPH/DIAG IV INF INIT: CPT | Mod: XU

## 2018-07-25 PROCEDURE — 83036 HEMOGLOBIN GLYCOSYLATED A1C: CPT

## 2018-07-25 PROCEDURE — 93970 EXTREMITY STUDY: CPT

## 2018-07-25 PROCEDURE — 87040 BLOOD CULTURE FOR BACTERIA: CPT

## 2018-07-25 PROCEDURE — 85045 AUTOMATED RETICULOCYTE COUNT: CPT

## 2018-07-25 PROCEDURE — 87205 SMEAR GRAM STAIN: CPT

## 2018-07-25 PROCEDURE — 83615 LACTATE (LD) (LDH) ENZYME: CPT

## 2018-07-25 PROCEDURE — 88305 TISSUE EXAM BY PATHOLOGIST: CPT

## 2018-07-25 PROCEDURE — 96375 TX/PRO/DX INJ NEW DRUG ADDON: CPT

## 2018-07-25 PROCEDURE — 70450 CT HEAD/BRAIN W/O DYE: CPT

## 2018-07-25 PROCEDURE — 85610 PROTHROMBIN TIME: CPT

## 2018-07-25 PROCEDURE — 83010 ASSAY OF HAPTOGLOBIN QUANT: CPT

## 2018-07-25 PROCEDURE — 80048 BASIC METABOLIC PNL TOTAL CA: CPT

## 2018-07-25 PROCEDURE — 82272 OCCULT BLD FECES 1-3 TESTS: CPT

## 2018-07-25 PROCEDURE — 84443 ASSAY THYROID STIM HORMONE: CPT

## 2018-07-25 PROCEDURE — 71045 X-RAY EXAM CHEST 1 VIEW: CPT

## 2018-07-25 PROCEDURE — 86900 BLOOD TYPING SEROLOGIC ABO: CPT

## 2018-07-25 PROCEDURE — 84300 ASSAY OF URINE SODIUM: CPT

## 2018-07-25 PROCEDURE — 82550 ASSAY OF CK (CPK): CPT

## 2018-07-25 PROCEDURE — 94760 N-INVAS EAR/PLS OXIMETRY 1: CPT

## 2018-07-25 PROCEDURE — 83550 IRON BINDING TEST: CPT

## 2018-07-25 PROCEDURE — 78278 ACUTE GI BLOOD LOSS IMAGING: CPT

## 2018-07-25 PROCEDURE — 97162 PT EVAL MOD COMPLEX 30 MIN: CPT

## 2018-07-25 PROCEDURE — 99291 CRITICAL CARE FIRST HOUR: CPT | Mod: 25

## 2018-07-25 PROCEDURE — 36430 TRANSFUSION BLD/BLD COMPNT: CPT

## 2018-07-25 PROCEDURE — 84133 ASSAY OF URINE POTASSIUM: CPT

## 2018-07-25 PROCEDURE — 84480 ASSAY TRIIODOTHYRONINE (T3): CPT

## 2018-07-25 PROCEDURE — 85027 COMPLETE CBC AUTOMATED: CPT

## 2018-07-25 PROCEDURE — P9037: CPT

## 2018-07-25 PROCEDURE — 80053 COMPREHEN METABOLIC PANEL: CPT

## 2018-07-25 PROCEDURE — 82553 CREATINE MB FRACTION: CPT

## 2018-07-25 PROCEDURE — 93005 ELECTROCARDIOGRAM TRACING: CPT

## 2018-07-25 PROCEDURE — 93306 TTE W/DOPPLER COMPLETE: CPT

## 2018-07-25 PROCEDURE — 87086 URINE CULTURE/COLONY COUNT: CPT

## 2018-07-25 PROCEDURE — 88312 SPECIAL STAINS GROUP 1: CPT

## 2018-07-25 PROCEDURE — 82728 ASSAY OF FERRITIN: CPT

## 2018-07-25 PROCEDURE — 96376 TX/PRO/DX INJ SAME DRUG ADON: CPT

## 2018-07-25 PROCEDURE — 84484 ASSAY OF TROPONIN QUANT: CPT

## 2018-07-25 PROCEDURE — P9016: CPT

## 2018-07-25 PROCEDURE — 97161 PT EVAL LOW COMPLEX 20 MIN: CPT

## 2018-07-25 PROCEDURE — 99232 SBSQ HOSP IP/OBS MODERATE 35: CPT

## 2018-07-25 RX ORDER — SUCRALFATE 1 G
1 TABLET ORAL
Qty: 0 | Refills: 0 | COMMUNITY
Start: 2018-07-25

## 2018-07-25 RX ORDER — FUROSEMIDE 40 MG
20 TABLET ORAL
Qty: 0 | Refills: 0 | Status: DISCONTINUED | OUTPATIENT
Start: 2018-07-25 | End: 2018-07-25

## 2018-07-25 RX ORDER — PANTOPRAZOLE SODIUM 20 MG/1
1 TABLET, DELAYED RELEASE ORAL
Qty: 0 | Refills: 0 | COMMUNITY
Start: 2018-07-25

## 2018-07-25 RX ORDER — LACTOBACILLUS ACIDOPHILUS 100MM CELL
0 CAPSULE ORAL
Qty: 0 | Refills: 0 | COMMUNITY
Start: 2018-07-25

## 2018-07-25 RX ORDER — HEPARIN SODIUM 5000 [USP'U]/ML
1 INJECTION INTRAVENOUS; SUBCUTANEOUS
Qty: 0 | Refills: 0 | COMMUNITY

## 2018-07-25 RX ADMIN — Medication 75 MICROGRAM(S): at 06:16

## 2018-07-25 RX ADMIN — Medication 1 GRAM(S): at 11:36

## 2018-07-25 RX ADMIN — Medication 3: at 14:22

## 2018-07-25 RX ADMIN — Medication 500 MILLIGRAM(S): at 06:18

## 2018-07-25 RX ADMIN — Medication 1 TABLET(S): at 11:36

## 2018-07-25 RX ADMIN — LORATADINE 10 MILLIGRAM(S): 10 TABLET ORAL at 11:36

## 2018-07-25 RX ADMIN — Medication 20 MILLIGRAM(S): at 06:16

## 2018-07-25 RX ADMIN — Medication 1 TABLET(S): at 11:53

## 2018-07-25 RX ADMIN — Medication 1 TABLET(S): at 17:52

## 2018-07-25 RX ADMIN — Medication 1: at 17:53

## 2018-07-25 RX ADMIN — PANTOPRAZOLE SODIUM 40 MILLIGRAM(S): 20 TABLET, DELAYED RELEASE ORAL at 06:16

## 2018-07-25 RX ADMIN — Medication 325 MILLIGRAM(S): at 06:16

## 2018-07-25 RX ADMIN — Medication 1 TABLET(S): at 11:35

## 2018-07-25 RX ADMIN — Medication 325 MILLIGRAM(S): at 17:53

## 2018-07-25 RX ADMIN — Medication 500 MILLIGRAM(S): at 17:53

## 2018-07-25 RX ADMIN — Medication 1 GRAM(S): at 17:52

## 2018-07-25 RX ADMIN — Medication 1 MILLIGRAM(S): at 11:36

## 2018-07-25 RX ADMIN — ERYTHROPOIETIN 10000 UNIT(S): 10000 INJECTION, SOLUTION INTRAVENOUS; SUBCUTANEOUS at 14:21

## 2018-07-25 RX ADMIN — PANTOPRAZOLE SODIUM 40 MILLIGRAM(S): 20 TABLET, DELAYED RELEASE ORAL at 17:53

## 2018-07-25 RX ADMIN — Medication 1 GRAM(S): at 06:16

## 2018-07-25 NOTE — PHYSICAL THERAPY INITIAL EVALUATION ADULT - ADDITIONAL COMMENTS
pt lives in house w/ 8 steps/rail to enter and another 8 steps/rail to 2nd floor.  Pt report is a community ambulator.  However prior to admission, pt at [facility].
Pt has 21 steps to enter with 1 rail. Pt reports the last time she was in the hospital she was carried into her home and then did not leave. O2Sat after steps and PT evaluation 95%, supplemental O2 left off.

## 2018-07-25 NOTE — PROGRESS NOTE ADULT - PROBLEM SELECTOR PROBLEM 1
Anemia, unspecified type
Asthma
Asthma, unspecified asthma severity, unspecified whether complicated, unspecified whether persistent
Anemia, unspecified type

## 2018-07-25 NOTE — PROGRESS NOTE ADULT - SUBJECTIVE AND OBJECTIVE BOX
NEPHROLOGY INTERVAL HPI/OVERNIGHT EVENTS:  HPI:  82 y/o F with PMHx DM2, hypothyroidism, asthma, arthritis, meningoma (chronic), and  BIBEMS after syncopal episode at [facility]. Patient is a poor historian and is unsure why she is in the hospital.  Family at bedside reports that patient was discharged from [facility] the previous day after being admitted with AMS 2/2 UTI.  Family reports that yesterday patient was SOB.  They report that she bleeds easily, but denies knowledge of blood in stools, changes in urine color, open wounds. Patient confirms abdominal pain. She denies chest pain, shortness of breath, palpitations, nausea or vomiting. She also denies dizziness blurry vision.    Per ED nurse after stool guaiac performed patient had red streaked stool.    In ED patient vitals are 108/54, afebrile, O2 Sat 100% on supp O2.  Labs significant for Hbg 3.8, Hct 12.6, Na 148, Cl 120, Co2 20, BUN 62, Crt 1.9, GFR 24, Ca 6.4. UA+ for Large blood, leukocyte esterase. EKG shows NSR.  Patient received 2 u PRBCs.  CT head/neck shows no acute pathology. Patient CT A/P ordered. (2018 14:43)      PAST MEDICAL & SURGICAL HISTORY:  Arthritis  Asthma  Diabetes mellitus  Hypothyroidism  No significant past surgical history      MEDICATIONS  (STANDING):  artificial tears (preservative free) Ophthalmic Solution 1 Drop(s) Both EYES daily  ascorbic acid 500 milliGRAM(s) Oral two times a day  dextrose 5%. 1000 milliLiter(s) (50 mL/Hr) IV Continuous <Continuous>  dextrose 50% Injectable 12.5 Gram(s) IV Push once  dextrose 50% Injectable 25 Gram(s) IV Push once  dextrose 50% Injectable 25 Gram(s) IV Push once  donepezil 5 milliGRAM(s) Oral at bedtime  epoetin sherita Injectable 48074 Unit(s) SubCutaneous <User Schedule>  ferrous    sulfate 325 milliGRAM(s) Oral two times a day  folic acid 1 milliGRAM(s) Oral daily  furosemide    Tablet 20 milliGRAM(s) Oral daily  insulin lispro (HumaLOG) corrective regimen sliding scale   SubCutaneous three times a day before meals  insulin lispro (HumaLOG) corrective regimen sliding scale   SubCutaneous at bedtime  lactobacillus acidophilus 1 Tablet(s) Oral three times a day with meals  levothyroxine 75 MICROGram(s) Oral daily  loratadine 10 milliGRAM(s) Oral daily  multivitamin 1 Tablet(s) Oral daily  pantoprazole    Tablet 40 milliGRAM(s) Oral two times a day  sucralfate 1 Gram(s) Oral four times a day    MEDICATIONS  (PRN):  acetaminophen   Tablet 650 milliGRAM(s) Oral every 6 hours PRN Mild Pain  ALBUTerol    0.083% 2.5 milliGRAM(s) Nebulizer every 6 hours PRN Shortness of Breath and/or Wheezing  dextrose 40% Gel 15 Gram(s) Oral once PRN Blood Glucose LESS THAN 70 milliGRAM(s)/deciliter  glucagon  Injectable 1 milliGRAM(s) IntraMuscular once PRN Glucose LESS THAN 70 milligrams/deciliter  ondansetron Injectable 4 milliGRAM(s) IV Push every 6 hours PRN Nausea and/or Vomiting      Allergies    Levaquin (Hives)  sulfa drugs (Unknown)    Intolerances        Vital Signs Last 24 Hrs  T(C): 37.1 (2018 04:27), Max: 37.1 (2018 04:27)  T(F): 98.7 (2018 04:27), Max: 98.7 (2018 04:27)  HR: 80 (2018 04:27) (60 - 84)  BP: 138/77 (2018 04:27) (138/77 - 166/94)  BP(mean): --  RR: 18 (2018 04:27) (17 - 18)  SpO2: 96% (2018 04:27) (93% - 96%)  Daily     Daily Weight in k.6 (2018 04:27)    PHYSICAL EXAM:    GENERAL: NAD, well-groomed, well-developed  HEAD:  Atraumatic, Normocephalic  EYES: EOMI, PERRLA, conjunctiva and sclera clear  ENMT: No tonsillar erythema, exudates, or enlargement; Moist mucous membranes, Good dentition, No lesions  NECK: Supple, No JVD, Normal thyroid  NERVOUS SYSTEM:  Alert & Oriented X3, Good concentration; Motor Strength 5/5 B/L upper and lower extremities; DTRs 2+ intact and symmetric  CHEST/LUNG: Clear to percussion bilaterally; No rales, rhonchi, wheezing, or rubs  HEART: Regular rate and rhythm; No murmurs, rubs, or gallops  ABDOMEN: Soft, Nontender, Nondistended; Bowel sounds present  EXTREMITIES:  2+ Peripheral Pulses, No clubbing, cyanosis, or edema  SKIN: No rashes or lesions    LABS:                      8.4    7.72  )-----------( 234      ( 2018 06:54 )             28.1     07-    141  |  105  |  41<H>  ----------------------------<  111<H>  5.0   |  33<H>  |  2.10<H>    Ca    8.1<L>      2018 06:54    RADIOLOGY & ADDITIONAL TESTS:

## 2018-07-25 NOTE — PROGRESS NOTE ADULT - PROBLEM SELECTOR PLAN 1
hgb low but stable, no overt s/s gib  sp egd 7/9 showed non bleeding du, gastritis and hh  sp repeat egd 7/17 findings of hh, esophagitis, gastritis and bile reflux  sp colonoscopy with polypectomy 7/20  path showed- Right colon polyp cw TA, sigmoid colon polyp cw TVA  no asa , anti coagulation for 5 days  trend h/h, transfuse prn  ppi bid/carafate qid  will need close outpatient gi f/u upon dc- will need repeat colon in 1 year and outpatient capsule

## 2018-07-25 NOTE — PROGRESS NOTE ADULT - SUBJECTIVE AND OBJECTIVE BOX
Neurology follow up note    RAO [id number]      Interval History:    Patient feels ok no new complaints.    MEDICATIONS    acetaminophen   Tablet 650 milliGRAM(s) Oral every 6 hours PRN  ALBUTerol    0.083% 2.5 milliGRAM(s) Nebulizer every 6 hours PRN  artificial tears (preservative free) Ophthalmic Solution 1 Drop(s) Both EYES daily  ascorbic acid 500 milliGRAM(s) Oral two times a day  dextrose 40% Gel 15 Gram(s) Oral once PRN  dextrose 5%. 1000 milliLiter(s) IV Continuous <Continuous>  dextrose 50% Injectable 12.5 Gram(s) IV Push once  dextrose 50% Injectable 25 Gram(s) IV Push once  dextrose 50% Injectable 25 Gram(s) IV Push once  donepezil 5 milliGRAM(s) Oral at bedtime  epoetin sherita Injectable 02955 Unit(s) SubCutaneous <User Schedule>  ferrous    sulfate 325 milliGRAM(s) Oral two times a day  folic acid 1 milliGRAM(s) Oral daily  furosemide    Tablet 20 milliGRAM(s) Oral <User Schedule>  glucagon  Injectable 1 milliGRAM(s) IntraMuscular once PRN  insulin lispro (HumaLOG) corrective regimen sliding scale   SubCutaneous three times a day before meals  insulin lispro (HumaLOG) corrective regimen sliding scale   SubCutaneous at bedtime  lactobacillus acidophilus 1 Tablet(s) Oral three times a day with meals  levothyroxine 75 MICROGram(s) Oral daily  loratadine 10 milliGRAM(s) Oral daily  multivitamin 1 Tablet(s) Oral daily  ondansetron Injectable 4 milliGRAM(s) IV Push every 6 hours PRN  pantoprazole    Tablet 40 milliGRAM(s) Oral two times a day  sucralfate 1 Gram(s) Oral four times a day      Allergies    Levaquin (Hives)  sulfa drugs (Unknown)    Intolerances            Vital Signs Last 24 Hrs  T(C): 37.1 (25 Jul 2018 04:27), Max: 37.1 (25 Jul 2018 04:27)  T(F): 98.7 (25 Jul 2018 04:27), Max: 98.7 (25 Jul 2018 04:27)  HR: 80 (25 Jul 2018 04:27) (60 - 84)  BP: 138/77 (25 Jul 2018 04:27) (138/77 - 166/94)  BP(mean): --  RR: 18 (25 Jul 2018 04:27) (17 - 18)  SpO2: 96% (25 Jul 2018 04:27) (93% - 96%)      REVIEW OF SYSTEMS:     Constitutional: No fever, chills, fatigue, weakness  Eyes: no eye pain, visual disturbances, or discharge  ENT:  No difficulty hearing, tinnitus, vertigo; No sinus or throat pain  Neck: No pain or stiffness  Respiratory: No cough, dyspnea, wheezing   Cardiovascular: No chest pain, palpitations,   Gastrointestinal: No abdominal or epigastric pain. No nausea, vomiting  No diarrhea or constipation.   Genitourinary: No dysuria, frequency, hematuria or incontinence  Neurological: No headaches, lightheadedness, vertigo, numbness or tremors  Psychiatric: No depression, anxiety, mood swings or difficulty sleeping  Musculoskeletal: No joint pain or swelling; No muscle, back or extremity pain  Skin: No itching, burning, rashes or lesions   Lymph Nodes: No enlarged glands  Endocrine: No heat or cold intolerance; No hair loss   Allergy and Immunologic: No hives or eczema    On Neurological Examination:    Mental Status - Patient is alert, awake,     Follow simple commands  Follow complex commands      Speech -   Fluent                        Cranial Nerves - Pupils 3 mm equal and reactive to light,   extraocular eye movements intact.   smile symmetric  intact bilateral NLF    Motor Exam -   Right upper 4/5  Left upper 4/5  bialteral lower able wiggle feet    Muscle tone - is normal all over.  No asymmetry is seen.      GENERAL Exam: Nontoxic , No Acute Distress   	  HEENT:  normocephalic, atraumatic  		  LUNGS: Decreased bilaterally  	  HEART: Normal S1S2   No murmur RRR        	  GI/ ABDOMEN:  Soft  Non tender    EXTREMITIES:   No Edema  No Clubbing  No Cyanosis   	   SKIN: Normal  No Ecchymosis             LABS:  CBC Full  -  ( 25 Jul 2018 06:54 )  WBC Count : 7.72 K/uL  Hemoglobin : 8.4 g/dL  Hematocrit : 28.1 %  Platelet Count - Automated : 234 K/uL  Mean Cell Volume : 88.9 fl  Mean Cell Hemoglobin : 26.6 pg  Mean Cell Hemoglobin Concentration : 29.9 gm/dL  Auto Neutrophil # : x  Auto Lymphocyte # : x  Auto Monocyte # : x  Auto Eosinophil # : x  Auto Basophil # : x  Auto Neutrophil % : x  Auto Lymphocyte % : x  Auto Monocyte % : x  Auto Eosinophil % : x  Auto Basophil % : x      07-25    141  |  105  |  41<H>  ----------------------------<  111<H>  5.0   |  33<H>  |  2.10<H>    Ca    8.1<L>      25 Jul 2018 06:54      Hemoglobin A1C:       Vitamin B12         RADIOLOGY    ASSESSMENT AND PLAN     MCI vs slight dementia   on Aricept no side effects    monitor H/H as needed    Hypothyroidism,  Levothyroxine    spoke to daughter in past     no new events       Physical therapy evaluation.  OOB to chair/ambulation with assistance only.    Greater than 40 minutes spent in direct patient care reviewing  the notes, lab data/ imaging , discussion with multidisciplinary team.

## 2018-07-25 NOTE — PROGRESS NOTE ADULT - ASSESSMENT
83 year old female with DM2, HTN, here with an episode of syncope, found to have significant anemia. Improvement in lethargy with PRBC transfusions    - Patient presented with profound anemia, s/p PRBC's x 5 units total and 1 unit of platelets    - H/H has been stable.  Continue to trend and transfuse as needed   - s/p EGD 7/9 with clean based duodenal ulcer, gastritis, hiatal hernia  - s/p colonoscopy with polypectomy.  - Chronic lower extremity edema unchanged.    Administer Lasix after blood products transfusion to avoid fluid volume overload. Lasix 20mg PO qday changed by renal to every other day 2/2 rising creatinine.    - Echo with normal LV function.  - Hemodynamics stable as per flow sheet, with a tendency toward intermittent hypertension    - BARBARA, Cr trending up with diuretics.  Baseline creatinine unknown, though normal in 2016, BARBARA likely due to her profound anemia.  Avoid nephrotoxics.  Renal following  - Monitor electrolytes. Keep K>4, Mg>2  - will follow    Christine Thomson NP-C  Cardiology

## 2018-07-25 NOTE — PROGRESS NOTE ADULT - SUBJECTIVE AND OBJECTIVE BOX
Date/Time Patient Seen:  		  Referring MD:   Data Reviewed	       Patient is a 83y old  Female who presents with a chief complaint of profound anemia (16 Jul 2018 09:11)  vs and meds reviewed  placement pending      Subjective/HPI     PAST MEDICAL & SURGICAL HISTORY:  Arthritis  Asthma  Diabetes mellitus  Hypothyroidism  No significant past surgical history        Medication list         MEDICATIONS  (STANDING):  artificial tears (preservative free) Ophthalmic Solution 1 Drop(s) Both EYES daily  ascorbic acid 500 milliGRAM(s) Oral two times a day  dextrose 5%. 1000 milliLiter(s) (50 mL/Hr) IV Continuous <Continuous>  dextrose 50% Injectable 12.5 Gram(s) IV Push once  dextrose 50% Injectable 25 Gram(s) IV Push once  dextrose 50% Injectable 25 Gram(s) IV Push once  donepezil 5 milliGRAM(s) Oral at bedtime  epoetin sherita Injectable 56285 Unit(s) SubCutaneous <User Schedule>  ferrous    sulfate 325 milliGRAM(s) Oral two times a day  folic acid 1 milliGRAM(s) Oral daily  furosemide    Tablet 20 milliGRAM(s) Oral daily  insulin lispro (HumaLOG) corrective regimen sliding scale   SubCutaneous three times a day before meals  insulin lispro (HumaLOG) corrective regimen sliding scale   SubCutaneous at bedtime  lactobacillus acidophilus 1 Tablet(s) Oral three times a day with meals  levothyroxine 75 MICROGram(s) Oral daily  loratadine 10 milliGRAM(s) Oral daily  multivitamin 1 Tablet(s) Oral daily  pantoprazole    Tablet 40 milliGRAM(s) Oral two times a day  sucralfate 1 Gram(s) Oral four times a day    MEDICATIONS  (PRN):  acetaminophen   Tablet 650 milliGRAM(s) Oral every 6 hours PRN Mild Pain  ALBUTerol    0.083% 2.5 milliGRAM(s) Nebulizer every 6 hours PRN Shortness of Breath and/or Wheezing  dextrose 40% Gel 15 Gram(s) Oral once PRN Blood Glucose LESS THAN 70 milliGRAM(s)/deciliter  glucagon  Injectable 1 milliGRAM(s) IntraMuscular once PRN Glucose LESS THAN 70 milligrams/deciliter  ondansetron Injectable 4 milliGRAM(s) IV Push every 6 hours PRN Nausea and/or Vomiting         Vitals log        ICU Vital Signs Last 24 Hrs  T(C): 37.1 (25 Jul 2018 04:27), Max: 37.1 (25 Jul 2018 04:27)  T(F): 98.7 (25 Jul 2018 04:27), Max: 98.7 (25 Jul 2018 04:27)  HR: 80 (25 Jul 2018 04:27) (60 - 84)  BP: 138/77 (25 Jul 2018 04:27) (138/77 - 166/94)  BP(mean): --  ABP: --  ABP(mean): --  RR: 18 (25 Jul 2018 04:27) (17 - 18)  SpO2: 96% (25 Jul 2018 04:27) (93% - 96%)           Input and Output:  I&O's Detail      Lab Data                        8.3    7.13  )-----------( 230      ( 24 Jul 2018 07:21 )             27.7     07-24    142  |  106  |  37<H>  ----------------------------<  97  4.8   |  33<H>  |  1.90<H>    Ca    8.3<L>      24 Jul 2018 07:21              Review of Systems	      Objective     Physical Examination      heart s1s2  lung dec BS  abd soft    Pertinent Lab findings & Imaging      Hazel:  NO   Adequate UO     I&O's Detail           Discussed with:     Cultures:	        Radiology

## 2018-07-25 NOTE — PROGRESS NOTE ADULT - SUBJECTIVE AND OBJECTIVE BOX
INTERVAL HPI/OVERNIGHT EVENTS:  pt seen and examined  pt denies n/v/abd pain, +bm today  per overnight rn no s/s overt gib  labs noted afebrile overnight    MEDICATIONS  (STANDING):  artificial tears (preservative free) Ophthalmic Solution 1 Drop(s) Both EYES daily  ascorbic acid 500 milliGRAM(s) Oral two times a day  dextrose 5%. 1000 milliLiter(s) (50 mL/Hr) IV Continuous <Continuous>  dextrose 50% Injectable 12.5 Gram(s) IV Push once  dextrose 50% Injectable 25 Gram(s) IV Push once  dextrose 50% Injectable 25 Gram(s) IV Push once  donepezil 5 milliGRAM(s) Oral at bedtime  epoetin sherita Injectable 22166 Unit(s) SubCutaneous <User Schedule>  ferrous    sulfate 325 milliGRAM(s) Oral two times a day  folic acid 1 milliGRAM(s) Oral daily  furosemide    Tablet 20 milliGRAM(s) Oral daily  insulin lispro (HumaLOG) corrective regimen sliding scale   SubCutaneous three times a day before meals  insulin lispro (HumaLOG) corrective regimen sliding scale   SubCutaneous at bedtime  lactobacillus acidophilus 1 Tablet(s) Oral three times a day with meals  levothyroxine 75 MICROGram(s) Oral daily  loratadine 10 milliGRAM(s) Oral daily  multivitamin 1 Tablet(s) Oral daily  pantoprazole    Tablet 40 milliGRAM(s) Oral two times a day  sucralfate 1 Gram(s) Oral four times a day    MEDICATIONS  (PRN):  acetaminophen   Tablet 650 milliGRAM(s) Oral every 6 hours PRN Mild Pain  ALBUTerol    0.083% 2.5 milliGRAM(s) Nebulizer every 6 hours PRN Shortness of Breath and/or Wheezing  dextrose 40% Gel 15 Gram(s) Oral once PRN Blood Glucose LESS THAN 70 milliGRAM(s)/deciliter  glucagon  Injectable 1 milliGRAM(s) IntraMuscular once PRN Glucose LESS THAN 70 milligrams/deciliter  ondansetron Injectable 4 milliGRAM(s) IV Push every 6 hours PRN Nausea and/or Vomiting      Allergies    Levaquin (Hives)  sulfa drugs (Unknown)    Intolerances        Review of Systems:    General:  No wt loss, fevers, chills, night sweats, fatigue   Eyes:  Good vision, no reported pain  ENT:  No sore throat, pain, runny nose, dysphagia  CV:  No pain, palpitations, hypo/hypertension  Resp:  No dyspnea, cough, tachypnea, wheezing  GI:  No pain, No nausea, No vomiting, No diarrhea, No constipation, No weight loss, No fever, No pruritis, No rectal bleeding, No melena, No dysphagia  :  No pain, bleeding, incontinence, nocturia  Muscle:  No pain, weakness  Neuro:  No weakness, tingling, memory problems  Psych:  No fatigue, insomnia, mood problems, depression  Endocrine:  No polyuria, polydypsia, cold/heat intolerance  Heme:  No petechiae, ecchymosis, easy bruisability  Skin:  No rash, tattoos, scars, edema      Vital Signs Last 24 Hrs  T(C): 37.1 (25 Jul 2018 04:27), Max: 37.1 (25 Jul 2018 04:27)  T(F): 98.7 (25 Jul 2018 04:27), Max: 98.7 (25 Jul 2018 04:27)  HR: 80 (25 Jul 2018 04:27) (60 - 84)  BP: 138/77 (25 Jul 2018 04:27) (138/77 - 166/94)  BP(mean): --  RR: 18 (25 Jul 2018 04:27) (17 - 18)  SpO2: 96% (25 Jul 2018 04:27) (93% - 96%)    PHYSICAL EXAM:    Constitutional: NAD, lying in bed  HEENT: EOMI, perrl  Neck: No LAD  Respiratory: dec bs  Cardiovascular: S1 and S2, RRR  Gastrointestinal: obese abd BS+, soft, NT, nd  Extremities: +edema and blistering to le  Vascular: 2+ peripheral pulses  Neurological: Awake alert confused   Skin: see above      LABS:                        8.4    7.72  )-----------( 234      ( 25 Jul 2018 06:54 )             28.1     07-25    141  |  105  |  41<H>  ----------------------------<  111<H>  5.0   |  33<H>  |  2.10<H>    Ca    8.1<L>      25 Jul 2018 06:54            RADIOLOGY & ADDITIONAL TESTS:  Surgical Pathology Final Report -  (07.20.18 @ 07:54)    Surgical Pathology Final Report - :   ACCESSION No:  [id number]    NELLY JEFFA                      1        Surgical Final Report          Final Diagnosis  1. Right colon polyp, biopsy:  - Tubular adenoma.    2. Colon, sigmoid polyp, polypectomy:  -Tubulovillous adenoma.    Win Sanders MD  (Electronic Signature)  Reported on: 07/24/18    Clinical Information  Anemia  Procedure: Colonoscopy    Specimen Description  1-Right colon polyp  2-Sigmoid polyp    Gross Description  1. The specimen is received in formalin and the specimen  container is labeled: Right colon polyp. It consists of multiple  fragments of tan soft tissue measuring in aggregate 0.6 x 0.4 x  0.2 cm. Entirely submitted. One cassette.    2. The specimen is received in formalin and the specimen  container is labeled: Sigmoid polyp. It consists of two tan soft  polypoid pieces of tissue measuring 0.3 and 0.7 cm in greatest  dimension. The base of each piece of tissue is inked green and  sectioned. Entirely submitted. One cassette.    In addition to other data that may appear on the specimen  containers, all labels have been inspected to confirm the  presence of the patient's name and date of birth.  PARMINDER 7/24/2018 7:04 AM

## 2018-07-25 NOTE — PROGRESS NOTE ADULT - ASSESSMENT
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 Renal function fluctuates but relatively stable , eGFR 26-30 ml/min  Hypernatremia - Resolved  Hyperkalemia  - Resolved   Anemia: Blood loss anemia, severe: GIB  Arthritis  Asthma  Diabetes mellitus  Lymphedema  s/p EGD attempted.   sp colonoscopy with polypectomy    1) Pedunculated Sigmoid Polyp    2) Right sided polyp    3) Diverticulosis    4) Hemorrhoids    - Renal indices are stable with a cr of 1.9 to 2.1; within baseline range  - Lasix restarted on 7/23/18; cr is rising a bit: 7/25/18 cr 2.1: will reduce lasix to every other day  - No indication for kidney biopsy. No signs of underlying GN or nephrotic syndrome   - No uremic bleeding diathesis. If bleeding becomes a recurrent issue, will consider on dose of DDAVP 0.3 /kg bw  - Urine studies reviewed. No real proteinuria. FeNa does not suggest pre-renal. S/p brief IVF.   - Low K diet   - ACEi can be started outpatient  - Cardio eval noted  - EPO as ordered    D/W RN    Thank you; Renally stable for outpatient follow up

## 2018-07-25 NOTE — PHYSICAL THERAPY INITIAL EVALUATION ADULT - GENERAL OBSERVATIONS, REHAB EVAL
pt found lying supine in bed.
Pt rec'd sitting at the edge of the bed with her daughter present. Nasal cannula around pt's neck and not in nose. Agreeable to PT evaluation after encouragement from daughter and PT.

## 2018-07-25 NOTE — PROGRESS NOTE ADULT - SUBJECTIVE AND OBJECTIVE BOX
[facility] -- Mabel Hernandez, Cesar Lopez Pannella, Patel, Savella  Office # [phone]      Follow Up:   anemia, s/p syncope    Subjective/Observations: Seen and examined.  Sitting in chair finishing lunch.  No complaints of cp, sob, palpitations or bleeding.  NAD appears comfortable.      REVIEW OF SYSTEMS: All other review of systems is negative unless indicated above    PAST MEDICAL & SURGICAL HISTORY:  Arthritis  Asthma  Diabetes mellitus  Hypothyroidism  No significant past surgical history      MEDICATIONS  (STANDING):  artificial tears (preservative free) Ophthalmic Solution 1 Drop(s) Both EYES daily  ascorbic acid 500 milliGRAM(s) Oral two times a day  dextrose 5%. 1000 milliLiter(s) (50 mL/Hr) IV Continuous <Continuous>  dextrose 50% Injectable 12.5 Gram(s) IV Push once  dextrose 50% Injectable 25 Gram(s) IV Push once  dextrose 50% Injectable 25 Gram(s) IV Push once  donepezil 5 milliGRAM(s) Oral at bedtime  epoetin sherita Injectable 33334 Unit(s) SubCutaneous <User Schedule>  ferrous    sulfate 325 milliGRAM(s) Oral two times a day  folic acid 1 milliGRAM(s) Oral daily  furosemide    Tablet 20 milliGRAM(s) Oral <User Schedule>  insulin lispro (HumaLOG) corrective regimen sliding scale   SubCutaneous three times a day before meals  insulin lispro (HumaLOG) corrective regimen sliding scale   SubCutaneous at bedtime  lactobacillus acidophilus 1 Tablet(s) Oral three times a day with meals  levothyroxine 75 MICROGram(s) Oral daily  loratadine 10 milliGRAM(s) Oral daily  multivitamin 1 Tablet(s) Oral daily  pantoprazole    Tablet 40 milliGRAM(s) Oral two times a day  sucralfate 1 Gram(s) Oral four times a day    MEDICATIONS  (PRN):  acetaminophen   Tablet 650 milliGRAM(s) Oral every 6 hours PRN Mild Pain  ALBUTerol    0.083% 2.5 milliGRAM(s) Nebulizer every 6 hours PRN Shortness of Breath and/or Wheezing  dextrose 40% Gel 15 Gram(s) Oral once PRN Blood Glucose LESS THAN 70 milliGRAM(s)/deciliter  glucagon  Injectable 1 milliGRAM(s) IntraMuscular once PRN Glucose LESS THAN 70 milligrams/deciliter  ondansetron Injectable 4 milliGRAM(s) IV Push every 6 hours PRN Nausea and/or Vomiting      Allergies    Levaquin (Hives)  sulfa drugs (Unknown)    Intolerances            Vital Signs Last 24 Hrs  T(C): 37.2 (25 Jul 2018 13:42), Max: 37.2 (25 Jul 2018 13:42)  T(F): 99 (25 Jul 2018 13:42), Max: 99 (25 Jul 2018 13:42)  HR: 93 (25 Jul 2018 13:42) (60 - 93)  BP: 152/89 (25 Jul 2018 13:42) (138/77 - 166/94)  BP(mean): --  RR: 17 (25 Jul 2018 13:42) (17 - 18)  SpO2: 98% (25 Jul 2018 13:42) (93% - 98%)    I&O's Summary        PHYSICAL EXAM:  TELE: Not on tele  Constitutional: NAD, awake and alert, obese  HEENT: Moist Mucous Membranes, Anicteric  Pulmonary: Non-labored, breath sounds are clear bilaterally, No wheezing, rales or rhonchi  Cardiovascular: Regular, S1 and S2, No murmurs, rubs, gallops or clicks  Gastrointestinal: Bowel Sounds present, soft, nontender.   Lymph: No peripheral edema. chronic lymphadenopathy bilateral lower extremities  Skin: evidence of PVD of b/l LE with dermatitis  Psych:  Mood & affect appropriate    LABS: All Labs Reviewed:                        8.4    7.72  )-----------( 234      ( 25 Jul 2018 06:54 )             28.1                         8.3    7.13  )-----------( 230      ( 24 Jul 2018 07:21 )             27.7                         7.9    8.26  )-----------( 225      ( 23 Jul 2018 07:05 )             26.8     25 Jul 2018 06:54    141    |  105    |  41     ----------------------------<  111    5.0     |  33     |  2.10   24 Jul 2018 07:21    142    |  106    |  37     ----------------------------<  97     4.8     |  33     |  1.90   23 Jul 2018 07:05    142    |  107    |  35     ----------------------------<  112    4.5     |  30     |  1.90     Ca    8.1        25 Jul 2018 06:54  Ca    8.3        24 Jul 2018 07:21  Ca    7.8        23 Jul 2018 07:05    < from: 12 Lead ECG (07.13.18 @ 22:04) >  Ventricular Rate 68 BPM    Atrial Rate 68 BPM    P-R Interval 142 ms    QRS Duration 80 ms    Q-T Interval 376 ms    QTC Calculation(Bezet) 399 ms    P Axis 44 degrees    R Axis 6 degrees    T Axis 23 degrees    Diagnosis Line Normal sinus rhythm  Low voltage QRS  Septal infarct , age undetermined  Abnormal ECG  When compared with ECG of 07-JUL-2018 13:03,  Septal infarct is now present  Confirmed by MAYRA COOPER ([id number]) on 7/14/2018 5:26:45 PM    < end of copied text >    < from: TTE Echo Doppler w/o Cont (07.19.18 @ 19:14) >   EXAM:  ECHO TTE W/O CON COMP W/DOPPLR         PROCEDURE DATE:  07/19/2018        INTERPRETATION:  Ordering Physician: PEPE LIMA [id number]    Indication: Murmur    Study Quality: Technically difficult  A complete echocardiographic study was performed utilizing standard   protocol including spectral and color Doppler in all echocardiographic   windows.    Height: 165 cm  Weight: 90.1 kg  BSA: 1.97 sq m  Blood Pressure: 142/85    MEASUREMENTS  IVS: 1.2cm  PWT: 1.1cm  LA: 3.1cm  AO: 3.1cm  LVIDd: 4.9cm  LVIDs: 3.3cm    RVSP: 48mmHg    FINDINGS  Left Ventricle: The endocardium is not well-visualized. Grossly normal   left ventricular size and systolic function. No segmental wall motion   abnormalities are visualized.  Aortic Valve: Calcified aortic valve with normal opening  Mitral Valve: Mitral annular calcification. Mild mitral regurgitation  Tricuspid Valve: Grossly normal tricuspid valve. Mild tricuspid   regurgitation  Pulmonic Valve: Not well visualized.  Left Atrium: Moderate left atrial enlargement  Right Ventricle: In limited views, grossly normal right ventricular size   and systolic function  Right Atrium: Grossly normal  Diastolic Function: Doppler evidence of grade 1 diastolic dysfunction  Pericardium/Pleura: No pericardial effusion  Hemodynamics: The pulmonary artery systolic pressure is estimated to be   48 mmHg, assuming the right atrial pressure is equal to 8 mmHg,   consistent with mildly elevated pulmonary pressures.    CONCLUSIONS:  1. Technically difficult and limited study  2. Grossly normal left ventricular size and systolic function  3. Calcified aortic valve with normal opening  4. Mild mitral regurgitation  5. Moderate left atrial enlargement  6. In limited views, grossly normal right ventricular size and systolic   function  7. Doppler evidence of grade 1 diastolic dysfunction  8. Mild pulmonary hypertension  9. No pericardial effusion    No prior exam for comparison.                    INNA THACKERSYMONE   This document has been electronically signed. Jul 20 2018  1:52PM    < end of copied text >    < from: US Duplex Venous Lower Ext Complete, Bilateral (07.16.18 @ 17:44) >  EXAM:  US DPLX LWR EXT VEINS COMPL BI                            PROCEDURE DATE:  07/16/2018          INTERPRETATION:  CLINICAL INFORMATION: Bilateral lower extremity edema    COMPARISON: 4/13/2016    TECHNIQUE: Duplex sonography of the BILATERAL LOWER extremities with   color and spectral Doppler, with and without compression.     Technical note indicates Limited exam due to the patient's body habitus.     FINDINGS:    There is normal compressibility of the bilateral common femoral, femoral   and popliteal veins. No calf vein thrombosis is detected.    Doppler examination shows normal spontaneous and phasic flow.    IMPRESSION:     No evidence of bilateral lower extremity deep venous thrombosis.                    HAMMAD TRENT M.D.,ATTENDING RADIOLOGIST  This document has been electronically signed. Jul 16 2018  5:48PM          < end of copied text >

## 2018-07-25 NOTE — PROGRESS NOTE ADULT - PROVIDER SPECIALTY LIST ADULT
Anesthesia
Cardiology
Critical Care
Gastroenterology
Heme/Onc
Heme/Onc
Hospitalist
Nephrology
Neurology
Pulmonology
Cardiology
Cardiology
Heme/Onc
Nephrology
Hospitalist

## 2018-07-25 NOTE — PROGRESS NOTE ADULT - ATTENDING COMMENTS
82 y/o F with PMHx CKD, DM2, hypothyroidism, asthma, presenting with GIB and acute blood loss anemia.      --hypotension, now improved  cont IVF  hold anti-htn meds  --stable respiratory status  *R apical lung nodule (1cm) incidentially found on CT C spine, will need outpt followup and repeat imaging in 3mos  --suspected UGIB  PPI gtt  serial CBCs, is responding appropriately to PRBC transfusions  plan for EGD tomorrow  NPO  --BARBARA on CKD, likely volume depletion  cont IVF  --no suspicion for infection, off Abx
83F PMH CKD, DM2, hypothyroidism, and asthma who was recently treated for UTI at [facility] now presents with acute blood loss anemia due to acute upper GI bleed, s/p 5 prbc's and 1 plts. S/p EGD 7/9 revealing non-bleeding duodenal ulcer    - No active neuro issues  - HD stable, hold aspirin given GI bleed  - Stable respiratory status, f/up as outpatient for 1 cm R apical lung nodule seen incidentally on CT c-spine  - Non-bleeding duodenal ulcer, H/H remains stable, give Vitamin K for INR >2 (likely due to massive transfusion and not eating). Stop pantoprazole gtt, change to oral bid. Start sucralfate 1 gm qid. Start diabetic diet  - CKD stable, monitor BUN/Cr, stable lytes, strict I/O's. Has chronic walls with no evidence of current UTI  - Observe off abx, resolved leukocytosis with resolved GI bleed  - SCD's for DVT ppx, start HSQ tomorrow if remains stable  - Discussed with patient at bedside  - Stable for floors
Chart reviewed    Patient seen and examined    Agree with plan as outlined above
Chart reviewed    Patient seen and examined    Agree with plan as outlined above
I personally saw and examined the patient in detail.  I have spoken to the above provider regarding the assessment and plan of care.  I reviewed the above assessment and plan of care, and agree.  I have made changes in the body of the note where appropriate.  Still high risk of re bleeding.  Need to follow H/H closely and transfuse as needed.
I saw and examined the patient personally. Spoke with above provider regarding this case. I reviewed the above findings completely.  I agree with the above history, physical, and plan which I have edited where appropriate.
Seen/examined. Agree with above.
Seen/examined. Agree with above.  Clinically improving, no additional bleeding. Watch for volume overload.
I saw and examined the patient personally 7/13. Spoke with above provider regarding this case. I reviewed the above findings completely.  I agree with the above history, physical, and plan which I have edited where appropriate.
I saw and examined the patient personally. Spoke with above provider regarding this case. I reviewed the above findings completely.  I agree with the above history, physical, and plan which I have edited where appropriate.
dc planning likely tomorrow
dc planning as per SW/CM  h/h stable
GI f/u requested.  Tylenol PRN for aches and pains, cant give NSAIDs secondary to GIB
Plan for Sigmoidoscopy vs Colonoscopy in am. Medically optimized.
Plan for colonscopy in am  Clear liquid diet  Check CBC in am
consider pscyh/neuro for confusion, trend h/h
apprec pscyh recs, pt does not have medical decision making capacity
D/w GI. Plan for Colonoscopy once patient is preped.
Plan for Flex sigmoidoscopy today  Monitor H/H  D/w Daughter, requesting neuro evaluation to assess for long term prognosis and treatment for dementia because has not seen Neurologist erica since symptoms of dementia started. Dr. DEVIN Jackson called.
Patient is medically optimized for colonoscopy.
7/17 EGD showed gastritis and esophagitis.  Colonoscopy scheduled for Friday. Pt is upset about diet  Per GI - 2 day prep, on clear fluids carb counting for DM

## 2018-07-25 NOTE — PROGRESS NOTE ADULT - PROBLEM SELECTOR PLAN 1
asthma, op, oa, dementia, anemia  supportive measures  keep Hgb > 7  NEBS prn for asthma  cvs regimen and BP control  oral hygiene  PT  planned for poss NAOMY  prognosis guarded

## 2018-07-25 NOTE — PHYSICAL THERAPY INITIAL EVALUATION ADULT - PERTINENT HX OF CURRENT PROBLEM, REHAB EVAL
82 y/o F BIBEMS after syncopal episode at [facility] [address]. Pt was discharged from [facility] the previous day after being admitted with AMS 2/2 UTI. CT head/neck shows no acute pathology.CT A/P shows possible rectal stercoral colitis, no source of bleed evident. (CXR) No evidence of acute cardiopulmonary process.
Per H&P: "82 y/o F with PMHx DM2, hypothyroidism, asthma, arthritis, meningoma (chronic), and  BIBEMS after syncopal episode at [facility]."

## 2018-09-26 ENCOUNTER — INPATIENT (INPATIENT)
Facility: HOSPITAL | Age: 83
LOS: 4 days | Discharge: HOME | DRG: 641 | End: 2018-10-01
Attending: INTERNAL MEDICINE | Admitting: INTERNAL MEDICINE
Payer: COMMERCIAL

## 2018-09-26 VITALS
HEART RATE: 74 BPM | OXYGEN SATURATION: 96 % | RESPIRATION RATE: 18 BRPM | TEMPERATURE: 98 F | DIASTOLIC BLOOD PRESSURE: 60 MMHG | SYSTOLIC BLOOD PRESSURE: 120 MMHG

## 2018-09-26 DIAGNOSIS — M79.89 OTHER SPECIFIED SOFT TISSUE DISORDERS: ICD-10-CM

## 2018-09-26 LAB
ALBUMIN SERPL ELPH-MCNC: 2.4 G/DL — LOW (ref 3.3–5)
ALP SERPL-CCNC: 200 U/L — HIGH (ref 40–120)
ALT FLD-CCNC: 7 U/L — LOW (ref 12–78)
ANION GAP SERPL CALC-SCNC: 6 MMOL/L — SIGNIFICANT CHANGE UP (ref 5–17)
APTT BLD: 31.9 SEC — SIGNIFICANT CHANGE UP (ref 27.5–37.4)
AST SERPL-CCNC: 10 U/L — LOW (ref 15–37)
BASOPHILS # BLD AUTO: 0.03 K/UL — SIGNIFICANT CHANGE UP (ref 0–0.2)
BASOPHILS NFR BLD AUTO: 0.4 % — SIGNIFICANT CHANGE UP (ref 0–2)
BILIRUB SERPL-MCNC: 0.3 MG/DL — SIGNIFICANT CHANGE UP (ref 0.2–1.2)
BUN SERPL-MCNC: 39 MG/DL — HIGH (ref 7–23)
CALCIUM SERPL-MCNC: 7.9 MG/DL — LOW (ref 8.5–10.1)
CHLORIDE SERPL-SCNC: 110 MMOL/L — HIGH (ref 96–108)
CO2 SERPL-SCNC: 26 MMOL/L — SIGNIFICANT CHANGE UP (ref 22–31)
CREAT SERPL-MCNC: 1.9 MG/DL — HIGH (ref 0.5–1.3)
EOSINOPHIL # BLD AUTO: 0.18 K/UL — SIGNIFICANT CHANGE UP (ref 0–0.5)
EOSINOPHIL NFR BLD AUTO: 2.6 % — SIGNIFICANT CHANGE UP (ref 0–6)
GLUCOSE SERPL-MCNC: 105 MG/DL — HIGH (ref 70–99)
HCT VFR BLD CALC: 31.8 % — LOW (ref 34.5–45)
HGB BLD-MCNC: 9.9 G/DL — LOW (ref 11.5–15.5)
IMM GRANULOCYTES NFR BLD AUTO: 0.4 % — SIGNIFICANT CHANGE UP (ref 0–1.5)
INR BLD: 1.13 RATIO — SIGNIFICANT CHANGE UP (ref 0.88–1.16)
LACTATE SERPL-SCNC: 0.7 MMOL/L — SIGNIFICANT CHANGE UP (ref 0.7–2)
LYMPHOCYTES # BLD AUTO: 1.1 K/UL — SIGNIFICANT CHANGE UP (ref 1–3.3)
LYMPHOCYTES # BLD AUTO: 15.8 % — SIGNIFICANT CHANGE UP (ref 13–44)
MCHC RBC-ENTMCNC: 24.8 PG — LOW (ref 27–34)
MCHC RBC-ENTMCNC: 31.1 GM/DL — LOW (ref 32–36)
MCV RBC AUTO: 79.5 FL — LOW (ref 80–100)
MONOCYTES # BLD AUTO: 0.39 K/UL — SIGNIFICANT CHANGE UP (ref 0–0.9)
MONOCYTES NFR BLD AUTO: 5.6 % — SIGNIFICANT CHANGE UP (ref 2–14)
NEUTROPHILS # BLD AUTO: 5.25 K/UL — SIGNIFICANT CHANGE UP (ref 1.8–7.4)
NEUTROPHILS NFR BLD AUTO: 75.2 % — SIGNIFICANT CHANGE UP (ref 43–77)
NT-PROBNP SERPL-SCNC: 1588 PG/ML — HIGH (ref 0–450)
PLATELET # BLD AUTO: 306 K/UL — SIGNIFICANT CHANGE UP (ref 150–400)
POTASSIUM SERPL-MCNC: 4.9 MMOL/L — SIGNIFICANT CHANGE UP (ref 3.5–5.3)
POTASSIUM SERPL-SCNC: 4.9 MMOL/L — SIGNIFICANT CHANGE UP (ref 3.5–5.3)
PROT SERPL-MCNC: 6.4 G/DL — SIGNIFICANT CHANGE UP (ref 6–8.3)
PROTHROM AB SERPL-ACNC: 12.4 SEC — SIGNIFICANT CHANGE UP (ref 9.8–12.7)
RBC # BLD: 4 M/UL — SIGNIFICANT CHANGE UP (ref 3.8–5.2)
RBC # FLD: 16.7 % — HIGH (ref 10.3–14.5)
SODIUM SERPL-SCNC: 142 MMOL/L — SIGNIFICANT CHANGE UP (ref 135–145)
WBC # BLD: 6.98 K/UL — SIGNIFICANT CHANGE UP (ref 3.8–10.5)
WBC # FLD AUTO: 6.98 K/UL — SIGNIFICANT CHANGE UP (ref 3.8–10.5)

## 2018-09-26 PROCEDURE — 71045 X-RAY EXAM CHEST 1 VIEW: CPT | Mod: 26

## 2018-09-26 PROCEDURE — 99284 EMERGENCY DEPT VISIT MOD MDM: CPT

## 2018-09-26 NOTE — ED ADULT NURSE NOTE - NSIMPLEMENTINTERV_GEN_ALL_ED
Implemented All Fall Risk Interventions:  [place] to call system. Call bell, personal items and telephone within reach. Instruct patient to call for assistance. Room bathroom lighting operational. Non-slip footwear when patient is off stretcher. Physically safe environment: no spills, clutter or unnecessary equipment. Stretcher in lowest position, wheels locked, appropriate side rails in place. Provide visual cue, wrist band, yellow gown, etc. Monitor gait and stability. Monitor for mental status changes and reorient to person, place, and time. Review medications for side effects contributing to fall risk. Reinforce activity limits and safety measures with patient and family.

## 2018-09-26 NOTE — ED PROVIDER NOTE - OBJECTIVE STATEMENT
82 yo female hx of arthritis, asthma, DM, hypothyroidism BIBEMS for b/l leg weeping edema with difficulty ambulating.  Patient was too scared to walk today.  No fever/chills.  States b/l leg swelling as been getting worse.  No chest pain, no shortness of breath.  PMD Dr. Stratton. 82 yo female hx of arthritis, asthma, DM, hypothyroidism BIBEMS for b/l leg weeping edema with difficulty ambulating.  Patient was too scared to walk today.  No fever/chills.  States b/l leg swelling as been getting worse.  No chest pain, no shortness of breath. Admits to not taking her diuretic ever since discharge 1 week ago. PMD Dr. Stratton. 82 yo female hx of arthritis, asthma, DM, hypothyroidism BIBEMS for b/l leg weeping edema with difficulty ambulating.  Patient was too scared to walk today.  No fever/chills.  States b/l leg swelling as been getting worse.  No chest pain, no shortness of breath. Admits to not taking her diuretic ever since discharge 1 week ago. PMD Dr. Russell. 82 yo female hx of arthritis, asthma, DM, hypothyroidism BIBEMS for b/l leg weeping edema with difficulty ambulating.  Patient was too scared to walk today.  No fever/chills.  States b/l leg swelling as been getting worse.  No chest pain, no shortness of breath. Admits to not taking her diuretic ever since discharge 1 week ago. PMD [facility] calls

## 2018-09-26 NOTE — ED PROVIDER NOTE - CARE PLAN
Principal Discharge DX:	Leg swelling Principal Discharge DX:	Leg swelling  Secondary Diagnosis:	Social problem

## 2018-09-26 NOTE — ED ADULT NURSE NOTE - OBJECTIVE STATEMENT
Pt. received alert and oriented x4 with chief complaint of increased swelling to legs. Pt. presents w/ bilateral lower extremity edema w/ reddened area to right lower extremity. Pt. denies SOB. Pt. presents with stage 1 coccyx pressure ulcer. Pt. received alert and oriented x4 with chief complaint of increased swelling to legs. Pt. presents w/ bilateral lower extremity edema w/ reddened area to right lower extremity. Pt. denies SOB. Pt. presents with stage 1 coccyx pressure ulcer and suspected DTI to bilateral gluteal area and sacral area.

## 2018-09-26 NOTE — ED PROVIDER NOTE - NS ED ROS FT

## 2018-09-26 NOTE — ED PROVIDER NOTE - PHYSICAL EXAMINATION
Gen: Alert, NAD  Head/eyes: NC/AT, PERRL, EOMI, normal lids/conjunctiva, no scleral icterus  ENT: Bilateral TM WNL, normal hearing, patent oropharynx without erythema/exudate, uvula midline, no peritonsillar abscess, no tongue/uvula swelling  Neck: supple, no tenderness/meningismus/JVD, Trachea midline  Pulm: Bilateral clear BS, normal resp effort, no wheeze/stridor/retractions  CV: RRR, no M/R/G, +2 dist pulses (radial, pedal DP/PT, popliteal)  Abd: soft, NT/ND, +BS, no guarding/rebound tenderness  Musculoskeletal: no edema/erythema/cyanosis, FROM in all extremities, no C/T/L spine ttp  Skin: b/l LE weeping edema, swelling with associate erythema  Neuro: AAOx3, CN 2-12 intact, normal sensation, 5/5 motor strength in all extremities, normal gait, no dysmetria Gen: Alert, NAD, morbidly obese  Head/eyes: NC/AT, PERRL, EOMI, normal lids/conjunctiva, no scleral icterus  ENT: Bilateral TM WNL, normal hearing, patent oropharynx without erythema/exudate, uvula midline, no peritonsillar abscess, no tongue/uvula swelling  Neck: supple, no tenderness/meningismus/JVD, Trachea midline  Pulm: Bilateral clear BS, normal resp effort, no wheeze/stridor/retractions  CV: RRR, no M/R/G, +2 dist pulses (radial, pedal DP/PT, popliteal)  Abd: soft, NT/ND, +BS, no guarding/rebound tenderness  Musculoskeletal: no edema/erythema/cyanosis, FROM in all extremities, no C/T/L spine ttp  Skin: b/l LE weeping edema, swelling with associate erythema, mild warmth (patient states is chronic)  Neuro: AAOx3, CN 2-12 intact, normal sensation, 5/5 motor strength in all extremities, normal gait, no dysmetria

## 2018-09-26 NOTE — ED PROVIDER NOTE - PROGRESS NOTE DETAILS
labs and imaging explained to patient, stressed to patient that she needs to continue her diuretics that were prescribed to her discussed case with daughter Shelia, wants patient admitted, patient can't even walk up stairs at home discussed case with daughter reji Sosa patient admitted, patient can't even walk up stairs at home, states their home physician prescribed 60mg lasix but they did not fill out medications. States gave some old prescription of lasix at home earlier today. patient able to walk with walker her in ED, does not want to be admitted. patient able to walk with walker her in ED, does not want to be admitted. family refusing for patient to be discharged.  Dr. Reyes aware, will admit for social concerns.  As per family patient has hx of alzheimers patient expressed that she does not want to be admitted, will await social work and family evaluation, admission with held patient now agreeable for admission after having discussion with daughter and son for treatment and possible placement discussed case with Dr. NAHEED Sanders will admit

## 2018-09-27 DIAGNOSIS — F41.8 OTHER SPECIFIED ANXIETY DISORDERS: ICD-10-CM

## 2018-09-27 DIAGNOSIS — E03.9 HYPOTHYROIDISM, UNSPECIFIED: ICD-10-CM

## 2018-09-27 DIAGNOSIS — F41.9 ANXIETY DISORDER, UNSPECIFIED: ICD-10-CM

## 2018-09-27 DIAGNOSIS — E11.9 TYPE 2 DIABETES MELLITUS WITHOUT COMPLICATIONS: ICD-10-CM

## 2018-09-27 DIAGNOSIS — Z29.9 ENCOUNTER FOR PROPHYLACTIC MEASURES, UNSPECIFIED: ICD-10-CM

## 2018-09-27 DIAGNOSIS — M79.89 OTHER SPECIFIED SOFT TISSUE DISORDERS: ICD-10-CM

## 2018-09-27 DIAGNOSIS — I73.9 PERIPHERAL VASCULAR DISEASE, UNSPECIFIED: ICD-10-CM

## 2018-09-27 DIAGNOSIS — Z65.9 PROBLEM RELATED TO UNSPECIFIED PSYCHOSOCIAL CIRCUMSTANCES: ICD-10-CM

## 2018-09-27 DIAGNOSIS — N18.9 CHRONIC KIDNEY DISEASE, UNSPECIFIED: ICD-10-CM

## 2018-09-27 DIAGNOSIS — D64.9 ANEMIA, UNSPECIFIED: ICD-10-CM

## 2018-09-27 DIAGNOSIS — M19.90 UNSPECIFIED OSTEOARTHRITIS, UNSPECIFIED SITE: ICD-10-CM

## 2018-09-27 LAB
ANION GAP SERPL CALC-SCNC: 7 MMOL/L — SIGNIFICANT CHANGE UP (ref 5–17)
BASOPHILS # BLD AUTO: 0.03 K/UL — SIGNIFICANT CHANGE UP (ref 0–0.2)
BASOPHILS NFR BLD AUTO: 0.4 % — SIGNIFICANT CHANGE UP (ref 0–2)
BUN SERPL-MCNC: 38 MG/DL — HIGH (ref 7–23)
CALCIUM SERPL-MCNC: 7.7 MG/DL — LOW (ref 8.5–10.1)
CHLORIDE SERPL-SCNC: 109 MMOL/L — HIGH (ref 96–108)
CO2 SERPL-SCNC: 28 MMOL/L — SIGNIFICANT CHANGE UP (ref 22–31)
CREAT SERPL-MCNC: 1.8 MG/DL — HIGH (ref 0.5–1.3)
EOSINOPHIL # BLD AUTO: 0.18 K/UL — SIGNIFICANT CHANGE UP (ref 0–0.5)
EOSINOPHIL NFR BLD AUTO: 2.4 % — SIGNIFICANT CHANGE UP (ref 0–6)
GLUCOSE SERPL-MCNC: 88 MG/DL — SIGNIFICANT CHANGE UP (ref 70–99)
HCT VFR BLD CALC: 28.2 % — LOW (ref 34.5–45)
HGB BLD-MCNC: 8.6 G/DL — LOW (ref 11.5–15.5)
IMM GRANULOCYTES NFR BLD AUTO: 0.5 % — SIGNIFICANT CHANGE UP (ref 0–1.5)
LYMPHOCYTES # BLD AUTO: 1.13 K/UL — SIGNIFICANT CHANGE UP (ref 1–3.3)
LYMPHOCYTES # BLD AUTO: 14.9 % — SIGNIFICANT CHANGE UP (ref 13–44)
MCHC RBC-ENTMCNC: 24.4 PG — LOW (ref 27–34)
MCHC RBC-ENTMCNC: 30.5 GM/DL — LOW (ref 32–36)
MCV RBC AUTO: 79.9 FL — LOW (ref 80–100)
MONOCYTES # BLD AUTO: 0.41 K/UL — SIGNIFICANT CHANGE UP (ref 0–0.9)
MONOCYTES NFR BLD AUTO: 5.4 % — SIGNIFICANT CHANGE UP (ref 2–14)
NEUTROPHILS # BLD AUTO: 5.78 K/UL — SIGNIFICANT CHANGE UP (ref 1.8–7.4)
NEUTROPHILS NFR BLD AUTO: 76.4 % — SIGNIFICANT CHANGE UP (ref 43–77)
PLATELET # BLD AUTO: 267 K/UL — SIGNIFICANT CHANGE UP (ref 150–400)
POTASSIUM SERPL-MCNC: 4.2 MMOL/L — SIGNIFICANT CHANGE UP (ref 3.5–5.3)
POTASSIUM SERPL-SCNC: 4.2 MMOL/L — SIGNIFICANT CHANGE UP (ref 3.5–5.3)
RBC # BLD: 3.53 M/UL — LOW (ref 3.8–5.2)
RBC # FLD: 16.5 % — HIGH (ref 10.3–14.5)
SODIUM SERPL-SCNC: 144 MMOL/L — SIGNIFICANT CHANGE UP (ref 135–145)
WBC # BLD: 7.57 K/UL — SIGNIFICANT CHANGE UP (ref 3.8–10.5)
WBC # FLD AUTO: 7.57 K/UL — SIGNIFICANT CHANGE UP (ref 3.8–10.5)

## 2018-09-27 PROCEDURE — 93010 ELECTROCARDIOGRAM REPORT: CPT

## 2018-09-27 RX ORDER — KETOCONAZOLE 2 %
0 GEL (GRAM) TOPICAL
Qty: 0 | Refills: 0 | COMMUNITY

## 2018-09-27 RX ORDER — NYSTATIN CREAM 100000 [USP'U]/G
1 CREAM TOPICAL
Qty: 0 | Refills: 0 | Status: DISCONTINUED | OUTPATIENT
Start: 2018-09-27 | End: 2018-10-01

## 2018-09-27 RX ORDER — ACETAMINOPHEN 500 MG
650 TABLET ORAL EVERY 6 HOURS
Qty: 0 | Refills: 0 | Status: DISCONTINUED | OUTPATIENT
Start: 2018-09-27 | End: 2018-10-01

## 2018-09-27 RX ORDER — FUROSEMIDE 40 MG
40 TABLET ORAL
Qty: 0 | Refills: 0 | Status: DISCONTINUED | OUTPATIENT
Start: 2018-09-27 | End: 2018-09-28

## 2018-09-27 RX ORDER — GLUCAGON INJECTION, SOLUTION 0.5 MG/.1ML
1 INJECTION, SOLUTION SUBCUTANEOUS ONCE
Qty: 0 | Refills: 0 | Status: DISCONTINUED | OUTPATIENT
Start: 2018-09-27 | End: 2018-10-01

## 2018-09-27 RX ORDER — ENOXAPARIN SODIUM 100 MG/ML
30 INJECTION SUBCUTANEOUS DAILY
Qty: 0 | Refills: 0 | Status: DISCONTINUED | OUTPATIENT
Start: 2018-09-27 | End: 2018-10-01

## 2018-09-27 RX ORDER — LISINOPRIL 2.5 MG/1
1 TABLET ORAL
Qty: 0 | Refills: 0 | COMMUNITY

## 2018-09-27 RX ORDER — CICLOPIROX OLAMINE 7.7 MG/G
0 CREAM TOPICAL
Qty: 0 | Refills: 0 | COMMUNITY

## 2018-09-27 RX ORDER — HALOBETASOL PROPIONATE 0.5 MG/G
0 OINTMENT TOPICAL
Qty: 0 | Refills: 0 | COMMUNITY

## 2018-09-27 RX ORDER — DEXTROSE 50 % IN WATER 50 %
25 SYRINGE (ML) INTRAVENOUS ONCE
Qty: 0 | Refills: 0 | Status: DISCONTINUED | OUTPATIENT
Start: 2018-09-27 | End: 2018-10-01

## 2018-09-27 RX ORDER — ENOXAPARIN SODIUM 100 MG/ML
40 INJECTION SUBCUTANEOUS DAILY
Qty: 0 | Refills: 0 | Status: DISCONTINUED | OUTPATIENT
Start: 2018-09-27 | End: 2018-09-27

## 2018-09-27 RX ORDER — DEXTROSE 50 % IN WATER 50 %
12.5 SYRINGE (ML) INTRAVENOUS ONCE
Qty: 0 | Refills: 0 | Status: DISCONTINUED | OUTPATIENT
Start: 2018-09-27 | End: 2018-10-01

## 2018-09-27 RX ORDER — DONEPEZIL HYDROCHLORIDE 10 MG/1
1 TABLET, FILM COATED ORAL
Qty: 0 | Refills: 0 | COMMUNITY

## 2018-09-27 RX ORDER — CLONAZEPAM 1 MG
0 TABLET ORAL
Qty: 0 | Refills: 0 | COMMUNITY

## 2018-09-27 RX ORDER — MAGNESIUM OXIDE 400 MG ORAL TABLET 241.3 MG
1 TABLET ORAL
Qty: 0 | Refills: 0 | COMMUNITY

## 2018-09-27 RX ORDER — DEXTROSE 50 % IN WATER 50 %
15 SYRINGE (ML) INTRAVENOUS ONCE
Qty: 0 | Refills: 0 | Status: DISCONTINUED | OUTPATIENT
Start: 2018-09-27 | End: 2018-10-01

## 2018-09-27 RX ORDER — INSULIN LISPRO 100/ML
VIAL (ML) SUBCUTANEOUS
Qty: 0 | Refills: 0 | Status: DISCONTINUED | OUTPATIENT
Start: 2018-09-27 | End: 2018-10-01

## 2018-09-27 RX ORDER — SODIUM CHLORIDE 9 MG/ML
1000 INJECTION, SOLUTION INTRAVENOUS
Qty: 0 | Refills: 0 | Status: DISCONTINUED | OUTPATIENT
Start: 2018-09-27 | End: 2018-10-01

## 2018-09-27 RX ORDER — VITAMIN E 100 UNIT
0 CAPSULE ORAL
Qty: 0 | Refills: 0 | COMMUNITY

## 2018-09-27 RX ORDER — MIRTAZAPINE 45 MG/1
1 TABLET, ORALLY DISINTEGRATING ORAL
Qty: 0 | Refills: 0 | COMMUNITY

## 2018-09-27 RX ORDER — INFLUENZA VIRUS VACCINE 15; 15; 15; 15 UG/.5ML; UG/.5ML; UG/.5ML; UG/.5ML
0.5 SUSPENSION INTRAMUSCULAR ONCE
Qty: 0 | Refills: 0 | Status: COMPLETED | OUTPATIENT
Start: 2018-09-27 | End: 2018-10-01

## 2018-09-27 RX ORDER — LEVOTHYROXINE SODIUM 125 MCG
1 TABLET ORAL
Qty: 0 | Refills: 0 | COMMUNITY

## 2018-09-27 RX ORDER — FERROUS SULFATE 325(65) MG
0 TABLET ORAL
Qty: 0 | Refills: 0 | COMMUNITY

## 2018-09-27 RX ORDER — HEPARIN SODIUM 5000 [USP'U]/ML
5000 INJECTION INTRAVENOUS; SUBCUTANEOUS EVERY 12 HOURS
Qty: 0 | Refills: 0 | Status: DISCONTINUED | OUTPATIENT
Start: 2018-09-27 | End: 2018-09-27

## 2018-09-27 RX ORDER — CLONAZEPAM 1 MG
0.5 TABLET ORAL
Qty: 0 | Refills: 0 | Status: DISCONTINUED | OUTPATIENT
Start: 2018-09-27 | End: 2018-09-30

## 2018-09-27 RX ORDER — PANTOPRAZOLE SODIUM 20 MG/1
40 TABLET, DELAYED RELEASE ORAL
Qty: 0 | Refills: 0 | Status: DISCONTINUED | OUTPATIENT
Start: 2018-09-27 | End: 2018-10-01

## 2018-09-27 RX ORDER — SODIUM BICARBONATE 1 MEQ/ML
650 SYRINGE (ML) INTRAVENOUS
Qty: 0 | Refills: 0 | Status: DISCONTINUED | OUTPATIENT
Start: 2018-09-27 | End: 2018-09-28

## 2018-09-27 RX ORDER — DONEPEZIL HYDROCHLORIDE 10 MG/1
5 TABLET, FILM COATED ORAL AT BEDTIME
Qty: 0 | Refills: 0 | Status: DISCONTINUED | OUTPATIENT
Start: 2018-09-27 | End: 2018-10-01

## 2018-09-27 RX ORDER — LEVOTHYROXINE SODIUM 125 MCG
75 TABLET ORAL DAILY
Qty: 0 | Refills: 0 | Status: DISCONTINUED | OUTPATIENT
Start: 2018-09-27 | End: 2018-10-01

## 2018-09-27 RX ORDER — ALPRAZOLAM 0.25 MG
0.25 TABLET ORAL ONCE
Qty: 0 | Refills: 0 | Status: DISCONTINUED | OUTPATIENT
Start: 2018-09-27 | End: 2018-09-27

## 2018-09-27 RX ORDER — ASPIRIN/CALCIUM CARB/MAGNESIUM 324 MG
81 TABLET ORAL DAILY
Qty: 0 | Refills: 0 | Status: DISCONTINUED | OUTPATIENT
Start: 2018-09-27 | End: 2018-10-01

## 2018-09-27 RX ORDER — LISINOPRIL 2.5 MG/1
20 TABLET ORAL DAILY
Qty: 0 | Refills: 0 | Status: DISCONTINUED | OUTPATIENT
Start: 2018-09-27 | End: 2018-10-01

## 2018-09-27 RX ORDER — MIRTAZAPINE 45 MG/1
7.5 TABLET, ORALLY DISINTEGRATING ORAL DAILY
Qty: 0 | Refills: 0 | Status: DISCONTINUED | OUTPATIENT
Start: 2018-09-27 | End: 2018-10-01

## 2018-09-27 RX ADMIN — NYSTATIN CREAM 1 APPLICATION(S): 100000 CREAM TOPICAL at 06:29

## 2018-09-27 RX ADMIN — LISINOPRIL 20 MILLIGRAM(S): 2.5 TABLET ORAL at 06:29

## 2018-09-27 RX ADMIN — Medication 0.5 MILLIGRAM(S): at 06:30

## 2018-09-27 RX ADMIN — DONEPEZIL HYDROCHLORIDE 5 MILLIGRAM(S): 10 TABLET, FILM COATED ORAL at 21:16

## 2018-09-27 RX ADMIN — Medication 0.25 MILLIGRAM(S): at 17:09

## 2018-09-27 RX ADMIN — Medication 650 MILLIGRAM(S): at 06:30

## 2018-09-27 RX ADMIN — Medication 81 MILLIGRAM(S): at 12:36

## 2018-09-27 RX ADMIN — Medication 650 MILLIGRAM(S): at 01:00

## 2018-09-27 RX ADMIN — Medication 1 TABLET(S): at 12:36

## 2018-09-27 RX ADMIN — Medication 0.5 MILLIGRAM(S): at 21:16

## 2018-09-27 RX ADMIN — ENOXAPARIN SODIUM 30 MILLIGRAM(S): 100 INJECTION SUBCUTANEOUS at 13:42

## 2018-09-27 RX ADMIN — Medication 40 MILLIGRAM(S): at 18:09

## 2018-09-27 RX ADMIN — Medication 40 MILLIGRAM(S): at 06:30

## 2018-09-27 RX ADMIN — Medication 650 MILLIGRAM(S): at 01:30

## 2018-09-27 RX ADMIN — PANTOPRAZOLE SODIUM 40 MILLIGRAM(S): 20 TABLET, DELAYED RELEASE ORAL at 06:29

## 2018-09-27 RX ADMIN — Medication 75 MICROGRAM(S): at 06:30

## 2018-09-27 NOTE — PATIENT PROFILE ADULT. - PMH
Anemia    Arthritis    Asthma    CKD (chronic kidney disease) stage 2, GFR 60-89 ml/min    Diabetes mellitus    Hypothyroidism    Obesity    PVD (peripheral vascular disease)  with chronic edema B/l lower EXT

## 2018-09-27 NOTE — H&P ADULT - RS GEN PE MLT RESP DETAILS PC
normal/respirations non-labored/clear to auscultation bilaterally/breath sounds equal no rhonchi/respirations non-labored/no chest wall tenderness/normal/clear to auscultation bilaterally/breath sounds equal/no rales/no wheezes

## 2018-09-27 NOTE — H&P ADULT - NSHPPHYSICALEXAM_GEN_ALL_CORE
Vital Signs Last 24 Hrs  T(C): 37 (26 Sep 2018 21:34), Max: 37 (26 Sep 2018 21:34)  T(F): 98.6 (26 Sep 2018 21:34), Max: 98.6 (26 Sep 2018 21:34)  HR: 82 (26 Sep 2018 21:34) (74 - 82)  BP: 129/79 (26 Sep 2018 21:34) (120/60 - 129/79)  BP(mean): --  RR: 19 (26 Sep 2018 21:34) (18 - 19)  SpO2: 95% (26 Sep 2018 21:34) (95% - 96%)

## 2018-09-27 NOTE — PHYSICAL THERAPY INITIAL EVALUATION ADULT - ADDITIONAL COMMENTS
pt lives in a house with her son, dtr and grandchildren. + steps in house. pt ambulates independently with RW and dtr assists with ADLs. Pt recently d/c from rehab and was home for 1 week

## 2018-09-27 NOTE — PROGRESS NOTE ADULT - SUBJECTIVE AND OBJECTIVE BOX
Patient is a 83y old  Female who presents with a chief complaint of Difficulty ambulating (27 Sep 2018 00:07)      INTERVAL HPI:  84 y/o F with PMHx DM2, hypothyroidism, asthma, arthritis, anxiety, chronic leg edema BIBEMS for difficulty ambulating and b/l leg weeping edema. Pt reports was recently discharged from rehab a week ago for 4 weeks and has been living at home with son and daughter.  Reports during rehab they had not been giving pt's her PO Lasix and edema started to worsen.  Pt reports started taking her Lasix as soon as she gotten home from rehab. Today while at home, pt reported had a bit more difficulty getting up and walking but son was more concerned about B/l Lower EXT edema  and called EMS which prompted arrival to the ED. before coming to ER pt took Lasix 60 mg PO x 1 dose.    In the ED, vitals: stable, labs: h/h 9.9/31.8, BUN/cr 39/1.9, probnp 1588. cxr neg, Patient admitted for Diuresis with IV Lasix. for fluid over load.      OVERNIGHT EVENTS: Pt seen, examined. Found laying in bed comfortably eating breakfast. She is denying any leg pain, sob, cp, palpitations, fevers, chills, nausea, vomiting. No acute overnight events. She is asking to be discharged multiple times, reports being brought by her son here for placement. Her b/l legs are erythematous, swollen to mid calf, mildly tender only when palpated, very minimal oozing/weeping this AM.    Home Medications:  acetaminophen 325 mg oral tablet: 2 tab(s) orally every 6 hours, As needed, Mild Pain (27 Sep 2018 00:06)  aspirin 81 mg oral delayed release tablet: 1 tab(s) orally once a day (27 Sep 2018 00:06)  clonazePAM 0.5 mg oral tablet: 1 tab(s) orally 2 times a day (27 Sep 2018 00:06)  donepezil 5 mg oral tablet: 1 tab(s) orally once a day (at bedtime) (27 Sep 2018 00:06)  lisinopril 20 mg oral tablet: 1 tab(s) orally once a day (27 Sep 2018 00:06)  mirtazapine 7.5 mg oral tablet: 1 tab(s) orally once a day (27 Sep 2018 00:06)  Multiple Vitamins oral tablet: 1 tab(s) orally once a day (27 Sep 2018 00:06)  pantoprazole 40 mg oral delayed release tablet: 1 tab(s) orally 2 times a day (27 Sep 2018 00:06)  sodium bicarbonate 650 mg oral tablet: 1 tab(s) orally 2 times a day (27 Sep 2018 00:06)  Synthroid 75 mcg (0.075 mg) oral tablet: 1 tab(s) orally once a day (27 Sep 2018 00:06)      MEDICATIONS  (STANDING):  aspirin enteric coated 81 milliGRAM(s) Oral daily  clonazePAM Tablet 0.5 milliGRAM(s) Oral two times a day  dextrose 5%. 1000 milliLiter(s) (50 mL/Hr) IV Continuous <Continuous>  dextrose 50% Injectable 12.5 Gram(s) IV Push once  dextrose 50% Injectable 25 Gram(s) IV Push once  dextrose 50% Injectable 25 Gram(s) IV Push once  donepezil 5 milliGRAM(s) Oral at bedtime  enoxaparin Injectable 30 milliGRAM(s) SubCutaneous daily  furosemide   Injectable 40 milliGRAM(s) IV Push two times a day  insulin lispro (HumaLOG) corrective regimen sliding scale   SubCutaneous Before meals and at bedtime  levothyroxine 75 MICROGram(s) Oral daily  lisinopril 20 milliGRAM(s) Oral daily  mirtazapine 7.5 milliGRAM(s) Oral daily  multivitamin 1 Tablet(s) Oral daily  nystatin Powder 1 Application(s) Topical two times a day  pantoprazole    Tablet 40 milliGRAM(s) Oral before breakfast  sodium bicarbonate 650 milliGRAM(s) Oral two times a day    MEDICATIONS  (PRN):  acetaminophen   Tablet .. 650 milliGRAM(s) Oral every 6 hours PRN Mild Pain (1 - 3)  dextrose 40% Gel 15 Gram(s) Oral once PRN Blood Glucose LESS THAN 70 milliGRAM(s)/deciliter  glucagon  Injectable 1 milliGRAM(s) IntraMuscular once PRN Glucose LESS THAN 70 milligrams/deciliter      Allergies    Levaquin (Hives)  sulfa drugs (Unknown)    Intolerances        REVIEW OF SYSTEMS:  CONSTITUTIONAL: No fever, No chills, No fatigue, No myalgia, No Body ache, No Weakness  EYES: No eye pain,  No visual disturbances, No discharge  ENMT:  No ear pain, No nose bleed, No vertigo; No sinus or throat pain, No Congestion  NECK: No pain, No stiffness  RESPIRATORY: No cough, wheezing, No  hemoptysis, No shortness of breath  CARDIOVASCULAR: No chest pain, palpitations  GASTROINTESTINAL: No abdominal or epigastric pain. No nausea, No vomiting; No diarrhea or constipation.  GENITOURINARY: No dysuria, No frequency, No urgency, No hematuria, or incontinence  NEUROLOGICAL: No headaches, No dizziness, No numbness, No tingling, No tremors, No weakness  EXT: B/l lower extremities diffusely red to mid calf. Mildly tender to palpation.   SKIN:  [ x ] No itching, burning, rashes, or lesions --except for b/l LEs see above  MUSCULOSKELETAL: No joint pain or swelling; No muscle pain, No back pain, No extremity pain  PSYCHIATRIC: No depression, anxiety, mood swings or difficulty sleeping at night  PAIN SCALE: [ x ] None  [  ] Other-  ROS Unable to obtain due to - [  ] Dementia  [  ] Lethargy  [  ] Sedated [  ] non verbal  REST OF REVIEW Of SYSTEM - [  ] Normal     Vital Signs Last 24 Hrs  T(C): 36.8 (27 Sep 2018 11:12), Max: 37 (26 Sep 2018 21:34)  T(F): 98.3 (27 Sep 2018 11:12), Max: 98.6 (26 Sep 2018 21:34)  HR: 76 (27 Sep 2018 11:12) (74 - 82)  BP: 138/69 (27 Sep 2018 11:12) (114/77 - 142/68)  BP(mean): --  RR: 17 (27 Sep 2018 11:12) (17 - 19)  SpO2: 98% (27 Sep 2018 11:12) (95% - 98%)  Finger Stick          PHYSICAL EXAM:  GENERAL:  [ x ] NAD , [ x ] well appearing, [ x ] Agitated, [  ] Drowsy,  [  ] Lethargy, [  ] confused   HEAD:  [ x ] Normal, [  ] Other  EYES:  [ x ] EOMI, [  x] PERRLA, [ x ] conjunctiva and sclera clear normal, [  ] Other,  [  ] Pallor,[  ] Discharge  ENMT:  [ x ] Normal, [ x ] Moist mucous membranes, [  ] Good dentition, [  ] No Thrush  NECK:  [x  ] Supple, [ x ] No JVD, [  ] Normal thyroid, [  ] Lymphadenopathy [  ] Other  CHEST/LUNG:  [ x ] Clear to auscultation bilaterally, [ x ] Breath Sounds equal OR Decrease,  [ x ] No rales, [ x ] No rhonchi  [x  ]  No wheezing  HEART:  [ x ] Regular rate and rhythm, [  ] tachycardia, [  ] Bradycardia,  [  ] irregular  [x  ] No murmurs, No rubs, No gallops, [  ] PPM in place (Mfr:  )  ABDOMEN:  [x  ] Soft, [x  ] Nontender, [x  ] Nondistended, [ x ] No mass, [x  ] Bowel sounds present, [  ] obese  NERVOUS SYSTEM:  [x  ] Alert & Oriented X3, [ x ] Nonfocal  [  ] Confusion  [  ] Encephalopathic [  ] Sedated [  ] Unable to assess, [  ] Other-  EXTREMITIES: [x  ] 2+ Peripheral Pulses, No clubbing, No cyanosis,  [x  ] edema B/L lower EXT--diffuslely erythematous b/l lower extremities to mid calf. Mild tenderness to palpation, minimal oozing/weezing. [  ] PVD stasis skin changes B/L Lower EXT  LYMPH: No lymphadenopathy noted  SKIN:  [  ] No rashes or lesions, [  ] Pressure Ulcers, [  ] ecchymosis, [  ] Skin Tears, [ x ] Other-- b/l diffuse erythema of lower extremities as described above    DIET: consistent carbs with evening snack    LABS:                        8.6    7.57  )-----------( 267      ( 27 Sep 2018 05:37 )             28.2     27 Sep 2018 05:37    144    |  109    |  38     ----------------------------<  88     4.2     |  28     |  1.80     Ca    7.7        27 Sep 2018 05:37    TPro  6.4    /  Alb  2.4    /  TBili  0.3    /  DBili  x      /  AST  10     /  ALT  7      /  AlkPhos  200    26 Sep 2018 18:42    PT/INR - ( 26 Sep 2018 18:42 )   PT: 12.4 sec;   INR: 1.13 ratio         PTT - ( 26 Sep 2018 18:42 )  PTT:31.9 sec                         Anemia Panel:      Thyroid Panel:            Serum Pro-Brain Natriuretic Peptide: 1588 pg/mL (09-26-18 @ 18:42)      RADIOLOGY & ADDITIONAL TESTS:  EKG reviewed- Sinus rhythm with premature atrial complexes  Otherwise normal ECG      HEALTH ISSUES - PROBLEM Dx:  Anxiety  PVD (peripheral vascular disease): PVD (peripheral vascular disease)  Anemia: Anemia  Prophylactic measure: Prophylactic measure  Arthritis: Arthritis  Depression with anxiety: Depression with anxiety  Hypothyroidism: Hypothyroidism  Diabetes mellitus: Diabetes mellitus  CKD (chronic kidney disease): CKD (chronic kidney disease)  Social problem: Social problem  Leg swelling: Leg swelling          Consultant(s) Notes Reviewed:  [  ] YES     Care Discussed with [] Consultants  [  ] Patient  [  ] Family  [  ]   [  ] Social Service  [  ] RN, [  ] Physical Therapy  DVT PPX: [  ] Lovenox, [  ] S C Heparin, [  ] Coumadin, [  ] Xarelto, [  ] Eliquis, [  ] Pradaxa, [  ] IV Heparin drip, [  ] SCD [  ] Contraindication 2 to GI Bleed,[  ] Ambulation  Advanced directive: [  ] None, [  ] DNR/DNI Patient is a 83y old  Female who presents with a chief complaint of Difficulty ambulating (27 Sep 2018 00:07)      INTERVAL HPI:  84 y/o F with PMHx DM2, hypothyroidism, asthma, arthritis, anxiety, chronic leg edema BIBEMS for difficulty ambulating and b/l leg weeping edema. Pt reports was recently discharged from rehab a week ago for 4 weeks and has been living at home with son and daughter.  Reports during rehab they had not been giving pt's her PO Lasix and edema started to worsen.  Pt reports started taking her Lasix as soon as she gotten home from rehab. Today while at home, pt reported had a bit more difficulty getting up and walking but son was more concerned about B/l Lower EXT edema  and called EMS which prompted arrival to the ED. before coming to ER pt took Lasix 60 mg PO x 1 dose.    In the ED, vitals: stable, labs: h/h 9.9/31.8, BUN/cr 39/1.9, probnp 1588. cxr neg, Patient admitted for Diuresis with IV Lasix. for fluid over load.      OVERNIGHT EVENTS: Pt seen, examined. Found laying in bed comfortably eating breakfast. She is denying any leg pain, sob, cp, palpitations, fevers, chills, nausea, vomiting. No acute overnight events. She is asking to be discharged multiple times, reports being brought by her son here for placement. Her b/l legs are erythematous, swollen to mid calf, mildly tender only when palpated, very minimal oozing/weeping this AM.    9/27: Pt seen, examined. Found eating breakfast comfortably in bed. Her legs are diffusely erythematous and only mildly weeping. She reports this has been the case for at least one year. She does not do anything to care for them at home. Denying fevers, chills, nausea, vomiting, cp, sob, palpitations. She reports being here awaiting placement, wants to go home, anxious to leave.    Home Medications:  acetaminophen 325 mg oral tablet: 2 tab(s) orally every 6 hours, As needed, Mild Pain (27 Sep 2018 00:06)  aspirin 81 mg oral delayed release tablet: 1 tab(s) orally once a day (27 Sep 2018 00:06)  clonazePAM 0.5 mg oral tablet: 1 tab(s) orally 2 times a day (27 Sep 2018 00:06)  donepezil 5 mg oral tablet: 1 tab(s) orally once a day (at bedtime) (27 Sep 2018 00:06)  lisinopril 20 mg oral tablet: 1 tab(s) orally once a day (27 Sep 2018 00:06)  mirtazapine 7.5 mg oral tablet: 1 tab(s) orally once a day (27 Sep 2018 00:06)  Multiple Vitamins oral tablet: 1 tab(s) orally once a day (27 Sep 2018 00:06)  pantoprazole 40 mg oral delayed release tablet: 1 tab(s) orally 2 times a day (27 Sep 2018 00:06)  sodium bicarbonate 650 mg oral tablet: 1 tab(s) orally 2 times a day (27 Sep 2018 00:06)  Synthroid 75 mcg (0.075 mg) oral tablet: 1 tab(s) orally once a day (27 Sep 2018 00:06)      MEDICATIONS  (STANDING):  aspirin enteric coated 81 milliGRAM(s) Oral daily  clonazePAM Tablet 0.5 milliGRAM(s) Oral two times a day  dextrose 5%. 1000 milliLiter(s) (50 mL/Hr) IV Continuous <Continuous>  dextrose 50% Injectable 12.5 Gram(s) IV Push once  dextrose 50% Injectable 25 Gram(s) IV Push once  dextrose 50% Injectable 25 Gram(s) IV Push once  donepezil 5 milliGRAM(s) Oral at bedtime  enoxaparin Injectable 30 milliGRAM(s) SubCutaneous daily  furosemide   Injectable 40 milliGRAM(s) IV Push two times a day  insulin lispro (HumaLOG) corrective regimen sliding scale   SubCutaneous Before meals and at bedtime  levothyroxine 75 MICROGram(s) Oral daily  lisinopril 20 milliGRAM(s) Oral daily  mirtazapine 7.5 milliGRAM(s) Oral daily  multivitamin 1 Tablet(s) Oral daily  nystatin Powder 1 Application(s) Topical two times a day  pantoprazole    Tablet 40 milliGRAM(s) Oral before breakfast  sodium bicarbonate 650 milliGRAM(s) Oral two times a day    MEDICATIONS  (PRN):  acetaminophen   Tablet .. 650 milliGRAM(s) Oral every 6 hours PRN Mild Pain (1 - 3)  dextrose 40% Gel 15 Gram(s) Oral once PRN Blood Glucose LESS THAN 70 milliGRAM(s)/deciliter  glucagon  Injectable 1 milliGRAM(s) IntraMuscular once PRN Glucose LESS THAN 70 milligrams/deciliter      Allergies    Levaquin (Hives)  sulfa drugs (Unknown)    Intolerances        REVIEW OF SYSTEMS:  CONSTITUTIONAL: No fever, No chills, No fatigue, No myalgia, No Body ache, No Weakness  EYES: No eye pain,  No visual disturbances, No discharge  ENMT:  No ear pain, No nose bleed, No vertigo; No sinus or throat pain, No Congestion  NECK: No pain, No stiffness  RESPIRATORY: No cough, wheezing, No  hemoptysis, No shortness of breath  CARDIOVASCULAR: No chest pain, palpitations  GASTROINTESTINAL: No abdominal or epigastric pain. No nausea, No vomiting; No diarrhea or constipation.  GENITOURINARY: No dysuria, No frequency, No urgency, No hematuria, or incontinence  NEUROLOGICAL: No headaches, No dizziness, No numbness, No tingling, No tremors, No weakness  EXT: B/l lower extremities diffusely red to mid calf. Mildly tender to palpation.   SKIN:  [ x ] No itching, burning, rashes, or lesions --except for b/l LEs see above  MUSCULOSKELETAL: No joint pain or swelling; No muscle pain, No back pain, No extremity pain  PSYCHIATRIC: No depression, anxiety, mood swings or difficulty sleeping at night  PAIN SCALE: [ x ] None  [  ] Other-  ROS Unable to obtain due to - [  ] Dementia  [  ] Lethargy  [  ] Sedated [  ] non verbal  REST OF REVIEW Of SYSTEM - [  ] Normal     Vital Signs Last 24 Hrs  T(C): 36.8 (27 Sep 2018 11:12), Max: 37 (26 Sep 2018 21:34)  T(F): 98.3 (27 Sep 2018 11:12), Max: 98.6 (26 Sep 2018 21:34)  HR: 76 (27 Sep 2018 11:12) (74 - 82)  BP: 138/69 (27 Sep 2018 11:12) (114/77 - 142/68)  BP(mean): --  RR: 17 (27 Sep 2018 11:12) (17 - 19)  SpO2: 98% (27 Sep 2018 11:12) (95% - 98%)  Finger Stick          PHYSICAL EXAM:  GENERAL:  [ x ] NAD , [ x ] well appearing, [ x ] Agitated, [  ] Drowsy,  [  ] Lethargy, [  ] confused   HEAD:  [ x ] Normal, [  ] Other  EYES:  [ x ] EOMI, [  x] PERRLA, [ x ] conjunctiva and sclera clear normal, [  ] Other,  [  ] Pallor,[  ] Discharge  ENMT:  [ x ] Normal, [ x ] Moist mucous membranes, [  ] Good dentition, [  ] No Thrush  NECK:  [x  ] Supple, [ x ] No JVD, [  ] Normal thyroid, [  ] Lymphadenopathy [  ] Other  CHEST/LUNG:  [ x ] Clear to auscultation bilaterally, [ x ] Breath Sounds equal OR Decrease,  [ x ] No rales, [ x ] No rhonchi  [x  ]  No wheezing  HEART:  [ x ] Regular rate and rhythm, [  ] tachycardia, [  ] Bradycardia,  [  ] irregular  [x  ] No murmurs, No rubs, No gallops, [  ] PPM in place (Mfr:  )  ABDOMEN:  [x  ] Soft, [x  ] Nontender, [x  ] Nondistended, [ x ] No mass, [x  ] Bowel sounds present, [  ] obese  NERVOUS SYSTEM:  [x  ] Alert & Oriented X3, [ x ] Nonfocal  [  ] Confusion  [  ] Encephalopathic [  ] Sedated [  ] Unable to assess, [  ] Other-  EXTREMITIES: [x  ] 2+ Peripheral Pulses, No clubbing, No cyanosis,  [x  ] edema B/L lower EXT--diffuslely erythematous b/l lower extremities to mid calf. Mild tenderness to palpation, minimal oozing/weezing. [  ] PVD stasis skin changes B/L Lower EXT  LYMPH: No lymphadenopathy noted  SKIN:  [  ] No rashes or lesions, [  ] Pressure Ulcers, [  ] ecchymosis, [  ] Skin Tears, [ x ] Other-- b/l diffuse erythema of lower extremities as described above    DIET: consistent carbs with evening snack    LABS:                        8.6    7.57  )-----------( 267      ( 27 Sep 2018 05:37 )             28.2     27 Sep 2018 05:37    144    |  109    |  38     ----------------------------<  88     4.2     |  28     |  1.80     Ca    7.7        27 Sep 2018 05:37    TPro  6.4    /  Alb  2.4    /  TBili  0.3    /  DBili  x      /  AST  10     /  ALT  7      /  AlkPhos  200    26 Sep 2018 18:42    PT/INR - ( 26 Sep 2018 18:42 )   PT: 12.4 sec;   INR: 1.13 ratio         PTT - ( 26 Sep 2018 18:42 )  PTT:31.9 sec                         Anemia Panel:      Thyroid Panel:            Serum Pro-Brain Natriuretic Peptide: 1588 pg/mL (09-26-18 @ 18:42)      RADIOLOGY & ADDITIONAL TESTS:  EKG reviewed- Sinus rhythm with premature atrial complexes  Otherwise normal ECG      HEALTH ISSUES - PROBLEM Dx:  Anxiety  PVD (peripheral vascular disease): PVD (peripheral vascular disease)  Anemia: Anemia  Prophylactic measure: Prophylactic measure  Arthritis: Arthritis  Depression with anxiety: Depression with anxiety  Hypothyroidism: Hypothyroidism  Diabetes mellitus: Diabetes mellitus  CKD (chronic kidney disease): CKD (chronic kidney disease)  Social problem: Social problem  Leg swelling: Leg swelling          Consultant(s) Notes Reviewed:  [  ] YES     Care Discussed with [] Consultants  [  ] Patient  [  ] Family  [  ]   [  ] Social Service  [  ] RN, [  ] Physical Therapy  DVT PPX: [  ] Lovenox, [  ] S C Heparin, [  ] Coumadin, [  ] Xarelto, [  ] Eliquis, [  ] Pradaxa, [  ] IV Heparin drip, [  ] SCD [  ] Contraindication 2 to GI Bleed,[  ] Ambulation  Advanced directive: [  ] None, [  ] DNR/DNI

## 2018-09-27 NOTE — H&P ADULT - PROBLEM SELECTOR PLAN 3
2/2 diabetic nephropathy, cr at baseline  - continue lisinopril  - trend bmp 2/2 diabetic nephropathy, cr at baseline CKD 3  - continue lisinopril, IV Lasix 2x day  - trend bmp

## 2018-09-27 NOTE — PROGRESS NOTE ADULT - ASSESSMENT
82 y/o F with PMHx DM2, hypothyroidism, asthma, arthritis, anxiety, chronic leg edema BIBEMS for difficulty ambulating and b/l leg weeping edema. Admitted for severe weeping edema B/L Lower EXT, difficulty walking and social work evaluation for placement, IV Lasix .

## 2018-09-27 NOTE — H&P ADULT - CONSTITUTIONAL DETAILS
emotional distressed/well-nourished/well-developed emotional distressed/well-nourished/well-developed/no distress

## 2018-09-27 NOTE — PROGRESS NOTE ADULT - PROBLEM SELECTOR PLAN 1
b/l chance LE edema, chronic edematous changes, doubt cardiac etiology, recent echo 7/18 reviewed, with normal LV function. Chance has improved   - c/w iv Lasix IV  40 Mg  BID, NON Compliance to Home Lasix   - i/os, daily weights, fluid restrict 1.5 lit /24 hrs  - f/u PT eval  - Ambulate/OOB as tolerated  - fall risk protocol

## 2018-09-27 NOTE — PROGRESS NOTE ADULT - PROBLEM SELECTOR PLAN 2
acute on Chronic   likely multifactorial , 2/2 CKD, Fluid Over load   CBC in AM   pt had  anemia work up on last admission with Dr Carpenter's group

## 2018-09-27 NOTE — BEHAVIORAL HEALTH ASSESSMENT NOTE - HPI (INCLUDE ILLNESS QUALITY, SEVERITY, DURATION, TIMING, CONTEXT, MODIFYING FACTORS, ASSOCIATED SIGNS AND SYMPTOMS)
84 y/o F with PMHx DM2, hypothyroidism, asthma, arthritis, anxiety, chronic leg edema BIBEMS for difficulty ambulating and b/l leg weeping edema. Pt reports was recently discharged from rehab a week ago for 4 weeks and has been living at home with son and daughter.  Reports during rehab they had not been giving pt's her PO Lasix and edema started to worsen.  Pt reports started taking her Lasix as soon as she gotten home from rehab. Today while at home, pt reported had a bit more difficulty getting up and walking but son was more concerned about B/l Lower EXT edema  and called EMS which prompted arrival to the ED. before coming to ER pt took Lasix 60 mg PO x 1 dose.  Patient seen, evaluated and chart reviewed. Patient is an 84 y/o DWF, retired, domiciled with no prior psychiatric hospitalizations or significant treatment, who was brought to the hospital by her family after she had difficulty walking at home, and the issue of autonomy was raised. At this time patient wanted to go home, and the issue of decision making capacity was raised. Patient is calm and cooperative, denies symptoms of psychosis, baltazar or depression.

## 2018-09-27 NOTE — H&P ADULT - NEUROLOGICAL DETAILS
sensation intact/responds to verbal commands/alert and oriented x 3/responds to pain cranial nerves intact/sensation intact/responds to verbal commands/alert and oriented x 3/responds to pain/strength decreased

## 2018-09-27 NOTE — H&P ADULT - EXTREMITIES COMMENTS
chronic B/l lower EXT stasis dermatitis with severe PVD skin changes with blistering with fluid oozing

## 2018-09-27 NOTE — H&P ADULT - PROBLEM SELECTOR PLAN 8
DVT ppx: heparin  Continue protonix, baby aspirin, sodium bicarb Continue mirtazapine, Klonopin, donepezil

## 2018-09-27 NOTE — H&P ADULT - PROBLEM SELECTOR PLAN 5
Stable - continue synthroid Not on home medications, last ha1c on file noted  - consistent carb diet, ISS, hypogly protocol, accuchecks

## 2018-09-27 NOTE — BEHAVIORAL HEALTH ASSESSMENT NOTE - SUMMARY
84 y/o WWF, with no prior psychiatric hospitalizations, who was admitted for bilateral pedal edema. Patient at this time refuses to stay in the hospital and wants to go home.

## 2018-09-27 NOTE — PROGRESS NOTE ADULT - PROBLEM SELECTOR PLAN 7
Son and daughter voiced unable to take care of pt at home, pt did not want to be admitted, had discussion with family and pt at length, agreed to be admitted for overnight  - f/u social work consult

## 2018-09-27 NOTE — ED ADULT NURSE REASSESSMENT NOTE - NS ED NURSE REASSESS COMMENT FT1
Patient wants to leave AMA, patient was informed that she is admitted patient report that she is not going to stay, call Dr Marilyn Guadalupe resident informed him regarding the patient wants to leave AMA, will come to ED to speak to patient, support and safety maintained.

## 2018-09-27 NOTE — H&P ADULT - NEGATIVE GASTROINTESTINAL SYMPTOMS
no vomiting/no nausea/no diarrhea no diarrhea/no constipation/no change in bowel habits/no abdominal pain/no nausea/no vomiting

## 2018-09-27 NOTE — H&P ADULT - PROBLEM SELECTOR PLAN 7
Chronic - Tylenol prn for pain control Son and daughter voiced unable to take care of pt at home, pt did not want to be admitted, had discussion with family and pt at length, agreed to be admitted for overnight  - social work consult, possible need for placement

## 2018-09-27 NOTE — H&P ADULT - NSHPOUTPATIENTPROVIDERS_GEN_ALL_CORE
Is This A New Presentation, Or A Follow-Up?: Growth How Severe Is Your Skin Lesion?: mild Has Your Skin Lesion Been Treated?: not been treated PMD [facility] [place] house calls

## 2018-09-27 NOTE — H&P ADULT - ASSESSMENT
82 y/o F with PMHx DM2, hypothyroidism, asthma, arthritis, anxiety, chronic leg edema BIBEMS for difficulty ambulating and b/l leg weeping edema. Admitted for severe weeping edema, difficulty walking and social work evaluation for placement. 82 y/o F with PMHx DM2, hypothyroidism, asthma, arthritis, anxiety, chronic leg edema BIBEMS for difficulty ambulating and b/l leg weeping edema. Admitted for severe weeping edema B/L Lower EXT, difficulty walking and social work evaluation for placement, IV Lasix .

## 2018-09-27 NOTE — PROGRESS NOTE ADULT - PROBLEM SELECTOR PLAN 5
Not on home medications, last ha1c on file noted  - consistent carb diet, ISS, hypogly protocol, accuchecks

## 2018-09-27 NOTE — H&P ADULT - HISTORY OF PRESENT ILLNESS
84 y/o F with PMHx DM2, hypothyroidism, asthma, arthritis, anxiety, chronic leg edema BIBEMS for difficulty ambulating and b/l leg weeping edema.  Pt reports was recently discharged from rehab a week ago and has been living at home with son and daughter.  Reports during rehab they had not been giving pt's her PO lasix and edema started to worsen.  Pt reports started taking her lasix as soon as she gotten home from rehab.  Today while at home, pt reported had a bit more difficulty getting up and walking but son was more concerned and called EMS which prompted arrival to the ED.    In the ED, vitals: stable, labs: h/h 9.9/31.8, BUN/cr 39/1.9, probnp 1588. cxr neg 82 y/o F with PMHx DM2, hypothyroidism, asthma, arthritis, anxiety, chronic leg edema BIBEMS for difficulty ambulating and b/l leg weeping edema. Pt reports was recently discharged from rehab a week ago for 4 weeks and has been living at home with son and daughter.  Reports during rehab they had not been giving pt's her PO Lasix and edema started to worsen.  Pt reports started taking her Lasix as soon as she gotten home from rehab. Today while at home, pt reported had a bit more difficulty getting up and walking but son was more concerned about B/l Lower EXT edema  and called EMS which prompted arrival to the ED. before coming to ER pt took Lasix 60 mg PO x 1 dose.    In the ED, vitals: stable, labs: h/h 9.9/31.8, BUN/cr 39/1.9, probnp 1588. cxr neg, Patient admitted for Diuresis with IV Lasix. for fluid over load.

## 2018-09-27 NOTE — H&P ADULT - GASTROINTESTINAL DETAILS
normal/no distention/nontender/soft normal/bowel sounds normal/no rebound tenderness/no rigidity/no organomegaly/soft/no masses palpable/obese/nontender/no guarding/no distention/no bruit

## 2018-09-27 NOTE — H&P ADULT - PROBLEM SELECTOR PLAN 2
Son and daughter in-law voiced unable to take care of pt at home, pt did not want to be admitted, had discussion with family and pt at length, agreed to be admitted for overnight  - social work consult, possible need for placement acute on Chronic   likely multifactorial , 2/2 CKD, Fluid Over load   CBC in AM   pt had  anemia work up on last admission with Dr Carpenter's group

## 2018-09-27 NOTE — H&P ADULT - NSHPSOCIALHISTORY_GEN_ALL_CORE
Patient lives with son and daughter in-law.  Per family patient ceased smoking cigarettes 15 years ago.  Denies etoh, ambulates w/ assistance at home. Patient lives with son and daughter in-law.  Per family patient ceased smoking cigarettes 15 years ago.  Denies ETOH, ambulates w/ assistance at home.

## 2018-09-27 NOTE — ED ADULT NURSE REASSESSMENT NOTE - NS ED NURSE REASSESS COMMENT FT1
Patient still refusing to go upstairs to her room, in no distress at this time, JUSTINE thomas will monitor support and safety maintained. Escalated to  and case workerPatient still refusing to go upstairs to her room, in no distress at this time, JUSTINE thomas will monitor support and safety maintained. Attempted to call patient son and daughter none answered their phone call, Escalated ANM,  to  and  Patient still refusing to go upstairs to her room. education provided on the benefit  of going to a hospital bed for safety will monitor support and safety maintained.

## 2018-09-27 NOTE — H&P ADULT - PROBLEM SELECTOR PLAN 1
Admit to med/surg - b/l cely OLMEDO edema, chronic edematous changes, doubt cardiac etiology, recent echo 7/18 reviewed, with normal LV function  - start iv lasix 40 BID  - i/os, daily weights, fluid restrict  - PT eval  - Ambulate/OOB as tolerated  - fall risk protocol Admit to med/surg - b/l cely OLMEDO edema, chronic edematous changes, doubt cardiac etiology, recent echo 7/18 reviewed, with normal LV function  - start iv Lasix IV  40 Mg  BID, NON Compliance to Home Lasix   - i/os, daily weights, fluid restrict 1.5 lit /24 hrs  - PT eval  - Ambulate/OOB as tolerated  - fall risk protocol

## 2018-09-27 NOTE — H&P ADULT - ATTENDING COMMENTS
Pt seen, examined, case & care plan d/w pt, residents at detail.  AM labs  Social service Eval. Pt seen, examined, case & care plan d/w pt, residents at detail.  AM labs, nursing skin care for B/L gluteal fold DTI, OOB to chair.  Social service Eval.

## 2018-09-27 NOTE — H&P ADULT - PMH
Arthritis    Asthma    Diabetes mellitus    Hypothyroidism Anemia    Arthritis    Asthma    CKD (chronic kidney disease) stage 2, GFR 60-89 ml/min    Diabetes mellitus    Hypothyroidism    Obesity    PVD (peripheral vascular disease)  with chronic edema B/l lower EXT

## 2018-09-27 NOTE — ED ADULT NURSE REASSESSMENT NOTE - NS ED NURSE REASSESS COMMENT FT1
Patient refused to go upstairs to her room JUSTINE smith aware, TORINM escalated it to Dr. Sanders support and safety maintained

## 2018-09-28 LAB
ALBUMIN SERPL ELPH-MCNC: 1.7 G/DL — LOW (ref 3.3–5)
ALP SERPL-CCNC: 144 U/L — HIGH (ref 40–120)
ALT FLD-CCNC: <6 U/L — LOW (ref 12–78)
ANION GAP SERPL CALC-SCNC: 6 MMOL/L — SIGNIFICANT CHANGE UP (ref 5–17)
AST SERPL-CCNC: 13 U/L — LOW (ref 15–37)
BILIRUB SERPL-MCNC: 0.2 MG/DL — SIGNIFICANT CHANGE UP (ref 0.2–1.2)
BUN SERPL-MCNC: 34 MG/DL — HIGH (ref 7–23)
CALCIUM SERPL-MCNC: 7.5 MG/DL — LOW (ref 8.5–10.1)
CHLORIDE SERPL-SCNC: 111 MMOL/L — HIGH (ref 96–108)
CO2 SERPL-SCNC: 29 MMOL/L — SIGNIFICANT CHANGE UP (ref 22–31)
CREAT SERPL-MCNC: 1.6 MG/DL — HIGH (ref 0.5–1.3)
GLUCOSE SERPL-MCNC: 80 MG/DL — SIGNIFICANT CHANGE UP (ref 70–99)
HBA1C BLD-MCNC: 5.6 % — SIGNIFICANT CHANGE UP (ref 4–5.6)
HCT VFR BLD CALC: 26.4 % — LOW (ref 34.5–45)
HGB BLD-MCNC: 8.1 G/DL — LOW (ref 11.5–15.5)
MCHC RBC-ENTMCNC: 24.7 PG — LOW (ref 27–34)
MCHC RBC-ENTMCNC: 30.7 GM/DL — LOW (ref 32–36)
MCV RBC AUTO: 80.5 FL — SIGNIFICANT CHANGE UP (ref 80–100)
NRBC # BLD: 0 /100 WBCS — SIGNIFICANT CHANGE UP (ref 0–0)
PLATELET # BLD AUTO: 241 K/UL — SIGNIFICANT CHANGE UP (ref 150–400)
POTASSIUM SERPL-MCNC: 3.6 MMOL/L — SIGNIFICANT CHANGE UP (ref 3.5–5.3)
POTASSIUM SERPL-SCNC: 3.6 MMOL/L — SIGNIFICANT CHANGE UP (ref 3.5–5.3)
PROT SERPL-MCNC: 4.8 G/DL — LOW (ref 6–8.3)
RBC # BLD: 3.28 M/UL — LOW (ref 3.8–5.2)
RBC # FLD: 16.5 % — HIGH (ref 10.3–14.5)
SODIUM SERPL-SCNC: 146 MMOL/L — HIGH (ref 135–145)
WBC # BLD: 7.83 K/UL — SIGNIFICANT CHANGE UP (ref 3.8–10.5)
WBC # FLD AUTO: 7.83 K/UL — SIGNIFICANT CHANGE UP (ref 3.8–10.5)

## 2018-09-28 RX ORDER — SALICYLIC ACID 0.5 %
1 CLEANSER (GRAM) TOPICAL
Qty: 0 | Refills: 0 | Status: DISCONTINUED | OUTPATIENT
Start: 2018-09-28 | End: 2018-10-01

## 2018-09-28 RX ORDER — FUROSEMIDE 40 MG
60 TABLET ORAL DAILY
Qty: 0 | Refills: 0 | Status: DISCONTINUED | OUTPATIENT
Start: 2018-09-28 | End: 2018-10-01

## 2018-09-28 RX ORDER — SODIUM BICARBONATE 1 MEQ/ML
650 SYRINGE (ML) INTRAVENOUS DAILY
Qty: 0 | Refills: 0 | Status: DISCONTINUED | OUTPATIENT
Start: 2018-09-28 | End: 2018-09-28

## 2018-09-28 RX ADMIN — NYSTATIN CREAM 1 APPLICATION(S): 100000 CREAM TOPICAL at 05:44

## 2018-09-28 RX ADMIN — Medication 75 MICROGRAM(S): at 05:44

## 2018-09-28 RX ADMIN — Medication 650 MILLIGRAM(S): at 18:04

## 2018-09-28 RX ADMIN — NYSTATIN CREAM 1 APPLICATION(S): 100000 CREAM TOPICAL at 18:05

## 2018-09-28 RX ADMIN — PANTOPRAZOLE SODIUM 40 MILLIGRAM(S): 20 TABLET, DELAYED RELEASE ORAL at 05:45

## 2018-09-28 RX ADMIN — LISINOPRIL 20 MILLIGRAM(S): 2.5 TABLET ORAL at 05:45

## 2018-09-28 RX ADMIN — Medication 1: at 18:04

## 2018-09-28 RX ADMIN — Medication 40 MILLIGRAM(S): at 05:45

## 2018-09-28 RX ADMIN — Medication 81 MILLIGRAM(S): at 12:06

## 2018-09-28 RX ADMIN — MIRTAZAPINE 7.5 MILLIGRAM(S): 45 TABLET, ORALLY DISINTEGRATING ORAL at 12:06

## 2018-09-28 RX ADMIN — Medication 0.5 MILLIGRAM(S): at 18:03

## 2018-09-28 RX ADMIN — Medication 650 MILLIGRAM(S): at 05:44

## 2018-09-28 RX ADMIN — DONEPEZIL HYDROCHLORIDE 5 MILLIGRAM(S): 10 TABLET, FILM COATED ORAL at 22:05

## 2018-09-28 RX ADMIN — Medication 1 TABLET(S): at 12:06

## 2018-09-28 RX ADMIN — NYSTATIN CREAM 1 APPLICATION(S): 100000 CREAM TOPICAL at 05:47

## 2018-09-28 RX ADMIN — ENOXAPARIN SODIUM 30 MILLIGRAM(S): 100 INJECTION SUBCUTANEOUS at 12:07

## 2018-09-28 RX ADMIN — Medication 1 APPLICATION(S): at 18:05

## 2018-09-28 RX ADMIN — Medication 0.5 MILLIGRAM(S): at 05:44

## 2018-09-28 NOTE — DISCHARGE NOTE ADULT - PROVIDER TOKENS
TOKEN:'[id number]' TOKEN:'[id number]:'[id number]:'[id number]',TOKEN:'[id number]'

## 2018-09-28 NOTE — PROGRESS NOTE ADULT - PROBLEM SELECTOR PLAN 1
b/l cely LE edema, chronic edematous changes, doubt cardiac etiology, recent echo 7/18 reviewed, with normal LV function. Weeping has improved   - c/w iv Lasix IV  40 Mg  BID, NON Compliance to Home Lasix   - i/os, daily weights, fluid restrict 1.5 lit /24 hrs  - PT recommends NAOMY; SW made aware  - Ambulate/OOB as tolerated  - fall risk protocol b/l cely LE edema, chronic edematous changes, doubt cardiac etiology, recent echo 7/18 reviewed, with normal LV function. Weeping has improved   - c/w iv Lasix IV  40 Mg  BID, NON Compliance to Home Lasix   - i/os, daily weights, fluid restrict 1.5 lit /24 hrs  - PT recommends NAOMY; SW made aware  - Ambulate/OOB as tolerated  - fall risk protocol  - Change to PO Lasix 60 mg daily

## 2018-09-28 NOTE — DIETITIAN INITIAL EVALUATION ADULT. - NS AS NUTRI INTERV MEALS SNACK
Fluid - modified diet/suggest add low Na diet as appropriate to current diet follow trend of renal labs for further restrictions/Carbohydrate - modified diet/Mineral - modified diet

## 2018-09-28 NOTE — DISCHARGE NOTE ADULT - PLAN OF CARE
Management - Weeping improved with treatment - Multifactorial  - Follow up for outpatient workup - Improved with treatment   - c/w home lisinopril - improved with treatment - Not on home meds, last A1C July 2018 5.8 - continue home synthroid - Continue home mirtazapine, Klonopin, donepezil. - Weeping improved with treatment  - continue with Lasix 60mg PO QD x 30 days  Follow up with PMD for further management - Weeping improved with Lasix treatment, wash both legs with Soap & Water daily  - continue with Lasix 60mg PO QD daily , keep B/L Lower EXT elevated,   - Fluid restriction 1.5 lit /24 hrs  Follow up with PMD for further management, Follow up BMP in 1 week. - Multifactorial, Acute on Chronic, stable H/H   - Follow up for outpatient workup with GI & Hematology - Improved with treatment, AKI_ CKD 3-4   - c/w home lisinopril - Multifactorial, Acute on Chronic, stable H/H   - Follow up for outpatient workup with GI & Hematology  CBC in 1 week with PMD   Protonix 40 mg daily - Improved with treatment, BARBARA- CKD 3-4   - c/w home lisinopril, bmp IN 1 WEEK -  Edema improved with  Lasix treatment  - Wash legs with soap & water daily  -Keep legs elevated.    B/L Posterior thigh Redness/DTI 2/2 persistent sitting in Chair  -Ambulation , Keep legs elevated, Off load - Not on home meds, last A1C July 2018 5.8  No Meds Diet control - Continue home mirtazapine, Klonopin  at bed time only as per Pt , donepezil. daily

## 2018-09-28 NOTE — CONSULT NOTE ADULT - ASSESSMENT
[ASSESSMENT and  PLAN]          I have discussed the above plan of care with patient in detail. They expressed understanding of the treatment plan . Risks, benefits and alternatives discussed in detail. I have asked if they have any questions or concerns and appropriately addressed them; all questions answered to their satisfactions and in lay terms. [ASSESSMENT and  PLAN]    84 y/o F with PMHx DM2, hypothyroidism, asthma, arthritis, anxiety, chronic leg edema BIBEMS for difficulty ambulating and b/l leg weeping edema. Admitted for severe weeping edema B/L Lower EXT, difficulty walking and social work evaluation for placement, IV Lasix .    Anemia multifactorial      Pt with anemia. Prior hx of GIB. FOBT+.      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   tx of LE edema per cardiology, ID.   Continue ASA, DVT prophylaxis  Check FOBT    I have discussed the above plan of care with patient in detail. They expressed understanding of the treatment plan . Risks, benefits and alternatives discussed in detail. I have asked if they have any questions or concerns and appropriately addressed them; all questions answered to their satisfactions and in lay terms.

## 2018-09-28 NOTE — DISCHARGE NOTE ADULT - ADDITIONAL INSTRUCTIONS
-Please follow up with your primary doctor within one week.  -Please follow up with Cardiology outpatient (information below) within one week of discharge.  -Patient and family are responsible to set up all follow up appointments.  -Continue taking your medications as directed above.  -If symptoms persist/worsen, please call your PMD or return to the ED. -Please follow up with your primary doctor within one week.  -Patient and family are responsible to set up all follow up appointments.  -Continue taking your medications as directed above.  -If symptoms persist/worsen, please call your PMD or return to the ED.

## 2018-09-28 NOTE — DISCHARGE NOTE ADULT - SECONDARY DIAGNOSIS.
Anemia CKD (chronic kidney disease) PVD (peripheral vascular disease) Diabetes mellitus Hypothyroidism Depression with anxiety

## 2018-09-28 NOTE — DISCHARGE NOTE ADULT - NS AS ACTIVITY OBS
Showering allowed/Bathing allowed/Walking-Outdoors allowed/No Heavy lifting/straining No Heavy lifting/straining/Showering allowed/Bathing allowed/Do not drive or operate machinery/Walking-Indoors allowed

## 2018-09-28 NOTE — PROGRESS NOTE ADULT - PROBLEM SELECTOR PLAN 7
Son and daughter voiced unable to take care of pt at home, pt did not want to be admitted, had discussion with family and pt at length, agreed to be admitted for overnight  -  contacted and aware- will discuss with pt the options

## 2018-09-28 NOTE — DIETITIAN INITIAL EVALUATION ADULT. - PROBLEM SELECTOR PLAN 1
Admit to med/surg - b/l cely OLMEDO edema, chronic edematous changes, doubt cardiac etiology, recent echo 7/18 reviewed, with normal LV function  - start iv Lasix IV  40 Mg  BID, NON Compliance to Home Lasix   - i/os, daily weights, fluid restrict 1.5 lit /24 hrs  - PT eval  - Ambulate/OOB as tolerated  - fall risk protocol

## 2018-09-28 NOTE — DISCHARGE NOTE ADULT - CARE PLAN
Principal Discharge DX:	Leg swelling  Goal:	Management  Assessment and plan of treatment:	- Weeping improved with treatment  Secondary Diagnosis:	Anemia  Assessment and plan of treatment:	- Multifactorial  - Follow up for outpatient workup  Secondary Diagnosis:	CKD (chronic kidney disease)  Assessment and plan of treatment:	- Improved with treatment   - c/w home lisinopril  Secondary Diagnosis:	PVD (peripheral vascular disease)  Assessment and plan of treatment:	- improved with treatment  Secondary Diagnosis:	Diabetes mellitus  Assessment and plan of treatment:	- Not on home meds, last A1C July 2018 5.8  Secondary Diagnosis:	Hypothyroidism  Assessment and plan of treatment:	- continue home synthroid  Secondary Diagnosis:	Depression with anxiety  Assessment and plan of treatment:	- Continue home mirtazapine, Klonopin, donepezil. Principal Discharge DX:	Leg swelling  Goal:	Management  Assessment and plan of treatment:	- Weeping improved with treatment  - continue with Lasix 60mg PO QD x 30 days  Follow up with PMD for further management  Secondary Diagnosis:	Anemia  Assessment and plan of treatment:	- Multifactorial  - Follow up for outpatient workup  Secondary Diagnosis:	CKD (chronic kidney disease)  Assessment and plan of treatment:	- Improved with treatment   - c/w home lisinopril  Secondary Diagnosis:	PVD (peripheral vascular disease)  Assessment and plan of treatment:	- improved with treatment  Secondary Diagnosis:	Diabetes mellitus  Assessment and plan of treatment:	- Not on home meds, last A1C July 2018 5.8  Secondary Diagnosis:	Hypothyroidism  Assessment and plan of treatment:	- continue home synthroid  Secondary Diagnosis:	Depression with anxiety  Assessment and plan of treatment:	- Continue home mirtazapine, Klonopin, donepezil. Principal Discharge DX:	Leg swelling  Goal:	Management  Assessment and plan of treatment:	- Weeping improved with Lasix treatment, wash both legs with Soap & Water daily  - continue with Lasix 60mg PO QD daily , keep B/L Lower EXT elevated,   - Fluid restriction 1.5 lit /24 hrs  Follow up with PMD for further management, Follow up BMP in 1 week.  Secondary Diagnosis:	Anemia  Assessment and plan of treatment:	- Multifactorial, Acute on Chronic, stable H/H   - Follow up for outpatient workup with GI & Hematology  Secondary Diagnosis:	CKD (chronic kidney disease)  Assessment and plan of treatment:	- Improved with treatment, AKI_ CKD 3-4   - c/w home lisinopril  Secondary Diagnosis:	PVD (peripheral vascular disease)  Assessment and plan of treatment:	- improved with treatment  Secondary Diagnosis:	Diabetes mellitus  Assessment and plan of treatment:	- Not on home meds, last A1C July 2018 5.8  Secondary Diagnosis:	Hypothyroidism  Assessment and plan of treatment:	- continue home synthroid  Secondary Diagnosis:	Depression with anxiety  Assessment and plan of treatment:	- Continue home mirtazapine, Klonopin, donepezil. Principal Discharge DX:	Leg swelling  Goal:	Management  Assessment and plan of treatment:	- Weeping improved with Lasix treatment, wash both legs with Soap & Water daily  - continue with Lasix 60mg PO QD daily , keep B/L Lower EXT elevated,   - Fluid restriction 1.5 lit /24 hrs  Follow up with PMD for further management, Follow up BMP in 1 week.  Secondary Diagnosis:	Anemia  Assessment and plan of treatment:	- Multifactorial, Acute on Chronic, stable H/H   - Follow up for outpatient workup with GI & Hematology  CBC in 1 week with PMD   Protonix 40 mg daily  Secondary Diagnosis:	CKD (chronic kidney disease)  Assessment and plan of treatment:	- Improved with treatment, BARBARA- CKD 3-4   - c/w home lisinopril, bmp IN 1 WEEK  Secondary Diagnosis:	PVD (peripheral vascular disease)  Assessment and plan of treatment:	-  Edema improved with  Lasix treatment  - Wash legs with soap & water daily  -Keep legs elevated.    B/L Posterior thigh Redness/DTI 2/2 persistent sitting in Chair  -Ambulation , Keep legs elevated, Off load  Secondary Diagnosis:	Diabetes mellitus  Assessment and plan of treatment:	- Not on home meds, last A1C July 2018 5.8  No Meds Diet control  Secondary Diagnosis:	Hypothyroidism  Assessment and plan of treatment:	- continue home synthroid  Secondary Diagnosis:	Depression with anxiety  Assessment and plan of treatment:	- Continue home mirtazapine, Klonopin  at bed time only as per Pt , donepezil. daily

## 2018-09-28 NOTE — DISCHARGE NOTE ADULT - CARE PROVIDER_API CALL
Jose Palacios (DO), Family Medicine  [address] [place] [address]  [address]  [place] [address]  Phone: [phone]  Fax: [phone] Jose Palacios (DO), Family Medicine  [address] [facility] [address]  [address]  [place] [address]  Phone: [phone]  Fax: [phone]    Dk Cardoso (DO), Gastroenterology; Internal Medicine  [address] [place] [address]  Phone: [phone]  Fax: [phone]    Henri Carpenter (MD), Hematology; Internal Medicine; Medical Oncology  [address] [facility]  [address]  [place] [address]  Phone: [phone]  Fax: [phone]    Tariq Sarkar), Nephrology  [address] [facility] [address]  [address]  [place] [address]  Phone: [phone]  Fax: [phone]

## 2018-09-28 NOTE — DISCHARGE NOTE ADULT - MEDICATION SUMMARY - MEDICATIONS TO CHANGE
I will SWITCH the dose or number of times a day I take the medications listed below when I get home from the hospital:  None I will SWITCH the dose or number of times a day I take the medications listed below when I get home from the hospital:    pantoprazole 40 mg oral delayed release tablet  -- 1 tab(s) by mouth 2 times a day    clonazePAM 0.5 mg oral tablet  -- 1 tab(s) by mouth 2 times a day

## 2018-09-28 NOTE — PROVIDER CONTACT NOTE (CHANGE IN STATUS NOTIFICATION) - ASSESSMENT
Patient is an 84yo female presenting with bilateral posterior thigh deep tissue injuries measuring approximately 9x9 each 2* long days sitting in a chair this patient is A&Ox3 and able to turn and reposition self and was advised to do so frequently and to avoid sitting in any chair without repositioning herself every 15 minutes BLLE skin is dry with patchy scales without openings

## 2018-09-28 NOTE — PROGRESS NOTE ADULT - PROBLEM SELECTOR PLAN 4
Chronic PVD with skin changes B/l lower EXT  with severe stasis changes.  c/w IV lasix for leg edema Chronic PVD with skin changes B/l lower EXT  with severe stasis changes.  c/w IV lasix for leg edema  -Wound care RN d/w, Skin care, OOB to chair

## 2018-09-28 NOTE — PROGRESS NOTE ADULT - PROBLEM SELECTOR PLAN 3
2/2 diabetic nephropathy, cr at baseline CKD 3  -Cr (1.6 today) seems to be stable and her baseline  - continue lisinopril, IV Lasix 2x day  - trend bmp 2/2 diabetic nephropathy, cr at baseline CKD 3  -Cr (1.6 today) seems to be stable and her baseline  - continue lisinopril, IV Lasix 2x day, change to PO Lasix today  - trend bmp, Renal DR Castellanos called

## 2018-09-28 NOTE — DISCHARGE NOTE ADULT - INSTRUCTIONS
Carb consistent diet with evening snack Low salt .Carb consistent diet with evening snack  Fluid restriction 1.5 Lit /24 hrs

## 2018-09-28 NOTE — DIETITIAN INITIAL EVALUATION ADULT. - PROBLEM SELECTOR PLAN 7
Son and daughter voiced unable to take care of pt at home, pt did not want to be admitted, had discussion with family and pt at length, agreed to be admitted for overnight  - social work consult, possible need for placement

## 2018-09-28 NOTE — PROVIDER CONTACT NOTE (CHANGE IN STATUS NOTIFICATION) - RECOMMENDATIONS
Cleanse BLLE with soap and water apply moisturizing lotion: protect DTPI by repositioning frequently and applying critic-aide daily

## 2018-09-28 NOTE — DIETITIAN INITIAL EVALUATION ADULT. - OTHER INFO
patient reports with good PO intake. recently discharged form rehab. states with frequent trips to bathroom RN aware.

## 2018-09-28 NOTE — PROGRESS NOTE ADULT - PROBLEM SELECTOR PLAN 2
acute on Chronic   likely multifactorial , 2/2 CKD, Fluid Over load   -Trend CBC  pt had  anemia work up on last admission with Dr Carpenter's group  Consulted stacy Romero f/u

## 2018-09-28 NOTE — DISCHARGE NOTE ADULT - PATIENT PORTAL LINK FT
You can access the [facility], offered by [facility], by registering with the following website: [email or web address]

## 2018-09-28 NOTE — DISCHARGE NOTE ADULT - MEDICATION SUMMARY - MEDICATIONS TO STOP TAKING
I will STOP taking the medications listed below when I get home from the hospital:  None I will STOP taking the medications listed below when I get home from the hospital:    sodium bicarbonate 650 mg oral tablet  -- 1 tab(s) by mouth 2 times a day

## 2018-09-28 NOTE — PROGRESS NOTE ADULT - ASSESSMENT
82 y/o F with PMHx DM2, hypothyroidism, asthma, arthritis, anxiety, chronic leg edema BIBEMS for difficulty ambulating and b/l leg weeping edema. Admitted for severe weeping edema B/L Lower EXT, difficulty walking and social work evaluation for placement, IV Lasix . 82 y/o F with PMHx DM2, hypothyroidism, asthma, arthritis, anxiety, chronic leg edema BIBEMS for difficulty ambulating and b/l leg weeping edema. Admitted for severe weeping edema B/L Lower EXT, difficulty walking and social work evaluation for placement, IV Lasix  Now on PO lasix.

## 2018-09-28 NOTE — CONSULT NOTE ADULT - SUBJECTIVE AND OBJECTIVE BOX
INCOMPLETE  NOTE:   Pt seen and evaluated. Full note to follow.   CONSULTATION & DOCUMENTATION IN PROGRESS    Patient is a 83y old  Female who presents with a chief complaint of Difficulty ambulating (28 Sep 2018 11:21)      HPI:  82 y/o F with PMHx DM2, hypothyroidism, asthma, arthritis, anxiety, chronic leg edema BIBEMS for difficulty ambulating and b/l leg weeping edema. Pt reports was recently discharged from rehab a week ago for 4 weeks and has been living at home with son and daughter.  Reports during rehab they had not been giving pt's her PO Lasix and edema started to worsen.  Pt reports started taking her Lasix as soon as she gotten home from rehab. Today while at home, pt reported had a bit more difficulty getting up and walking but son was more concerned about B/l Lower EXT edema  and called EMS which prompted arrival to the ED. before coming to ER pt took Lasix 60 mg PO x 1 dose.    In the ED, vitals: stable, labs: h/h 9.9/31.8, BUN/cr 39/1.9, probnp 1588. cxr neg, Patient admitted for Diuresis with IV Lasix. for fluid over load. (27 Sep 2018 00:07)      Heme asked to see pt for anemia.   Hx of anemia hx GI bleed s/p EGD showed duodenal ulcer, presenting with melena .  Anemia   Prior presented with Hb 0f 3.4 7/7     Hb 3.8 s/p 3 units+ plt on 7/7/18 with appropriate response      Hb was 6.8 on 7/8/18 , s/p 2 units , HB is 8.2 today      + melena , GI is planning bleeding scan / colonoscopy      coags are normal     Was home from rehab at [facility] x1wk Fri.  Brought in by family due to weakness.           PAST MEDICAL & SURGICAL HISTORY:  Obesity  CKD (chronic kidney disease) stage 2, GFR 60-89 ml/min  Anemia  PVD (peripheral vascular disease): with chronic edema B/l lower EXT  Arthritis  Asthma  Diabetes mellitus  Hypothyroidism  No significant past surgical history      HEALTH ISSUES - PROBLEM Dx:  Anxiety  PVD (peripheral vascular disease): PVD (peripheral vascular disease)  Anemia: Anemia  Prophylactic measure: Prophylactic measure  Arthritis: Arthritis  Depression with anxiety: Depression with anxiety  Hypothyroidism: Hypothyroidism  Diabetes mellitus: Diabetes mellitus  CKD (chronic kidney disease): CKD (chronic kidney disease)  Social problem: Social problem  Leg swelling: Leg swelling          FAMILY HISTORY:  No pertinent family history in first degree relatives      [SOCIAL HISTORY: ]  smoking:    EtOH:    illicit drugs:    occupation:    marital status:    Other:       [ALLERGIES/INTOLERANCES:]  Allergies    Levaquin (Hives)  sulfa drugs (Unknown)    Intolerances          [MEDICATIONS]  MEDICATIONS  (STANDING):  aspirin enteric coated 81 milliGRAM(s) Oral daily  clonazePAM Tablet 0.5 milliGRAM(s) Oral two times a day  dextrose 5%. 1000 milliLiter(s) (50 mL/Hr) IV Continuous <Continuous>  dextrose 50% Injectable 12.5 Gram(s) IV Push once  dextrose 50% Injectable 25 Gram(s) IV Push once  dextrose 50% Injectable 25 Gram(s) IV Push once  donepezil 5 milliGRAM(s) Oral at bedtime  enoxaparin Injectable 30 milliGRAM(s) SubCutaneous daily  furosemide    Tablet 60 milliGRAM(s) Oral daily  influenza   Vaccine 0.5 milliLiter(s) IntraMuscular once  insulin lispro (HumaLOG) corrective regimen sliding scale   SubCutaneous Before meals and at bedtime  levothyroxine 75 MICROGram(s) Oral daily  lisinopril 20 milliGRAM(s) Oral daily  mirtazapine 7.5 milliGRAM(s) Oral daily  multivitamin 1 Tablet(s) Oral daily  nystatin Cream 1 Application(s) Topical two times a day  nystatin Powder 1 Application(s) Topical two times a day  pantoprazole    Tablet 40 milliGRAM(s) Oral before breakfast  sodium bicarbonate 650 milliGRAM(s) Oral two times a day  vitamin A &amp; D Ointment 1 Application(s) Topical two times a day    MEDICATIONS  (PRN):  acetaminophen   Tablet .. 650 milliGRAM(s) Oral every 6 hours PRN Mild Pain (1 - 3)  dextrose 40% Gel 15 Gram(s) Oral once PRN Blood Glucose LESS THAN 70 milliGRAM(s)/deciliter  glucagon  Injectable 1 milliGRAM(s) IntraMuscular once PRN Glucose LESS THAN 70 milligrams/deciliter        [REVIEW OF SYSTEMS: ]  CONSTITUTIONAL: normal   EYES: No eye pain, no visual disturbances, no discharge  ENMT:  no discharge  NECK: No pain, no stiffness  BREASTS: No pain, no masses, no nipple discharge  RESPIRATORY: No cough, no wheezing, no chills, no hemoptysis; No shortness of breath  CARDIOVASCULAR: No chest pain, no palpitations, no dizziness, no leg swelling  GASTROINTESTINAL: No abdominal or epigastric pain. No nausea, no vomiting, no hematemesis; No diarrhea , no constipation. No melena, no hematochezia.  GENITOURINARY: No dysuria, no frequency, no hematuria, no incontinence  NEUROLOGICAL: No headaches, no memory loss, no loss of strength, no numbness, no tremors  SKIN: No itching, no burning, no rashes, no lesions   LYMPH NODES: No enlarged glands  ENDOCRINE: No heat or cold intolerance; No hair loss  MUSCULOSKELETAL: No joint pain or swelling; No muscle, no back, or extremity pain  PSYCHIATRIC: No depression, no anxiety, no mood swings, no difficulty sleeping  HEME/LYMPH: No easy bruising, no bleeding gums    [VITALS SIGNS 24hrs]  Vital Signs Last 24 Hrs  T(C): 36.8 (28 Sep 2018 04:30), Max: 36.8 (27 Sep 2018 13:23)  T(F): 98.2 (28 Sep 2018 04:30), Max: 98.2 (27 Sep 2018 13:23)  HR: 70 (28 Sep 2018 04:30) (70 - 83)  BP: 115/71 (28 Sep 2018 04:30) (115/71 - 142/62)  BP(mean): --  RR: 17 (28 Sep 2018 04:30) (17 - 18)  SpO2: 98% (28 Sep 2018 04:30) (98% - 99%)    [PHYSICAL EXAM]  General: adult in NAD,  WN,  WD. pale  HEENT: clear oropharynx, anicteric sclera, pink conjunctivae.  Neck: supple, no masses.  CV: normal S1S2, no murmur, no rubs, no gallops.  Lungs: clear to auscultation, no wheezes, no rales, no rhonchi.  Abdomen: soft, non-tender, non-distended, no hepatosplenomegaly, normal BS, no guarding.  Ext: no clubbing, no cyanosis, +edema.  Skin: no rashes,  no petechiae, BL venous stasis changes., +BL LE erythema  Neuro: alert and oriented X3, no focal motor deficits.  LN: no MAYA.      [LABS:]                        8.1    7.83  )-----------( 241      ( 28 Sep 2018 08:36 )             26.4     CBC Full  -  ( 28 Sep 2018 08:36 )  WBC Count : 7.83 K/uL  Hemoglobin : 8.1 g/dL  Hematocrit : 26.4 %  Platelet Count - Automated : 241 K/uL  Mean Cell Volume : 80.5 fl  Mean Cell Hemoglobin : 24.7 pg  Mean Cell Hemoglobin Concentration : 30.7 gm/dL  Auto Neutrophil # : x  Auto Lymphocyte # : x  Auto Monocyte # : x  Auto Eosinophil # : x  Auto Basophil # : x  Auto Neutrophil % : x  Auto Lymphocyte % : x  Auto Monocyte % : x  Auto Eosinophil % : x  Auto Basophil % : x    09-28    146<H>  |  111<H>  |  34<H>  ----------------------------<  80  3.6   |  29  |  1.60<H>    Ca    7.5<L>      28 Sep 2018 08:36    TPro  4.8<L>  /  Alb  1.7<L>  /  TBili  0.2  /  DBili  x   /  AST  13<L>  /  ALT  <6<L>  /  AlkPhos  144<H>  09-28    PT/INR - ( 26 Sep 2018 18:42 )   PT: 12.4 sec;   INR: 1.13 ratio         PTT - ( 26 Sep 2018 18:42 )  PTT:31.9 sec  LIVER FUNCTIONS - ( 28 Sep 2018 08:36 )  Alb: 1.7 g/dL / Pro: 4.8 g/dL / ALK PHOS: 144 U/L / ALT: <6 U/L / AST: 13 U/L / GGT: x                   WBC  TREND (5 Days)  WBC Count: 7.83 K/uL (09-28 @ 08:36)  WBC Count: 7.57 K/uL (09-27 @ 05:37)  WBC Count: 6.98 K/uL (09-26 @ 18:42)    HGB  TREND (5 Days)  Hemoglobin: 8.1 g/dL (09-28 @ 08:36)  Hemoglobin: 8.6 g/dL (09-27 @ 05:37)  Hemoglobin: 9.9 g/dL (09-26 @ 18:42)    HCT  TREND (5 Days)  Hematocrit: 26.4 % (09-28 @ 08:36)  Hematocrit: 28.2 % (09-27 @ 05:37)  Hematocrit: 31.8 % (09-26 @ 18:42)    PLT  TREND (5 Days)  Platelet Count - Automated: 241 K/uL (09-28 @ 08:36)  Platelet Count - Automated: 267 K/uL (09-27 @ 05:37)  Platelet Count - Automated: 306 K/uL (09-26 @ 18:42)    Reticulocyte Percent: 3.0 % (07-07 @ 18:19)      Ferritin, Serum: 207 ng/mL (07-11 @ 20:23)    Iron - Total Binding Capacity.: 195 ug/dL (07-13 @ 22:10)  Iron - Total Binding Capacity.: 199 ug/dL (07-11 @ 20:23)     Vitamin B12, Serum: 1061 pg/mL (07-21 @ 16:11)  Vitamin B12, Serum: 654 pg/mL (07-11 @ 20:23)        Folate, Serum: >20.0 ng/mL (07-21 @ 16:11)     [BLOOD SMEAR INTERPRETATION: ]  RBC:   WBC:   Plts:     [RADIOLOGY & ADDITIONAL STUDIES:] INCOMPLETE  NOTE:   Pt seen and evaluated. Full note to follow.   CONSULTATION & DOCUMENTATION IN PROGRESS    Patient is a 83y old  Female who presents with a chief complaint of Difficulty ambulating (28 Sep 2018 11:21)      HPI:  82 y/o F with PMHx DM2, hypothyroidism, asthma, arthritis, anxiety, chronic leg edema BIBEMS for difficulty ambulating and b/l leg weeping edema. Pt reports was recently discharged from rehab a week ago for 4 weeks and has been living at home with son and daughter.  Reports during rehab they had not been giving pt's her PO Lasix and edema started to worsen.  Pt reports started taking her Lasix as soon as she gotten home from rehab. Today while at home, pt reported had a bit more difficulty getting up and walking but son was more concerned about B/l Lower EXT edema  and called EMS which prompted arrival to the ED. before coming to ER pt took Lasix 60 mg PO x 1 dose.    In the ED, vitals: stable, labs: h/h 9.9/31.8, BUN/cr 39/1.9, probnp 1588. cxr neg, Patient admitted for Diuresis with IV Lasix. for fluid over load. (27 Sep 2018 00:07)    Heme asked to see pt for anemia.   Hx of anemia hx GI bleed s/p EGD showed duodenal ulcer, presenting with melena .  Anemia   Prior presented with Hb 0f 3.4 7/7     Hgb 3.8 s/p 3 units+ plt on 7/7/18 with appropriate response      Hgb was 6.8 on 7/8/18 , s/p 2 units , HB is 8.2 today      + melena , GI is planning bleeding scan / colonoscopy      coags are normal     Was home from rehab at [facility] x[facility]wk Fri.  Brought in by family due to weakness.   Noted to be anemic again.       PAST MEDICAL & SURGICAL HISTORY:  Obesity  CKD (chronic kidney disease) stage 2, GFR 60-89 ml/min  Anemia  PVD (peripheral vascular disease): with chronic edema B/l lower EXT  Arthritis  Asthma  Diabetes mellitus  Hypothyroidism  No significant past surgical history      HEALTH ISSUES - PROBLEM Dx:  Anxiety  PVD (peripheral vascular disease): PVD (peripheral vascular disease)  Anemia: Anemia  Prophylactic measure: Prophylactic measure  Arthritis: Arthritis  Depression with anxiety: Depression with anxiety  Hypothyroidism: Hypothyroidism  Diabetes mellitus: Diabetes mellitus  CKD (chronic kidney disease): CKD (chronic kidney disease)  Social problem: Social problem  Leg swelling: Leg swelling          FAMILY HISTORY:  No pertinent family history in first degree relatives      [SOCIAL HISTORY: ]  smoking:  ex-smoker, quit x15yrs  EtOH:  denies  illicit drugs:  denies  occupation:  retired  marital status:    Other: lives with son and dtr-in-law      [ALLERGIES/INTOLERANCES:]  Allergies      Levaquin (Hives)      sulfa drugs (Unknown)  Intolerances          [MEDICATIONS]  MEDICATIONS  (STANDING):  aspirin enteric coated 81 milliGRAM(s) Oral daily  clonazePAM Tablet 0.5 milliGRAM(s) Oral two times a day  dextrose 5%. 1000 milliLiter(s) (50 mL/Hr) IV Continuous <Continuous>  dextrose 50% Injectable 12.5 Gram(s) IV Push once  dextrose 50% Injectable 25 Gram(s) IV Push once  dextrose 50% Injectable 25 Gram(s) IV Push once  donepezil 5 milliGRAM(s) Oral at bedtime  enoxaparin Injectable 30 milliGRAM(s) SubCutaneous daily  furosemide    Tablet 60 milliGRAM(s) Oral daily  influenza   Vaccine 0.5 milliLiter(s) IntraMuscular once  insulin lispro (HumaLOG) corrective regimen sliding scale   SubCutaneous Before meals and at bedtime  levothyroxine 75 MICROGram(s) Oral daily  lisinopril 20 milliGRAM(s) Oral daily  mirtazapine 7.5 milliGRAM(s) Oral daily  multivitamin 1 Tablet(s) Oral daily  nystatin Cream 1 Application(s) Topical two times a day  nystatin Powder 1 Application(s) Topical two times a day  pantoprazole    Tablet 40 milliGRAM(s) Oral before breakfast  sodium bicarbonate 650 milliGRAM(s) Oral two times a day  vitamin A &amp; D Ointment 1 Application(s) Topical two times a day    MEDICATIONS  (PRN):  acetaminophen   Tablet .. 650 milliGRAM(s) Oral every 6 hours PRN Mild Pain (1 - 3)  dextrose 40% Gel 15 Gram(s) Oral once PRN Blood Glucose LESS THAN 70 milliGRAM(s)/deciliter  glucagon  Injectable 1 milliGRAM(s) IntraMuscular once PRN Glucose LESS THAN 70 milligrams/deciliter        [REVIEW OF SYSTEMS: ]  CONSTITUTIONAL: normal   EYES: No eye pain, no visual disturbances, no discharge  ENMT:  no discharge  NECK: No pain, no stiffness  BREASTS: No pain, no masses, no nipple discharge  RESPIRATORY: No cough, no wheezing, no chills, no hemoptysis; No shortness of breath  CARDIOVASCULAR: No chest pain, no palpitations, no dizziness, no leg swelling  GASTROINTESTINAL: No abdominal or epigastric pain. No nausea, no vomiting, no hematemesis; No diarrhea , no constipation. No melena, no hematochezia.  GENITOURINARY: No dysuria, no frequency, no hematuria, no incontinence  NEUROLOGICAL: No headaches, no memory loss, no loss of strength, no numbness, no tremors  SKIN: No itching, no burning, no rashes, no lesions   LYMPH NODES: No enlarged glands  ENDOCRINE: No heat or cold intolerance; No hair loss  MUSCULOSKELETAL: No joint pain or swelling; No muscle, no back, or extremity pain  PSYCHIATRIC: No depression, no anxiety, no mood swings, no difficulty sleeping  HEME/LYMPH: No easy bruising, no bleeding gums    [VITALS SIGNS 24hrs]  Vital Signs Last 24 Hrs  T(C): 36.8 (28 Sep 2018 04:30), Max: 36.8 (27 Sep 2018 13:23)  T(F): 98.2 (28 Sep 2018 04:30), Max: 98.2 (27 Sep 2018 13:23)  HR: 70 (28 Sep 2018 04:30) (70 - 83)  BP: 115/71 (28 Sep 2018 04:30) (115/71 - 142/62)  BP(mean): --  RR: 17 (28 Sep 2018 04:30) (17 - 18)  SpO2: 98% (28 Sep 2018 04:30) (98% - 99%)    [PHYSICAL EXAM]  General: adult in NAD,  WN,  WD. pale  HEENT: clear oropharynx, anicteric sclera, pink conjunctivae.  Neck: supple, no masses.  CV: normal S1S2, no murmur, no rubs, no gallops.  Lungs: clear to auscultation, no wheezes, no rales, no rhonchi.  Abdomen: soft, non-tender, non-distended, no hepatosplenomegaly, normal BS, no guarding.  Ext: no clubbing, no cyanosis, +edema.  Skin: no rashes,  no petechiae, BL venous stasis changes., +BL LE erythema  Neuro: alert and oriented X3, no focal motor deficits.  LN: no MAYA.      [LABS:]                        8.1    7.83  )-----------( 241      ( 28 Sep 2018 08:36 )             26.4     CBC Full  -  ( 28 Sep 2018 08:36 )  WBC Count : 7.83 K/uL  Hemoglobin : 8.1 g/dL  Hematocrit : 26.4 %  Platelet Count - Automated : 241 K/uL  Mean Cell Volume : 80.5 fl  Mean Cell Hemoglobin : 24.7 pg  Mean Cell Hemoglobin Concentration : 30.7 gm/dL  Auto Neutrophil # : x  Auto Lymphocyte # : x  Auto Monocyte # : x  Auto Eosinophil # : x  Auto Basophil # : x  Auto Neutrophil % : x  Auto Lymphocyte % : x  Auto Monocyte % : x  Auto Eosinophil % : x  Auto Basophil % : x    09-28    146<H>  |  111<H>  |  34<H>  ----------------------------<  80  3.6   |  29  |  1.60<H>    Ca    7.5<L>      28 Sep 2018 08:36    TPro  4.8<L>  /  Alb  1.7<L>  /  TBili  0.2  /  DBili  x   /  AST  13<L>  /  ALT  <6<L>  /  AlkPhos  144<H>  09-28    PT/INR - ( 26 Sep 2018 18:42 )   PT: 12.4 sec;   INR: 1.13 ratio         PTT - ( 26 Sep 2018 18:42 )  PTT:31.9 sec  LIVER FUNCTIONS - ( 28 Sep 2018 08:36 )  Alb: 1.7 g/dL / Pro: 4.8 g/dL / ALK PHOS: 144 U/L / ALT: <6 U/L / AST: 13 U/L / GGT: x                   WBC  TREND (5 Days)  WBC Count: 7.83 K/uL (09-28 @ 08:36)  WBC Count: 7.57 K/uL (09-27 @ 05:37)  WBC Count: 6.98 K/uL (09-26 @ 18:42)    HGB  TREND (5 Days)  Hemoglobin: 8.1 g/dL (09-28 @ 08:36)  Hemoglobin: 8.6 g/dL (09-27 @ 05:37)  Hemoglobin: 9.9 g/dL (09-26 @ 18:42)    HCT  TREND (5 Days)  Hematocrit: 26.4 % (09-28 @ 08:36)  Hematocrit: 28.2 % (09-27 @ 05:37)  Hematocrit: 31.8 % (09-26 @ 18:42)    PLT  TREND (5 Days)  Platelet Count - Automated: 241 K/uL (09-28 @ 08:36)  Platelet Count - Automated: 267 K/uL (09-27 @ 05:37)  Platelet Count - Automated: 306 K/uL (09-26 @ 18:42)    Reticulocyte Percent: 3.0 % (07-07 @ 18:19)      Ferritin, Serum: 207 ng/mL (07-11 @ 20:23)    Iron - Total Binding Capacity.: 195 ug/dL (07-13 @ 22:10)  Iron - Total Binding Capacity.: 199 ug/dL (07-11 @ 20:23)     Vitamin B12, Serum: 1061 pg/mL (07-21 @ 16:11)  Vitamin B12, Serum: 654 pg/mL (07-11 @ 20:23)      Folate, Serum: >20.0 ng/mL (07-21 @ 16:11)         [BLOOD SMEAR INTERPRETATION: ]  RBC: normocytic, normochromia, no schistocytes, no roleux, rare ovalocyte rare micropcytic RBC  WBC: segmented neutrophils >lymphocytes, rare eos. no immature forms, no blasts.   Plts: adequate.         [RADIOLOGY & ADDITIONAL STUDIES:]    < from: CT Abdomen and Pelvis No Cont (07.07.18 @ 16:10) >  EXAM:  CT ABDOMEN AND PELVIS                        PROCEDURE DATE:  07/07/2018    INTERPRETATION:    CT ABDOMEN and PELVIS without IV contrast dated 7/7/2018 4:10 PM    INDICATION: 83-year-old female severe anemia, ro RP bleed        TECHNIQUE: CT of the abdomen and pelvis was performed without intravenous   contrast administration. Axial, sagittal and coronal images were produced   and reviewed.  Enteric contrast was not administered, limiting complete   evaluation of the gastrointestinal tract. The patient is imaged with both   upper extremities in the gantry.    COMPARISON: None.    FINDINGS: Images of the lower chest demonstrate trace right pleural   effusion and subjacent right basilar atelectasis. There is no pericardial   effusion or hematoma. Atherosclerotic coronary artery calcifications are   noted. There is visualization of the hyperdense and the ventricular   septum, in keeping with history of anemia. A small hiatal hernia is   noted.     Postcholecystectomy surgical clips are seen in the gallbladder fossa. The   pancreas is completely fatty replaced. There is an apparent 2 cm   circumscribed cortical hypodensity in the left kidney, with an adjacent   cortical dystrophic calcification The liver, spleen, adrenal glands and   right kidney have a grossly unremarkable nonenhanced appearance.    Extensive atheromatous disease throughout the aortobiiliac system,   without aneurysm. There is also marked mural calcification of the splenic   artery. No retroperitoneal hematoma is present. No retroperitoneal mass   or lymphadenopathy is seen.     Enteric contrast was not administered, limiting complete evaluation of   the gastrointestinal tract. Evaluation of the nonopacified small bowel   demonstrates no dilatation or air-fluid levels. There is an apparent   intraluminal lipoma within the second portion of duodenum. There is a   moderate-sized rectal stool ball with circumferential mural thickening   and presacral edema stranding, most suggestive of stercoral colitis.   There is no proximal colonic obstruction. The appendix is not visualized.   No ascites no pneumoperitoneum is seen.    There is no pelvic hematoma, mass or lymphadenopathy. The urinary bladder   is decompressed around a Oliva catheter.    Evaluation of the osseous structures demonstrates a mild S-shaped   thoracolumbar scoliosis with multilevel multifactorial degenerative   spinal changes. There is a benign-appearing bone island in the right   iliac wing. Is heterogeneous appearance of the sacral ala which is felt   to represent to be related to stress-related change.      IMPRESSION:  No retroperitoneal or pelvic hematoma.  Constellation of findings are most suggestive of rectal stercoral colitis.  Trace right pleural effusion.  Oliva catheter in situ.    JUNE BEYER M.D., ATTENDING RADIOLOGIST  This document has been electronically signed. Jul 7 2018  4:20PM  < end of copied text >        < from: Xray Chest 1 View AP/PA (09.26.18 @ 18:49) >  EXAM:  XR CHEST AP OR PA 1V                        PROCEDURE DATE:  09/26/2018    INTERPRETATION:  Clinical information: Leg edema    Portable study of the chest, 6:33 PM  Comparison exam dated 7/7/2018.    Patient is rotated towards the right. Shallow inspiration,  elevated diaphragm. No lobar consolidation or effusion. No vascular congestion.   Heart size could not be evaluated due to shallow inspiration.   Atherosclerotic changes aorta. Degenerative changes,  both glenohumeral joints.  Recommend a follow-up study with deeper inspiration.    IMPRESSION: See above report  HAMMAD TRENT M.D.,ATTENDING RADIOLOGIST  This document has been electronically signed. Sep 26 2018  6:57PM  < end of copied text > Patient is a 83y old  Female who presents with a chief complaint of Difficulty ambulating (28 Sep 2018 11:21)      HPI:  82 y/o F with PMHx DM2, hypothyroidism, asthma, arthritis, anxiety, chronic leg edema BIBEMS for difficulty ambulating and b/l leg weeping edema. Pt reports was recently discharged from rehab a week ago for 4 weeks and has been living at home with son and daughter.  Reports during rehab they had not been giving pt's her PO Lasix and edema started to worsen.  Pt reports started taking her Lasix as soon as she gotten home from rehab. Today while at home, pt reported had a bit more difficulty getting up and walking but son was more concerned about B/l Lower EXT edema  and called EMS which prompted arrival to the ED. before coming to ER pt took Lasix 60 mg PO x 1 dose.    In the ED, vitals: stable, labs: h/h 9.9/31.8, BUN/cr 39/1.9, probnp 1588. cxr neg, Patient admitted for Diuresis with IV Lasix. for fluid over load. (27 Sep 2018 00:07)    Heme asked to see pt for anemia.   Hx of anemia hx GI bleed s/p EGD showed duodenal ulcer, presenting with melena .  Anemia   Prior presented with Hb 0f 3.4 7/7     Hgb 3.8 s/p 3 units+ plt on 7/7/18 with appropriate response      Hgb was 6.8 on 7/8/18 , s/p 2 units , HB is 8.2 today      + melena , GI is planning bleeding scan / colonoscopy      coags are normal     Was home from rehab at [place] [id number] Fri.  Brought in by family due to weakness.   Noted to be anemic again.       PAST MEDICAL & SURGICAL HISTORY:  Obesity  CKD (chronic kidney disease) stage 2, GFR 60-89 ml/min  Anemia  PVD (peripheral vascular disease): with chronic edema B/l lower EXT  Arthritis  Asthma  Diabetes mellitus  Hypothyroidism  No significant past surgical history      HEALTH ISSUES - PROBLEM Dx:  Anxiety  PVD (peripheral vascular disease): PVD (peripheral vascular disease)  Anemia: Anemia  Prophylactic measure: Prophylactic measure  Arthritis: Arthritis  Depression with anxiety: Depression with anxiety  Hypothyroidism: Hypothyroidism  Diabetes mellitus: Diabetes mellitus  CKD (chronic kidney disease): CKD (chronic kidney disease)  Social problem: Social problem  Leg swelling: Leg swelling          FAMILY HISTORY:  No pertinent family history in first degree relatives      [SOCIAL HISTORY: ]  smoking:  ex-smoker, quit x15yrs  EtOH:  denies  illicit drugs:  denies  occupation:  retired  marital status:    Other: lives with son and dtr-in-law      [ALLERGIES/INTOLERANCES:]  Allergies      Levaquin (Hives)      sulfa drugs (Unknown)  Intolerances          [MEDICATIONS]  MEDICATIONS  (STANDING):  aspirin enteric coated 81 milliGRAM(s) Oral daily  clonazePAM Tablet 0.5 milliGRAM(s) Oral two times a day  dextrose 5%. 1000 milliLiter(s) (50 mL/Hr) IV Continuous <Continuous>  dextrose 50% Injectable 12.5 Gram(s) IV Push once  dextrose 50% Injectable 25 Gram(s) IV Push once  dextrose 50% Injectable 25 Gram(s) IV Push once  donepezil 5 milliGRAM(s) Oral at bedtime  enoxaparin Injectable 30 milliGRAM(s) SubCutaneous daily  furosemide    Tablet 60 milliGRAM(s) Oral daily  influenza   Vaccine 0.5 milliLiter(s) IntraMuscular once  insulin lispro (HumaLOG) corrective regimen sliding scale   SubCutaneous Before meals and at bedtime  levothyroxine 75 MICROGram(s) Oral daily  lisinopril 20 milliGRAM(s) Oral daily  mirtazapine 7.5 milliGRAM(s) Oral daily  multivitamin 1 Tablet(s) Oral daily  nystatin Cream 1 Application(s) Topical two times a day  nystatin Powder 1 Application(s) Topical two times a day  pantoprazole    Tablet 40 milliGRAM(s) Oral before breakfast  sodium bicarbonate 650 milliGRAM(s) Oral two times a day  vitamin A &amp; D Ointment 1 Application(s) Topical two times a day    MEDICATIONS  (PRN):  acetaminophen   Tablet .. 650 milliGRAM(s) Oral every 6 hours PRN Mild Pain (1 - 3)  dextrose 40% Gel 15 Gram(s) Oral once PRN Blood Glucose LESS THAN 70 milliGRAM(s)/deciliter  glucagon  Injectable 1 milliGRAM(s) IntraMuscular once PRN Glucose LESS THAN 70 milligrams/deciliter        [REVIEW OF SYSTEMS: ]  CONSTITUTIONAL: normal   EYES: No eye pain, no visual disturbances, no discharge  ENMT:  no discharge  NECK: No pain, no stiffness  BREASTS: No pain, no masses, no nipple discharge  RESPIRATORY: No cough, no wheezing, no chills, no hemoptysis; No shortness of breath  CARDIOVASCULAR: No chest pain, no palpitations, no dizziness, no leg swelling  GASTROINTESTINAL: No abdominal or epigastric pain. No nausea, no vomiting, no hematemesis; No diarrhea , no constipation. No melena, no hematochezia.  GENITOURINARY: No dysuria, no frequency, no hematuria, no incontinence  NEUROLOGICAL: No headaches, no memory loss, no loss of strength, no numbness, no tremors  SKIN: No itching, no burning, no rashes, no lesions   LYMPH NODES: No enlarged glands  ENDOCRINE: No heat or cold intolerance; No hair loss  MUSCULOSKELETAL: No joint pain or swelling; No muscle, no back, or extremity pain  PSYCHIATRIC: No depression, no anxiety, no mood swings, no difficulty sleeping  HEME/LYMPH: No easy bruising, no bleeding gums    [VITALS SIGNS 24hrs]  Vital Signs Last 24 Hrs  T(C): 36.8 (28 Sep 2018 04:30), Max: 36.8 (27 Sep 2018 13:23)  T(F): 98.2 (28 Sep 2018 04:30), Max: 98.2 (27 Sep 2018 13:23)  HR: 70 (28 Sep 2018 04:30) (70 - 83)  BP: 115/71 (28 Sep 2018 04:30) (115/71 - 142/62)  BP(mean): --  RR: 17 (28 Sep 2018 04:30) (17 - 18)  SpO2: 98% (28 Sep 2018 04:30) (98% - 99%)    [PHYSICAL EXAM]  General: adult in NAD,  WN,  WD. pale  HEENT: clear oropharynx, anicteric sclera, pink conjunctivae.  Neck: supple, no masses.  CV: normal S1S2, no murmur, no rubs, no gallops.  Lungs: clear to auscultation, no wheezes, no rales, no rhonchi.  Abdomen: soft, non-tender, non-distended, no hepatosplenomegaly, normal BS, no guarding.  Ext: no clubbing, no cyanosis, +edema.  Skin: no rashes,  no petechiae, BL venous stasis changes., +BL LE erythema  Neuro: alert and oriented X3, no focal motor deficits.  LN: no MAYA.      [LABS:]                        8.1    7.83  )-----------( 241      ( 28 Sep 2018 08:36 )             26.4     CBC Full  -  ( 28 Sep 2018 08:36 )  WBC Count : 7.83 K/uL  Hemoglobin : 8.1 g/dL  Hematocrit : 26.4 %  Platelet Count - Automated : 241 K/uL  Mean Cell Volume : 80.5 fl  Mean Cell Hemoglobin : 24.7 pg  Mean Cell Hemoglobin Concentration : 30.7 gm/dL  Auto Neutrophil # : x  Auto Lymphocyte # : x  Auto Monocyte # : x  Auto Eosinophil # : x  Auto Basophil # : x  Auto Neutrophil % : x  Auto Lymphocyte % : x  Auto Monocyte % : x  Auto Eosinophil % : x  Auto Basophil % : x    09-28    146<H>  |  111<H>  |  34<H>  ----------------------------<  80  3.6   |  29  |  1.60<H>    Ca    7.5<L>      28 Sep 2018 08:36    TPro  4.8<L>  /  Alb  1.7<L>  /  TBili  0.2  /  DBili  x   /  AST  13<L>  /  ALT  <6<L>  /  AlkPhos  144<H>  09-28    PT/INR - ( 26 Sep 2018 18:42 )   PT: 12.4 sec;   INR: 1.13 ratio         PTT - ( 26 Sep 2018 18:42 )  PTT:31.9 sec  LIVER FUNCTIONS - ( 28 Sep 2018 08:36 )  Alb: 1.7 g/dL / Pro: 4.8 g/dL / ALK PHOS: 144 U/L / ALT: <6 U/L / AST: 13 U/L / GGT: x                   WBC  TREND (5 Days)  WBC Count: 7.83 K/uL (09-28 @ 08:36)  WBC Count: 7.57 K/uL (09-27 @ 05:37)  WBC Count: 6.98 K/uL (09-26 @ 18:42)    HGB  TREND (5 Days)  Hemoglobin: 8.1 g/dL (09-28 @ 08:36)  Hemoglobin: 8.6 g/dL (09-27 @ 05:37)  Hemoglobin: 9.9 g/dL (09-26 @ 18:42)    HCT  TREND (5 Days)  Hematocrit: 26.4 % (09-28 @ 08:36)  Hematocrit: 28.2 % (09-27 @ 05:37)  Hematocrit: 31.8 % (09-26 @ 18:42)    PLT  TREND (5 Days)  Platelet Count - Automated: 241 K/uL (09-28 @ 08:36)  Platelet Count - Automated: 267 K/uL (09-27 @ 05:37)  Platelet Count - Automated: 306 K/uL (09-26 @ 18:42)    Reticulocyte Percent: 3.0 % (07-07 @ 18:19)      Ferritin, Serum: 207 ng/mL (07-11 @ 20:23)    Iron - Total Binding Capacity.: 195 ug/dL (07-13 @ 22:10)  Iron - Total Binding Capacity.: 199 ug/dL (07-11 @ 20:23)     Vitamin B12, Serum: 1061 pg/mL (07-21 @ 16:11)  Vitamin B12, Serum: 654 pg/mL (07-11 @ 20:23)      Folate, Serum: >20.0 ng/mL (07-21 @ 16:11)         [BLOOD SMEAR INTERPRETATION: ]  RBC: normocytic, normochromia, no schistocytes, no roleux, rare ovalocyte rare micropcytic RBC  WBC: segmented neutrophils >lymphocytes, rare eos. no immature forms, no blasts.   Plts: adequate.         [RADIOLOGY & ADDITIONAL STUDIES:]    < from: CT Abdomen and Pelvis No Cont (07.07.18 @ 16:10) >  EXAM:  CT ABDOMEN AND PELVIS                        PROCEDURE DATE:  07/07/2018    INTERPRETATION:    CT ABDOMEN and PELVIS without IV contrast dated 7/7/2018 4:10 PM    INDICATION: 83-year-old female severe anemia, ro RP bleed        TECHNIQUE: CT of the abdomen and pelvis was performed without intravenous   contrast administration. Axial, sagittal and coronal images were produced   and reviewed.  Enteric contrast was not administered, limiting complete   evaluation of the gastrointestinal tract. The patient is imaged with both   upper extremities in the gantry.    COMPARISON: None.    FINDINGS: Images of the lower chest demonstrate trace right pleural   effusion and subjacent right basilar atelectasis. There is no pericardial   effusion or hematoma. Atherosclerotic coronary artery calcifications are   noted. There is visualization of the hyperdense and the ventricular   septum, in keeping with history of anemia. A small hiatal hernia is   noted.     Postcholecystectomy surgical clips are seen in the gallbladder fossa. The   pancreas is completely fatty replaced. There is an apparent 2 cm   circumscribed cortical hypodensity in the left kidney, with an adjacent   cortical dystrophic calcification The liver, spleen, adrenal glands and   right kidney have a grossly unremarkable nonenhanced appearance.    Extensive atheromatous disease throughout the aortobiiliac system,   without aneurysm. There is also marked mural calcification of the splenic   artery. No retroperitoneal hematoma is present. No retroperitoneal mass   or lymphadenopathy is seen.     Enteric contrast was not administered, limiting complete evaluation of   the gastrointestinal tract. Evaluation of the nonopacified small bowel   demonstrates no dilatation or air-fluid levels. There is an apparent   intraluminal lipoma within the second portion of duodenum. There is a   moderate-sized rectal stool ball with circumferential mural thickening   and presacral edema stranding, most suggestive of stercoral colitis.   There is no proximal colonic obstruction. The appendix is not visualized.   No ascites no pneumoperitoneum is seen.    There is no pelvic hematoma, mass or lymphadenopathy. The urinary bladder   is decompressed around a Oliva catheter.    Evaluation of the osseous structures demonstrates a mild S-shaped   thoracolumbar scoliosis with multilevel multifactorial degenerative   spinal changes. There is a benign-appearing bone island in the right   iliac wing. Is heterogeneous appearance of the sacral ala which is felt   to represent to be related to stress-related change.      IMPRESSION:  No retroperitoneal or pelvic hematoma.  Constellation of findings are most suggestive of rectal stercoral colitis.  Trace right pleural effusion.  Oliva catheter in situ.    JUNE BEYER M.D., ATTENDING RADIOLOGIST  This document has been electronically signed. Jul 7 2018  4:20PM  < end of copied text >        < from: Xray Chest 1 View AP/PA (09.26.18 @ 18:49) >  EXAM:  XR CHEST AP OR PA 1V                        PROCEDURE DATE:  09/26/2018    INTERPRETATION:  Clinical information: Leg edema    Portable study of the chest, 6:33 PM  Comparison exam dated 7/7/2018.    Patient is rotated towards the right. Shallow inspiration,  elevated diaphragm. No lobar consolidation or effusion. No vascular congestion.   Heart size could not be evaluated due to shallow inspiration.   Atherosclerotic changes aorta. Degenerative changes,  both glenohumeral joints.  Recommend a follow-up study with deeper inspiration.    IMPRESSION: See above report  HAMMAD TRENT M.D.,ATTENDING RADIOLOGIST  This document has been electronically signed. Sep 26 2018  6:57PM  < end of copied text >

## 2018-09-28 NOTE — DISCHARGE NOTE ADULT - HOSPITAL COURSE
84 y/o F with PMHx DM2, hypothyroidism, asthma, arthritis, anxiety, chronic leg edema BIBEMS for difficulty ambulating and b/l leg weeping edema. Pt reports was recently discharged from rehab a week ago for 4 weeks and has been living at home with son and daughter.  Reports during rehab they had not been giving pt's her PO Lasix and edema started to worsen.  Pt reports started taking her Lasix as soon as she gotten home from rehab. Today while at home, pt reported had a bit more difficulty getting up and walking but son was more concerned about B/l Lower EXT edema  and called EMS which prompted arrival to the ED. before coming to ER pt took Lasix 60 mg PO x 1 dose.    In the ED, vitals: stable, labs: h/h 9.9/31.8, BUN/cr 39/1.9, probnp 1588. cxr neg, Patient admitted for Diuresis with IV Lasix. for fluid over load 82 y/o F with PMHx DM2, hypothyroidism, asthma, arthritis, anxiety, chronic leg edema BIBEMS for difficulty ambulating and b/l leg weeping edema. Pt reports was recently discharged from rehab a week ago for 4 weeks and has been living at home with son and daughter.  Reports during rehab they had not been giving pt's her PO Lasix and edema started to worsen.  Pt reports started taking her Lasix as soon as she gotten home from rehab. Today while at home, pt reported had a bit more difficulty getting up and walking but son was more concerned about B/l Lower EXT edema  and called EMS which prompted arrival to the ED. before coming to ER pt took Lasix 60 mg PO x 1 dose.    In the ED, vitals: stable, labs: h/h 9.9/31.8, BUN/cr 39/1.9, probnp 1588. cxr neg, Patient admitted for Diuresis with IV Lasix. for leg swelling. Patient was seen by nephrology and heme/onc specialists during admission. Patient improved symptomatically with treatment. Hemoglobin and renal indices returned to baseline.    Patient was seen and examined at bedside on day of discharge.   Patient is medically stable and improved for discharge home with outpatient follow up. 84 y/o F with PMHx DM2, hypothyroidism, asthma, arthritis, anxiety, chronic leg edema BIBEMS for difficulty ambulating and b/l leg weeping edema. Pt reports was recently discharged from rehab a week ago for 4 weeks and has been living at home with son and daughter.  Reports during rehab they had not been giving pt's her PO Lasix and edema started to worsen.  Pt reports started taking her Lasix as soon as she gotten home from rehab. Today while at home, pt reported had a bit more difficulty getting up and walking but son was more concerned about B/l Lower EXT edema  and called EMS which prompted arrival to the ED. before coming to ER pt took Lasix 60 mg PO x 1 dose.    In the ED, vitals: stable, labs: h/h 9.9/31.8, BUN/cr 39/1.9, probnp 1588. cxr neg, Patient admitted for Diuresis with IV Lasix. for leg swelling. Pt was started on Lasix 40 mg IV Q 12 hrs  for fluid over load,  DR Brumfield -Psych for medical decision making  capacity, As per DR Brumfield pt has capacity to make medical decisions.  Renal MD called [facility], CKD 3-4 , Renal DR Sarkar/DR Castellanos called , chronic Anemia, Hematology Dr Figueroa called, Anemia work up ordered, FOBT - positive . Patient was seen by nephrology  Paloma Castellanos and heme/onc specialists during admission. Patient improved symptomatically with treatment. Hemoglobin and renal indices returned to baseline. Pt has had EGD & Colonoscopy on previous admission 7./ 2018, GI DR Sanders called,  pt was started on PO Lasix 60 mg daily. Leg edema improved, pt seen by wound care RN for Redness on Posterior upper Thigh/Legs, 2/2 prolong sitting in the chair, severe stasis skin dermatitis of b/l legs improved, as per Wound care RN -No special dressings, wash with soap & Water,, pt has Anemia of chronic disease, Cr Stable -STOP NaHCO3 as per Renal MD, PT eval done ---> [facility], Pt & Dtr both Does NOT want pt  to go to [facility], family at home, dtr 's taking responsibility for MOM to come home,  [facility] arranged, case D/W Dtr JIMMY  [phone] at detail. D/C home today as d/w PT & Social service. Pt DOES NOT want to take Klonopin in day time, As per GI -No further in pt work up, out pt follow up with GI. Labs stable.    Patient was seen and examined at bedside on day of discharge.   Patient is medically stable and improved for discharge home with outpatient follow up.

## 2018-09-28 NOTE — DISCHARGE NOTE ADULT - MEDICATION SUMMARY - MEDICATIONS TO TAKE
I will START or STAY ON the medications listed below when I get home from the hospital:    acetaminophen 325 mg oral tablet  -- 2 tab(s) by mouth every 6 hours, As needed, Mild Pain  -- Indication: For Arthritis    aspirin 81 mg oral delayed release tablet  -- 1 tab(s) by mouth once a day  -- Indication: For Prophylactic measure    lisinopril 20 mg oral tablet  -- 1 tab(s) by mouth once a day  -- Indication: For Hypertension    sodium bicarbonate 650 mg oral tablet  -- 1 tab(s) by mouth 2 times a day  -- Indication: For GERD    clonazePAM 0.5 mg oral tablet  -- 1 tab(s) by mouth 2 times a day  -- Indication: For Anxiety    mirtazapine 7.5 mg oral tablet  -- 1 tab(s) by mouth once a day  -- Indication: For Depression with anxiety    donepezil 5 mg oral tablet  -- 1 tab(s) by mouth once a day (at bedtime)  -- Indication: For Depression with anxiety    furosemide 20 mg oral tablet  -- 3 tab(s) by mouth once a day   -- Avoid prolonged or excessive exposure to direct and/or artificial sunlight while taking this medication.  It is very important that you take or use this exactly as directed.  Do not skip doses or discontinue unless directed by your doctor.  It may be advisable to drink a full glass orange juice or eat a banana daily while taking this medication.    -- Indication: For Leg swelling    pantoprazole 40 mg oral delayed release tablet  -- 1 tab(s) by mouth 2 times a day  -- Indication: For GERD    Synthroid 75 mcg (0.075 mg) oral tablet  -- 1 tab(s) by mouth once a day  -- Indication: For Hypothyroidism    Multiple Vitamins oral tablet  -- 1 tab(s) by mouth once a day  -- Indication: For Prophylactic measure I will START or STAY ON the medications listed below when I get home from the hospital:    acetaminophen 325 mg oral tablet  -- 2 tab(s) by mouth every 6 hours, As needed, Mild Pain  -- Indication: For Arthritis    aspirin 81 mg oral delayed release tablet  -- 1 tab(s) by mouth once a day  -- Indication: For Prophylactic measure    lisinopril 20 mg oral tablet  -- 1 tab(s) by mouth once a day  -- Indication: For Hypertension    clonazePAM 0.5 mg oral tablet  -- 1 tab(s) by mouth 2 times a day  -- Indication: For Anxiety    mirtazapine 7.5 mg oral tablet  -- 1 tab(s) by mouth once a day  -- Indication: For Depression with anxiety    donepezil 5 mg oral tablet  -- 1 tab(s) by mouth once a day (at bedtime)  -- Indication: For Depression with anxiety    furosemide 20 mg oral tablet  -- 3 tab(s) by mouth once a day   -- Avoid prolonged or excessive exposure to direct and/or artificial sunlight while taking this medication.  It is very important that you take or use this exactly as directed.  Do not skip doses or discontinue unless directed by your doctor.  It may be advisable to drink a full glass orange juice or eat a banana daily while taking this medication.    -- Indication: For Leg swelling    pantoprazole 40 mg oral delayed release tablet  -- 1 tab(s) by mouth 2 times a day  -- Indication: For GERD    Synthroid 75 mcg (0.075 mg) oral tablet  -- 1 tab(s) by mouth once a day  -- Indication: For Hypothyroidism    Multiple Vitamins oral tablet  -- 1 tab(s) by mouth once a day  -- Indication: For Prophylactic measure I will START or STAY ON the medications listed below when I get home from the hospital:    acetaminophen 325 mg oral tablet  -- 2 tab(s) by mouth every 6 hours, As needed, for arthritis  Pain  -- Indication: For OA pain    aspirin 81 mg oral delayed release tablet  -- Enteric Coated ASA 81 mg 1 tab(s) by mouth once a day  -- Indication: For Cardiac prophylaxis    lisinopril 20 mg oral tablet  -- 1 tab(s) by mouth once a day  -- Indication: For Hypertension    clonazePAM 0.5 mg oral tablet  -- 1 tab(s) by mouth once a day (at bedtime), As Needed  -- Indication: For insomnia    mirtazapine 7.5 mg oral tablet  -- 1 tab(s) by mouth once a day  -- Indication: For Depression with anxiety    donepezil 5 mg oral tablet  -- 1 tab(s) by mouth once a day (at bedtime)  -- Indication: For forgetful    furosemide 20 mg oral tablet  -- 3 tab(s) by mouth once a day   -- Avoid prolonged or excessive exposure to direct and/or artificial sunlight while taking this medication.  It is very important that you take or use this exactly as directed.  Do not skip doses or discontinue unless directed by your doctor.  It may be advisable to drink a full glass orange juice or eat a banana daily while taking this medication.    -- Indication: For Leg swelling/stasis edema with dermatitis    pantoprazole 40 mg oral delayed release tablet  -- 1 tab(s) by mouth once a day  -- Indication: For GERD/ GI prophylaxis    Synthroid 75 mcg (0.075 mg) oral tablet  -- 1 tab(s) by mouth once a day  -- Indication: For Hypothyroidism    Multiple Vitamins oral tablet  -- 1 tab(s) by mouth once a day  -- Indication: For Vit

## 2018-09-28 NOTE — CONSULT NOTE ADULT - SUBJECTIVE AND OBJECTIVE BOX
Chart reviewed.    Renal indices are improving. No evidence of acidosis.   In fact, the bicarb is toward the upper limit of normal.   At this time, will hold sodium bicarb tabs.    Full consult note to follow, thank you.

## 2018-09-28 NOTE — PROGRESS NOTE ADULT - SUBJECTIVE AND OBJECTIVE BOX
Patient is a 83y old  Female who presents with a chief complaint of Difficulty ambulating (27 Sep 2018 00:07)      INTERVAL HPI:  82 y/o F with PMHx DM2, hypothyroidism, asthma, arthritis, anxiety, chronic leg edema BIBEMS for difficulty ambulating and b/l leg weeping edema. Pt reports was recently discharged from rehab a week ago for 4 weeks and has been living at home with son and daughter.  Reports during rehab they had not been giving pt's her PO Lasix and edema started to worsen.  Pt reports started taking her Lasix as soon as she gotten home from rehab. Today while at home, pt reported had a bit more difficulty getting up and walking but son was more concerned about B/l Lower EXT edema  and called EMS which prompted arrival to the ED. before coming to ER pt took Lasix 60 mg PO x 1 dose.    In the ED, vitals: stable, labs: h/h 9.9/31.8, BUN/cr 39/1.9, probnp 1588. cxr neg, Patient admitted for Diuresis with IV Lasix. for fluid over load.      OVERNIGHT EVENTS: Pt seen, examined. Found laying in bed comfortably. She is denying any leg pain, sob, cp, palpitations, fevers, chills, nausea, vomiting. No acute overnight events. She is asking to be discharged multiple times. Her b/l legs are erythematous but improving, swollen to mid calf, mildly tender only when palpated, very minimal oozing/weeping this AM.    9/27: Pt seen, examined. Found eating breakfast comfortably in bed. Her legs are diffusely erythematous and only mildly weeping. She reports this has been the case for at least one year. She does not do anything to care for them at home. Denying fevers, chills, nausea, vomiting, cp, sob, palpitations. She reports being here awaiting placement, wants to go home, anxious to leave.    9/28: Pt seen, examined. Finished breakfast. No acute overnight events, leg swelling has decreased, less erythematous than day prior. No longer weeping/oozing. Asking for A&D ointment, will order. Denying fevers, chills, nausea, vomiting, cp, sob, palpitations. Awaiting placement, saying social work will have meeting with family today to determine dispo-- followed up, MATI Conway (ext [phone]) aware. Heme/onc consulted for     Home Medications:  acetaminophen 325 mg oral tablet: 2 tab(s) orally every 6 hours, As needed, Mild Pain (27 Sep 2018 00:06)  aspirin 81 mg oral delayed release tablet: 1 tab(s) orally once a day (27 Sep 2018 00:06)  clonazePAM 0.5 mg oral tablet: 1 tab(s) orally 2 times a day (27 Sep 2018 00:06)  donepezil 5 mg oral tablet: 1 tab(s) orally once a day (at bedtime) (27 Sep 2018 00:06)  lisinopril 20 mg oral tablet: 1 tab(s) orally once a day (27 Sep 2018 00:06)  mirtazapine 7.5 mg oral tablet: 1 tab(s) orally once a day (27 Sep 2018 00:06)  Multiple Vitamins oral tablet: 1 tab(s) orally once a day (27 Sep 2018 00:06)  pantoprazole 40 mg oral delayed release tablet: 1 tab(s) orally 2 times a day (27 Sep 2018 00:06)  sodium bicarbonate 650 mg oral tablet: 1 tab(s) orally 2 times a day (27 Sep 2018 00:06)  Synthroid 75 mcg (0.075 mg) oral tablet: 1 tab(s) orally once a day (27 Sep 2018 00:06)    MEDICATIONS  (STANDING):  aspirin enteric coated 81 milliGRAM(s) Oral daily  clonazePAM Tablet 0.5 milliGRAM(s) Oral two times a day  dextrose 5%. 1000 milliLiter(s) (50 mL/Hr) IV Continuous <Continuous>  dextrose 50% Injectable 12.5 Gram(s) IV Push once  dextrose 50% Injectable 25 Gram(s) IV Push once  dextrose 50% Injectable 25 Gram(s) IV Push once  donepezil 5 milliGRAM(s) Oral at bedtime  enoxaparin Injectable 30 milliGRAM(s) SubCutaneous daily  furosemide   Injectable 40 milliGRAM(s) IV Push two times a day  influenza   Vaccine 0.5 milliLiter(s) IntraMuscular once  insulin lispro (HumaLOG) corrective regimen sliding scale   SubCutaneous Before meals and at bedtime  levothyroxine 75 MICROGram(s) Oral daily  lisinopril 20 milliGRAM(s) Oral daily  mirtazapine 7.5 milliGRAM(s) Oral daily  multivitamin 1 Tablet(s) Oral daily  nystatin Cream 1 Application(s) Topical two times a day  nystatin Powder 1 Application(s) Topical two times a day  pantoprazole    Tablet 40 milliGRAM(s) Oral before breakfast  sodium bicarbonate 650 milliGRAM(s) Oral two times a day  vitamin A &amp; D Ointment 1 Application(s) Topical two times a day    MEDICATIONS  (PRN):  acetaminophen   Tablet .. 650 milliGRAM(s) Oral every 6 hours PRN Mild Pain (1 - 3)  dextrose 40% Gel 15 Gram(s) Oral once PRN Blood Glucose LESS THAN 70 milliGRAM(s)/deciliter  glucagon  Injectable 1 milliGRAM(s) IntraMuscular once PRN Glucose LESS THAN 70 milligrams/deciliter          Allergies    Levaquin (Hives)  sulfa drugs (Unknown)    Intolerances        REVIEW OF SYSTEMS:  CONSTITUTIONAL: No fever, No chills, No fatigue, No myalgia, No Body ache, No Weakness  EYES: No eye pain,  No visual disturbances, No discharge  ENMT:  No ear pain, No nose bleed, No vertigo; No sinus or throat pain, No Congestion  NECK: No pain, No stiffness  RESPIRATORY: No cough, wheezing, No  hemoptysis, No shortness of breath  CARDIOVASCULAR: No chest pain, palpitations  GASTROINTESTINAL: No abdominal or epigastric pain. No nausea, No vomiting; No diarrhea or constipation.  GENITOURINARY: No dysuria, No frequency, No urgency, No hematuria, or incontinence  NEUROLOGICAL: No headaches, No dizziness, No numbness, No tingling, No tremors, No weakness  EXT: B/l lower extremities diffusely red to mid calf. Mildly tender to palpation.   SKIN:  [ x ] No itching, burning, rashes, or lesions --except for b/l LEs see above  MUSCULOSKELETAL: No joint pain or swelling; No muscle pain, No back pain, No extremity pain  PSYCHIATRIC: No depression, anxiety, mood swings or difficulty sleeping at night  PAIN SCALE: [ x ] None  [  ] Other-  ROS Unable to obtain due to - [  ] Dementia  [  ] Lethargy  [  ] Sedated [  ] non verbal  REST OF REVIEW Of SYSTEM - [  ] Normal     Vital Signs Last 24 Hrs  T(C): 36.8 (28 Sep 2018 04:30), Max: 36.8 (27 Sep 2018 11:12)  T(F): 98.2 (28 Sep 2018 04:30), Max: 98.3 (27 Sep 2018 11:12)  HR: 70 (28 Sep 2018 04:30) (70 - 83)  BP: 115/71 (28 Sep 2018 04:30) (115/71 - 142/62)  BP(mean): --  RR: 17 (28 Sep 2018 04:30) (17 - 18)  SpO2: 98% (28 Sep 2018 04:30) (98% - 99%)        PHYSICAL EXAM:  GENERAL:  [ x ] NAD , [ x ] well appearing, [  ] Agitated, [  ] Drowsy,  [  ] Lethargy, [  ] confused   HEAD:  [ x ] Normal, [  ] Other  EYES:  [ x ] EOMI, [  x] PERRLA, [ x ] conjunctiva and sclera clear normal, [  ] Other,  [  ] Pallor,[  ] Discharge  ENMT:  [ x ] Normal, [ x ] Moist mucous membranes, [  ] Good dentition, [  ] No Thrush  NECK:  [x  ] Supple, [ x ] No JVD, [  ] Normal thyroid, [  ] Lymphadenopathy [  ] Other  CHEST/LUNG:  [ x ] Clear to auscultation bilaterally, [ x ] Breath Sounds equal OR Decrease,  [ x ] No rales, [ x ] No rhonchi  [x  ]  No wheezing  HEART:  [ x ] Regular rate and rhythm, [  ] tachycardia, [  ] Bradycardia,  [  ] irregular  [x  ] No murmurs, No rubs, No gallops, [  ] PPM in place (Mfr:  )  ABDOMEN:  [x  ] Soft, [x  ] Nontender, [x  ] Nondistended, [ x ] No mass, [x  ] Bowel sounds present, [  ] obese  NERVOUS SYSTEM:  [x  ] Alert & Oriented X3, [ x ] Nonfocal  [  ] Confusion  [  ] Encephalopathic [  ] Sedated [  ] Unable to assess, [  ] Other-  EXTREMITIES: [x  ] 2+ Peripheral Pulses, No clubbing, No cyanosis,  [x  ] edema B/L lower EXT--erythematous b/l lower extremities to mid calf. Mild tenderness to palpation, minimal oozing/weezing. [  ] PVD stasis skin changes B/L Lower EXT  LYMPH: No lymphadenopathy noted  SKIN:  [  ] No rashes or lesions, [  ] Pressure Ulcers, [  ] ecchymosis, [  ] Skin Tears, [ x ] Other-- b/l diffuse erythema of lower extremities as described above    DIET: consistent carbs with evening snack (fluid restrictions)    LABS:                        8.1    7.83  )-----------( 241      ( 28 Sep 2018 08:36 )             26.4     09-28    146<H>  |  111<H>  |  34<H>  ----------------------------<  80  3.6   |  29  |  1.60<H>    Ca    7.5<L>      28 Sep 2018 08:36    TPro  4.8<L>  /  Alb  1.7<L>  /  TBili  0.2  /  DBili  x   /  AST  13<L>  /  ALT  <6<L>  /  AlkPhos  144<H>  09-28    LIVER FUNCTIONS - ( 28 Sep 2018 08:36 )  Alb: 1.7 g/dL / Pro: 4.8 g/dL / ALK PHOS: 144 U/L / ALT: <6 U/L / AST: 13 U/L / GGT: x           PT/INR - ( 26 Sep 2018 18:42 )   PT: 12.4 sec;   INR: 1.13 ratio         PTT - ( 26 Sep 2018 18:42 )  PTT:31.9 sec                         Anemia Panel:      Thyroid Panel:            Serum Pro-Brain Natriuretic Peptide: 1588 pg/mL (09-26-18 @ 18:42)      RADIOLOGY & ADDITIONAL TESTS:  EKG reviewed- Sinus rhythm with premature atrial complexes  Otherwise normal ECG      HEALTH ISSUES - PROBLEM Dx:  Anxiety  PVD (peripheral vascular disease): PVD (peripheral vascular disease)  Anemia: Anemia  Prophylactic measure: Prophylactic measure  Arthritis: Arthritis  Depression with anxiety: Depression with anxiety  Hypothyroidism: Hypothyroidism  Diabetes mellitus: Diabetes mellitus  CKD (chronic kidney disease): CKD (chronic kidney disease)  Social problem: Social problem  Leg swelling: Leg swelling          Consultant(s) Notes Reviewed:  [  ] YES     Care Discussed with [] Consultants  [  ] Patient  [  ] Family  [  ]   [  ] Social Service  [  ] RN, [  ] Physical Therapy  DVT PPX: [  ] Lovenox, [  ] S C Heparin, [  ] Coumadin, [  ] Xarelto, [  ] Eliquis, [  ] Pradaxa, [  ] IV Heparin drip, [  ] SCD [  ] Contraindication 2 to GI Bleed,[  ] Ambulation  Advanced directive: [  ] None, [  ] DNR/DNI Patient is a 83y old  Female who presents with a chief complaint of Difficulty ambulating (27 Sep 2018 00:07)      INTERVAL HPI:  84 y/o F with PMHx DM2, hypothyroidism, asthma, arthritis, anxiety, chronic leg edema BIBEMS for difficulty ambulating and b/l leg weeping edema. Pt reports was recently discharged from rehab a week ago for 4 weeks and has been living at home with son and daughter.  Reports during rehab they had not been giving pt's her PO Lasix and edema started to worsen.  Pt reports started taking her Lasix as soon as she gotten home from rehab. Today while at home, pt reported had a bit more difficulty getting up and walking but son was more concerned about B/l Lower EXT edema  and called EMS which prompted arrival to the ED. before coming to ER pt took Lasix 60 mg PO x 1 dose.    In the ED, vitals: stable, labs: h/h 9.9/31.8, BUN/cr 39/1.9, probnp 1588. cxr neg, Patient admitted for Diuresis with IV Lasix. for fluid over load.      OVERNIGHT EVENTS: Pt seen, examined. Found laying in bed comfortably. She is denying any leg pain, sob, cp, palpitations, fevers, chills, nausea, vomiting. No acute overnight events. She is asking to be discharged multiple times. Her b/l legs are erythematous but improving, swollen to mid calf, mildly tender only when palpated, very minimal oozing/weeping this AM.    9/27: Pt seen, examined. Found eating breakfast comfortably in bed. Her legs are diffusely erythematous and only mildly weeping. She reports this has been the case for at least one year. She does not do anything to care for them at home. Denying fevers, chills, nausea, vomiting, cp, sob, palpitations. She reports being here awaiting placement, wants to go home, anxious to leave.    9/28: Pt seen, examined. Finished breakfast. No acute overnight events, leg swelling has decreased, less erythematous than day prior. No longer weeping/oozing. Asking for A&D ointment, will order. Denying fevers, chills, nausea, vomiting, cp, sob, palpitations. Awaiting placement, saying social work will have meeting with family today to determine dispo-- followed up, MATI Conway (ext [phone]) aware. Heme/onc consulted for Anemia, Renal for CKD/BARBARA.    Home Medications:  acetaminophen 325 mg oral tablet: 2 tab(s) orally every 6 hours, As needed, Mild Pain (27 Sep 2018 00:06)  aspirin 81 mg oral delayed release tablet: 1 tab(s) orally once a day (27 Sep 2018 00:06)  clonazePAM 0.5 mg oral tablet: 1 tab(s) orally 2 times a day (27 Sep 2018 00:06)  donepezil 5 mg oral tablet: 1 tab(s) orally once a day (at bedtime) (27 Sep 2018 00:06)  lisinopril 20 mg oral tablet: 1 tab(s) orally once a day (27 Sep 2018 00:06)  mirtazapine 7.5 mg oral tablet: 1 tab(s) orally once a day (27 Sep 2018 00:06)  Multiple Vitamins oral tablet: 1 tab(s) orally once a day (27 Sep 2018 00:06)  pantoprazole 40 mg oral delayed release tablet: 1 tab(s) orally 2 times a day (27 Sep 2018 00:06)  sodium bicarbonate 650 mg oral tablet: 1 tab(s) orally 2 times a day (27 Sep 2018 00:06)  Synthroid 75 mcg (0.075 mg) oral tablet: 1 tab(s) orally once a day (27 Sep 2018 00:06)    MEDICATIONS  (STANDING):  aspirin enteric coated 81 milliGRAM(s) Oral daily  clonazePAM Tablet 0.5 milliGRAM(s) Oral two times a day  dextrose 5%. 1000 milliLiter(s) (50 mL/Hr) IV Continuous <Continuous>  dextrose 50% Injectable 12.5 Gram(s) IV Push once  dextrose 50% Injectable 25 Gram(s) IV Push once  dextrose 50% Injectable 25 Gram(s) IV Push once  donepezil 5 milliGRAM(s) Oral at bedtime  enoxaparin Injectable 30 milliGRAM(s) SubCutaneous daily  furosemide   Injectable 40 milliGRAM(s) IV Push two times a day  influenza   Vaccine 0.5 milliLiter(s) IntraMuscular once  insulin lispro (HumaLOG) corrective regimen sliding scale   SubCutaneous Before meals and at bedtime  levothyroxine 75 MICROGram(s) Oral daily  lisinopril 20 milliGRAM(s) Oral daily  mirtazapine 7.5 milliGRAM(s) Oral daily  multivitamin 1 Tablet(s) Oral daily  nystatin Cream 1 Application(s) Topical two times a day  nystatin Powder 1 Application(s) Topical two times a day  pantoprazole    Tablet 40 milliGRAM(s) Oral before breakfast  sodium bicarbonate 650 milliGRAM(s) Oral two times a day  vitamin A &amp; D Ointment 1 Application(s) Topical two times a day    MEDICATIONS  (PRN):  acetaminophen   Tablet .. 650 milliGRAM(s) Oral every 6 hours PRN Mild Pain (1 - 3)  dextrose 40% Gel 15 Gram(s) Oral once PRN Blood Glucose LESS THAN 70 milliGRAM(s)/deciliter  glucagon  Injectable 1 milliGRAM(s) IntraMuscular once PRN Glucose LESS THAN 70 milligrams/deciliter          Allergies    Levaquin (Hives)  sulfa drugs (Unknown)    Intolerances        REVIEW OF SYSTEMS: i am OK, C/O OA Knees  CONSTITUTIONAL: No fever, No chills, No fatigue, No myalgia, No Body ache, No Weakness  EYES: No eye pain,  No visual disturbances, No discharge  ENMT:  No ear pain, No nose bleed, No vertigo; No sinus or throat pain, No Congestion  NECK: No pain, No stiffness  RESPIRATORY: No cough, wheezing, No  hemoptysis, No shortness of breath  CARDIOVASCULAR: No chest pain, palpitations  GASTROINTESTINAL: No abdominal or epigastric pain. No nausea, No vomiting; No diarrhea or constipation.  GENITOURINARY: No dysuria, No frequency, No urgency, No hematuria, or incontinence  NEUROLOGICAL: No headaches, No dizziness, No numbness, No tingling, No tremors, No weakness  EXT: B/l lower extremities diffusely red to mid calf. Mildly tender to palpation.   SKIN:  [ x ] No itching, burning, rashes, or lesions --except for b/l LEs see above  MUSCULOSKELETAL: No joint pain or swelling; No muscle pain, No back pain, No extremity pain  PSYCHIATRIC: No depression, anxiety, mood swings or difficulty sleeping at night  PAIN SCALE: [ x ] None  [  ] Other-  ROS Unable to obtain due to - [  ] Dementia  [  ] Lethargy  [  ] Sedated [  ] non verbal  REST OF REVIEW Of SYSTEM - [ x ] Normal     Vital Signs Last 24 Hrs  T(C): 36.8 (28 Sep 2018 04:30), Max: 36.8 (27 Sep 2018 11:12)  T(F): 98.2 (28 Sep 2018 04:30), Max: 98.3 (27 Sep 2018 11:12)  HR: 70 (28 Sep 2018 04:30) (70 - 83)  BP: 115/71 (28 Sep 2018 04:30) (115/71 - 142/62)  BP(mean): --  RR: 17 (28 Sep 2018 04:30) (17 - 18)  SpO2: 98% (28 Sep 2018 04:30) (98% - 99%)        PHYSICAL EXAM: [facility]  GENERAL:  [ x ] NAD , [ x ] well appearing, [  ] Agitated, [  ] Drowsy,  [  ] Lethargy, [  ] confused   HEAD:  [ x ] Normal, [  ] Other  EYES:  [ x ] EOMI, [  x] PERRLA, [ x ] conjunctiva and sclera clear normal, [  ] Other,  [  ] Pallor,[  ] Discharge  ENMT:  [ x ] Normal, [ x ] Moist mucous membranes, [ x ] Good dentition, [  ] No Thrush  NECK:  [x  ] Supple, [ x ] No JVD, [x  ] Normal thyroid, [  ] Lymphadenopathy [  ] Other  CHEST/LUNG:  [ x ] Clear to auscultation bilaterally, [ x ] Breath Sounds equal B/L Decrease, Poor effort  [ x ] No rales, [ x ] No rhonchi  [x  ]  No wheezing  HEART:  [ x ] Regular rate and rhythm, [  ] tachycardia, [  ] Bradycardia,  [  ] irregular  [x  ] No murmurs, No rubs, No gallops, [  ] PPM in place (Mfr:  )  ABDOMEN:  [x  ] Soft, [x  ] Nontender, [x  ] Nondistended, [ x ] No mass, [x  ] Bowel sounds present, [  ] obese  NERVOUS SYSTEM:  [x  ] Alert & Oriented X3, [ x ] Nonfocal  [  ] Confusion  [  ] Encephalopathic [  ] Sedated [  ] Unable to assess, [  ] Other-  EXTREMITIES: [x  ] 2+ Peripheral Pulses, No clubbing, No cyanosis,  [x  ] edema B/L lower EXT--erythematous b/l lower extremities to mid calf. Mild tenderness to palpation, minimal oozing/weezing.-Resolved, scabs ++ [  ] PVD stasis skin changes B/L Lower EXT  LYMPH: No lymphadenopathy noted  SKIN:  [  ] No rashes or lesions, [x  ] Pressure Ulcers B/L Gluteal folds DTI, [  ] ecchymosis, [  ] Skin Tears, [ x ] Other-- b/l diffuse erythema of lower extremities as described above    DIET: consistent carbs with evening snack (fluid restrictions)    LABS:                        8.1    7.83  )-----------( 241      ( 28 Sep 2018 08:36 )             26.4     09-28    146<H>  |  111<H>  |  34<H>  ----------------------------<  80  3.6   |  29  |  1.60<H>    Ca    7.5<L>      28 Sep 2018 08:36    TPro  4.8<L>  /  Alb  1.7<L>  /  TBili  0.2  /  DBili  x   /  AST  13<L>  /  ALT  <6<L>  /  AlkPhos  144<H>  09-28    LIVER FUNCTIONS - ( 28 Sep 2018 08:36 )  Alb: 1.7 g/dL / Pro: 4.8 g/dL / ALK PHOS: 144 U/L / ALT: <6 U/L / AST: 13 U/L / GGT: x           PT/INR - ( 26 Sep 2018 18:42 )   PT: 12.4 sec;   INR: 1.13 ratio         PTT - ( 26 Sep 2018 18:42 )  PTT:31.9 sec    Serum Pro-Brain Natriuretic Peptide: 1588 pg/mL (09-26-18 @ 18:42)      RADIOLOGY & ADDITIONAL TESTS:  EKG reviewed- Sinus rhythm with premature atrial complexes  Otherwise normal ECG      HEALTH ISSUES - PROBLEM Dx:  Anxiety  PVD (peripheral vascular disease): PVD (peripheral vascular disease)  Anemia: Anemia  Prophylactic measure: Prophylactic measure  Arthritis: Arthritis  Depression with anxiety: Depression with anxiety  Hypothyroidism: Hypothyroidism  Diabetes mellitus: Diabetes mellitus  CKD (chronic kidney disease): CKD (chronic kidney disease)  Social problem: Social problem  Leg swelling: Leg swelling        Consultant(s) Notes Reviewed:  [x  ] YES     Care Discussed with [ x ] Consultants  [ x ] Patient  [  ] Family  [  ]   [ x ] Social Service  [ x ] RN, [ x ] Physical Therapy  DVT PPX: [x  ] Lovenox, [  ] S C Heparin, [  ] Coumadin, [  ] Xarelto, [  ] Eliquis, [  ] Pradaxa, [  ] IV Heparin drip, [  ] SCD [  ] Contraindication 2 to GI Bleed,[  ] Ambulation  Advanced directive: [ x ] None, [  ] DNR/DNI

## 2018-09-29 LAB
ANION GAP SERPL CALC-SCNC: 8 MMOL/L — SIGNIFICANT CHANGE UP (ref 5–17)
BUN SERPL-MCNC: 33 MG/DL — HIGH (ref 7–23)
CALCIUM SERPL-MCNC: 7.3 MG/DL — LOW (ref 8.5–10.1)
CHLORIDE SERPL-SCNC: 109 MMOL/L — HIGH (ref 96–108)
CO2 SERPL-SCNC: 28 MMOL/L — SIGNIFICANT CHANGE UP (ref 22–31)
CREAT SERPL-MCNC: 1.6 MG/DL — HIGH (ref 0.5–1.3)
CRP SERPL-MCNC: 0.16 MG/DL — SIGNIFICANT CHANGE UP (ref 0–0.4)
ERYTHROCYTE [SEDIMENTATION RATE] IN BLOOD: 5 MM/HR — SIGNIFICANT CHANGE UP (ref 0–20)
FERRITIN SERPL-MCNC: 95 NG/ML — SIGNIFICANT CHANGE UP (ref 15–150)
FOLATE SERPL-MCNC: 10.5 NG/ML — SIGNIFICANT CHANGE UP
GLUCOSE SERPL-MCNC: 81 MG/DL — SIGNIFICANT CHANGE UP (ref 70–99)
HCT VFR BLD CALC: 28.6 % — LOW (ref 34.5–45)
HGB BLD-MCNC: 8.7 G/DL — LOW (ref 11.5–15.5)
IRON SATN MFR SERPL: 19 % — SIGNIFICANT CHANGE UP (ref 14–50)
IRON SATN MFR SERPL: 24 UG/DL — LOW (ref 30–160)
MCHC RBC-ENTMCNC: 24.4 PG — LOW (ref 27–34)
MCHC RBC-ENTMCNC: 30.4 GM/DL — LOW (ref 32–36)
MCV RBC AUTO: 80.1 FL — SIGNIFICANT CHANGE UP (ref 80–100)
NRBC # BLD: 0 /100 WBCS — SIGNIFICANT CHANGE UP (ref 0–0)
OB PNL STL: POSITIVE
PLATELET # BLD AUTO: 246 K/UL — SIGNIFICANT CHANGE UP (ref 150–400)
PLATELET # BLD AUTO: SIGNIFICANT CHANGE UP K/UL (ref 150–400)
POTASSIUM SERPL-MCNC: 3.7 MMOL/L — SIGNIFICANT CHANGE UP (ref 3.5–5.3)
POTASSIUM SERPL-SCNC: 3.7 MMOL/L — SIGNIFICANT CHANGE UP (ref 3.5–5.3)
RBC # BLD: 3.57 M/UL — LOW (ref 3.8–5.2)
RBC # BLD: 3.57 M/UL — LOW (ref 3.8–5.2)
RBC # FLD: 16.8 % — HIGH (ref 10.3–14.5)
RETICS #: 34.9 K/UL — SIGNIFICANT CHANGE UP (ref 25–125)
RETICS/RBC NFR: 1 % — SIGNIFICANT CHANGE UP (ref 0.5–2.5)
SODIUM SERPL-SCNC: 145 MMOL/L — SIGNIFICANT CHANGE UP (ref 135–145)
TIBC SERPL-MCNC: 125 UG/DL — LOW (ref 220–430)
UIBC SERPL-MCNC: 101 UG/DL — LOW (ref 110–370)
VIT B12 SERPL-MCNC: 1271 PG/ML — HIGH (ref 232–1245)
WBC # BLD: 8.59 K/UL — SIGNIFICANT CHANGE UP (ref 3.8–10.5)
WBC # FLD AUTO: 8.59 K/UL — SIGNIFICANT CHANGE UP (ref 3.8–10.5)

## 2018-09-29 RX ADMIN — Medication 81 MILLIGRAM(S): at 11:16

## 2018-09-29 RX ADMIN — NYSTATIN CREAM 1 APPLICATION(S): 100000 CREAM TOPICAL at 17:20

## 2018-09-29 RX ADMIN — MIRTAZAPINE 7.5 MILLIGRAM(S): 45 TABLET, ORALLY DISINTEGRATING ORAL at 11:16

## 2018-09-29 RX ADMIN — Medication 650 MILLIGRAM(S): at 06:45

## 2018-09-29 RX ADMIN — DONEPEZIL HYDROCHLORIDE 5 MILLIGRAM(S): 10 TABLET, FILM COATED ORAL at 21:50

## 2018-09-29 RX ADMIN — PANTOPRAZOLE SODIUM 40 MILLIGRAM(S): 20 TABLET, DELAYED RELEASE ORAL at 06:11

## 2018-09-29 RX ADMIN — NYSTATIN CREAM 1 APPLICATION(S): 100000 CREAM TOPICAL at 06:11

## 2018-09-29 RX ADMIN — Medication 1 APPLICATION(S): at 06:13

## 2018-09-29 RX ADMIN — Medication 1 APPLICATION(S): at 17:21

## 2018-09-29 RX ADMIN — Medication 650 MILLIGRAM(S): at 06:09

## 2018-09-29 RX ADMIN — LISINOPRIL 20 MILLIGRAM(S): 2.5 TABLET ORAL at 06:12

## 2018-09-29 RX ADMIN — Medication 0.5 MILLIGRAM(S): at 17:20

## 2018-09-29 RX ADMIN — Medication 75 MICROGRAM(S): at 06:13

## 2018-09-29 RX ADMIN — Medication 1 TABLET(S): at 11:16

## 2018-09-29 RX ADMIN — ENOXAPARIN SODIUM 30 MILLIGRAM(S): 100 INJECTION SUBCUTANEOUS at 11:15

## 2018-09-29 RX ADMIN — Medication 0.5 MILLIGRAM(S): at 06:12

## 2018-09-29 RX ADMIN — Medication 60 MILLIGRAM(S): at 06:12

## 2018-09-29 NOTE — PROGRESS NOTE ADULT - PROBLEM SELECTOR PLAN 1
b/l cely LE edema, chronic edematous changes, recent echo 7/18 reviewed, with normal LV function. [place] has improved   - On Lasix 60 mg Po daily, NON Compliance to Home Lasix   - i/os, daily weights, fluid restrict 1.5 lit /24 hrs  - PT recommends NAOMY; SW made aware  - Ambulate/OOB as tolerated  - fall risk protocol

## 2018-09-29 NOTE — PROGRESS NOTE ADULT - ASSESSMENT
82 y/o F with PMHx DM2, hypothyroidism, asthma, arthritis, anxiety, chronic leg edema BIBEMS for difficulty ambulating and b/l leg weeping edema. Admitted for severe weeping edema B/L Lower EXT, difficulty walking and social work evaluation for placement, IV Lasix  Now on PO lasix.

## 2018-09-29 NOTE — PROGRESS NOTE ADULT - PROBLEM SELECTOR PLAN 4
Chronic PVD with skin changes B/l lower EXT  with severe stasis changes.  c/w IV lasix for leg edema  -Wound care RN d/w, Skin care, OOB to chair

## 2018-09-29 NOTE — CONSULT NOTE ADULT - ASSESSMENT
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  Edema  Anemia  HTN    -Renal indices have improved to baseline; the creatinine is known to fluctuate and can go up as high as around 2  -There is no acidosis; Sodium bicarb tabs stopped for now  -The patient is already on Lasix which will cause alkalosis; however, continue with current dose for now  -Blood pressure is stable; continue with current antihypertensive regimen which includes Lisinopril  -Encouraged to drink a little more free water due to borderline hypernatremia  -Anemia work up and management per Hem/Onc  -No additional renal work up needed, but will follow chemistries and make adjustments as needed    Thank you    D/W RN

## 2018-09-29 NOTE — PROGRESS NOTE ADULT - PROBLEM SELECTOR PLAN 3
2/2 diabetic nephropathy, cr at baseline CKD 3  -Cr (1.6 today) seems to be stable and her baseline  - continue lisinopril, On PO Lasix 60 mg daily  - trend bmp, Renal DR Jasmin meraz, BMP in AM,

## 2018-09-29 NOTE — PROGRESS NOTE ADULT - PROBLEM SELECTOR PLAN 2
acute on Chronic anemia, FOBT +  likely multifactorial , 2/2 CKD, Fluid Over load   -daily CBC  pt had  anemia work up on last admission with Dr Carpenter's group  Consulted stacy Romero f/u

## 2018-09-29 NOTE — PROGRESS NOTE ADULT - SUBJECTIVE AND OBJECTIVE BOX
Patient seen and examined;  Chart reviewed and events noted;   reports abdominal pain overnight; more in lower abdomen ; seems to have resolved    MEDICATIONS  (STANDING):  aspirin enteric coated 81 milliGRAM(s) Oral daily  clonazePAM Tablet 0.5 milliGRAM(s) Oral two times a day  donepezil 5 milliGRAM(s) Oral at bedtime  enoxaparin Injectable 30 milliGRAM(s) SubCutaneous daily  furosemide    Tablet 60 milliGRAM(s) Oral daily  influenza   Vaccine 0.5 milliLiter(s) IntraMuscular once  insulin lispro (HumaLOG) corrective regimen sliding scale   SubCutaneous Before meals and at bedtime  levothyroxine 75 MICROGram(s) Oral daily  lisinopril 20 milliGRAM(s) Oral daily  mirtazapine 7.5 milliGRAM(s) Oral daily  multivitamin 1 Tablet(s) Oral daily  nystatin Cream 1 Application(s) Topical two times a day  nystatin Powder 1 Application(s) Topical two times a day  pantoprazole    Tablet 40 milliGRAM(s) Oral before breakfast  vitamin A &amp; D Ointment 1 Application(s) Topical two times a day    MEDICATIONS  (PRN):  acetaminophen   Tablet .. 650 milliGRAM(s) Oral every 6 hours PRN Mild Pain (1 - 3)  dextrose 40% Gel 15 Gram(s) Oral once PRN Blood Glucose LESS THAN 70 milliGRAM(s)/deciliter  glucagon  Injectable 1 milliGRAM(s) IntraMuscular once PRN Glucose LESS THAN 70 milligrams/deciliter      Vital Signs Last 24 Hrs  T(C): 36.8 (29 Sep 2018 04:41), Max: 36.8 (28 Sep 2018 20:50)  T(F): 98.3 (29 Sep 2018 04:41), Max: 98.3 (28 Sep 2018 20:50)  HR: 83 (29 Sep 2018 04:41) (78 - 83)  BP: 126/72 (29 Sep 2018 04:41) (126/72 - 134/72)  BP(mean): --  RR: 17 (29 Sep 2018 04:41) (17 - 17)  SpO2: 92% (29 Sep 2018 04:41) (92% - 98%)    PHYSICAL EXAM  General: adult in NAD  HEENT: clear oropharynx, anicteric sclera, pink conjunctivae  Neck: supple  CV: normal S1S2 with no murmur rubs or gallops  Lungs: clear to auscultation, no wheezes, no rhales  Abdomen: obses, soft non-tender non-distended, no hepato/splenomegaly  Ext: + erythema and edema with chronic venous changes in bilateral lower extremities; crusting      LABS:                        8.7    8.59  )-----------( CLUMPED    ( 29 Sep 2018 08:20 )             28.6     Hemoglobin: 8.7 g/dL (09-29 @ 08:20)  Hemoglobin: 8.1 g/dL (09-28 @ 08:36)  Hemoglobin: 8.6 g/dL (09-27 @ 05:37)  Hemoglobin: 9.9 g/dL (09-26 @ 18:42)    Platelet Count - Automated: CLUMPED K/uL (09-29 @ 08:20)  Platelet Count - Automated: 241 K/uL (09-28 @ 08:36)  Platelet Count - Automated: 267 K/uL (09-27 @ 05:37)  Platelet Count - Automated: 306 K/uL (09-26 @ 18:42)    09-29    145  |  109<H>  |  33<H>  ----------------------------<  81  3.7   |  28  |  1.60<H>    Ca    7.3<L>      29 Sep 2018 08:20    TPro  4.8<L>  /  Alb  1.7<L>  /  TBili  0.2  /  DBili  x   /  AST  13<L>  /  ALT  <6<L>  /  AlkPhos  144<H>  09-28

## 2018-09-29 NOTE — CHART NOTE - NSCHARTNOTEFT_GEN_A_CORE
While seeing patient's room mate patient asked visitor if purell could hurt her because she "accidently drank it thinking it was water". Purell bottle found next to cup on patient's table containing purell and small medicine cup, it appears that patient pumped purell into the cup and drank it. On further questioning it does not appear that patient ingested any of the purell but rather once she realized what it was spit it out. There are no lesions or burns in patients mouth, denies any chest pain, trouble breathing, nausea or vomiting.     Vital Signs Last 24 Hrs  T(C): 37.1 (29 Sep 2018 14:32), Max: 37.1 (29 Sep 2018 14:32)  T(F): 98.7 (29 Sep 2018 14:32), Max: 98.7 (29 Sep 2018 14:32)  HR: 85 (29 Sep 2018 14:32) (78 - 85)  BP: 102/65 (29 Sep 2018 14:32) (102/65 - 128/78)  BP(mean): --  RR: 17 (29 Sep 2018 14:32) (17 - 17)  SpO2: 95% (29 Sep 2018 14:32) (92% - 98%)    Physical Exam:  General: Well developed, well nourished, NAD  HEENT: NCAT, PERRLA, EOMI bl, moist mucous membranes   Neck: Supple, nontender, no mass  Neurology: A&Ox3  Respiratory: CTA B/L, No W/R/R  CV: RRR, +S1/S2, no murmurs, rubs or gallops  Abdominal: Soft, NT, ND +BSx4  Extremities: +2 - +3 leg chronic lymphedema B/L  Skin: warm, dry, normal color    Dr. Sanders made aware  No further treatment at this time

## 2018-09-29 NOTE — CONSULT NOTE ADULT - SUBJECTIVE AND OBJECTIVE BOX
Patient is a 83y old  Female who presents with a chief complaint of Difficulty ambulating (28 Sep 2018 19:04)   Acute  renal failure.    HPI:  84 y/o F with PMHx DM2, hypothyroidism, asthma, arthritis, anxiety, chronic leg edema BIBEMS for difficulty ambulating and b/l leg weeping edema. Pt reports was recently discharged from rehab a week ago for 4 weeks and has been living at home with son and daughter.  Reports during rehab they had not been giving pt's her PO Lasix and edema started to worsen.  Pt reports started taking her Lasix as soon as she gotten home from rehab. Today while at home, pt reported had a bit more difficulty getting up and walking but son was more concerned about B/l Lower EXT edema  and called EMS which prompted arrival to the ED. before coming to ER pt took Lasix 60 mg PO x 1 dose.    In the ED, vitals: stable, labs: h/h 9.9/31.8, BUN/cr 39/1.9, probnp 1588. cxr neg, Patient admitted for Diuresis with IV Lasix. for fluid over load. (27 Sep 2018 00:07)   Hx renal stones x1 not aware of type, no other renal  problems; elevated creatinine on admission.    Renal consulted for Acute on CKD stage 3-4. The patient has been having worsening LE edema, but also admits this is a chronic problem. She denies shortness of breath and chest pain. Denies urinary complaints as well. Admits to feeling groggy this morning after having Klonopin given yesterday.     PAST MEDICAL & SURGICAL HISTORY:  Obesity  CKD (chronic kidney disease) stage 2, GFR 60-89 ml/min  Anemia  PVD (peripheral vascular disease): with chronic edema B/l lower EXT  Arthritis  Asthma  Diabetes mellitus  Hypothyroidism  No significant past surgical history   DM 2, [place], hypothyroidism, prior DVT and IVC filter; Cholecystectomy    FAMILY HISTORY:  No pertinent family history in first degree relatives  [place]    Social History:Non smoker    MEDICATIONS  (STANDING):  aspirin enteric coated 81 milliGRAM(s) Oral daily  clonazePAM Tablet 0.5 milliGRAM(s) Oral two times a day  dextrose 5%. 1000 milliLiter(s) (50 mL/Hr) IV Continuous <Continuous>  dextrose 50% Injectable 12.5 Gram(s) IV Push once  dextrose 50% Injectable 25 Gram(s) IV Push once  dextrose 50% Injectable 25 Gram(s) IV Push once  donepezil 5 milliGRAM(s) Oral at bedtime  enoxaparin Injectable 30 milliGRAM(s) SubCutaneous daily  furosemide    Tablet 60 milliGRAM(s) Oral daily  influenza   Vaccine 0.5 milliLiter(s) IntraMuscular once  insulin lispro (HumaLOG) corrective regimen sliding scale   SubCutaneous Before meals and at bedtime  levothyroxine 75 MICROGram(s) Oral daily  lisinopril 20 milliGRAM(s) Oral daily  mirtazapine 7.5 milliGRAM(s) Oral daily  multivitamin 1 Tablet(s) Oral daily  nystatin Cream 1 Application(s) Topical two times a day  nystatin Powder 1 Application(s) Topical two times a day  pantoprazole    Tablet 40 milliGRAM(s) Oral before breakfast  vitamin A &amp; D Ointment 1 Application(s) Topical two times a day    MEDICATIONS  (PRN):  acetaminophen   Tablet .. 650 milliGRAM(s) Oral every 6 hours PRN Mild Pain (1 - 3)  dextrose 40% Gel 15 Gram(s) Oral once PRN Blood Glucose LESS THAN 70 milliGRAM(s)/deciliter  glucagon  Injectable 1 milliGRAM(s) IntraMuscular once PRN Glucose LESS THAN 70 milligrams/deciliter   Meds reviewed    Allergies    Levaquin (Hives)  sulfa drugs (Unknown)    Intolerances         REVIEW OF SYSTEMS:    CONSTITUTIONAL:  weight gain, feels weak, feels groggy  EYES: No eye pain, visual disturbances, or discharge  ENMT:  No difficulty hearing, tinnitus, vertigo; No sinus or throat pain  NECK: No pain or stiffness  BREASTS: No pain, masses, or nipple discharge  RESPIRATORY: neg  CARDIOVASCULAR: No chest pain, palpitations, dizziness,   GASTROINTESTINAL: No abdominal or epigastric pain. +nausea, vomiting, or hematemesis; No diarrhea or constipation. No melena   GENITOURINARY: No dysuria, frequency, hematuria, or incontinence  NEUROLOGICAL: No headaches, memory loss, loss of strength, numbness, or tremors  SKIN: Diffuse erythema, no blisters  LYMPH NODES: No enlarged glands  ENDOCRINE: No heat or cold intolerance; No hair loss  MUSCULOSKELETAL: Chronic lymphedema legs with scars  PSYCHIATRIC: No depression, anxiety, mood swings, or difficulty sleeping  HEME/LYMPH: No easy bruising, or bleeding gums  ALLERGY AND IMMUNOLOGIC: No hives or eczema    Vital Signs Last 24 Hrs  T(C): 36.8 (29 Sep 2018 04:41), Max: 36.8 (28 Sep 2018 20:50)  T(F): 98.3 (29 Sep 2018 04:41), Max: 98.3 (28 Sep 2018 20:50)  HR: 83 (29 Sep 2018 04:41) (78 - 83)  BP: 126/72 (29 Sep 2018 04:41) (126/72 - 134/72)  BP(mean): --  RR: 17 (29 Sep 2018 04:41) (17 - 17)  SpO2: 92% (29 Sep 2018 04:41) (92% - 98%)  Daily     Daily Weight in k.1 (28 Sep 2018 11:12)    PHYSICAL EXAM:    GENERAL: appears chronically ill, oriented.  HEAD:  Atraumatic, Normocephalic  EYES: Conjunctiva and sclera clear  ENMT:  Moist mucous membranes, Good dentition, No lesions  NECK: Supple, neck veins full  NERVOUS SYSTEM:  Alert & Oriented X3, Good concentration; Motor Strength wnl upper and lower extremities  CHEST/LUNG: Clear to percussion bilaterally; No rales, rhonchi, wheezing, or rubs  HEART: Regular rate and rhythm; No murmurs, rubs, or gallops  ABDOMEN: Soft, Nontender, Nondistended; Bowel sounds present, body wall and flank edema  EXTREMITIES:  +2 - +3 leg chronic lymphedema B/L  LYMPH: No lymphadenopathy noted  SKIN: No rashes or lesions, pale    LABS:                        8.1    7.83  )-----------( 241      ( 28 Sep 2018 08:36 )             26.4         146<H>  |  111<H>  |  34<H>  ----------------------------<  80  3.6   |  29  |  1.60<H>    Ca    7.5<L>      28 Sep 2018 08:36    TPro  4.8<L>  /  Alb  1.7<L>  /  TBili  0.2  /  DBili  x   /  AST  13<L>  /  ALT  <6<L>  /  AlkPhos  144<H>                RADIOLOGY & ADDITIONAL TESTS:

## 2018-09-29 NOTE — PROGRESS NOTE ADULT - SUBJECTIVE AND OBJECTIVE BOX
Patient is a 83y old  Female who presents with a chief complaint of Difficulty ambulating (29 Sep 2018 11:30)      INTERVAL HPI:      OVERNIGHT EVENTS:    Home Medications:  acetaminophen 325 mg oral tablet: 2 tab(s) orally every 6 hours, As needed, Mild Pain (27 Sep 2018 00:06)  aspirin 81 mg oral delayed release tablet: 1 tab(s) orally once a day (27 Sep 2018 00:06)  clonazePAM 0.5 mg oral tablet: 1 tab(s) orally 2 times a day (27 Sep 2018 00:06)  donepezil 5 mg oral tablet: 1 tab(s) orally once a day (at bedtime) (27 Sep 2018 00:06)  lisinopril 20 mg oral tablet: 1 tab(s) orally once a day (27 Sep 2018 00:06)  mirtazapine 7.5 mg oral tablet: 1 tab(s) orally once a day (27 Sep 2018 00:06)  Multiple Vitamins oral tablet: 1 tab(s) orally once a day (27 Sep 2018 00:06)  pantoprazole 40 mg oral delayed release tablet: 1 tab(s) orally 2 times a day (27 Sep 2018 00:06)  sodium bicarbonate 650 mg oral tablet: 1 tab(s) orally 2 times a day (27 Sep 2018 00:06)  Synthroid 75 mcg (0.075 mg) oral tablet: 1 tab(s) orally once a day (27 Sep 2018 00:06)      MEDICATIONS  (STANDING):  aspirin enteric coated 81 milliGRAM(s) Oral daily  clonazePAM Tablet 0.5 milliGRAM(s) Oral two times a day  dextrose 5%. 1000 milliLiter(s) (50 mL/Hr) IV Continuous <Continuous>  dextrose 50% Injectable 12.5 Gram(s) IV Push once  dextrose 50% Injectable 25 Gram(s) IV Push once  dextrose 50% Injectable 25 Gram(s) IV Push once  donepezil 5 milliGRAM(s) Oral at bedtime  enoxaparin Injectable 30 milliGRAM(s) SubCutaneous daily  furosemide    Tablet 60 milliGRAM(s) Oral daily  influenza   Vaccine 0.5 milliLiter(s) IntraMuscular once  insulin lispro (HumaLOG) corrective regimen sliding scale   SubCutaneous Before meals and at bedtime  levothyroxine 75 MICROGram(s) Oral daily  lisinopril 20 milliGRAM(s) Oral daily  mirtazapine 7.5 milliGRAM(s) Oral daily  multivitamin 1 Tablet(s) Oral daily  nystatin Cream 1 Application(s) Topical two times a day  nystatin Powder 1 Application(s) Topical two times a day  pantoprazole    Tablet 40 milliGRAM(s) Oral before breakfast  vitamin A &amp; D Ointment 1 Application(s) Topical two times a day    MEDICATIONS  (PRN):  acetaminophen   Tablet .. 650 milliGRAM(s) Oral every 6 hours PRN Mild Pain (1 - 3)  dextrose 40% Gel 15 Gram(s) Oral once PRN Blood Glucose LESS THAN 70 milliGRAM(s)/deciliter  glucagon  Injectable 1 milliGRAM(s) IntraMuscular once PRN Glucose LESS THAN 70 milligrams/deciliter      Allergies    Levaquin (Hives)  sulfa drugs (Unknown)    Intolerances        REVIEW OF SYSTEMS:  CONSTITUTIONAL: No fever, No chills, No fatigue, No myalgia, No Body ache, No Weakness  EYES: No eye pain,  No visual disturbances, No discharge, NO Redness  ENMT:  No ear pain, No nose bleed, No vertigo; No sinus or throat pain, No Congestion  NECK: No pain, No stiffness  RESPIRATORY: No cough, wheezing, No  hemoptysis, No shortness of breath  CARDIOVASCULAR: No chest pain, palpitations  GASTROINTESTINAL: No abdominal or epigastric pain. No nausea, No vomiting; No diarrhea or constipation. [  ] BM  GENITOURINARY: No dysuria, No frequency, No urgency, No hematuria, or incontinence  NEUROLOGICAL: No headaches, No dizziness, No numbness, No tingling, No tremors, No weakness  EXT: No Swelling, No Pain, No Edema  SKIN:  [  ] No itching, burning, rashes, or lesions   MUSCULOSKELETAL: No joint pain or swelling; No muscle pain, No back pain, No extremity pain  PSYCHIATRIC: No depression, anxiety, mood swings or difficulty sleeping at night  PAIN SCALE: [  ] None  [  ] Other-  ROS Unable to obtain due to - [  ] Dementia  [  ] Lethargy  [  ] Sedated [  ] non verbal  REST OF REVIEW Of SYSTEM - [  ] Normal     Vital Signs Last 24 Hrs  T(C): 36.8 (29 Sep 2018 04:41), Max: 36.8 (28 Sep 2018 20:50)  T(F): 98.3 (29 Sep 2018 04:41), Max: 98.3 (28 Sep 2018 20:50)  HR: 83 (29 Sep 2018 04:41) (78 - 83)  BP: 126/72 (29 Sep 2018 04:41) (126/72 - 134/72)  BP(mean): --  RR: 17 (29 Sep 2018 04:41) (17 - 17)  SpO2: 92% (29 Sep 2018 04:41) (92% - 98%)  Finger Stick          PHYSICAL EXAM:  GENERAL:  [  ] NAD , [  ] well appearing, [  ] Agitated, [  ] Drowsy,  [  ] Lethargy, [  ] confused   HEAD:  [  ] Normal, [  ] Other  EYES:  [  ] EOMI, [  ] PERRLA, [  ] conjunctiva and sclera clear normal, [  ] Other,  [  ] Pallor,[  ] Discharge  ENMT:  [  ] Normal, [  ] Moist mucous membranes, [  ] Good dentition, [  ] No Thrush  NECK:  [  ] Supple, [  ] No JVD, [  ] Normal thyroid, [  ] Lymphadenopathy [  ] Other  CHEST/LUNG:  [  ] Clear to auscultation bilaterally, [  ] Breath Sounds equal OR Decrease,  [  ] No rales, [  ] No rhonchi  [  ]  No wheezing  HEART:  [  ] Regular rate and rhythm, [  ] tachycardia, [  ] Bradycardia,  [  ] irregular  [  ] No murmurs, No rubs, No gallops, [  ] PPM in place (Mfr:  )  ABDOMEN:  [  ] Soft, [  ] Nontender, [  ] Nondistended, [  ] No mass, [  ] Bowel sounds present, [  ] obese  NERVOUS SYSTEM:  [  ] Alert & Oriented X3, [  ] Nonfocal  [  ] Confusion  [  ] Encephalopathic [  ] Sedated [  ] Unable to assess, [  ] Other-  EXTREMITIES: [  ] 2+ Peripheral Pulses, No clubbing, No cyanosis,  [  ] edema B/L lower EXT. [  ] PVD stasis skin changes B/L Lower EXT  LYMPH: No lymphadenopathy noted  SKIN:  [  ] No rashes or lesions, [  ] Pressure Ulcers, [  ] ecchymosis, [  ] Skin Tears, [  ] Other    DIET:     LABS:                        x      x     )-----------( 246      ( 29 Sep 2018 12:19 )             x        29 Sep 2018 08:20    145    |  109    |  33     ----------------------------<  81     3.7     |  28     |  1.60     Ca    7.3        29 Sep 2018 08:20            Culture Results:   No growth to date. (09-26 @ 21:00)  Culture Results:   No growth to date. (09-26 @ 21:00)                  Culture - Blood (collected 26 Sep 2018 21:00)  Source: .Blood Blood  Preliminary Report (27 Sep 2018 22:01):    No growth to date.    Culture - Blood (collected 26 Sep 2018 21:00)  Source: .Blood Blood  Preliminary Report (27 Sep 2018 22:01):    No growth to date.         Anemia Panel:  Absolute Reticulocytes: 34.9 K/uL (09-29-18 @ 08:20)      Thyroid Panel:            Serum Pro-Brain Natriuretic Peptide: 1588 pg/mL (09-26-18 @ 18:42)      RADIOLOGY & ADDITIONAL TESTS:      HEALTH ISSUES - PROBLEM Dx:  Anxiety  PVD (peripheral vascular disease): PVD (peripheral vascular disease)  Anemia: Anemia  Prophylactic measure: Prophylactic measure  Arthritis: Arthritis  Depression with anxiety: Depression with anxiety  Hypothyroidism: Hypothyroidism  Diabetes mellitus: Diabetes mellitus  CKD (chronic kidney disease): CKD (chronic kidney disease)  Social problem: Social problem  Leg swelling: Leg swelling          Consultant(s) Notes Reviewed:  [  ] YES     Care Discussed with [X] Consultants  [  ] Patient  [  ] Family  [  ]   [  ] Social Service  [  ] RN, [  ] Physical Therapy  DVT PPX: [  ] Lovenox, [  ] S C Heparin, [  ] Coumadin, [  ] Xarelto, [  ] Eliquis, [  ] Pradaxa, [  ] IV Heparin drip, [  ] SCD [  ] Contraindication 2 to GI Bleed,[  ] Ambulation  Advanced directive: [  ] None, [  ] DNR/DNI Patient is a 83y old  Female who presents with a chief complaint of Difficulty ambulating (29 Sep 2018 11:30)      INTERVAL HPI:  84 y/o F with PMHx DM2, hypothyroidism, asthma, arthritis, anxiety, chronic leg edema BIBEMS for difficulty ambulating and b/l leg weeping edema. Pt reports was recently discharged from rehab a week ago for 4 weeks and has been living at home with son and daughter.  Reports during rehab they had not been giving pt's her PO Lasix and edema started to worsen.  Pt reports started taking her Lasix as soon as she gotten home from rehab. Today while at home, pt reported had a bit more difficulty getting up and walking but son was more concerned about B/l Lower EXT edema  and called EMS which prompted arrival to the ED. before coming to ER pt took Lasix 60 mg PO x 1 dose.    In the ED, vitals: stable, labs: h/h 9.9/31.8, BUN/cr 39/1.9, probnp 1588. cxr neg, Patient admitted for Diuresis with IV Lasix. for fluid over load.      OVERNIGHT EVENTS: Pt seen, examined. Found laying in bed comfortably. She is denying any leg pain, sob, cp, palpitations, fevers, chills, nausea, vomiting. No acute overnight events. She is asking to be discharged multiple times. Her b/l legs are erythematous but improving, swollen to mid calf, mildly tender only when palpated, very minimal oozing/weeping this AM.    9/27: Pt seen, examined. Found eating breakfast comfortably in bed. Her legs are diffusely erythematous and only mildly weeping. She reports this has been the case for at least one year. She does not do anything to care for them at home. Denying fevers, chills, nausea, vomiting, cp, sob, palpitations. She reports being here awaiting placement, wants to go home, anxious to leave.    9/28: Pt seen, examined. Finished breakfast. No acute overnight events, leg swelling has decreased, less erythematous than day prior. No longer weeping/oozing. Asking for A&D ointment, will order. Denying fevers, chills, nausea, vomiting, cp, sob, palpitations. Awaiting placement, saying social work will have meeting with family today to determine dispo-- followed up, [facility] (ext [phone]) aware. Heme/onc consulted for Anemia, Renal for CKD/BARBARA.      OVERNIGHT EVENTS:    Home Medications:  acetaminophen 325 mg oral tablet: 2 tab(s) orally every 6 hours, As needed, Mild Pain (27 Sep 2018 00:06)  aspirin 81 mg oral delayed release tablet: 1 tab(s) orally once a day (27 Sep 2018 00:06)  clonazePAM 0.5 mg oral tablet: 1 tab(s) orally 2 times a day (27 Sep 2018 00:06)  donepezil 5 mg oral tablet: 1 tab(s) orally once a day (at bedtime) (27 Sep 2018 00:06)  lisinopril 20 mg oral tablet: 1 tab(s) orally once a day (27 Sep 2018 00:06)  mirtazapine 7.5 mg oral tablet: 1 tab(s) orally once a day (27 Sep 2018 00:06)  Multiple Vitamins oral tablet: 1 tab(s) orally once a day (27 Sep 2018 00:06)  pantoprazole 40 mg oral delayed release tablet: 1 tab(s) orally 2 times a day (27 Sep 2018 00:06)  sodium bicarbonate 650 mg oral tablet: 1 tab(s) orally 2 times a day (27 Sep 2018 00:06)  Synthroid 75 mcg (0.075 mg) oral tablet: 1 tab(s) orally once a day (27 Sep 2018 00:06)      MEDICATIONS  (STANDING):  aspirin enteric coated 81 milliGRAM(s) Oral daily  clonazePAM Tablet 0.5 milliGRAM(s) Oral two times a day  dextrose 5%. 1000 milliLiter(s) (50 mL/Hr) IV Continuous <Continuous>  dextrose 50% Injectable 12.5 Gram(s) IV Push once  dextrose 50% Injectable 25 Gram(s) IV Push once  dextrose 50% Injectable 25 Gram(s) IV Push once  donepezil 5 milliGRAM(s) Oral at bedtime  enoxaparin Injectable 30 milliGRAM(s) SubCutaneous daily  furosemide    Tablet 60 milliGRAM(s) Oral daily  influenza   Vaccine 0.5 milliLiter(s) IntraMuscular once  insulin lispro (HumaLOG) corrective regimen sliding scale   SubCutaneous Before meals and at bedtime  levothyroxine 75 MICROGram(s) Oral daily  lisinopril 20 milliGRAM(s) Oral daily  mirtazapine 7.5 milliGRAM(s) Oral daily  multivitamin 1 Tablet(s) Oral daily  nystatin Cream 1 Application(s) Topical two times a day  nystatin Powder 1 Application(s) Topical two times a day  pantoprazole    Tablet 40 milliGRAM(s) Oral before breakfast  vitamin A &amp; D Ointment 1 Application(s) Topical two times a day    MEDICATIONS  (PRN):  acetaminophen   Tablet .. 650 milliGRAM(s) Oral every 6 hours PRN Mild Pain (1 - 3)  dextrose 40% Gel 15 Gram(s) Oral once PRN Blood Glucose LESS THAN 70 milliGRAM(s)/deciliter  glucagon  Injectable 1 milliGRAM(s) IntraMuscular once PRN Glucose LESS THAN 70 milligrams/deciliter      Allergies    Levaquin (Hives)  sulfa drugs (Unknown)    Intolerances        REVIEW OF SYSTEMS:  CONSTITUTIONAL: No fever, No chills, No fatigue, No myalgia, No Body ache, No Weakness  EYES: No eye pain,  No visual disturbances, No discharge, NO Redness  ENMT:  No ear pain, No nose bleed, No vertigo; No sinus or throat pain, No Congestion  NECK: No pain, No stiffness  RESPIRATORY: No cough, wheezing, No  hemoptysis, No shortness of breath  CARDIOVASCULAR: No chest pain, palpitations  GASTROINTESTINAL: No abdominal or epigastric pain. No nausea, No vomiting; No diarrhea or constipation. [ x ] BM  GENITOURINARY: No dysuria, No frequency, No urgency, No hematuria, or incontinence  NEUROLOGICAL: No headaches, No dizziness, No numbness, No tingling, No tremors, No weakness  EXT: No Swelling, No Pain, No Edema  SKIN:  [x  ] No itching, burning, rashes, or lesions   MUSCULOSKELETAL: No joint pain or swelling; No muscle pain, No back pain, No extremity pain  PSYCHIATRIC: No depression, anxiety, mood swings or difficulty sleeping at night  PAIN SCALE: [  ] None  [x  ] Other-Rt Knee OA pain, Back pain  ROS Unable to obtain due to - [  ] Dementia  [  ] Lethargy  [  ] Sedated [  ] non verbal  REST OF REVIEW Of SYSTEM - [ x ] Normal     Vital Signs Last 24 Hrs  T(C): 36.8 (29 Sep 2018 04:41), Max: 36.8 (28 Sep 2018 20:50)  T(F): 98.3 (29 Sep 2018 04:41), Max: 98.3 (28 Sep 2018 20:50)  HR: 83 (29 Sep 2018 04:41) (78 - 83)  BP: 126/72 (29 Sep 2018 04:41) (126/72 - 134/72)  BP(mean): --  RR: 17 (29 Sep 2018 04:41) (17 - 17)  SpO2: 92% (29 Sep 2018 04:41) (92% - 98%)  Finger Stick  PHYSICAL EXAM: [facility]  GENERAL:  [x  ] NAD , [ x ] well appearing, [  ] Agitated, [  ] Drowsy,  [  ] Lethargy, [  ] confused   HEAD:  [ x ] Normal, [  ] Other  EYES:  [x  ] EOMI, [ x ] PERRLA, [ x ] conjunctiva and sclera clear normal, [  ] Other,  [ x ] Pallor,[  ] Discharge  ENMT:  [ x ] Normal, [x  ] Moist mucous membranes, [x  ] Good dentition, [ x ] No Thrush  NECK:  [x  ] Supple, [ x ] No JVD, [ x ] Normal thyroid, [  ] Lymphadenopathy [  ] Other  CHEST/LUNG:  [ x ] Clear to auscultation bilaterally, [x  ] Breath Sounds equal B/L,  [ x ] No rales, [x  ] No rhonchi  [x  ]  No wheezing  HEART:  [ x ] Regular rate and rhythm, [  ] tachycardia, [  ] Bradycardia,  [  ] irregular  [x  ] No murmurs, No rubs, No gallops, [  ] PPM in place (Mfr:  )  ABDOMEN:  [x  ] Soft, [ x ] Nontender, [x  ] Nondistended, [ x ] No mass, [ x ] Bowel sounds present, [ x ] obese  NERVOUS SYSTEM:  [ x ] Alert & Oriented X3, [ x ] Nonfocal  [  ] Confusion  [  ] Encephalopathic [  ] Sedated [  ] Unable to assess, [  ] Other-  EXTREMITIES: [x  ] 2+ Peripheral Pulses, No clubbing, No cyanosis,  [  ] edema B/L lower EXT. [x  ] severe PVD stasis skin changes B/L Lower EXT with chronic scaling, scabs ,chronic erythema b/L Lower EXT  LYMPH: No lymphadenopathy noted  SKIN:  [ x ] No rashes or lesions, [ x ] Pressure Ulcers- B/L Gluteal fold DTI, [  ] ecchymosis, [  ] Skin Tears, [ x ] Other- B/L groin fungal rash    DIET: DASH    LABS:                        x      x     )-----------( 246      ( 29 Sep 2018 12:19 )             x        29 Sep 2018 08:20    145    |  109    |  33     ----------------------------<  81     3.7     |  28     |  1.60     Ca    7.3        29 Sep 2018 08:20            Culture Results:   No growth to date. (09-26 @ 21:00)  Culture Results:   No growth to date. (09-26 @ 21:00)                  Culture - Blood (collected 26 Sep 2018 21:00)  Source: .Blood Blood  Preliminary Report (27 Sep 2018 22:01):    No growth to date.    Culture - Blood (collected 26 Sep 2018 21:00)  Source: .Blood Blood  Preliminary Report (27 Sep 2018 22:01):    No growth to date.         Anemia Panel:  Absolute Reticulocytes: 34.9 K/uL (09-29-18 @ 08:20)      Thyroid Panel:            Serum Pro-Brain Natriuretic Peptide: 1588 pg/mL (09-26-18 @ 18:42)      RADIOLOGY & ADDITIONAL TESTS:      HEALTH ISSUES - PROBLEM Dx:  Anxiety  PVD (peripheral vascular disease): PVD (peripheral vascular disease)  Anemia: Anemia  Prophylactic measure: Prophylactic measure  Arthritis: Arthritis  Depression with anxiety: Depression with anxiety  Hypothyroidism: Hypothyroidism  Diabetes mellitus: Diabetes mellitus  CKD (chronic kidney disease): CKD (chronic kidney disease)  Social problem: Social problem  Leg swelling: Leg swelling          Consultant(s) Notes Reviewed:  [  ] YES     Care Discussed with [X] Consultants  [  ] Patient  [  ] Family  [  ]   [  ] Social Service  [  ] RN, [  ] Physical Therapy  DVT PPX: [  ] Lovenox, [  ] S C Heparin, [  ] Coumadin, [  ] Xarelto, [  ] Eliquis, [  ] Pradaxa, [  ] IV Heparin drip, [  ] SCD [  ] Contraindication 2 to GI Bleed,[  ] Ambulation  Advanced directive: [  ] None, [  ] DNR/DNI

## 2018-09-29 NOTE — PROGRESS NOTE ADULT - ASSESSMENT
82 y/o F with PMHx DM2, hypothyroidism, asthma, arthritis, anxiety, chronic leg edema BIBEMS for difficulty ambulating and b/l leg weeping edema. Admitted for severe weeping edema B/L Lower EXT, difficulty walking and social work evaluation for placement. Asked to eval by heme for anemia which apperas to be multifactorial with GI blood loss (guaiac + stools), anemia due to CKD and overall chronic disease    RECOMMEND:   - Transfuse PRBC if symptomatic, if Hgb <7.0  - follow up iron studies, B12/folate; Check Fe, iron ,B12  - no contraindication in regards to continue ASA, DVT prophylaxis as I do not believe her to have rapid drop in Hg/active bleeding; however patient is noted to have guaiac positive stool; Hg remains stable at this time  - continue to monitor CBC

## 2018-09-30 LAB
ALBUMIN SERPL ELPH-MCNC: 1.7 G/DL — LOW (ref 3.3–5)
ALP SERPL-CCNC: 160 U/L — HIGH (ref 40–120)
ALT FLD-CCNC: 7 U/L — LOW (ref 12–78)
ANION GAP SERPL CALC-SCNC: 6 MMOL/L — SIGNIFICANT CHANGE UP (ref 5–17)
AST SERPL-CCNC: 11 U/L — LOW (ref 15–37)
BILIRUB SERPL-MCNC: 0.2 MG/DL — SIGNIFICANT CHANGE UP (ref 0.2–1.2)
BUN SERPL-MCNC: 33 MG/DL — HIGH (ref 7–23)
CALCIUM SERPL-MCNC: 7.2 MG/DL — LOW (ref 8.5–10.1)
CHLORIDE SERPL-SCNC: 108 MMOL/L — SIGNIFICANT CHANGE UP (ref 96–108)
CO2 SERPL-SCNC: 30 MMOL/L — SIGNIFICANT CHANGE UP (ref 22–31)
CREAT SERPL-MCNC: 1.6 MG/DL — HIGH (ref 0.5–1.3)
GLUCOSE SERPL-MCNC: 86 MG/DL — SIGNIFICANT CHANGE UP (ref 70–99)
HCT VFR BLD CALC: 29.7 % — LOW (ref 34.5–45)
HGB BLD-MCNC: 8.9 G/DL — LOW (ref 11.5–15.5)
MCHC RBC-ENTMCNC: 24.4 PG — LOW (ref 27–34)
MCHC RBC-ENTMCNC: 30 GM/DL — LOW (ref 32–36)
MCV RBC AUTO: 81.4 FL — SIGNIFICANT CHANGE UP (ref 80–100)
NRBC # BLD: 0 /100 WBCS — SIGNIFICANT CHANGE UP (ref 0–0)
PLATELET # BLD AUTO: 245 K/UL — SIGNIFICANT CHANGE UP (ref 150–400)
POTASSIUM SERPL-MCNC: 3.8 MMOL/L — SIGNIFICANT CHANGE UP (ref 3.5–5.3)
POTASSIUM SERPL-SCNC: 3.8 MMOL/L — SIGNIFICANT CHANGE UP (ref 3.5–5.3)
PROT SERPL-MCNC: 5.1 G/DL — LOW (ref 6–8.3)
RBC # BLD: 3.65 M/UL — LOW (ref 3.8–5.2)
RBC # FLD: 16.7 % — HIGH (ref 10.3–14.5)
SODIUM SERPL-SCNC: 144 MMOL/L — SIGNIFICANT CHANGE UP (ref 135–145)
WBC # BLD: 8.71 K/UL — SIGNIFICANT CHANGE UP (ref 3.8–10.5)
WBC # FLD AUTO: 8.71 K/UL — SIGNIFICANT CHANGE UP (ref 3.8–10.5)

## 2018-09-30 RX ORDER — CLONAZEPAM 1 MG
0.5 TABLET ORAL AT BEDTIME
Qty: 0 | Refills: 0 | Status: DISCONTINUED | OUTPATIENT
Start: 2018-09-30 | End: 2018-10-01

## 2018-09-30 RX ADMIN — ENOXAPARIN SODIUM 30 MILLIGRAM(S): 100 INJECTION SUBCUTANEOUS at 11:08

## 2018-09-30 RX ADMIN — DONEPEZIL HYDROCHLORIDE 5 MILLIGRAM(S): 10 TABLET, FILM COATED ORAL at 22:34

## 2018-09-30 RX ADMIN — NYSTATIN CREAM 1 APPLICATION(S): 100000 CREAM TOPICAL at 05:49

## 2018-09-30 RX ADMIN — Medication 0.5 MILLIGRAM(S): at 22:33

## 2018-09-30 RX ADMIN — PANTOPRAZOLE SODIUM 40 MILLIGRAM(S): 20 TABLET, DELAYED RELEASE ORAL at 05:49

## 2018-09-30 RX ADMIN — Medication 81 MILLIGRAM(S): at 11:08

## 2018-09-30 RX ADMIN — Medication 0.5 MILLIGRAM(S): at 05:49

## 2018-09-30 RX ADMIN — MIRTAZAPINE 7.5 MILLIGRAM(S): 45 TABLET, ORALLY DISINTEGRATING ORAL at 22:34

## 2018-09-30 RX ADMIN — Medication 1 APPLICATION(S): at 05:49

## 2018-09-30 RX ADMIN — Medication 60 MILLIGRAM(S): at 05:49

## 2018-09-30 RX ADMIN — Medication 1 TABLET(S): at 11:08

## 2018-09-30 RX ADMIN — LISINOPRIL 20 MILLIGRAM(S): 2.5 TABLET ORAL at 05:49

## 2018-09-30 RX ADMIN — Medication 75 MICROGRAM(S): at 05:49

## 2018-09-30 NOTE — PROGRESS NOTE ADULT - ASSESSMENT
82 y/o F with PMHx DM2, hypothyroidism, asthma, arthritis, anxiety, chronic leg edema BIBEMS for difficulty ambulating and b/l leg weeping edema. Admitted for severe weeping edema B/L Lower EXT, difficulty walking and social work evaluation for placement. Asked to eval by heme for anemia which appears to be multifactorial with GI blood loss (guaiac + stools), anemia due to CKD and overall chronic disease    RECOMMEND:   - Transfuse PRBC if symptomatic, if Hgb <7.0  - iron studies c/w chronic disease; B12/folate replete  - no contraindication in regards to continue ASA, DVT prophylaxis as I do not believe her to have rapid drop in Hg/active bleeding; however patient is noted to have guaiac positive stool; Hg remains stable at this time  - doubt given comorbidities that patient will be candidate for invasive GI eval in light of no clear active bleeding  - continue to monitor CBC periodically  - no hematologic barriers to discharge planning  - will sign off; reconsult if needed

## 2018-09-30 NOTE — PROGRESS NOTE ADULT - SUBJECTIVE AND OBJECTIVE BOX
Patient seen and examined;  Chart reviewed and events noted;   sitting in bed; eating; report no new complaints    MEDICATIONS  (STANDING):  aspirin enteric coated 81 milliGRAM(s) Oral daily  clonazePAM Tablet 0.5 milliGRAM(s) Oral two times a day  dextrose 5%. 1000 milliLiter(s) (50 mL/Hr) IV Continuous <Continuous>  donepezil 5 milliGRAM(s) Oral at bedtime  enoxaparin Injectable 30 milliGRAM(s) SubCutaneous daily  furosemide    Tablet 60 milliGRAM(s) Oral daily  influenza   Vaccine 0.5 milliLiter(s) IntraMuscular once  insulin lispro (HumaLOG) corrective regimen sliding scale   SubCutaneous Before meals and at bedtime  levothyroxine 75 MICROGram(s) Oral daily  lisinopril 20 milliGRAM(s) Oral daily  mirtazapine 7.5 milliGRAM(s) Oral daily  multivitamin 1 Tablet(s) Oral daily  nystatin Cream 1 Application(s) Topical two times a day  nystatin Powder 1 Application(s) Topical two times a day  pantoprazole    Tablet 40 milliGRAM(s) Oral before breakfast  vitamin A &amp; D Ointment 1 Application(s) Topical two times a day    MEDICATIONS  (PRN):  acetaminophen   Tablet .. 650 milliGRAM(s) Oral every 6 hours PRN Mild Pain (1 - 3)  dextrose 40% Gel 15 Gram(s) Oral once PRN Blood Glucose LESS THAN 70 milliGRAM(s)/deciliter  glucagon  Injectable 1 milliGRAM(s) IntraMuscular once PRN Glucose LESS THAN 70 milligrams/deciliter      Vital Signs Last 24 Hrs  T(C): 36.9 (30 Sep 2018 04:52), Max: 37.1 (29 Sep 2018 14:32)  T(F): 98.5 (30 Sep 2018 04:52), Max: 98.7 (29 Sep 2018 14:32)  HR: 81 (30 Sep 2018 04:52) (81 - 97)  BP: 113/71 (30 Sep 2018 04:52) (102/65 - 125/71)  BP(mean): --  RR: 17 (30 Sep 2018 04:52) (16 - 18)  SpO2: 92% (30 Sep 2018 04:52) (92% - 95%)    PHYSICAL EXAM  General: adult obese woman in NAD  HEENT: clear oropharynx, anicteric sclera, pink conjunctivae  Neck: supple  CV: normal S1S2 with no murmur rubs or gallops  Lungs: clear to auscultation, no wheezes, no rhales  Abdomen: soft non-tender non-distended, no hepato/splenomegaly  Ext: chronic venous stasis changes with erythema and crusting  Skin: no rashes and no petichiae  Neuro: alert and oriented X3 no focal deficits      LABS:                        8.9    8.71  )-----------( 245      ( 30 Sep 2018 08:47 )             29.7     Hemoglobin: 8.9 g/dL (09-30 @ 08:47)  Hemoglobin: 8.7 g/dL (09-29 @ 08:20)  Hemoglobin: 8.1 g/dL (09-28 @ 08:36)  Hemoglobin: 8.6 g/dL (09-27 @ 05:37)  Hemoglobin: 9.9 g/dL (09-26 @ 18:42)    09-30    144  |  108  |  33<H>  ----------------------------<  86  3.8   |  30  |  1.60<H>    Ca    7.2<L>      30 Sep 2018 08:47    TPro  5.1<L>  /  Alb  1.7<L>  /  TBili  0.2  /  DBili  x   /  AST  11<L>  /  ALT  7<L>  /  AlkPhos  160<H>  09-30    Iron with Total Binding Capacity in AM (09.29.18 @ 12:58)    Iron - Total Binding Capacity.: 125 ug/dL    % Saturation, Iron: 19 %    Iron Total, Serum: 24 ug/dL    Unsaturated Iron Binding Capacity: 101 ug/dL  Ferritin, Serum: 95 ng/mL (09.29.18 @ 12:58)    Vitamin B12, Serum: 1271: Note: Reference Range Change on 12/18/2017. pg/mL (09.29.18 @ 12:58)  Folate, Serum: 10.5 ng/mL (09.29.18 @ 12:58)    Occult Blood, Feces: Positive (09.29.18 @ 10:07)

## 2018-09-30 NOTE — PROGRESS NOTE ADULT - PROBLEM SELECTOR PLAN 5
Not on home medications, last ha1c on file noted  - consistent carb diet, ISS, hypogly protocol, Accu-Chek  HOLD Oral Meds

## 2018-09-30 NOTE — PROGRESS NOTE ADULT - ASSESSMENT
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

## 2018-09-30 NOTE — PROGRESS NOTE ADULT - SUBJECTIVE AND OBJECTIVE BOX
Patient is a 83y old  Female who presents with a chief complaint of Difficulty ambulating (30 Sep 2018 06:14)      INTERVAL HPI:      OVERNIGHT EVENTS:    Home Medications:  acetaminophen 325 mg oral tablet: 2 tab(s) orally every 6 hours, As needed, Mild Pain (27 Sep 2018 00:06)  aspirin 81 mg oral delayed release tablet: 1 tab(s) orally once a day (27 Sep 2018 00:06)  clonazePAM 0.5 mg oral tablet: 1 tab(s) orally 2 times a day (27 Sep 2018 00:06)  donepezil 5 mg oral tablet: 1 tab(s) orally once a day (at bedtime) (27 Sep 2018 00:06)  lisinopril 20 mg oral tablet: 1 tab(s) orally once a day (27 Sep 2018 00:06)  mirtazapine 7.5 mg oral tablet: 1 tab(s) orally once a day (27 Sep 2018 00:06)  Multiple Vitamins oral tablet: 1 tab(s) orally once a day (27 Sep 2018 00:06)  pantoprazole 40 mg oral delayed release tablet: 1 tab(s) orally 2 times a day (27 Sep 2018 00:06)  sodium bicarbonate 650 mg oral tablet: 1 tab(s) orally 2 times a day (27 Sep 2018 00:06)  Synthroid 75 mcg (0.075 mg) oral tablet: 1 tab(s) orally once a day (27 Sep 2018 00:06)      MEDICATIONS  (STANDING):  aspirin enteric coated 81 milliGRAM(s) Oral daily  clonazePAM Tablet 0.5 milliGRAM(s) Oral two times a day  dextrose 5%. 1000 milliLiter(s) (50 mL/Hr) IV Continuous <Continuous>  dextrose 50% Injectable 12.5 Gram(s) IV Push once  dextrose 50% Injectable 25 Gram(s) IV Push once  dextrose 50% Injectable 25 Gram(s) IV Push once  donepezil 5 milliGRAM(s) Oral at bedtime  enoxaparin Injectable 30 milliGRAM(s) SubCutaneous daily  furosemide    Tablet 60 milliGRAM(s) Oral daily  influenza   Vaccine 0.5 milliLiter(s) IntraMuscular once  insulin lispro (HumaLOG) corrective regimen sliding scale   SubCutaneous Before meals and at bedtime  levothyroxine 75 MICROGram(s) Oral daily  lisinopril 20 milliGRAM(s) Oral daily  mirtazapine 7.5 milliGRAM(s) Oral daily  multivitamin 1 Tablet(s) Oral daily  nystatin Cream 1 Application(s) Topical two times a day  nystatin Powder 1 Application(s) Topical two times a day  pantoprazole    Tablet 40 milliGRAM(s) Oral before breakfast  vitamin A &amp; D Ointment 1 Application(s) Topical two times a day    MEDICATIONS  (PRN):  acetaminophen   Tablet .. 650 milliGRAM(s) Oral every 6 hours PRN Mild Pain (1 - 3)  dextrose 40% Gel 15 Gram(s) Oral once PRN Blood Glucose LESS THAN 70 milliGRAM(s)/deciliter  glucagon  Injectable 1 milliGRAM(s) IntraMuscular once PRN Glucose LESS THAN 70 milligrams/deciliter      Allergies    Levaquin (Hives)  sulfa drugs (Unknown)    Intolerances        REVIEW OF SYSTEMS:  CONSTITUTIONAL: No fever, No chills, No fatigue, No myalgia, No Body ache, No Weakness  EYES: No eye pain,  No visual disturbances, No discharge, NO Redness  ENMT:  No ear pain, No nose bleed, No vertigo; No sinus or throat pain, No Congestion  NECK: No pain, No stiffness  RESPIRATORY: No cough, wheezing, No  hemoptysis, No shortness of breath  CARDIOVASCULAR: No chest pain, palpitations  GASTROINTESTINAL: No abdominal or epigastric pain. No nausea, No vomiting; No diarrhea or constipation. [  ] BM  GENITOURINARY: No dysuria, No frequency, No urgency, No hematuria, or incontinence  NEUROLOGICAL: No headaches, No dizziness, No numbness, No tingling, No tremors, No weakness  EXT: No Swelling, No Pain, No Edema  SKIN:  [  ] No itching, burning, rashes, or lesions   MUSCULOSKELETAL: No joint pain or swelling; No muscle pain, No back pain, No extremity pain  PSYCHIATRIC: No depression, anxiety, mood swings or difficulty sleeping at night  PAIN SCALE: [  ] None  [  ] Other-  ROS Unable to obtain due to - [  ] Dementia  [  ] Lethargy  [  ] Sedated [  ] non verbal  REST OF REVIEW Of SYSTEM - [  ] Normal     Vital Signs Last 24 Hrs  T(C): 36.9 (30 Sep 2018 04:52), Max: 37.1 (29 Sep 2018 14:32)  T(F): 98.5 (30 Sep 2018 04:52), Max: 98.7 (29 Sep 2018 14:32)  HR: 81 (30 Sep 2018 04:52) (81 - 97)  BP: 113/71 (30 Sep 2018 04:52) (102/65 - 125/71)  BP(mean): --  RR: 17 (30 Sep 2018 04:52) (16 - 18)  SpO2: 92% (30 Sep 2018 04:52) (92% - 95%)  Finger Stick        09-29 @ 07:01  -  09-30 @ 07:00  --------------------------------------------------------  IN: 340 mL / OUT: 0 mL / NET: 340 mL        PHYSICAL EXAM:  GENERAL:  [  ] NAD , [  ] well appearing, [  ] Agitated, [  ] Drowsy,  [  ] Lethargy, [  ] confused   HEAD:  [  ] Normal, [  ] Other  EYES:  [  ] EOMI, [  ] PERRLA, [  ] conjunctiva and sclera clear normal, [  ] Other,  [  ] Pallor,[  ] Discharge  ENMT:  [  ] Normal, [  ] Moist mucous membranes, [  ] Good dentition, [  ] No Thrush  NECK:  [  ] Supple, [  ] No JVD, [  ] Normal thyroid, [  ] Lymphadenopathy [  ] Other  CHEST/LUNG:  [  ] Clear to auscultation bilaterally, [  ] Breath Sounds equal OR Decrease,  [  ] No rales, [  ] No rhonchi  [  ]  No wheezing  HEART:  [  ] Regular rate and rhythm, [  ] tachycardia, [  ] Bradycardia,  [  ] irregular  [  ] No murmurs, No rubs, No gallops, [  ] PPM in place (Mfr:  )  ABDOMEN:  [  ] Soft, [  ] Nontender, [  ] Nondistended, [  ] No mass, [  ] Bowel sounds present, [  ] obese  NERVOUS SYSTEM:  [  ] Alert & Oriented X3, [  ] Nonfocal  [  ] Confusion  [  ] Encephalopathic [  ] Sedated [  ] Unable to assess, [  ] Other-  EXTREMITIES: [  ] 2+ Peripheral Pulses, No clubbing, No cyanosis,  [  ] edema B/L lower EXT. [  ] PVD stasis skin changes B/L Lower EXT  LYMPH: No lymphadenopathy noted  SKIN:  [  ] No rashes or lesions, [  ] Pressure Ulcers, [  ] ecchymosis, [  ] Skin Tears, [  ] Other    DIET:     LABS:                        8.9    8.71  )-----------( 245      ( 30 Sep 2018 08:47 )             29.7     30 Sep 2018 08:47    144    |  108    |  33     ----------------------------<  86     3.8     |  30     |  1.60     Ca    7.2        30 Sep 2018 08:47    TPro  5.1    /  Alb  1.7    /  TBili  0.2    /  DBili  x      /  AST  11     /  ALT  7      /  AlkPhos  160    30 Sep 2018 08:47    Culture Results:   No growth to date. (09-26 @ 21:00)  Culture Results:   No growth to date. (09-26 @ 21:00)    Culture - Blood (collected 26 Sep 2018 21:00)  Source: .Blood Blood  Preliminary Report (27 Sep 2018 22:01):    No growth to date.    Culture - Blood (collected 26 Sep 2018 21:00)  Source: .Blood Blood  Preliminary Report (27 Sep 2018 22:01):    No growth to date.         Anemia Panel:  Vitamin B12, Serum: 1271 pg/mL (09-29-18 @ 12:58)  Folate, Serum: 10.5 ng/mL (09-29-18 @ 12:58)  Ferritin, Serum: 95 ng/mL (09-29-18 @ 12:58)  Iron Total, Serum: 24 ug/dL (09-29-18 @ 12:58)  Iron - Total Binding Capacity.: 125 ug/dL (09-29-18 @ 12:58)  Absolute Reticulocytes: 34.9 K/uL (09-29-18 @ 08:20)      Thyroid Panel:            Serum Pro-Brain Natriuretic Peptide: 1588 pg/mL (09-26-18 @ 18:42)      RADIOLOGY & ADDITIONAL TESTS:      HEALTH ISSUES - PROBLEM Dx:  Anxiety  PVD (peripheral vascular disease): PVD (peripheral vascular disease)  Anemia: Anemia  Prophylactic measure: Prophylactic measure  Arthritis: Arthritis  Depression with anxiety: Depression with anxiety  Hypothyroidism: Hypothyroidism  Diabetes mellitus: Diabetes mellitus  CKD (chronic kidney disease): CKD (chronic kidney disease)  Social problem: Social problem  Leg swelling: Leg swelling          Consultant(s) Notes Reviewed:  [  ] YES     Care Discussed with [X] Consultants  [  ] Patient  [  ] Family  [  ]   [  ] Social Service  [  ] RN, [  ] Physical Therapy  DVT PPX: [  ] Lovenox, [  ] S C Heparin, [  ] Coumadin, [  ] Xarelto, [  ] Eliquis, [  ] Pradaxa, [  ] IV Heparin drip, [  ] SCD [  ] Contraindication 2 to GI Bleed,[  ] Ambulation  Advanced directive: [  ] None, [  ] DNR/DNI Patient is a 83y old  Female who presents with a chief complaint of Difficulty ambulating (30 Sep 2018 06:14)      INTERVAL HPI:  84 y/o F with PMHx DM2, hypothyroidism, asthma, arthritis, anxiety, chronic leg edema BIBEMS for difficulty ambulating and b/l leg weeping edema. Pt reports was recently discharged from rehab a week ago for 4 weeks and has been living at home with son and daughter.  Reports during rehab they had not been giving pt's her PO Lasix and edema started to worsen.  Pt reports started taking her Lasix as soon as she gotten home from rehab. Today while at home, pt reported had a bit more difficulty getting up and walking but son was more concerned about B/l Lower EXT edema  and called EMS which prompted arrival to the ED. before coming to ER pt took Lasix 60 mg PO x 1 dose.    In the ED, vitals: stable, labs: h/h 9.9/31.8, BUN/cr 39/1.9, probnp 1588. cxr neg, Patient admitted for Diuresis with IV Lasix. for fluid over load.    Pt  Found laying in bed comfortably. She is denying any leg pain, sob, cp, palpitations, fevers, chills, nausea, vomiting. No acute overnight events. She is asking to be discharged multiple times. Her b/l legs are erythematous but improving, swollen to mid calf, mildly tender only when palpated, very minimal oozing/weeping this AM.    9/27: Pt seen, examined. Found eating breakfast comfortably in bed. Her legs are diffusely erythematous and only mildly weeping. She reports this has been the case for at least one year. She does not do anything to care for them at home. Denying fevers, chills, nausea, vomiting, cp, sob, palpitations. She reports being here awaiting placement, wants to go home, anxious to leave.    9/28: Pt seen, examined. Finished breakfast. No acute overnight events, leg swelling has decreased, less erythematous than day prior. No longer weeping/oozing. Asking for A&D ointment, will order. Denying fevers, chills, nausea, vomiting, cp, sob, palpitations. Awaiting placement, saying social work will have meeting with family today to determine dispo-- followed up, [facility] (ext [phone]) aware. Heme/onc consulted for Anemia, Renal for CKD/BARBARA.  9/29/18 ; Pt stable, No complaints, D/C planning.  9/30/18 Pt seen, examined, states she does NOT want to take Klonopin during the day time, C/O OA of b/l knees. D/C Planning. FOBT + GI Dr Sanders called.    OVERNIGHT EVENTS:  NONE      Home Medications:  acetaminophen 325 mg oral tablet: 2 tab(s) orally every 6 hours, As needed, Mild Pain (27 Sep 2018 00:06)  aspirin 81 mg oral delayed release tablet: 1 tab(s) orally once a day (27 Sep 2018 00:06)  clonazePAM 0.5 mg oral tablet: 1 tab(s) orally 2 times a day (27 Sep 2018 00:06)  donepezil 5 mg oral tablet: 1 tab(s) orally once a day (at bedtime) (27 Sep 2018 00:06)  lisinopril 20 mg oral tablet: 1 tab(s) orally once a day (27 Sep 2018 00:06)  mirtazapine 7.5 mg oral tablet: 1 tab(s) orally once a day (27 Sep 2018 00:06)  Multiple Vitamins oral tablet: 1 tab(s) orally once a day (27 Sep 2018 00:06)  pantoprazole 40 mg oral delayed release tablet: 1 tab(s) orally 2 times a day (27 Sep 2018 00:06)  sodium bicarbonate 650 mg oral tablet: 1 tab(s) orally 2 times a day (27 Sep 2018 00:06)  Synthroid 75 mcg (0.075 mg) oral tablet: 1 tab(s) orally once a day (27 Sep 2018 00:06)      MEDICATIONS  (STANDING):  aspirin enteric coated 81 milliGRAM(s) Oral daily  clonazePAM Tablet 0.5 milliGRAM(s) Oral two times a day  dextrose 5%. 1000 milliLiter(s) (50 mL/Hr) IV Continuous <Continuous>  dextrose 50% Injectable 12.5 Gram(s) IV Push once  dextrose 50% Injectable 25 Gram(s) IV Push once  dextrose 50% Injectable 25 Gram(s) IV Push once  donepezil 5 milliGRAM(s) Oral at bedtime  enoxaparin Injectable 30 milliGRAM(s) SubCutaneous daily  furosemide    Tablet 60 milliGRAM(s) Oral daily  influenza   Vaccine 0.5 milliLiter(s) IntraMuscular once  insulin lispro (HumaLOG) corrective regimen sliding scale   SubCutaneous Before meals and at bedtime  levothyroxine 75 MICROGram(s) Oral daily  lisinopril 20 milliGRAM(s) Oral daily  mirtazapine 7.5 milliGRAM(s) Oral daily  multivitamin 1 Tablet(s) Oral daily  nystatin Cream 1 Application(s) Topical two times a day  nystatin Powder 1 Application(s) Topical two times a day  pantoprazole    Tablet 40 milliGRAM(s) Oral before breakfast  vitamin A &amp; D Ointment 1 Application(s) Topical two times a day    MEDICATIONS  (PRN):  acetaminophen   Tablet .. 650 milliGRAM(s) Oral every 6 hours PRN Mild Pain (1 - 3)  dextrose 40% Gel 15 Gram(s) Oral once PRN Blood Glucose LESS THAN 70 milliGRAM(s)/deciliter  glucagon  Injectable 1 milliGRAM(s) IntraMuscular once PRN Glucose LESS THAN 70 milligrams/deciliter      Allergies    Levaquin (Hives)  sulfa drugs (Unknown)    Intolerances        REVIEW OF SYSTEMS:  CONSTITUTIONAL: No fever, No chills, No fatigue, No myalgia, No Body ache, No Weakness  EYES: No eye pain,  No visual disturbances, No discharge, NO Redness  ENMT:  No ear pain, No nose bleed, No vertigo; No sinus or throat pain, No Congestion  NECK: No pain, No stiffness  RESPIRATORY: No cough, wheezing, No  hemoptysis, No shortness of breath  CARDIOVASCULAR: No chest pain, palpitations  GASTROINTESTINAL: No abdominal or epigastric pain. No nausea, No vomiting; No diarrhea or constipation. [ x ] BM  GENITOURINARY: No dysuria, No frequency, No urgency, No hematuria, or incontinence  NEUROLOGICAL: No headaches, No dizziness, No numbness, No tingling, No tremors, No weakness  EXT: No Swelling, No Pain, No Edema  SKIN:  [ x ] No itching, burning, rashes, or lesions   MUSCULOSKELETAL: No joint pain or swelling; No muscle pain, No back pain, No extremity pain  PSYCHIATRIC: No depression, anxiety, mood swings or difficulty sleeping at night  PAIN SCALE: [  ] None  [x  ] Other-B/L Knees   ROS Unable to obtain due to - [  ] Dementia  [  ] Lethargy  [  ] Sedated [  ] non verbal  REST OF REVIEW Of SYSTEM - [ x ] Normal     Vital Signs Last 24 Hrs  T(C): 36.9 (30 Sep 2018 04:52), Max: 37.1 (29 Sep 2018 14:32)  T(F): 98.5 (30 Sep 2018 04:52), Max: 98.7 (29 Sep 2018 14:32)  HR: 81 (30 Sep 2018 04:52) (81 - 97)  BP: 113/71 (30 Sep 2018 04:52) (102/65 - 125/71)  BP(mean): --  RR: 17 (30 Sep 2018 04:52) (16 - 18)  SpO2: 92% (30 Sep 2018 04:52) (92% - 95%)  Finger Stick        09-29 @ 07:01  -  09-30 @ 07:00  --------------------------------------------------------  IN: 340 mL / OUT: 0 mL / NET: 340 mL        PHYSICAL EXAM: [facility]  GENERAL:  [x  ] NAD , [x  ] well appearing, [  ] Agitated, [  ] Drowsy,  [  ] Lethargy, [  ] confused   HEAD:  [ x ] Normal, [  ] Other  EYES:  [ x ] EOMI, [ x ] PERRLA, [  ] conjunctiva and sclera clear normal, [  ] Other,  [ x ] Pallor,[  ] Discharge  ENMT:  [ x ] Normal, [  ] Moist mucous membranes, [ x ] Good dentition, [x  ] No Thrush  NECK:  [x  ] Supple, [x  ] No JVD, [  x] Normal thyroid, [  ] Lymphadenopathy [  ] Other  CHEST/LUNG:  [ x ] Clear to auscultation bilaterally, [ x ] Breath Sounds equal B/L,  [ x ] No rales, [ x ] No rhonchi  [ x ]  No wheezing  HEART:  [x] Regular rate and rhythm, [  ] tachycardia, [  ] Bradycardia,  [  ] irregular  [ x ] No murmurs, No rubs, No gallops, [  ] PPM in place (Mfr:  )  ABDOMEN:  [x  ] Soft, [  x] Nontender, [ x ] Nondistended, [ x ] No mass, [ x ] Bowel sounds present, [ x ] obese  NERVOUS SYSTEM:  [x  ] Alert & Oriented X3, [x  ] Nonfocal  [  ] Confusion  [  ] Encephalopathic [  ] Sedated [  ] Unable to assess, [  ] Other-  EXTREMITIES: [  ] 2+ Peripheral Pulses, No clubbing, No cyanosis,  [ x ] chronic  edema B/L lower EXT. [ x ] severe PVD stasis skin changes B/L Lower EXT with scabs & scaling b/l lower EXT  LYMPH: No lymphadenopathy noted  SKIN:  [x  ] No rashes or lesions, [ x ] Pressure Ulcers,- B/L Posterior thighs- DTI [ x ] ecchymosis, [  ] Skin Tears, [  ] Other    DIET: Regular-DASH/DM    LABS:                        8.9    8.71  )-----------( 245      ( 30 Sep 2018 08:47 )             29.7     30 Sep 2018 08:47    144    |  108    |  33     ----------------------------<  86     3.8     |  30     |  1.60     Ca    7.2        30 Sep 2018 08:47    TPro  5.1    /  Alb  1.7    /  TBili  0.2    /  DBili  x      /  AST  11     /  ALT  7      /  AlkPhos  160    30 Sep 2018 08:47    Culture Results:   No growth to date. (09-26 @ 21:00)  Culture Results:   No growth to date. (09-26 @ 21:00)    Culture - Blood (collected 26 Sep 2018 21:00)  Source: .Blood Blood  Preliminary Report (27 Sep 2018 22:01):    No growth to date.    Culture - Blood (collected 26 Sep 2018 21:00)  Source: .Blood Blood  Preliminary Report (27 Sep 2018 22:01):    No growth to date.         Anemia Panel:  Vitamin B12, Serum: 1271 pg/mL (09-29-18 @ 12:58)  Folate, Serum: 10.5 ng/mL (09-29-18 @ 12:58)  Ferritin, Serum: 95 ng/mL (09-29-18 @ 12:58)  Iron Total, Serum: 24 ug/dL (09-29-18 @ 12:58)  Iron - Total Binding Capacity.: 125 ug/dL (09-29-18 @ 12:58)  Absolute Reticulocytes: 34.9 K/uL (09-29-18 @ 08:20)      Thyroid Panel:    Serum Pro-Brain Natriuretic Peptide: 1588 pg/mL (09-26-18 @ 18:42)      RADIOLOGY & ADDITIONAL TESTS:NONE      HEALTH ISSUES - PROBLEM Dx:  Anxiety  PVD (peripheral vascular disease): PVD (peripheral vascular disease)  Anemia: Anemia  Prophylactic measure: Prophylactic measure  Arthritis: Arthritis  Depression with anxiety: Depression with anxiety  Hypothyroidism: Hypothyroidism  Diabetes mellitus: Diabetes mellitus  CKD (chronic kidney disease): CKD (chronic kidney disease)  Social problem: Social problem  Leg swelling: Leg swelling      Consultant(s) Notes Reviewed:  [ x ] YES     Care Discussed with [X] Consultants  [ x ] Patient  [  ] Family  [  ]   [  ] Social Service  [x  ] RN, [  ] Physical Therapy  DVT PPX: [  ] Lovenox, [ x ] S C Heparin, [  ] Coumadin, [  ] Xarelto, [  ] Eliquis, [  ] Pradaxa, [  ] IV Heparin drip, [  ] SCD [  ] Contraindication 2 to GI Bleed,[  ] Ambulation  Advanced directive: [ x ] None, [  ] DNR/DNI

## 2018-09-30 NOTE — PROGRESS NOTE ADULT - SUBJECTIVE AND OBJECTIVE BOX
NEPHROLOGY INTERVAL HPI/OVERNIGHT EVENTS:  HPI:  82 y/o F with PMHx DM2, hypothyroidism, asthma, arthritis, anxiety, chronic leg edema BIBEMS for difficulty ambulating and b/l leg weeping edema. Pt reports was recently discharged from rehab a week ago for 4 weeks and has been living at home with son and daughter.  Reports during rehab they had not been giving pt's her PO Lasix and edema started to worsen.  Pt reports started taking her Lasix as soon as she gotten home from rehab. Today while at home, pt reported had a bit more difficulty getting up and walking but son was more concerned about B/l Lower EXT edema  and called EMS which prompted arrival to the ED. before coming to ER pt took Lasix 60 mg PO x 1 dose.    In the ED, vitals: stable, labs: h/h 9.9/31.8, BUN/cr 39/1.9, probnp 1588. cxr neg, Patient admitted for Diuresis with IV Lasix. for fluid over load. 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   No current complaints    PAST MEDICAL & SURGICAL HISTORY:  Obesity  CKD (chronic kidney disease)   Anemia  PVD (peripheral vascular disease): with chronic edema B/l lower EXT  Arthritis  Asthma  Diabetes mellitus  Hypothyroidism  No significant past surgical history      MEDICATIONS  (STANDING):  aspirin enteric coated 81 milliGRAM(s) Oral daily  clonazePAM Tablet 0.5 milliGRAM(s) Oral two times a day  dextrose 5%. 1000 milliLiter(s) (50 mL/Hr) IV Continuous <Continuous>  dextrose 50% Injectable 12.5 Gram(s) IV Push once  dextrose 50% Injectable 25 Gram(s) IV Push once  dextrose 50% Injectable 25 Gram(s) IV Push once  donepezil 5 milliGRAM(s) Oral at bedtime  enoxaparin Injectable 30 milliGRAM(s) SubCutaneous daily  furosemide    Tablet 60 milliGRAM(s) Oral daily  influenza   Vaccine 0.5 milliLiter(s) IntraMuscular once  insulin lispro (HumaLOG) corrective regimen sliding scale   SubCutaneous Before meals and at bedtime  levothyroxine 75 MICROGram(s) Oral daily  lisinopril 20 milliGRAM(s) Oral daily  mirtazapine 7.5 milliGRAM(s) Oral daily  multivitamin 1 Tablet(s) Oral daily  nystatin Cream 1 Application(s) Topical two times a day  nystatin Powder 1 Application(s) Topical two times a day  pantoprazole    Tablet 40 milliGRAM(s) Oral before breakfast  vitamin A &amp; D Ointment 1 Application(s) Topical two times a day    MEDICATIONS  (PRN):  acetaminophen   Tablet .. 650 milliGRAM(s) Oral every 6 hours PRN Mild Pain (1 - 3)  dextrose 40% Gel 15 Gram(s) Oral once PRN Blood Glucose LESS THAN 70 milliGRAM(s)/deciliter  glucagon  Injectable 1 milliGRAM(s) IntraMuscular once PRN Glucose LESS THAN 70 milligrams/deciliter      Allergies    Levaquin (Hives)  sulfa drugs (Unknown)    Intolerances        Vital Signs Last 24 Hrs  T(C): 36.9 (30 Sep 2018 04:52), Max: 37.1 (29 Sep 2018 14:32)  T(F): 98.5 (30 Sep 2018 04:52), Max: 98.7 (29 Sep 2018 14:32)  HR: 81 (30 Sep 2018 04:52) (81 - 97)  BP: 113/71 (30 Sep 2018 04:52) (102/65 - 125/71)  BP(mean): --  RR: 17 (30 Sep 2018 04:52) (16 - 18)  SpO2: 92% (30 Sep 2018 04:52) (92% - 95%)  Daily     Daily     PHYSICAL EXAM:  GENERAL: appears chronically ill, oriented.  HEAD:  Atraumatic, Normocephalic  EYES: Conjunctiva and sclera clear  ENMT:  Moist mucous membranes, Good dentition, No lesions  NECK: Supple, neck veins full  NERVOUS SYSTEM:  Alert & Oriented X3, Good concentration; Motor Strength wnl upper and lower extremities  CHEST/LUNG: Clear to percussion bilaterally; No rales, rhonchi, wheezing, or rubs  HEART: Regular rate and rhythm; No murmurs, rubs, or gallops  ABDOMEN: Soft, Nontender, Nondistended; Bowel sounds present, body wall and flank edema  EXTREMITIES:  +2 - +3 leg chronic lymphedema B/L  LYMPH: No lymphadenopathy noted  SKIN: No rashes or lesions, pale    LABS:                        x      x     )-----------( 246      ( 29 Sep 2018 12:19 )             x        09-29    145  |  109<H>  |  33<H>  ----------------------------<  81  3.7   |  28  |  1.60<H>    Ca    7.3<L>      29 Sep 2018 08:20    TPro  4.8<L>  /  Alb  1.7<L>  /  TBili  0.2  /  DBili  x   /  AST  13<L>  /  ALT  <6<L>  /  AlkPhos  144<H>  09-28              RADIOLOGY & ADDITIONAL TESTS:

## 2018-09-30 NOTE — PROGRESS NOTE ADULT - PROBLEM SELECTOR PLAN 2
acute on Chronic anemia, FOBT +  likely multifactorial , 2/2 CKD,- 3, Fluid Over load   -daily CBC, GI Dr Sanders  consult called   pt had  anemia work up on last admission with Dr Carpenter's group  Hematology Dr Taylor d/иван ACD

## 2018-09-30 NOTE — PROGRESS NOTE ADULT - PROBLEM SELECTOR PLAN 1
b/l chance LE edema, chronic PVD stasis skin changes, recent echo 7/18 reviewed, with normal LV function. Chance has improved   - On Lasix 60 mg Po daily, NON Compliance to Home Lasix   - i/os, daily weights, fluid restrict 1.5 lit /24 hrs  - PT recommends NAOMY; SW made aware  - Ambulate/OOB as tolerated  - fall risk protocol  - Skin Care , Keep legs elevated

## 2018-09-30 NOTE — PROVIDER CONTACT NOTE (OTHER) - SITUATION
pts left 4th and 5th toes have black around and under toe nails pts left 4th and 5th toes have black around and under toe nails, made aware pre breakfast blood sugar was 76, had apple juice and breakfast

## 2018-09-30 NOTE — PROGRESS NOTE ADULT - PROBLEM SELECTOR PLAN 4
Chronic PVD with skin changes B/l lower EXT  with severe stasis changes.  c/w IV lasix for leg edema  -Wound care RN d/w, Skin care, ambulate  with PT

## 2018-09-30 NOTE — PROGRESS NOTE ADULT - PROBLEM SELECTOR PLAN 3
2/2 diabetic nephropathy, cr at baseline CKD 3  -Cr (1.6 today) seems to be stable and her baseline  - continue lisinopril, On PO Lasix 60 mg daily  -  Renal DR Castellanos on case, Off Na Hco3 , BMP in AM,

## 2018-10-01 VITALS
HEART RATE: 98 BPM | OXYGEN SATURATION: 96 % | RESPIRATION RATE: 17 BRPM | TEMPERATURE: 97 F | SYSTOLIC BLOOD PRESSURE: 89 MMHG | DIASTOLIC BLOOD PRESSURE: 65 MMHG

## 2018-10-01 DIAGNOSIS — D50.8 OTHER IRON DEFICIENCY ANEMIAS: ICD-10-CM

## 2018-10-01 LAB
ANION GAP SERPL CALC-SCNC: 5 MMOL/L — SIGNIFICANT CHANGE UP (ref 5–17)
BUN SERPL-MCNC: 32 MG/DL — HIGH (ref 7–23)
CALCIUM SERPL-MCNC: 7.3 MG/DL — LOW (ref 8.5–10.1)
CHLORIDE SERPL-SCNC: 106 MMOL/L — SIGNIFICANT CHANGE UP (ref 96–108)
CO2 SERPL-SCNC: 31 MMOL/L — SIGNIFICANT CHANGE UP (ref 22–31)
CREAT SERPL-MCNC: 1.5 MG/DL — HIGH (ref 0.5–1.3)
CULTURE RESULTS: SIGNIFICANT CHANGE UP
CULTURE RESULTS: SIGNIFICANT CHANGE UP
GLUCOSE SERPL-MCNC: 85 MG/DL — SIGNIFICANT CHANGE UP (ref 70–99)
HCT VFR BLD CALC: 31.3 % — LOW (ref 34.5–45)
HGB BLD-MCNC: 9.1 G/DL — LOW (ref 11.5–15.5)
MCHC RBC-ENTMCNC: 23.9 PG — LOW (ref 27–34)
MCHC RBC-ENTMCNC: 29.1 GM/DL — LOW (ref 32–36)
MCV RBC AUTO: 82.2 FL — SIGNIFICANT CHANGE UP (ref 80–100)
NRBC # BLD: 0 /100 WBCS — SIGNIFICANT CHANGE UP (ref 0–0)
PLATELET # BLD AUTO: 247 K/UL — SIGNIFICANT CHANGE UP (ref 150–400)
POTASSIUM SERPL-MCNC: 4 MMOL/L — SIGNIFICANT CHANGE UP (ref 3.5–5.3)
POTASSIUM SERPL-SCNC: 4 MMOL/L — SIGNIFICANT CHANGE UP (ref 3.5–5.3)
RBC # BLD: 3.81 M/UL — SIGNIFICANT CHANGE UP (ref 3.8–5.2)
RBC # FLD: 16.7 % — HIGH (ref 10.3–14.5)
SODIUM SERPL-SCNC: 142 MMOL/L — SIGNIFICANT CHANGE UP (ref 135–145)
SPECIMEN SOURCE: SIGNIFICANT CHANGE UP
SPECIMEN SOURCE: SIGNIFICANT CHANGE UP
WBC # BLD: 9.34 K/UL — SIGNIFICANT CHANGE UP (ref 3.8–10.5)
WBC # FLD AUTO: 9.34 K/UL — SIGNIFICANT CHANGE UP (ref 3.8–10.5)

## 2018-10-01 RX ORDER — SODIUM BICARBONATE 1 MEQ/ML
1 SYRINGE (ML) INTRAVENOUS
Qty: 0 | Refills: 0 | COMMUNITY

## 2018-10-01 RX ORDER — CLONAZEPAM 1 MG
1 TABLET ORAL
Qty: 0 | Refills: 0 | COMMUNITY

## 2018-10-01 RX ORDER — FUROSEMIDE 40 MG
3 TABLET ORAL
Qty: 90 | Refills: 0 | OUTPATIENT
Start: 2018-10-01 | End: 2018-10-30

## 2018-10-01 RX ADMIN — PANTOPRAZOLE SODIUM 40 MILLIGRAM(S): 20 TABLET, DELAYED RELEASE ORAL at 06:14

## 2018-10-01 RX ADMIN — INFLUENZA VIRUS VACCINE 0.5 MILLILITER(S): 15; 15; 15; 15 SUSPENSION INTRAMUSCULAR at 15:35

## 2018-10-01 RX ADMIN — Medication 75 MICROGRAM(S): at 06:14

## 2018-10-01 RX ADMIN — ENOXAPARIN SODIUM 30 MILLIGRAM(S): 100 INJECTION SUBCUTANEOUS at 12:32

## 2018-10-01 RX ADMIN — Medication 650 MILLIGRAM(S): at 15:37

## 2018-10-01 RX ADMIN — Medication 81 MILLIGRAM(S): at 12:32

## 2018-10-01 RX ADMIN — Medication 1 APPLICATION(S): at 06:14

## 2018-10-01 RX ADMIN — LISINOPRIL 20 MILLIGRAM(S): 2.5 TABLET ORAL at 06:14

## 2018-10-01 RX ADMIN — Medication 60 MILLIGRAM(S): at 06:14

## 2018-10-01 RX ADMIN — NYSTATIN CREAM 1 APPLICATION(S): 100000 CREAM TOPICAL at 06:16

## 2018-10-01 RX ADMIN — Medication 1 TABLET(S): at 12:32

## 2018-10-01 RX ADMIN — NYSTATIN CREAM 1 APPLICATION(S): 100000 CREAM TOPICAL at 06:14

## 2018-10-01 NOTE — PROGRESS NOTE ADULT - ATTENDING COMMENTS
Pt seen, Examined, case & care plan d/w pt , at detail.  D/C planning to NAOMY
Pt seen, Examined, case & care plan d/w pt , at detail.  D/C planning to NAOMY
Pt seen, examined, case & care plan d/w Pt ,residents, at Detail.  Psych DR Brumfield d/w -Pt has Capacity to make medical decision.  D/C Planning, Social service eval, PT eval.
Pt seen, Examined, case & care plan d/w pt, residents , social service at detail.  D/C planning to NAOMY
Pt seen, examined, case & care plan d/w pt , residents, Dtr [phone] at detail about out pt follow up  D/C Home D/W PT & Social service at detail.  Pt & Dtr both wants to go home, NO Rehab, Dtr helps Pt  Total D/C care time is 50 minutes.

## 2018-10-01 NOTE — PROGRESS NOTE ADULT - PROBLEM SELECTOR PLAN 3
acute on Chronic anemia, FOBT +  likely multifactorial , 2/2 CKD,- 3, Fluid Over load   -daily CBC, GI Dr Sanders  consult called   pt had  anemia work up on last admission with Dr Carpenter's group  Hematology Dr Taylor following, ACD; ok for DC planning 2/2 diabetic nephropathy, cr at baseline CKD 3  -Cr (1.6 9/30, pending today's results) seems to be stable and her baseline  - continue lisinopril, On PO Lasix 60 mg daily  -  Renal DR Castellanos on case, Off Na Hco3 , BMP as out pt with PMD/Renal

## 2018-10-01 NOTE — PROGRESS NOTE ADULT - PROBLEM SELECTOR PLAN 9
Chronic - Tylenol prn for pain control DVT ppx: Lovenox 30subQ daily  Continue Protonix, baby aspirin,

## 2018-10-01 NOTE — PROGRESS NOTE ADULT - PROBLEM SELECTOR PLAN 4
2/2 diabetic nephropathy, cr at baseline CKD 3  -Cr (1.6 9/30, pending today's results) seems to be stable and her baseline  - continue lisinopril, On PO Lasix 60 mg daily  -  Renal DR Castellanos on case, Off Na Hco3 , BMP in AM Chronic PVD with skin changes B/l lower EXT  with severe stasis changes.  c/w IV Lasix for leg edema  c/w A&D ointment as needed  -Wound care RN aware, Skin care, ambulate  with PT  -D/W Dtr & pt at detail, DO NOT Sit in chair or W.C for long time 2/2 DTI  posterior thigh B/L Legs, Pt to ambulate, keep legs elevated,

## 2018-10-01 NOTE — PROGRESS NOTE ADULT - ASSESSMENT
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  Edema  Anemia  HTN    -Renal indices are stable at baseline; the creatinine is known to fluctuate and can go up as high as around 2  -There is no acidosis; Keep sodium bicarb tabs on hold   -Continue Lasix as ordered  -BP stable; tolerating ACEi thus far  -Hypernatremia improved  -Anemia work up and management per Hem/Onc  -No additional renal work up needed, but will follow chemistries and make adjustments as needed    Thank you    Renally stable for DC planning

## 2018-10-01 NOTE — PROGRESS NOTE ADULT - PROBLEM SELECTOR PLAN 2
b/l cely LE edema, chronic PVD stasis skin changes, recent echo 7/18 reviewed, with normal LV function. Weeping has significantly improved   - On Lasix 60 mg Po daily, NON Compliance to Home Lasix   - i/os, daily weights, fluid restrict 1.5 lit /24 hrs  -Continue with A&D ointment as needed to b/l LEs  - PT recommends NAOMY; SW made aware  - Ambulate/OOB as tolerated  - fall risk protocol  - Skin Care , Keep legs elevated acute on Chronic anemia, FOBT +  likely multifactorial , 2/2 CKD,- 3, Fluid Over load   -daily CBC, GI Dr Sanders  consult called -Pt has had EGD & Colonoscopy in 7/2018  sp egd 7/9 showed non bleeding du, gastritis and hiatal hernia  sp repeat egd 7/17 findings of hh, esophagitis, gastritis and bile reflux  sp colonoscopy with polypectomy 7/20    pt had  anemia work up on last admission with Dr Carpenter's group, .  Hematology Dr Taylor following, ACD; ok for DC planning ,out pt follow up

## 2018-10-01 NOTE — CONSULT NOTE ADULT - PROBLEM SELECTOR RECOMMENDATION 2
daily cbc   transfuse prn   consider hematology eval  check iron studies   ppi once a day  check stool occult blood  further recommendations pending above    get old records

## 2018-10-01 NOTE — PROGRESS NOTE ADULT - REASON FOR ADMISSION
Difficulty ambulating

## 2018-10-01 NOTE — PROGRESS NOTE ADULT - SUBJECTIVE AND OBJECTIVE BOX
Patient is a 83y old  Female who presents with a chief complaint of Difficulty ambulating (30 Sep 2018 06:14)      INTERVAL HPI:  82 y/o F with PMHx DM2, hypothyroidism, asthma, arthritis, anxiety, chronic leg edema BIBEMS for difficulty ambulating and b/l leg weeping edema. Pt reports was recently discharged from rehab a week ago for 4 weeks and has been living at home with son and daughter.  Reports during rehab they had not been giving pt's her PO Lasix and edema started to worsen.  Pt reports started taking her Lasix as soon as she gotten home from rehab. Today while at home, pt reported had a bit more difficulty getting up and walking but son was more concerned about B/l Lower EXT edema  and called EMS which prompted arrival to the ED. before coming to ER pt took Lasix 60 mg PO x 1 dose.    In the ED, vitals: stable, labs: h/h 9.9/31.8, BUN/cr 39/1.9, probnp 1588. cxr neg, Patient admitted for Diuresis with IV Lasix. for fluid over load.    Pt  Found laying in bed comfortably. She is denying any leg pain, sob, cp, palpitations, fevers, chills, nausea, vomiting. No acute overnight events. She is asking to be discharged multiple times. Her b/l legs are erythematous but improving, swollen to mid calf, mildly tender only when palpated, very minimal oozing/weeping this AM.    9/27: Pt seen, examined. Found eating breakfast comfortably in bed. Her legs are diffusely erythematous and only mildly weeping. She reports this has been the case for at least one year. She does not do anything to care for them at home. Denying fevers, chills, nausea, vomiting, cp, sob, palpitations. She reports being here awaiting placement, wants to go home, anxious to leave.    9/28: Pt seen, examined. Finished breakfast. No acute overnight events, leg swelling has decreased, less erythematous than day prior. No longer weeping/oozing. Asking for A&D ointment, will order. Denying fevers, chills, nausea, vomiting, cp, sob, palpitations. Awaiting placement, saying social work will have meeting with family today to determine dispo-- followed up, MATI Conway (ext [phone]) aware. Heme/onc consulted for Anemia, Renal for CKD/BARBARA.  9/29/18 ; Pt stable, No complaints, D/C planning.  9/30/18 Pt seen, examined, states she does NOT want to take Klonopin during the day time, C/O OA of b/l knees. D/C Planning. FOBT + GI Dr Sanders called.    10/1/18: Pt seen, examined. Patient wants to go home. She is complaining of some behavioral issues with nursing assistant staff, as well as chronic diarrhea since admission. 9/29 FOBT +. PEr heme/onc doubt pt candidate for invasive GI eval given no clear bleeding, anemia is likely ACD. F/u GI reccs for FOBT and diarrhea.    OVERNIGHT EVENTS:  Pt seen examined, doing well with no acute complaints except does endorse pasty diarrhea x5 days, per nursing staff not liquidy/bloody. Denies abdominal pain, BRBPR, nausea, vomiting, urinary complaints. Wants to go home    MEDICATIONS  (STANDING):  aspirin enteric coated 81 milliGRAM(s) Oral daily  clonazePAM Tablet 0.5 milliGRAM(s) Oral at bedtime  dextrose 5%. 1000 milliLiter(s) (50 mL/Hr) IV Continuous <Continuous>  dextrose 50% Injectable 12.5 Gram(s) IV Push once  dextrose 50% Injectable 25 Gram(s) IV Push once  dextrose 50% Injectable 25 Gram(s) IV Push once  donepezil 5 milliGRAM(s) Oral at bedtime  enoxaparin Injectable 30 milliGRAM(s) SubCutaneous daily  furosemide    Tablet 60 milliGRAM(s) Oral daily  influenza   Vaccine 0.5 milliLiter(s) IntraMuscular once  insulin lispro (HumaLOG) corrective regimen sliding scale   SubCutaneous Before meals and at bedtime  levothyroxine 75 MICROGram(s) Oral daily  lisinopril 20 milliGRAM(s) Oral daily  mirtazapine 7.5 milliGRAM(s) Oral daily  multivitamin 1 Tablet(s) Oral daily  nystatin Cream 1 Application(s) Topical two times a day  nystatin Powder 1 Application(s) Topical two times a day  pantoprazole    Tablet 40 milliGRAM(s) Oral before breakfast  vitamin A &amp; D Ointment 1 Application(s) Topical two times a day    MEDICATIONS  (PRN):  acetaminophen   Tablet .. 650 milliGRAM(s) Oral every 6 hours PRN Mild Pain (1 - 3)  dextrose 40% Gel 15 Gram(s) Oral once PRN Blood Glucose LESS THAN 70 milliGRAM(s)/deciliter  glucagon  Injectable 1 milliGRAM(s) IntraMuscular once PRN Glucose LESS THAN 70 milligrams/deciliter        Allergies    Levaquin (Hives)  sulfa drugs (Unknown)    Intolerances        REVIEW OF SYSTEMS:  CONSTITUTIONAL: No fever, No chills, No fatigue, No myalgia, No Body ache, No Weakness  EYES: No eye pain,  No visual disturbances, No discharge, NO Redness  ENMT:  No ear pain, No nose bleed, No vertigo; No sinus or throat pain, No Congestion  NECK: No pain, No stiffness  RESPIRATORY: No cough, wheezing, No  hemoptysis, No shortness of breath  CARDIOVASCULAR: No chest pain, palpitations  GASTROINTESTINAL:+ soft, pasty BM x5.  No abdominal or epigastric pain. No nausea, No vomiting;No constipation. [ x ] BM  GENITOURINARY: No dysuria, No frequency, No urgency, No hematuria, or incontinence  NEUROLOGICAL: No headaches, No dizziness, No numbness, No tingling, No tremors, No weakness  EXT: No Swelling, No Pain, No Edema  SKIN:  [ x ] No itching, burning, rashes, or lesions   MUSCULOSKELETAL: No joint pain or swelling; No muscle pain, No back pain, No extremity pain  PSYCHIATRIC: No depression, anxiety, mood swings or difficulty sleeping at night  PAIN SCALE: [  ] None  [x  ] Other-B/L Knees   ROS Unable to obtain due to - [  ] Dementia  [  ] Lethargy  [  ] Sedated [  ] non verbal  REST OF REVIEW Of SYSTEM - [ x ] Normal     Vital Signs Last 24 Hrs  T(C): 36.8 (01 Oct 2018 05:01), Max: 37.3 (30 Sep 2018 20:10)  T(F): 98.3 (01 Oct 2018 05:01), Max: 99.1 (30 Sep 2018 20:10)  HR: 80 (01 Oct 2018 05:01) (76 - 88)  BP: 117/77 (01 Oct 2018 05:01) (117/77 - 120/73)  BP(mean): --  RR: 17 (01 Oct 2018 05:01) (16 - 18)  SpO2: 92% (01 Oct 2018 05:01) (90% - 92%)    I&O's Summary        PHYSICAL EXAM:   GENERAL:  [x  ] NAD , [x  ] well appearing, [  ] Agitated, [  ] Drowsy,  [  ] Lethargy, [  ] confused   HEAD:  [ x ] Normal, [  ] Other  EYES:  [ x ] EOMI, [ x ] PERRLA, [ x ] conjunctiva and sclera clear normal, [  ] Other,  [  ] Pallor,[  ] Discharge  ENMT:  [ x ] Normal, [ x ] Moist mucous membranes, [  ] Good dentition, [x  ] No Thrush  NECK:  [x  ] Supple, [x  ] No JVD, [  x] Normal thyroid, [  ] Lymphadenopathy [  ] Other  CHEST/LUNG:  [ x ] Clear to auscultation bilaterally, [ x ] Breath Sounds equal B/L,  [ x ] No rales, [ x ] No rhonchi  [ x ]  No wheezing  HEART:  [x] Regular rate and rhythm, [  ] tachycardia, [  ] Bradycardia,  [  ] irregular  [ x ] No murmurs, No rubs, No gallops, [  ] PPM in place (Mfr:  )  ABDOMEN:  [x  ] Soft, [  x] Nontender, [ x ] Nondistended, [ x ] No mass, [ x ] Bowel sounds present, [ x ] obese  NERVOUS SYSTEM:  [x  ] Alert & Oriented X3, [x  ] Nonfocal  [  ] Confusion  [  ] Encephalopathic [  ] Sedated [  ] Unable to assess, [  ] Other-  EXTREMITIES: [  ] 2+ Peripheral Pulses, No clubbing, No cyanosis,  [ x ] chronic  edema B/L lower EXT. [ x ] severe PVD stasis skin changes B/L Lower EXT with scabs & scaling b/l lower EXT  LYMPH: No lymphadenopathy noted  SKIN:  [x  ] No rashes or lesions, [ x ] Pressure Ulcers,- B/L Posterior thighs- DTI [ x ] ecchymosis, [  ] Skin Tears, [  ] Other    DIET: Regular-DASH/DM         LABS:                        8.9    8.71  )-----------( 245      ( 30 Sep 2018 08:47 )             29.7     09-30    144  |  108  |  33<H>  ----------------------------<  86  3.8   |  30  |  1.60<H>    Ca    7.2<L>      30 Sep 2018 08:47    TPro  5.1<L>  /  Alb  1.7<L>  /  TBili  0.2  /  DBili  x   /  AST  11<L>  /  ALT  7<L>  /  AlkPhos  160<H>  09-30    LIVER FUNCTIONS - ( 30 Sep 2018 08:47 )  Alb: 1.7 g/dL / Pro: 5.1 g/dL / ALK PHOS: 160 U/L / ALT: 7 U/L / AST: 11 U/L / GGT: x                   Culture Results:   No growth to date. (09-26 @ 21:00)  Culture Results:   No growth to date. (09-26 @ 21:00)    Culture - Blood (collected 26 Sep 2018 21:00)  Source: .Blood Blood  Preliminary Report (27 Sep 2018 22:01):    No growth to date.    Culture - Blood (collected 26 Sep 2018 21:00)  Source: .Blood Blood  Preliminary Report (27 Sep 2018 22:01):    No growth to date.         Anemia Panel:  Vitamin B12, Serum: 1271 pg/mL (09-29-18 @ 12:58)  Folate, Serum: 10.5 ng/mL (09-29-18 @ 12:58)  Ferritin, Serum: 95 ng/mL (09-29-18 @ 12:58)  Iron Total, Serum: 24 ug/dL (09-29-18 @ 12:58)  Iron - Total Binding Capacity.: 125 ug/dL (09-29-18 @ 12:58)  Absolute Reticulocytes: 34.9 K/uL (09-29-18 @ 08:20)      Thyroid Panel:    Serum Pro-Brain Natriuretic Peptide: 1588 pg/mL (09-26-18 @ 18:42)      RADIOLOGY & ADDITIONAL TESTS:NONE      HEALTH ISSUES - PROBLEM Dx:  Anxiety  PVD (peripheral vascular disease): PVD (peripheral vascular disease)  Anemia: Anemia  Prophylactic measure: Prophylactic measure  Arthritis: Arthritis  Depression with anxiety: Depression with anxiety  Hypothyroidism: Hypothyroidism  Diabetes mellitus: Diabetes mellitus  CKD (chronic kidney disease): CKD (chronic kidney disease)  Social problem: Social problem  Leg swelling: Leg swelling          Consultant(s) Notes Reviewed:  [ x ] YES     Care Discussed with [ ] Consultants  [ ] Patient  [  ] Family  [  ]   [  ] Social Service  [  ] RN, [  ] Physical Therapy  DVT PPX: [ x ] Lovenox, [  ] S C Heparin, [  ] Coumadin, [  ] Xarelto, [  ] Eliquis, [  ] Pradaxa, [  ] IV Heparin drip, [  ] SCD [  ] Contraindication 2 to GI Bleed,[  ] Ambulation  Advanced directive: [ x ] None, [  ] DNR/DNI Patient is a 83y old  Female who presents with a chief complaint of Difficulty ambulating (30 Sep 2018 06:14)      INTERVAL HPI:  82 y/o F with PMHx DM2, hypothyroidism, asthma, arthritis, anxiety, chronic leg edema BIBEMS for difficulty ambulating and b/l leg weeping edema. Pt reports was recently discharged from rehab a week ago for 4 weeks and has been living at home with son and daughter.  Reports during rehab they had not been giving pt's her PO Lasix and edema started to worsen.  Pt reports started taking her Lasix as soon as she gotten home from rehab. Today while at home, pt reported had a bit more difficulty getting up and walking but son was more concerned about B/l Lower EXT edema  and called EMS which prompted arrival to the ED. before coming to ER pt took Lasix 60 mg PO x 1 dose.    In the ED, vitals: stable, labs: h/h 9.9/31.8, BUN/cr 39/1.9, probnp 1588. cxr neg, Patient admitted for Diuresis with IV Lasix. for fluid over load.    Pt  Found laying in bed comfortably. She is denying any leg pain, sob, cp, palpitations, fevers, chills, nausea, vomiting. No acute overnight events. She is asking to be discharged multiple times. Her b/l legs are erythematous but improving, swollen to mid calf, mildly tender only when palpated, very minimal oozing/weeping this AM.    9/27: Pt seen, examined. Found eating breakfast comfortably in bed. Her legs are diffusely erythematous and only mildly weeping. She reports this has been the case for at least one year. She does not do anything to care for them at home. Denying fevers, chills, nausea, vomiting, cp, sob, palpitations. She reports being here awaiting placement, wants to go home, anxious to leave.    9/28: Pt seen, examined. Finished breakfast. No acute overnight events, leg swelling has decreased, less erythematous than day prior. No longer weeping/oozing. Asking for A&D ointment, will order. Denying fevers, chills, nausea, vomiting, cp, sob, palpitations. Awaiting placement, saying social work will have meeting with family today to determine dispo-- followed up, MATI Conway (ext [phone]) aware. Heme/onc consulted for Anemia, Renal for CKD/BARBARA.  9/29/18 ; Pt stable, No complaints, D/C planning.  9/30/18 Pt seen, examined, states she does NOT want to take Klonopin during the day time, C/O OA of b/l knees. D/C Planning. FOBT + GI Dr Sanders called.    10/1/18: Pt seen, examined. Patient wants to go home. She is complaining of some behavioral issues with nursing assistant staff, as well as chronic diarrhea since admission. 9/29 FOBT +. PEr heme/onc doubt pt candidate for invasive GI eval given no clear bleeding, anemia is likely ACD. F/u GI reccs for FOBT and diarrhea. As per GI DR Cardoso -Out pt work up, stable for D/C from GI, hematology & renal stand point, D/W PT ----> Recommends NAOMY, pt & dtr wants to go home, NO NAOMY, as per Dtr family is at home & dtr  will take responsibility of Pt. NO Complaints.      OVERNIGHT EVENTS:  Pt seen examined, doing well with no acute complaints except does endorse pasty diarrhea x5 days, per nursing staff not liquidy/bloody. Denies abdominal pain, BRBPR, nausea, vomiting, urinary complaints. Wants to go home    MEDICATIONS  (STANDING):  aspirin enteric coated 81 milliGRAM(s) Oral daily  clonazePAM Tablet 0.5 milliGRAM(s) Oral at bedtime  dextrose 5%. 1000 milliLiter(s) (50 mL/Hr) IV Continuous <Continuous>  dextrose 50% Injectable 12.5 Gram(s) IV Push once  dextrose 50% Injectable 25 Gram(s) IV Push once  dextrose 50% Injectable 25 Gram(s) IV Push once  donepezil 5 milliGRAM(s) Oral at bedtime  enoxaparin Injectable 30 milliGRAM(s) SubCutaneous daily  furosemide    Tablet 60 milliGRAM(s) Oral daily  influenza   Vaccine 0.5 milliLiter(s) IntraMuscular once  insulin lispro (HumaLOG) corrective regimen sliding scale   SubCutaneous Before meals and at bedtime  levothyroxine 75 MICROGram(s) Oral daily  lisinopril 20 milliGRAM(s) Oral daily  mirtazapine 7.5 milliGRAM(s) Oral daily  multivitamin 1 Tablet(s) Oral daily  nystatin Cream 1 Application(s) Topical two times a day  nystatin Powder 1 Application(s) Topical two times a day  pantoprazole    Tablet 40 milliGRAM(s) Oral before breakfast  vitamin A &amp; D Ointment 1 Application(s) Topical two times a day    MEDICATIONS  (PRN):  acetaminophen   Tablet .. 650 milliGRAM(s) Oral every 6 hours PRN Mild Pain (1 - 3)  dextrose 40% Gel 15 Gram(s) Oral once PRN Blood Glucose LESS THAN 70 milliGRAM(s)/deciliter  glucagon  Injectable 1 milliGRAM(s) IntraMuscular once PRN Glucose LESS THAN 70 milligrams/deciliter        Allergies    Levaquin (Hives)  sulfa drugs (Unknown)    Intolerances        REVIEW OF SYSTEMS: I feel fine , wants to go home.  CONSTITUTIONAL: No fever, No chills, No fatigue, No myalgia, No Body ache, No Weakness  EYES: No eye pain,  No visual disturbances, No discharge, NO Redness  ENMT:  No ear pain, No nose bleed, No vertigo; No sinus or throat pain, No Congestion  NECK: No pain, No stiffness  RESPIRATORY: No cough, wheezing, No  hemoptysis, No shortness of breath  CARDIOVASCULAR: No chest pain, palpitations  GASTROINTESTINAL:+ soft, pasty BM x5.  No abdominal or epigastric pain. No nausea, No vomiting;No constipation. [ x ] BM soft  GENITOURINARY: No dysuria, No frequency, No urgency, No hematuria, or incontinence  NEUROLOGICAL: No headaches, No dizziness, No numbness, No tingling, No tremors, No weakness  EXT: No Swelling, No Pain, No Edema  SKIN:  [ x ] No itching, burning, rashes, or lesions   MUSCULOSKELETAL: No joint pain or swelling; No muscle pain, No back pain, No extremity pain  PSYCHIATRIC: No depression, anxiety, mood swings or difficulty sleeping at night  PAIN SCALE: [  ] None  [x  ] Other-B/L Knees OA  ROS Unable to obtain due to - [  ] Dementia  [  ] Lethargy  [  ] Sedated [  ] non verbal  REST OF REVIEW Of SYSTEM - [ x ] Normal     Vital Signs Last 24 Hrs  T(C): 36.8 (01 Oct 2018 05:01), Max: 37.3 (30 Sep 2018 20:10)  T(F): 98.3 (01 Oct 2018 05:01), Max: 99.1 (30 Sep 2018 20:10)  HR: 80 (01 Oct 2018 05:01) (76 - 88)  BP: 117/77 (01 Oct 2018 05:01) (117/77 - 120/73)  BP(mean): --  RR: 17 (01 Oct 2018 05:01) (16 - 18)  SpO2: 92% (01 Oct 2018 05:01) (90% - 92%)    I&O's Summary        PHYSICAL EXAM:   GENERAL:  [x  ] NAD , [x  ] well appearing, [  ] Agitated, [  ] Drowsy,  [  ] Lethargy, [  ] confused   HEAD:  [ x ] Normal, [  ] Other  EYES:  [ x ] EOMI, [ x ] PERRLA, [ x ] conjunctiva and sclera clear normal, [  ] Other,  [  ] Pallor,[  ] Discharge  ENMT:  [ x ] Normal, [ x ] Moist mucous membranes, [  ] Good dentition, [x  ] No Thrush  NECK:  [x  ] Supple, [x  ] No JVD, [  x] Normal thyroid, [  ] Lymphadenopathy [  ] Other  CHEST/LUNG:  [ x ] Clear to auscultation bilaterally, [ x ] Breath Sounds equal B/L,  [ x ] No rales, [ x ] No rhonchi  [ x ]  No wheezing  HEART:  [x] Regular rate and rhythm, [  ] tachycardia, [  ] Bradycardia,  [  ] irregular  [ x ] No murmurs, No rubs, No gallops, [  ] PPM in place (Mfr:  )  ABDOMEN:  [x  ] Soft, [  x] Nontender, [ x ] Nondistended, [ x ] No mass, [ x ] Bowel sounds present, [ x ] obese  NERVOUS SYSTEM:  [x  ] Alert & Oriented X3, [x  ] Nonfocal  [  ] Confusion  [  ] Encephalopathic [  ] Sedated [  ] Unable to assess, [  ] Other-  EXTREMITIES: [  ] 2+ Peripheral Pulses, No clubbing, No cyanosis,  [ x ] chronic  edema B/L lower EXT. [ x ] severe PVD stasis skin changes B/L Lower EXT with scabs & scaling b/l lower EXT  LYMPH: No lymphadenopathy noted  SKIN:  [x  ] B/L Groin Rashes Improved, No  lesions, [ x ] Pressure Ulcers,- B/L Posterior thighs- DTI [ x ] ecchymosis, [  ] Skin Tears, [  ] Other    DIET: Regular-DASH/DM         LABS:                        8.9    8.71  )-----------( 245      ( 30 Sep 2018 08:47 )             29.7     09-30    144  |  108  |  33<H>  ----------------------------<  86  3.8   |  30  |  1.60<H>    Ca    7.2<L>      30 Sep 2018 08:47    TPro  5.1<L>  /  Alb  1.7<L>  /  TBili  0.2  /  DBili  x   /  AST  11<L>  /  ALT  7<L>  /  AlkPhos  160<H>  09-30    LIVER FUNCTIONS - ( 30 Sep 2018 08:47 )  Alb: 1.7 g/dL / Pro: 5.1 g/dL / ALK PHOS: 160 U/L / ALT: 7 U/L / AST: 11 U/L / GGT: x             Culture Results:   No growth to date. (09-26 @ 21:00)  Culture Results:   No growth to date. (09-26 @ 21:00)    Culture - Blood (collected 26 Sep 2018 21:00)  Source: .Blood Blood  Preliminary Report (27 Sep 2018 22:01):    No growth to date.    Culture - Blood (collected 26 Sep 2018 21:00)  Source: .Blood Blood  Preliminary Report (27 Sep 2018 22:01):    No growth to date.         Anemia Panel:  Vitamin B12, Serum: 1271 pg/mL (09-29-18 @ 12:58)  Folate, Serum: 10.5 ng/mL (09-29-18 @ 12:58)  Ferritin, Serum: 95 ng/mL (09-29-18 @ 12:58)  Iron Total, Serum: 24 ug/dL (09-29-18 @ 12:58)  Iron - Total Binding Capacity.: 125 ug/dL (09-29-18 @ 12:58)  Absolute Reticulocytes: 34.9 K/uL (09-29-18 @ 08:20)      Thyroid Panel:    Serum Pro-Brain Natriuretic Peptide: 1588 pg/mL (09-26-18 @ 18:42)      RADIOLOGY & ADDITIONAL TESTS:NONE      HEALTH ISSUES - PROBLEM Dx:  Anxiety  PVD (peripheral vascular disease): PVD (peripheral vascular disease)  Anemia: Anemia  Prophylactic measure: Prophylactic measure  Arthritis: Arthritis  Depression with anxiety: Depression with anxiety  Hypothyroidism: Hypothyroidism  Diabetes mellitus: Diabetes mellitus  CKD (chronic kidney disease): CKD (chronic kidney disease)  Social problem: Social problem  Leg swelling: Leg swelling          Consultant(s) Notes Reviewed:  [ x ] YES     Care Discussed with [x ] Consultants  [x ] Patient  [x  ] Family Dtr [phone]  [ x ]   [  ] Social Service  [ x ] RN, [ x ] Physical Therapy  DVT PPX: [ x ] Lovenox, [  ] S C Heparin, [  ] Coumadin, [  ] Xarelto, [  ] Eliquis, [  ] Pradaxa, [  ] IV Heparin drip, [  ] SCD [  ] Contraindication 2 to GI Bleed,[  ] Ambulation  Advanced directive: [ x ] None, [  ] DNR/DNI

## 2018-10-01 NOTE — CONSULT NOTE ADULT - SUBJECTIVE AND OBJECTIVE BOX
Chief Complaint:  Patient is a 83y old  Female who presents with a chief complaint of Difficulty ambulating 84 y/o F with PMHx DM2, hypothyroidism, asthma, arthritis, anxiety, chronic leg edema BIBEMS for difficulty ambulating and b/l leg weeping edema. Pt reports was recently discharged from rehab a week ago for 4 weeks and has been living at home with son and daughter.  Reports during rehab they had not been giving pt's her PO Lasix and edema started to worsen.  Pt reports started taking her Lasix as soon as she gotten home from rehab. Today while at home, pt reported had a bit more difficulty getting up and walking but son was more concerned about B/l Lower EXT edema  and called EMS which prompted arrival to the ED. before coming to ER pt took Lasix 60 mg PO x 1 dose.    In the ED, vitals: stable, labs: h/h 9.9/31.8, BUN/cr 39/1.9, probnp 1588. cxr neg, Patient admitted for Diuresis with IV Lasix. for fluid over load.       Review of Systems:  · Negative General Symptoms	no fever; no chills; no sweating; no anorexia; no fatigue	  · Skin/Breast	negative	  · Negative Ophthalmologic Symptoms	no diplopia; no photophobia; no lacrimation L; no lacrimation R; no blurred vision L	  · Negative ENMT Symptoms	no hearing difficulty; no ear pain; no tinnitus; no vertigo; no nasal congestion; no nasal discharge	  · Negative Respiratory and Thorax Symptoms	no wheezing; no dyspnea; no cough; no hemoptysis; no pleuritic chest pain	  · Negative Cardiovascular Symptoms	no chest pain; no palpitations; no dyspnea on exertion; no orthopnea	  · Cardiovascular Symptoms	peripheral edema	  · Negative Gastrointestinal Symptoms	no nausea; no vomiting; no diarrhea; no constipation; no change in bowel habits; no abdominal pain	  No new overnight event.  No N/V/D.  Tolerating diet.  no melena no brbpr  recent scopes donme this year      PMHX/PSHX:  Obesity  CKD (chronic kidney disease) stage 2, GFR 60-89 ml/min  Anemia  PVD (peripheral vascular disease)  Arthritis  Asthma  Diabetes mellitus  Hypothyroidism  No significant past surgical history      Family history:  No pertinent family history in first degree relatives      Social History: no etoh no cigs no ivda     ROS:     General:  No wt loss, fevers, chills, night sweats, fatigue,   Eyes:  Good vision, no reported pain  ENT:  No sore throat, pain, runny nose, dysphagia  CV:  No pain, palpitations, hypo/hypertension  Resp:  No dyspnea, cough, tachypnea, wheezing  GI:  No pain, No nausea, No vomiting, No diarrhea, No constipation, No weight loss, No fever, No pruritis, No rectal bleeding, No tarry stools, No dysphagia,  :  No pain, bleeding, incontinence, nocturia  Muscle:  No pain, weakness  Neuro:  No weakness, tingling, memory problems  Psych:  No fatigue, insomnia, mood problems, depression  Endocrine:  No polyuria, polydipsia, cold/heat intolerance  Heme:  No petechiae, ecchymosis, easy bruisability  Skin:  No rash, tattoos, scars, edema      PHYSICAL EXAM:   Vital Signs:  Vital Signs Last 24 Hrs  T(C): 36.3 (01 Oct 2018 12:38), Max: 37.3 (30 Sep 2018 20:10)  T(F): 97.3 (01 Oct 2018 12:38), Max: 99.1 (30 Sep 2018 20:10)  HR: 98 (01 Oct 2018 12:38) (80 - 98)  BP: 89/65 (01 Oct 2018 12:38) (89/65 - 118/74)  BP(mean): --  RR: 17 (01 Oct 2018 12:38) (17 - 18)  SpO2: 96% (01 Oct 2018 12:38) (90% - 96%)  Daily     Daily Weight in k.8 (01 Oct 2018 05:01)    GENERAL:  Appears stated age, well-groomed, well-nourished, no distress  HEENT:  NC/AT,  conjunctivae clear and pink, no thyromegaly, nodules, adenopathy, no JVD, sclera -anicteric  CHEST:  Full & symmetric excursion, no increased effort, breath sounds clear  HEART:  Regular rhythm, S1, S2, no murmur/rub/S3/S4, no abdominal bruit, no edema  ABDOMEN:  Soft, non-tender, non-distended, normoactive bowel sounds,  no masses ,no hepato-splenomegaly, no signs of chronic liver disease  EXTEREMITIES:  no cyanosis,clubbing or edema  SKIN:  No rash/erythema/ecchymoses/petechiae/wounds/abscess/warm/dry  NEURO:  Alert, oriented, no asterixis, no tremor, no encephalopathy    LABS:                        9.1    9.34  )-----------( 247      ( 01 Oct 2018 09:47 )             31.3     10-01    142  |  106  |  32<H>  ----------------------------<  85  4.0   |  31  |  1.50<H>    Ca    7.3<L>      01 Oct 2018 09:47    TPro  5.1<L>  /  Alb  1.7<L>  /  TBili  0.2  /  DBili  x   /  AST  11<L>  /  ALT  7<L>  /  AlkPhos  160<H>      LIVER FUNCTIONS - ( 30 Sep 2018 08:47 )  Alb: 1.7 g/dL / Pro: 5.1 g/dL / ALK PHOS: 160 U/L / ALT: 7 U/L / AST: 11 U/L / GGT: x                   Imaging:

## 2018-10-01 NOTE — PROGRESS NOTE ADULT - PROBLEM SELECTOR PLAN 5
Chronic PVD with skin changes B/l lower EXT  with severe stasis changes.  c/w IV lasix for leg edema  c/w A&D ointment as needed  -Wound care RN aware, Skin care, ambulate  with PT Not on home medications, last ha1c on file noted  - consistent carb diet, ISS, hypogly protocol, Accu-Chek  HOLD Oral Meds

## 2018-10-01 NOTE — PROGRESS NOTE ADULT - SUBJECTIVE AND OBJECTIVE BOX
NEPHROLOGY INTERVAL HPI/OVERNIGHT EVENTS:  HPI:  84 y/o F with PMHx DM2, hypothyroidism, asthma, arthritis, anxiety, chronic leg edema BIBEMS for difficulty ambulating and b/l leg weeping edema. Pt reports was recently discharged from rehab a week ago for 4 weeks and has been living at home with son and daughter.  Reports during rehab they had not been giving pt's her PO Lasix and edema started to worsen.  Pt reports started taking her Lasix as soon as she gotten home from rehab. Today while at home, pt reported had a bit more difficulty getting up and walking but son was more concerned about B/l Lower EXT edema  and called EMS which prompted arrival to the ED. before coming to ER pt took Lasix 60 mg PO x 1 dose.    In the ED, vitals: stable, labs: h/h 9.9/31.8, BUN/cr 39/1.9, probnp 1588. cxr neg, Patient admitted for Diuresis with IV Lasix. for fluid over load. (27 Sep 2018 00:07)    Follow up Acute on CKD stage 4  No acute events noted      PAST MEDICAL & SURGICAL HISTORY:  Obesity  CKD (chronic kidney disease) stage 2, GFR 60-89 ml/min  Anemia  PVD (peripheral vascular disease): with chronic edema B/l lower EXT  Arthritis  Asthma  Diabetes mellitus  Hypothyroidism  No significant past surgical history      MEDICATIONS  (STANDING):  aspirin enteric coated 81 milliGRAM(s) Oral daily  clonazePAM Tablet 0.5 milliGRAM(s) Oral at bedtime  dextrose 5%. 1000 milliLiter(s) (50 mL/Hr) IV Continuous <Continuous>  dextrose 50% Injectable 12.5 Gram(s) IV Push once  dextrose 50% Injectable 25 Gram(s) IV Push once  dextrose 50% Injectable 25 Gram(s) IV Push once  donepezil 5 milliGRAM(s) Oral at bedtime  enoxaparin Injectable 30 milliGRAM(s) SubCutaneous daily  furosemide    Tablet 60 milliGRAM(s) Oral daily  influenza   Vaccine 0.5 milliLiter(s) IntraMuscular once  insulin lispro (HumaLOG) corrective regimen sliding scale   SubCutaneous Before meals and at bedtime  levothyroxine 75 MICROGram(s) Oral daily  lisinopril 20 milliGRAM(s) Oral daily  mirtazapine 7.5 milliGRAM(s) Oral daily  multivitamin 1 Tablet(s) Oral daily  nystatin Cream 1 Application(s) Topical two times a day  nystatin Powder 1 Application(s) Topical two times a day  pantoprazole    Tablet 40 milliGRAM(s) Oral before breakfast  vitamin A &amp; D Ointment 1 Application(s) Topical two times a day    MEDICATIONS  (PRN):  acetaminophen   Tablet .. 650 milliGRAM(s) Oral every 6 hours PRN Mild Pain (1 - 3)  dextrose 40% Gel 15 Gram(s) Oral once PRN Blood Glucose LESS THAN 70 milliGRAM(s)/deciliter  glucagon  Injectable 1 milliGRAM(s) IntraMuscular once PRN Glucose LESS THAN 70 milligrams/deciliter      Allergies    Levaquin (Hives)  sulfa drugs (Unknown)    Intolerances        Vital Signs Last 24 Hrs  T(C): 36.8 (01 Oct 2018 05:01), Max: 37.3 (30 Sep 2018 20:10)  T(F): 98.3 (01 Oct 2018 05:01), Max: 99.1 (30 Sep 2018 20:10)  HR: 80 (01 Oct 2018 05:01) (76 - 88)  BP: 117/77 (01 Oct 2018 05:01) (117/77 - 120/73)  BP(mean): --  RR: 17 (01 Oct 2018 05:01) (16 - 18)  SpO2: 92% (01 Oct 2018 05:01) (90% - 92%)  Daily     Daily Weight in k.8 (01 Oct 2018 05:01)    PHYSICAL EXAM:  GENERAL: Elderly, no distress  HEAD:  Atraumatic, Normocephalic  EYES: Conjunctiva and sclera clear  ENMT:  Moist mucous membranes  NECK: Supple, No JVD  NERVOUS SYSTEM:  Alert & Oriented X3, Good concentration; Motor Strength wnl upper and lower extremities  CHEST/LUNG: Clear to percussion bilaterally; No rales, rhonchi, wheezing, or rubs  HEART: Regular rate and rhythm; No murmurs, rubs, or gallops  ABDOMEN: Soft, Nontender, Nondistended; Bowel sounds present, body wall and flank edema  EXTREMITIES:  +2 - +3 leg chronic lymphedema B/L  LYMPH: No lymphadenopathy noted  SKIN: No rashes or lesions, pale    LABS:                        8.9    8.71  )-----------( 245      ( 30 Sep 2018 08:47 )             29.7     09-30    144  |  108  |  33<H>  ----------------------------<  86  3.8   |  30  |  1.60<H>    Ca    7.2<L>      30 Sep 2018 08:47    TPro  5.1<L>  /  Alb  1.7<L>  /  TBili  0.2  /  DBili  x   /  AST  11<L>  /  ALT  7<L>  /  AlkPhos  160<H>                RADIOLOGY & ADDITIONAL TESTS:

## 2018-10-01 NOTE — PROGRESS NOTE ADULT - PROBLEM SELECTOR PLAN 1
-Pt complaining of one week of loose, nonbloody pasty stools approx x5/day.  -Will confirm with nursing staff, who will monitor closely  -C. Diff PCR ordered, will f/u  -GI (dr. Sanders) group to see patient in regards to her FOBT+ result -Pt complaining of one week of loose, nonbloody pasty stools approx x5/day.  -Will confirm with nursing staff, who will monitor closely  -C. Diff PCR ordered, will f/u  -Strict I&Os  -GI (dr. Sanders) group to see patient in regards to her FOBT+ result b/l cely LE edema, chronic PVD stasis skin changes, recent echo 7/18 reviewed, with normal LV function. Weeping has significantly improved   - On Lasix 60 mg Po daily, NON Compliance to Home Lasix   - i/os, daily weights, fluid restrict 1.5 lit /24 hrs  -Continue with A&D ointment as needed to b/l LEs  - PT recommends NAOMY; SW made aware  - Ambulate/OOB as tolerated  - fall risk protocol  - Skin Care , Keep legs elevated

## 2018-10-01 NOTE — PROGRESS NOTE ADULT - PROBLEM SELECTOR PLAN 10
DVT ppx: heparin  Continue protonix, baby aspirin, sodium bicarb
DVT ppx: heparin  Continue protonix, baby aspirin, sodium bicarb
DVT ppx: lovenox 30subQ daily  Continue protonix, baby aspirin,
DVT ppx: heparin  Continue protonix, baby aspirin, sodium bicarb

## 2018-11-11 PROCEDURE — 83880 ASSAY OF NATRIURETIC PEPTIDE: CPT

## 2018-11-11 PROCEDURE — 85730 THROMBOPLASTIN TIME PARTIAL: CPT

## 2018-11-11 PROCEDURE — 85027 COMPLETE CBC AUTOMATED: CPT

## 2018-11-11 PROCEDURE — 85049 AUTOMATED PLATELET COUNT: CPT

## 2018-11-11 PROCEDURE — 97162 PT EVAL MOD COMPLEX 30 MIN: CPT

## 2018-11-11 PROCEDURE — 80048 BASIC METABOLIC PNL TOTAL CA: CPT

## 2018-11-11 PROCEDURE — 85045 AUTOMATED RETICULOCYTE COUNT: CPT

## 2018-11-11 PROCEDURE — 97530 THERAPEUTIC ACTIVITIES: CPT

## 2018-11-11 PROCEDURE — 86900 BLOOD TYPING SEROLOGIC ABO: CPT

## 2018-11-11 PROCEDURE — 83550 IRON BINDING TEST: CPT

## 2018-11-11 PROCEDURE — 71045 X-RAY EXAM CHEST 1 VIEW: CPT

## 2018-11-11 PROCEDURE — 85610 PROTHROMBIN TIME: CPT

## 2018-11-11 PROCEDURE — 82746 ASSAY OF FOLIC ACID SERUM: CPT

## 2018-11-11 PROCEDURE — 90686 IIV4 VACC NO PRSV 0.5 ML IM: CPT

## 2018-11-11 PROCEDURE — 82728 ASSAY OF FERRITIN: CPT

## 2018-11-11 PROCEDURE — 82272 OCCULT BLD FECES 1-3 TESTS: CPT

## 2018-11-11 PROCEDURE — 97110 THERAPEUTIC EXERCISES: CPT

## 2018-11-11 PROCEDURE — 82962 GLUCOSE BLOOD TEST: CPT

## 2018-11-11 PROCEDURE — 86850 RBC ANTIBODY SCREEN: CPT

## 2018-11-11 PROCEDURE — 93005 ELECTROCARDIOGRAM TRACING: CPT

## 2018-11-11 PROCEDURE — 83605 ASSAY OF LACTIC ACID: CPT

## 2018-11-11 PROCEDURE — 80053 COMPREHEN METABOLIC PANEL: CPT

## 2018-11-11 PROCEDURE — 36415 COLL VENOUS BLD VENIPUNCTURE: CPT

## 2018-11-11 PROCEDURE — 87040 BLOOD CULTURE FOR BACTERIA: CPT

## 2018-11-11 PROCEDURE — 97116 GAIT TRAINING THERAPY: CPT

## 2018-11-11 PROCEDURE — 86901 BLOOD TYPING SEROLOGIC RH(D): CPT

## 2018-11-11 PROCEDURE — 99285 EMERGENCY DEPT VISIT HI MDM: CPT | Mod: 25

## 2018-11-11 PROCEDURE — 83036 HEMOGLOBIN GLYCOSYLATED A1C: CPT

## 2018-11-11 PROCEDURE — 82607 VITAMIN B-12: CPT

## 2018-11-11 PROCEDURE — 86140 C-REACTIVE PROTEIN: CPT

## 2018-11-11 PROCEDURE — 85652 RBC SED RATE AUTOMATED: CPT

## 2020-01-07 NOTE — PROGRESS NOTE ADULT - PROBLEM/PLAN-9
DISPLAY PLAN FREE TEXT
Use the 5 A's (Ask, Advise, Assess, Assist, Arrange)

## 2020-10-24 NOTE — DISCHARGE NOTE ADULT - MEDICATION SUMMARY - MEDICATIONS TO CHANGE
no abdominal pain, no bloating, no constipation, no diarrhea, no nausea and no vomiting.
I will SWITCH the dose or number of times a day I take the medications listed below when I get home from the hospital:  None

## 2021-05-26 NOTE — ED ADULT NURSE NOTE - NS ED NURSE PRESS ULCER STAGE 13
Sandovalmanuel Fu is a 26 year old male presenting for   Chief Complaint   Patient presents with   • Consultation     L gluteal mass     Denies Latex allergy or sensitivity.    Currently is not taking any medications  Refills needed today: No     suspected deep tissue injury

## 2022-01-19 NOTE — ED PROVIDER NOTE - PSYCHIATRIC, MLM
January 19, 2022       Andrey Solis MD  [address] [place] [address]  Via In Basket      Patient: Ariel Beaver   YOB: 1961   Date of Visit: 1/19/2022       Dear Dr. Solis:    Thank you for referring Ariel Beaver to me for evaluation. Below are my notes for this visit with him.    If you have questions, please do not hesitate to call me. I look forward to following your patient along with you.      Sincerely,        Nikolai Love MD        CC: No Recipients  Nikolai Love MD  1/19/2022 10:38 AM  Signed  Subjective  Patient ID: Ariel Beaver is a male.    Chief Complaint   Patient presents with   • Follow-up     5 month post test no issue at this time        HPI  Today is a comprehensive follow-up CV visit for this 60-year-old male.    Review of the patient's family history at today's visit finds a positive family history for coronary artery disease relating to the patient.    The results of the patient's bilateral carotid duplex examination performed at the [facility] in [facility] on September 15, 2021 were reviewed in detail by me at the patient's visit today and were discussed in detail with the patient at today's visit.  The patient is aware that the examination found  A less than 50% stenosis involving the right internal carotid artery.  A less than 50% stenosis was found involving the left internal carotid artery.  Antegrade flow was noted in both vertebral arteries.    The patient reports no new consistent issues of concern since the patient's last visit to see me which took place on August 11, 2021.    The patient's blood pressure at today's visit is mildly elevated at 147/67 mmHg with regard to the patient's essential hypertension issue.  Careful questioning of the patient at today's visit finds that the patient has not been consistently taking his second daily dose of hydralazine 25 mg p.o. I discussed with the  patient at today's visit my feeling that the patient's blood pressure may be uncontrolled at this point due to the omission of the second daily dose of hydralazine 25 mg p.o.  The patient states that going forward he will be taking hydralazine 25 mg p.o. twice daily from today.    At today's visit, the patient denies current chest pain with regard to the patient's GERD issue.    At today's visit, the patient denies current shortness of breath with regard to the patient's mitral insufficiency, left ventricular hypertrophy and diastolic left ventricular dysfunction issues.    At today's visit, the patient denies current palpitations.    Today's visit, the patient denies current syncope or near syncope with regard to the patient's sinus bradycardia issue.    The patient reports no recent abnormal bleeding with regard to aspirin therapy in progress.    The patient reports no adverse reaction to ongoing atorvastatin (statin) therapy in progress for the patient's unspecified hyperlipidemia issue.    ECG obtained today for the patient found sinus bradycardia.  Nonspecific repolarization changes are present.  The results of today's abnormal ECG were discussed with the patient.    I plan to resee the patient in 4 months to assess the patient's CV status as well as CV issues.    Today's comprehensive follow-up CV visit involved the dedicated evaluation and management related to issues of: essential hypertension, bilateral carotid stenosis, GERD, mitral insufficiency, left ventricular hypertrophy, diastolic left ventricular dysfunction, sinus bradycardia, abnormal ECG, aspirin therapy, statin therapy, unspecified hyperlipidemia, positive family history for coronary artery disease relating to the patient.      Social History  Social History     Tobacco Use   • Smoking status: Former Smoker     Packs/day: 0.00     Types: Cigarettes     Quit date: 1981     Years since quittin.6   • Smokeless tobacco: Never Used    Substance Use Topics   • Alcohol use: Not Currently   • Drug use: Never        Allergies  ALLERGIES:   Allergen Reactions   • Penicillin G Other (See Comments)   • Penicillins Other (See Comments)       Presenting Medications  Current Outpatient Medications   Medication Sig Dispense Refill   • losartan (COZAAR) 100 MG tablet Take 1 tablet by mouth daily. 90 tablet 3   • hydrALAZINE (APRESOLINE) 25 MG tablet Take 1 tablet by mouth 2 times daily. 180 tablet 3   • DULoxetine (CYMBALTA) 30 MG capsule Take 1 capsule by mouth daily. 90 capsule 3   • amLODIPine (NORVASC) 10 MG tablet Take 1 tablet by mouth daily. 90 tablet 3   • levothyroxine (Synthroid) 100 MCG tablet Take 1 tablet by mouth every morning. Indications: Underactive Thyroid 90 tablet 3   • atorvastatin (LIPITOR) 40 MG tablet Take 1 tablet by mouth daily. 90 tablet 3   • CVS D3 125 mcg (5,000 units) capsule TAKE 1 CAPSULE BY MOUTH EVERY DAY 30 capsule 0   • BLACK CURRANT SEED OIL PO      • TURMERIC PO      • aspirin 325 MG EC tablet Take 325 mg by mouth.       No current facility-administered medications for this visit.       Review of Systems  Review of Systems   Constitutional: Negative.   HENT: Negative.    Eyes: Negative.    Cardiovascular: Negative.    Respiratory: Negative.    Endocrine: Negative.    Hematologic/Lymphatic: Negative.    Skin: Negative.    Musculoskeletal: Negative.    Gastrointestinal: Negative.    Genitourinary: Negative.    Neurological: Negative.    Psychiatric/Behavioral: Negative.    Allergic/Immunologic: Negative.        Physical Exam  Visit Vitals  BP (!) 147/67 (BP Location: LUE - Left upper extremity, Patient Position: Sitting, Cuff Size: Regular)   Pulse (!) 52   Temp 98.2 °F (36.8 °C) (Axillary)   Resp 20   Ht 5' 10.87\" (1.8 m)   Wt 106.8 kg (235 lb 8 oz)   SpO2 96%   BMI 32.97 kg/m²     Vital signs were reviewed today.    Physical Exam   Constitutional: He appears healthy. No distress.   Eyes: Pupils are equal, round, and  reactive to light. Conjunctivae are normal.   Neck: No JVD present. No neck adenopathy.   Cardiovascular: Regular rhythm, S1 normal, S2 normal and intact distal pulses.  No extrasystoles are present. Bradycardia present. PMI is not displaced. Exam reveals no gallop, no S3, no S4, no distant heart sounds, no friction rub, no midsystolic click and no opening snap.   No murmur heard.  Pulses:       Carotid pulses are 1+ on the right side and 1+ on the left side.       Radial pulses are 1+ on the right side and 1+ on the left side.        Femoral pulses are 1+ on the right side and 1+ on the left side.       Popliteal pulses are 1+ on the right side and 1+ on the left side.        Dorsalis pedis pulses are 1+ on the right side and 1+ on the left side.        Posterior tibial pulses are 1+ on the right side and 1+ on the left side.   Pulmonary/Chest: Effort normal and breath sounds normal. No stridor. He has no wheezes. He has no rales. He exhibits no tenderness.   Abdominal: Soft. Bowel sounds are normal. He exhibits no distension and no mass. There is no splenomegaly or hepatomegaly. There is no abdominal tenderness. No hernia.   Musculoskeletal:         General: No tenderness, deformity or edema. Normal range of motion.      Cervical back: Normal range of motion and neck supple.   Neurological: He is alert and oriented to person, place, and time. He has normal motor skills and intact cranial nerves. Gait normal.   Skin: Skin is warm and dry. No rash noted. No cyanosis. No jaundice, pallor or plethora. Nails show no clubbing.       Recent Labs    Recent Labs   Lab 10/06/21  1243   Cholesterol 226*   HDL 89   Triglycerides 29   CALCLDL 131*   Non-HDL Cholesterol 137       Recent Labs   Lab 10/06/21  1242   Sodium 142   Chloride 108*   BUN 15   BUN/ Creatinine Ratio 15   Potassium 3.8   Glucose 86   Creatinine 1.03   Calcium 9.1   TSH 2.993          Hemoglobin A1C (%)   Date Value   06/25/2020 7.7 (H)   .    Recent Labs    Lab 10/06/21  1243 04/28/21  1327 04/28/21  1327   WBC 3.5*   < > 3.7*   RBC 4.64   < > 4.99   HGB 12.6*   < > 13.4   HCT 38.8*   < > 42.0   MCV 83.6   < > 84.2   MCHC 32.5   < > 31.9*   RDW-CV 14.1   < > 14.4      < > 173   Lymphocytes, Percent 54  --  56    < > = values in this interval not displayed.       Recent Labs   Lab 10/06/21  1242   GPT 27         Assessment / Plan  Patient Active Problem List   Diagnosis   • Allergic rhinitis   • Alpha thalassemia trait   • Generalized anxiety disorder   • Benign essential hypertension   • BPH without urinary obstruction   • Vitamin D deficiency   • Varicose veins of leg with swelling, bilateral   • Controlled type 2 diabetes mellitus with kidney complication, without long-term current use of insulin (CMS/HCC)   • Type 2 diabetes mellitus with diabetic mononeuropathy (CMS/HCC)   • Obstructive sleep apnea syndrome   • Preglaucoma   • Poorly controlled type 2 diabetes mellitus with peripheral neuropathy (CMS/HCC)   • Pes planus   • Obesity   • Neutropenia (CMS/HCC)   • Need for prophylactic measure   • Migraine headache   • Major depression, recurrent (CMS/HCC)   • Hypothyroidism   • Hyperlipidemia   • Bright red rectal bleeding   • Chronic constipation   • Colon cancer screening   • Dental infection   • Elevated prostate specific antigen (PSA)   • Esophageal reflux   • Abnormal electrocardiogram (ECG) (EKG)   • Precordial pain   • Dyspnea   • Palpitations   • Gait disorder   • Sinus bradycardia   • Family history of ischemic heart disease   • Nonrheumatic mitral (valve) insufficiency   • Hx of adenomatous colonic polyps   • Atherosclerosis of abdominal aorta (CMS/HCC)   • Bilateral carotid artery stenosis   • LVH (left ventricular hypertrophy)   • Diastolic dysfunction, left ventricle   • Disorder of nervous 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 normal mood and affect. no apparent risk to self or others.

## 2022-04-29 NOTE — ED ADULT NURSE NOTE - PSH
Problem: Infection - Risk of, Central Venous Catheter-Associated Bloodstream Infection  Goal: *Absence of infection signs and symptoms  Outcome: Progressing Towards Goal     Problem: Falls - Risk of  Goal: *Absence of Falls  Description: Document Rei Fall Risk and appropriate interventions in the flowsheet.   Outcome: Progressing Towards Goal  Note: Fall Risk Interventions:  Mobility Interventions: Bed/chair exit alarm,Communicate number of staff needed for ambulation/transfer,Patient to call before getting OOB,PT Consult for mobility concerns,OT consult for ADLs,Strengthening exercises (ROM-active/passive),Utilize walker, cane, or other assistive device    Mentation Interventions: Bed/chair exit alarm,Adequate sleep, hydration, pain control,Reorient patient    Medication Interventions: Bed/chair exit alarm,Evaluate medications/consider consulting pharmacy,Patient to call before getting OOB,Teach patient to arise slowly    Elimination Interventions: Bed/chair exit alarm,Call light in reach,Patient to call for help with toileting needs,Toileting schedule/hourly rounds    History of Falls Interventions: Bed/chair exit alarm         Problem: Patient Education: Go to Patient Education Activity  Goal: Patient/Family Education  Outcome: Progressing Towards Goal No significant past surgical history

## 2022-05-09 NOTE — DISCHARGE NOTE ADULT - NSTOBACCOUSAGEY/N_GEN_A_CS
[facility] Daily Progress Note    Patient: Sukumar Álvarez Date: 2022   : 1943 Attending: Harshad Sanford DO   Admit Date: 2022 Room/Bed: 5623/   79 year old male      Chief Complaint: +BM, tolerating liquids, feeling much better    Subjective: He was observed and examined sitting up in recliner and states he ahs been having bowel movments, abdominal discomfort is gone and tolerating liquids fine.   No overnight events.  He denies SOB, CP, HA, new pain or weakness, bowel/bladder habit changes.        Vital Last Value 24 Hour Range   Temperature 97.9 °F (36.6 °C) (22) Temp  Min: 97.5 °F (36.4 °C)  Max: 98.1 °F (36.7 °C)   Pulse 63 (22) Pulse  Min: 56  Max: 63   Respiratory 16 (22) Resp  Min: 15  Max: 16   Non-Invasive  Blood Pressure 132/66 (22) BP  Min: 132/66  Max: 145/67   Arterial   Blood Pressure   No data recorded   Pulse Oximetry 97 % (22) SpO2  Min: 95 %  Max: 99 %   CVP   No data recorded   PCWP   No data recorded   Tube Feeding Formula   NA   Tube Feeding Rate   No data recorded   NIH Scale   NA   Glascow Coma Scale 15 NA       Vital Today Admitted   Weight 89.6 kg (197 lb 8.5 oz) (22 0604) Weight: 93 kg (205 lb) (22 1126)   Height N/A Height: 6' (182.9 cm) (22 1126)   BMI N/A BMI (Calculated): 27.8 (22 1126)     Peripheral IV 22 Right Forearm 20 (Active)   Site Assessment WDL 22   Dressing Type Transparent 22   Dressing Activity Applied 22 06   Dressing Changed On   22 0630   Lumen Cap Applied/Changed On 22 0630   Line Status Line flushed 22   Interventions Capped IV 22       Peripheral IV 22 Left Wrist 18 (Active)   Site Assessment WDL 22   Dressing Type Transparent 22   Line Status Infusing 22 1300   Interventions Capped IV 22       Wound Back Medial Puncture  (Active)       Wound Back Medial Puncture (Active)       Wound  Puncture (Active)       Wound Abdomen Medial Incision (Active)   Dressing Assessment Clean;Dry;Intact 22 2000   Dressing Activity Applied 22 0800   Wound Exudate Minimal 22   Periwound Condition Normal 22 0800   Wound Edge Approximated;Steri-strips 22   Topical Agent Mastisol 22 1133   Wound Dressing None 22       Wound Back Medial Incision (Active)   Wound Care Team Consult Date 22 1223   Dressing Assessment Clean;Dry;Intact 22 0800   Dressing Activity Applied 22 1223   Wound Exudate None 22   Periwound Condition Normal 22 1500   Wound Edge Glued 22   Wound Dressing None 22         Intake/Output:  No intake or output data in the 24 hours ending 22 1050    Last Nursing Mobility/Ambulation Documented:  Activity: Ambulated, Resting in bed  Ambulated: To bathroom  Distance Ambulated (ft): 15 ft      Weight Bearing Status: Weight bearing as tolerated  Mobility Assistive Device: Gait belt, Walker, Brace  Level of Assistance: Minimal assist    Distance Ambulated (ft)  Av.9 ft  Min: 10 ft  Max: 1500 ft      Medications/Infusions:  Scheduled:   • docusate sodium-sennosides  1 tablet Oral Daily   • bisacodyl  10 mg Rectal Once   • ophthalmic solution  1 drop Both Eyes TID   • ascorbic acid  1,000 mg Oral Daily   • atorvastatin  10 mg Oral Nightly   • lisinopril  10 mg Oral Nightly   • tamsulosin  0.4 mg Oral Q Evening   • sodium chloride (PF)  2 mL Intracatheter 2 times per day   • enoxaparin  40 mg Subcutaneous Daily   • famotidine  20 mg Oral Daily   • umeclidinium bromide  1 puff Inhalation Daily Resp       Continuous Infusions:     Physical Exam:     General: Alert, cooperative, NAD, appears stated age  Head: Normocephalic, No obvious abnormality, atraumatic  Eyes: PERRLA, EOM intact, Conjunctiva clear  Neck: supple,  symmetrical, trachea midline  Lungs: respirations unlabored  Abdomen: Soft, non distended  Extremities: Normal, atraumatic, no cyanosis or edema  Pulses: 2+, Symmetric all extremities  Skin: Color, texture, turgor -normal, No rashes or lesions noted.   Neuro: GCS 15.  Follows commands.  Eyes open spontaneously. Speech fluent.   CN II-XII grossly intact  Strength bilateral UE 5/5   Strength bilateral LE 5/5  SILT bilateral V123, UE, LE  Incision site x 2 C/D/I      Laboratory Results:    Lab Results   Component Value Date    SODIUM 133 (L) 05/07/2022    POTASSIUM 3.7 05/07/2022    GLUCOSE 154 (H) 05/07/2022    BUN 15 05/07/2022    CREATININE 1.03 05/07/2022    MG 2.3 05/06/2022    WBC 10.2 05/09/2022    HGB 13.2 05/09/2022    HCT 37.5 (L) 05/09/2022     05/09/2022       Impression/Diagnoses:  78yo male who presents for an elective spinal fusion  Lumbar stenosis  Lumbar DDD  Aortic aneurysm  Arthritis  COPD  HTN      POD #5: L5-S1 ALIF, L5-S1 Posterior Instrumented Fusion (Dr. Dubose, 05/04/22)     Plans/Recommendations:  Pain control   Neuro checks Q4H  Keep normotensive  No HOB restrictions  Continue bowel regimen  PT/OT- advance to baseline activity,   LSO brace OOB/with activity  Encourage use of IS  OOB to chair for meals  SCDs and Lovenox for DVT prophylaxis  No full AC/AP medications until POD#14  Minimize narcotics as tolerated  Dr. Ma following; diet advanced to clears, continue to advance if patient able to tolerate; Appreciate recommendations  Appreciate medical management  Follow up in 2 weeks for wound check  From NSGY perspective ok for discharge  Discharge planning, home with [facility] when medically cleared     Will discuss with NSGY team.      Will discuss plan with neurosurgery team today on rounds.   [facility] with questions     Quality:  VTE Pharmacologic Prophylaxis: Yes  VTE Mechanical Prophylaxis: Yes    Eliz Correa PA-C  5/9/2022             No

## 2022-10-04 NOTE — PHYSICAL THERAPY INITIAL EVALUATION ADULT - SOCIAL CONCERNS
Patient spouse is requesting  medical certificate stating his spouse has dementia. He is also requesting  [facility] paperwork so if the event he has leave work to take of his spouse and to also accompany patient to and from appointments. Patient spouse was notified there might be a form he is requesting a fee waiver.    Please review   Thanks None

## 2022-11-07 NOTE — PROGRESS NOTE ADULT - SUBJECTIVE AND OBJECTIVE BOX
NEPHROLOGY INTERVAL HPI/OVERNIGHT EVENTS:  HPI:  82 y/o F with PMHx DM2, hypothyroidism, asthma, arthritis, meningoma (chronic), and  BIBEMS after syncopal episode at [facility]. Patient is a poor historian and is unsure why she is in the hospital.  Family at bedside reports that patient was discharged from [facility] the previous day after being admitted with AMS 2/2 UTI.  Family reports that yesterday patient was SOB.  They report that she bleeds easily, but denies knowledge of blood in stools, changes in urine color, open wounds. Patient confirms abdominal pain. She denies chest pain, shortness of breath, palpitations, nausea or vomiting. She also denies dizziness blurry vision.    Per ED nurse after stool guaiac performed patient had red streaked stool.    In ED patient vitals are 108/54, afebrile, O2 Sat 100% on supp O2.  Labs significant for Hbg 3.8, Hct 12.6, Na 148, Cl 120, Co2 20, BUN 62, Crt 1.9, GFR 24, Ca 6.4. UA+ for Large blood, leukocyte esterase. EKG shows NSR.  Patient received 2 u PRBCs.  CT head/neck shows no acute pathology. Patient CT A/P ordered. (07 Jul 2018 14:43)    Follow up CKD  No complaints    PAST MEDICAL & SURGICAL HISTORY:  Arthritis  Asthma  Diabetes mellitus  Hypothyroidism  No significant past surgical history      MEDICATIONS  (STANDING):  artificial tears (preservative free) Ophthalmic Solution 1 Drop(s) Both EYES daily  dextrose 5%. 1000 milliLiter(s) (50 mL/Hr) IV Continuous <Continuous>  dextrose 50% Injectable 12.5 Gram(s) IV Push once  dextrose 50% Injectable 25 Gram(s) IV Push once  dextrose 50% Injectable 25 Gram(s) IV Push once  donepezil 5 milliGRAM(s) Oral at bedtime  epoetin sherita Injectable 69843 Unit(s) SubCutaneous <User Schedule>  folic acid 1 milliGRAM(s) Oral daily  insulin lispro (HumaLOG) corrective regimen sliding scale   SubCutaneous three times a day before meals  insulin lispro (HumaLOG) corrective regimen sliding scale   SubCutaneous at bedtime  lactobacillus acidophilus 1 Tablet(s) Oral three times a day with meals  levothyroxine 75 MICROGram(s) Oral daily  loratadine 10 milliGRAM(s) Oral daily  pantoprazole    Tablet 40 milliGRAM(s) Oral two times a day  sucralfate 1 Gram(s) Oral four times a day    MEDICATIONS  (PRN):  acetaminophen   Tablet 650 milliGRAM(s) Oral every 6 hours PRN Mild Pain  ALBUTerol    0.083% 2.5 milliGRAM(s) Nebulizer every 6 hours PRN Shortness of Breath and/or Wheezing  dextrose 40% Gel 15 Gram(s) Oral once PRN Blood Glucose LESS THAN 70 milliGRAM(s)/deciliter  glucagon  Injectable 1 milliGRAM(s) IntraMuscular once PRN Glucose LESS THAN 70 milligrams/deciliter  ondansetron Injectable 4 milliGRAM(s) IV Push every 6 hours PRN Nausea and/or Vomiting      Allergies    Levaquin (Hives)  sulfa drugs (Unknown)    Intolerances        Vital Signs Last 24 Hrs  T(C): 37 (21 Jul 2018 14:22), Max: 37 (21 Jul 2018 14:22)  T(F): 98.6 (21 Jul 2018 14:22), Max: 98.6 (21 Jul 2018 14:22)  HR: 77 (21 Jul 2018 14:22) (68 - 88)  BP: 130/67 (21 Jul 2018 14:22) (119/67 - 138/81)  BP(mean): --  RR: 17 (21 Jul 2018 14:22) (16 - 17)  SpO2: 96% (21 Jul 2018 14:22) (91% - 100%)  Daily     Daily     PHYSICAL EXAM:  GENERAL: Obese, no distress  HEAD:  Atraumatic, Normocephalic  EYES: EOMI, PERRLA, conjunctiva and sclera clear  ENMT: Moist mucous membranes, Good dentition, No lesions  NECK: Supple, No JVD  NERVOUS SYSTEM:  Alert & Oriented X3, Good concentration; Motor Strength 5/5 B/L upper and lower extremities; DTRs 2+ intact and symmetric  CHEST/LUNG: Clear to percussion bilaterally; No rales, rhonchi, wheezing, or rubs  HEART: Regular rate and rhythm; No murmurs, rubs, or gallops  ABDOMEN: Soft, Nontender, Nondistended; Bowel sounds present  EXTREMITIES:  2+ Peripheral Pulses, No clubbing, cyanosis, or edema  SKIN: No rashes or lesions    LABS:                        8.8    7.95  )-----------( 265      ( 21 Jul 2018 09:07 )             31.0     07-21    144  |  109<H>  |  21  ----------------------------<  107<H>  4.2   |  28  |  1.90<H>    Ca    8.1<L>      21 Jul 2018 09:07      PT/INR - ( 20 Jul 2018 07:40 )   PT: 12.5 sec;   INR: 1.14 ratio                   RADIOLOGY & ADDITIONAL TESTS: no

## 2023-04-25 NOTE — H&P ADULT - PROBLEM SELECTOR PLAN 6
PHYSICAL EXAM:    GENERAL: Alert, appears stated age, well appearing, non-toxic  SKIN: Warm, pink and dry. MMM.   HEAD: NC, AT  EYE: Normal lids/conjunctiva  ENT: Normal hearing, patent oropharynx  NECK: +supple. No meningismus, or JVD  Pulm: Bilateral BS, normal resp effort, no wheezes, stridor, or retractions  CV: RRR, no M/R/G, 2+and = radial pulses  Abd: soft, non-tender, non-distended, no rebound/guarding.   Mskel: no erythema, cyanosis, edema. no calf tenderness. no ttp throughout. but pt points to pain in R anterior mid femur area when ambulating. 2+ dp pulses.   Neuro: AAOx3, no sensory/motor deficits, 5/5 strength throughout. - HOLD ALL NSAIDS - HOLD ALL NSAIDS  - tylenol 650mg PO q6 PRN mild pain currently on supplemental O2 given symptomatic anemia  duonebs q6 PRN wheezing/SOB

## 2023-10-02 NOTE — PROGRESS NOTE ADULT - PROBLEM SELECTOR PLAN 6
Not on home medications, last ha1c on file noted  - consistent carb diet, ISS, hypogly protocol, Accu-Chek  HOLD Oral Meds Dressing: bandage Stable - continue synthroid

## 2023-10-14 NOTE — PROGRESS NOTE ADULT - SUBJECTIVE AND OBJECTIVE BOX
[place] EMERGENCY DEPARTMENT ([facility])    10/13/23       ED PROVIDER NOTE  Triage D      History     Chief Complaint   Patient presents with    Abdominal Pain     The history is provided by the patient and medical records.     Keerthi Montoya is a 71 year old female with prior history of diverticulitis (10/2012), chronic kidney disease, type 2 diabetes, chronic ulcerative enterocolitis, acute on chronic diastolic heart failure, and recent shingles infection who presents to the emergency department generalized pain to the abdomen.  Upon recent consult with cardiology (9/29/2023), patient reported SOB with abdominal bloating.  She was admitted and diuresed for 15 pounds of weight gain.  The patient also has a previous hernia repair (12/2006).      Today at the emergency department she reports dark black stools and concern for fluid buildup.  She is on blood thinners (Brilinta, 60 mg daily).  She reported similar symptoms on 1/20/2023 prior to colonic diverticulitis diagnosis.  She previously had a left colon resection done over 10 years ago, however noted that not all affected areas were removed. The patient is concerned for diverticulitis rebound.    CT Abdomen 1/20/2023  IMPRESSION:  Compared to prior CT abdomen pelvis 12/30/2022, the  current scan shows:     1. Colonic diverticulosis with mild pericolonic streakiness along the  distal descending/proximal sigmoid colon, uncomplicated acute  diverticulitis cannot be excluded. No intra-abdominal collection or  pneumoperitoneum.  2. New mild ascites and mild right pleural effusion. Mild nonspecific  mesenteric streakiness including streakiness along the aidee hepatis.  Consider correlation for more likely volume overload or focal  pancreatitis.  3. Few sub-6 mm pulmonary nodules. Please consider correlation with  prior imaging for stability and are follow-up CT chest in 12 months if  patient is at high risk for lung cancer.  4. Additional  INTERVAL HPI/OVERNIGHT EVENTS:  pt seen and examined  denies n/v/d/abd pain  per overnight rn no s/s overt gib  labs noted afebrile overnight  sp egd yesterday, colonoscopy not done as pt did not take prep    MEDICATIONS  (STANDING):  ALBUTerol/ipratropium for Nebulization 3 milliLiter(s) Nebulizer every 6 hours  bisacodyl 20 milliGRAM(s) Oral at bedtime  dextrose 5%. 1000 milliLiter(s) (50 mL/Hr) IV Continuous <Continuous>  dextrose 50% Injectable 12.5 Gram(s) IV Push once  dextrose 50% Injectable 25 Gram(s) IV Push once  dextrose 50% Injectable 25 Gram(s) IV Push once  epoetin sherita Injectable 53307 Unit(s) SubCutaneous <User Schedule>  folic acid 1 milliGRAM(s) Oral daily  insulin lispro (HumaLOG) corrective regimen sliding scale   SubCutaneous three times a day before meals  insulin lispro (HumaLOG) corrective regimen sliding scale   SubCutaneous at bedtime  lactobacillus acidophilus 1 Tablet(s) Oral three times a day with meals  levothyroxine 75 MICROGram(s) Oral daily  pantoprazole    Tablet 40 milliGRAM(s) Oral two times a day  sucralfate 1 Gram(s) Oral four times a day    MEDICATIONS  (PRN):  acetaminophen   Tablet 650 milliGRAM(s) Oral every 6 hours PRN Mild Pain  dextrose 40% Gel 15 Gram(s) Oral once PRN Blood Glucose LESS THAN 70 milliGRAM(s)/deciliter  glucagon  Injectable 1 milliGRAM(s) IntraMuscular once PRN Glucose LESS THAN 70 milligrams/deciliter  ondansetron Injectable 4 milliGRAM(s) IV Push every 6 hours PRN Nausea and/or Vomiting      Allergies    Levaquin (Hives)  sulfa drugs (Unknown)    Intolerances        Review of Systems:    General:  No wt loss, fevers, chills, night sweats, fatigue   Eyes:  Good vision, no reported pain  ENT:  No sore throat, pain, runny nose, dysphagia  CV:  No pain, palpitations, hypo/hypertension  Resp:  No dyspnea, cough, tachypnea, wheezing  GI:  No pain, No nausea, No vomiting, No diarrhea, No constipation, No weight loss, No fever, No pruritis, No rectal bleeding, No melena, No dysphagia  :  No pain, bleeding, incontinence, nocturia  Muscle:  No pain, weakness  Neuro:  No weakness, tingling, memory problems  Psych:  No fatigue, insomnia, mood problems, depression  Endocrine:  No polyuria, polydypsia, cold/heat intolerance  Heme:  No petechiae, ecchymosis, easy bruisability  Skin:  No rash, tattoos, scars, edema      Vital Signs Last 24 Hrs  T(C): 37.1 (18 Jul 2018 05:58), Max: 37.3 (17 Jul 2018 14:29)  T(F): 98.7 (18 Jul 2018 05:58), Max: 99.1 (17 Jul 2018 14:29)  HR: 72 (18 Jul 2018 07:27) (71 - 84)  BP: 142/81 (18 Jul 2018 05:58) (124/84 - 185/86)  BP(mean): --  RR: 18 (18 Jul 2018 05:58) (15 - 18)  SpO2: 92% (18 Jul 2018 07:27) (92% - 100%)    PHYSICAL EXAM:    Constitutional: NAD, lying in bed  HEENT: EOMI, perrl  Neck: No LAD  Respiratory: dec bs  Cardiovascular: S1 and S2, RRR  Gastrointestinal: obese abd BS+, soft, NT, nd  Extremities: +edema and blistering to le  Vascular: 2+ peripheral pulses  Neurological: Awake alert confused agitated  Skin: see above      LABS:                        8.0    5.44  )-----------( 228      ( 18 Jul 2018 07:16 )             26.5     07-18    144  |  111<H>  |  32<H>  ----------------------------<  118<H>  4.4   |  28  |  1.90<H>    Ca    8.2<L>      18 Jul 2018 07:16      PT/INR - ( 17 Jul 2018 07:35 )   PT: 13.5 sec;   INR: 1.23 ratio               RADIOLOGY & ADDITIONAL TESTS: chronic/incidental findings as described above, similar  to prior CT from 12/20/2022 including pericardial effusion.     [Urgent Result: Diverticulitis]      Past Medical History  Past Medical History:   Diagnosis Date    Chronic kidney disease     Diabetes mellitus, type 2 (H)     Diverticulosis of colon (without mention of hemorrhage) 10/01/2012    GI bleed     History of blood transfusion     HLD (hyperlipidemia)     Hypertension     Hypothyroidism     MARIA D (iron deficiency anemia)     Persistent atrial fibrillation (H)     Pulmonary hypertension (H)     Ulcerative (chronic) enterocolitis (H)      Past Surgical History:   Procedure Laterality Date    CHOLECYSTECTOMY  3/1987    COLON SURGERY  01/2001    Left colon resection; diverticulitis    COLONOSCOPY N/A 4/24/2017    Procedure: COMBINED COLONOSCOPY, SINGLE OR MULTIPLE BIOPSY/POLYPECTOMY BY BIOPSY;;  Surgeon: Radha Salguero MD;  Location: [facility] OR    COLONOSCOPY N/A 3/10/2023    Procedure: COLONOSCOPY;  Surgeon: Preeti James MD;  Location: [facility]    COLONOSCOPY WITH CO2 INSUFFLATION N/A 4/24/2017    Procedure: COLONOSCOPY WITH CO2 INSUFFLATION;  Colonoscopy Dx rectal bleeding, BMI 25.86, Pharm [facility] .[phone], ;  Surgeon: Radha Salguero MD;  Location: [facility] OR    CV LEFT ATRIAL APPENDAGE CLOSURE N/A 5/11/2023    Procedure: Left Atrial Appendage Closure;  Surgeon: Bobby Grimes MD;  Location:  [facility] CARDIAC CATH LAB    CV RIGHT HEART CATH MEASUREMENTS RECORDED N/A 3/16/2020    Procedure: CV RIGHT HEART CATH;  Surgeon: Nader Muñoz MD;  [facility]    ESOPHAGOSCOPY, GASTROSCOPY, DUODENOSCOPY (EGD), COMBINED N/A 1/23/2023    Procedure: ESOPHAGOGASTRODUODENOSCOPY, WITH BIOPSY;  Surgeon: Ricki Deal MD;  Location: [facility]    ESOPHAGOSCOPY, GASTROSCOPY, DUODENOSCOPY (EGD), COMBINED N/A 3/10/2023    Procedure: Esophagoscopy, gastroscopy, duodenoscopy (EGD), combined;  Surgeon: Preeti James MD;   Location:  GI    GYN SURGERY  9/1983    tubal ligation    HERNIA REPAIR  12/2006    recurrent incisional hernia    SIGMOIDOSCOPY FLEXIBLE N/A 1/23/2023    Procedure: Sigmoidoscopy flexible;  Surgeon: Ricki Deal MD;  Location:  GI    THROAT SURGERY  12/1974    thyroidectomy     gabapentin (NEURONTIN) 100 MG capsule  aspirin 81 MG EC tablet  atorvastatin (LIPITOR) 40 MG tablet  blood glucose (ACCU-CHEK FELIPE PLUS) test strip  Cholecalciferol (VITAMIN D) 2000 units CAPS  Cyanocobalamin (B-12) 1000 MCG TABS  diphenhydrAMINE (BENADRYL) 25 MG tablet  empagliflozin (JARDIANCE) 25 MG TABS tablet  glipiZIDE (GLUCOTROL) 10 MG tablet  glipiZIDE (GLUCOTROL) 5 MG tablet  hydrALAZINE (APRESOLINE) 50 MG tablet  isosorbide mononitrate (IMDUR) 30 MG 24 hr tablet  levothyroxine (SYNTHROID/LEVOTHROID) 112 MCG tablet  losartan (COZAAR) 100 MG tablet  mesalamine (APRISO ER) 0.375 g 24 hr capsule  pantoprazole (PROTONIX) 40 MG EC tablet  senna (SENOKOT) 8.6 MG tablet  sodium bicarbonate 650 MG tablet  ticagrelor (BRILINTA) 60 MG tablet  torsemide (DEMADEX) 20 MG tablet      Allergies   Allergen Reactions    Actos [Pioglitazone]      Fluid retention    Amlodipine      Leg swelling, hand swelling    Animal Dander     Doxycycline Hives    Dust Mites     Grass     Keflex [Cephalexin Hcl] Hives    Metoprolol      Swelling of lower legs and fatigue    Other [Seasonal Allergies]      pollen    Ranitidine      rash    Synthroid [Levothyroxine Sodium] Swelling     Allergic to brand name    Tetracycline Hives    Tylenol Hives    Ziac [Bisoprolol-Hydrochlorothiazide]      Family History  Family History   Problem Relation Age of Onset    Cerebrovascular Disease Mother     Cancer Father         bladder    Diverticulitis Brother     Diverticulitis Brother     Kidney Disease No family hx of     Crohn's Disease No family hx of     Ulcerative Colitis No family hx of     Stomach Cancer No family hx of     GERD No family hx of     Celiac Disease  No family hx of     Anesthesia Reaction No family hx of      Social History   Social History     Tobacco Use    Smoking status: Never     Passive exposure: Never    Smokeless tobacco: Never   Vaping Use    Vaping Use: Never used   Substance Use Topics    Alcohol use: Not Currently     Alcohol/week: 2.0 - 4.0 standard drinks of alcohol     Types: 1 - 2 Cans of beer, 1 - 2 Shots of liquor per week     Comment: occasional cocktail or beer    Drug use: No         A medically appropriate review of systems was performed with pertinent positives and negatives noted in the HPI, and all other systems negative.    Physical Exam   BP: 132/73  Pulse: 101  Temp: 97.6  F (36.4  C)  Resp: 15  SpO2: 98 %  Physical Exam  Vitals and nursing note reviewed.   Constitutional:       General: She is not in acute distress.  HENT:      Mouth/Throat:      Mouth: Mucous membranes are moist.   Cardiovascular:      Rate and Rhythm: Regular rhythm.   Pulmonary:      Effort: Pulmonary effort is normal.      Breath sounds: Normal breath sounds.   Abdominal:      General: There is no distension.      Palpations: Abdomen is soft. There is no mass.      Hernia: No hernia is present.   Musculoskeletal:      Cervical back: Neck supple.   Skin:     Findings: Lesion present.             Comments: Healed zoster lesions no secondary infection   Neurological:      General: No focal deficit present.      Mental Status: She is alert and oriented to person, place, and time.   Psychiatric:         Mood and Affect: Mood normal.         Behavior: Behavior normal.         ED Course, Procedures, & Data      Procedures          Results for orders placed or performed during the hospital encounter of 10/13/23   CT Abdomen Pelvis w/o Contrast     Status: None    Narrative    EXAM: CT ABDOMEN PELVIS W/O CONTRAST  LOCATION: [facility]  DATE: 10/13/2023    INDICATION: Mid, left abd pain.  COMPARISON: None.  TECHNIQUE: CT scan  of the abdomen and pelvis was performed without IV contrast. Multiplanar reformats were obtained. Dose reduction techniques were used.  CONTRAST: None.    FINDINGS:   LOWER CHEST: Mild mosaic attenuation can be seen with air trapping. Cardiac enlargement with tiny pericardial effusion.    HEPATOBILIARY: Cholecystectomy.    PANCREAS: Normal.    SPLEEN: Normal.    ADRENAL GLANDS: Normal.    KIDNEYS/BLADDER: Normal.    BOWEL: Colonic diverticula without CT evidence for diverticulitis.    LYMPH NODES: Normal.    VASCULATURE: Unremarkable.    PELVIC ORGANS: Normal.    MUSCULOSKELETAL: Multilevel lumbar degenerative change.      Impression    IMPRESSION:   1.  Colonic diverticula without CT evidence for diverticulitis.    2.  Cardiac enlargement. Vascular calcification. Tiny pericardial effusion.    3.  Mosaic attenuation in the lung bases can be seen with air trapping.     UA with Microscopic reflex to Culture     Status: Abnormal    Specimen: Urine, Clean Catch   Result Value Ref Range    Color Urine Straw Colorless, Straw, Light Yellow, Yellow    Appearance Urine Clear Clear    Glucose Urine 500 (A) Negative mg/dL    Bilirubin Urine Negative Negative    Ketones Urine Negative Negative mg/dL    Specific Gravity Urine 1.005 1.003 - 1.035    Blood Urine Small (A) Negative    pH Urine 5.5 5.0 - 7.0    Protein Albumin Urine Negative Negative mg/dL    Urobilinogen Urine Normal Normal, 2.0 mg/dL    Nitrite Urine Negative Negative    Leukocyte Esterase Urine Negative Negative    RBC Urine <1 <=2 /HPF    WBC Urine 1 <=5 /HPF    Squamous Epithelials Urine <1 <=1 /HPF    Narrative    Urine Culture not indicated   Basic metabolic panel     Status: Abnormal   Result Value Ref Range    Sodium 130 (L) 135 - 145 mmol/L    Potassium 5.1 3.4 - 5.3 mmol/L    Chloride 94 (L) 98 - 107 mmol/L    Carbon Dioxide (CO2) 19 (L) 22 - 29 mmol/L    Anion Gap 17 (H) 7 - 15 mmol/L    Urea Nitrogen 37.4 (H) 8.0 - 23.0 mg/dL    Creatinine 1.62 (H) 0.51  - 0.95 mg/dL    GFR Estimate 34 (L) >60 mL/min/1.73m2    Calcium 9.6 8.8 - 10.2 mg/dL    Glucose 143 (H) 70 - 99 mg/dL   Lipase     Status: Abnormal   Result Value Ref Range    Lipase 77 (H) 13 - 60 U/L   CBC with platelets and differential     Status: Abnormal   Result Value Ref Range    WBC Count 8.1 4.0 - 11.0 10e3/uL    RBC Count 3.33 (L) 3.80 - 5.20 10e6/uL    Hemoglobin 9.9 (L) 11.7 - 15.7 g/dL    Hematocrit 31.4 (L) 35.0 - 47.0 %    MCV 94 78 - 100 fL    MCH 29.7 26.5 - 33.0 pg    MCHC 31.5 31.5 - 36.5 g/dL    RDW 24.6 (H) 10.0 - 15.0 %    Platelet Count 378 150 - 450 10e3/uL    % Neutrophils 61 %    % Lymphocytes 16 %    % Monocytes 17 %    Mids % (Monos, Eos, Basos)      % Eosinophils 4 %    % Basophils 1 %    % Immature Granulocytes 1 %    NRBCs per 100 WBC 0 <1 /100    Absolute Neutrophils 5.0 1.6 - 8.3 10e3/uL    Absolute Lymphocytes 1.3 0.8 - 5.3 10e3/uL    Absolute Monocytes 1.4 (H) 0.0 - 1.3 10e3/uL    Mids Abs (Monos, Eos, Basos)      Absolute Eosinophils 0.4 0.0 - 0.7 10e3/uL    Absolute Basophils 0.1 0.0 - 0.2 10e3/uL    Absolute Immature Granulocytes 0.1 <=0.4 10e3/uL    Absolute NRBCs 0.0 10e3/uL   Lactic acid whole blood     Status: Normal   Result Value Ref Range    Lactic Acid 0.8 0.7 - 2.0 mmol/L   CBC with platelets differential     Status: Abnormal    Narrative    The following orders were created for panel order CBC with platelets differential.  Procedure                               Abnormality         Status                     ---------                               -----------         ------                     CBC with platelets and d...[[id number]]  Abnormal            Final result                 Please view results for these tests on the individual orders.     Medications - No data to display  Labs Ordered and Resulted from Time of ED Arrival to Time of ED Departure   ROUTINE UA WITH MICROSCOPIC REFLEX TO CULTURE - Abnormal       Result Value    Color Urine Straw      Appearance  Urine Clear      Glucose Urine 500 (*)     Bilirubin Urine Negative      Ketones Urine Negative      Specific Gravity Urine 1.005      Blood Urine Small (*)     pH Urine 5.5      Protein Albumin Urine Negative      Urobilinogen Urine Normal      Nitrite Urine Negative      Leukocyte Esterase Urine Negative      RBC Urine <1      WBC Urine 1      Squamous Epithelials Urine <1     BASIC METABOLIC PANEL - Abnormal    Sodium 130 (*)     Potassium 5.1      Chloride 94 (*)     Carbon Dioxide (CO2) 19 (*)     Anion Gap 17 (*)     Urea Nitrogen 37.4 (*)     Creatinine 1.62 (*)     GFR Estimate 34 (*)     Calcium 9.6      Glucose 143 (*)    LIPASE - Abnormal    Lipase 77 (*)    CBC WITH PLATELETS AND DIFFERENTIAL - Abnormal    WBC Count 8.1      RBC Count 3.33 (*)     Hemoglobin 9.9 (*)     Hematocrit 31.4 (*)     MCV 94      MCH 29.7      MCHC 31.5      RDW 24.6 (*)     Platelet Count 378      % Neutrophils 61      % Lymphocytes 16      % Monocytes 17      Mids % (Monos, Eos, Basos)        % Eosinophils 4      % Basophils 1      % Immature Granulocytes 1      NRBCs per 100 WBC 0      Absolute Neutrophils 5.0      Absolute Lymphocytes 1.3      Absolute Monocytes 1.4 (*)     Mids Abs (Monos, Eos, Basos)        Absolute Eosinophils 0.4      Absolute Basophils 0.1      Absolute Immature Granulocytes 0.1      Absolute NRBCs 0.0     LACTIC ACID WHOLE BLOOD - Normal    Lactic Acid 0.8       CT Abdomen Pelvis w/o Contrast   Final Result   IMPRESSION:    1.  Colonic diverticula without CT evidence for diverticulitis.      2.  Cardiac enlargement. Vascular calcification. Tiny pericardial effusion.      3.  Mosaic attenuation in the lung bases can be seen with air trapping.                Critical care was not performed.     Medical Decision Making  The patient's presentation was of moderate complexity (recent zoster infection left mid abdomen dermatome lesions are healed over without secondary infection).    The patient's evaluation  involved:  ordering and/or review of 3+ test(s) in this encounter (CBC, routine labs, CT abdomen to rule out intra-abdominal pathology as source of the patient's abdominal pain complaint)    The patient's management necessitated moderate risk (prescription drug management including medications given in the ED).    Assessment & Plan    Patient presents complaining of primarily left-sided abdominal pain with history of recent zoster which now appears to be healed over she likely has a post herpetic neuralgia and will restart her on gabapentin.  CT scan did not show any abnormalities.  She did report that she been having black stool but her hemoglobin is stable at 10.  She has an upcoming appointment with her primary care provider.  We will start her on gabapentin 100 mg 3 times a day which can be titrated up by her primary care provider.    I have reviewed the nursing notes. I have reviewed the findings, diagnosis, plan and need for follow up with the patient.    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  This document has been checked and approved by the attending provider.    I, Edd Vincent MD, was physically present and have reviewed and verified the accuracy of this note documented by Alfredito Kyle medical scribe.      Edd Vincent MD    [facility] EMERGENCY DEPARTMENT  10/13/2023     Edd Vincent MD  10/14/23 [id number]

## 2023-11-11 NOTE — PROGRESS NOTE ADULT - PROBLEM SELECTOR PLAN 6
Pt Aox4, denies any pain SOB or distress. No palpitations. /107, . Other VSS. sinus tach on tele. Pt currently on heparin gtt @ 17ml/hr. Pt previously hypertensive and tachy in ED ptA&O4 on RA. denies SOB, chest pain and palpitations. denies HA, discomfort, vision changes.  manual /104 , all other VSS vitals as seen in flowsheet. currently on supplemental O2 given symptomatic anemia  duonebs q6 PRN wheezing/SOB

## 2023-11-15 NOTE — PROGRESS NOTE ADULT - PROBLEM SELECTOR PROBLEM 3
Type 2 diabetes mellitus with diabetic chronic kidney disease, unspecified CKD stage, unspecified whether long term insulin use on unit

## 2024-06-28 NOTE — CONSULT NOTE ADULT - CONSULT REQUESTED DATE/TIME
-- DO NOT REPLY / DO NOT REPLY ALL --  -- This inbox is not monitored  -- Message is from [facility] [employer] ([facility]) --      Message Type:  Refill Medication   Refill request for Pended medication named: Rinvoq 15 mg tablet  Preferred pharmacy verified, and selected.   [facility] - [place], [place] - [address] [place] [address]    Is the patient OUT of Medication?  No        Message: patient is asking for a call back regarding why the rx isn't automatically coming to him, why does he have to call                     20-Jul-2018 16:57